# Patient Record
Sex: FEMALE | Race: WHITE | Employment: UNEMPLOYED | URBAN - METROPOLITAN AREA
[De-identification: names, ages, dates, MRNs, and addresses within clinical notes are randomized per-mention and may not be internally consistent; named-entity substitution may affect disease eponyms.]

---

## 2017-01-01 ENCOUNTER — HOSPITAL ENCOUNTER (OUTPATIENT)
Dept: LAB | Age: 77
Discharge: HOME | End: 2017-10-03

## 2017-01-01 ENCOUNTER — OFFICE VISIT (OUTPATIENT)
Dept: INTERNAL MEDICINE CLINIC | Age: 77
End: 2017-01-01

## 2017-01-01 VITALS
SYSTOLIC BLOOD PRESSURE: 184 MMHG | WEIGHT: 125 LBS | HEIGHT: 60 IN | BODY MASS INDEX: 24.54 KG/M2 | RESPIRATION RATE: 14 BRPM | TEMPERATURE: 97 F | OXYGEN SATURATION: 96 % | HEART RATE: 70 BPM | DIASTOLIC BLOOD PRESSURE: 106 MMHG

## 2017-01-01 DIAGNOSIS — N18.30 STAGE 3 CHRONIC KIDNEY DISEASE (HCC): ICD-10-CM

## 2017-01-01 DIAGNOSIS — I10 UNCONTROLLED HYPERTENSION: ICD-10-CM

## 2017-01-01 DIAGNOSIS — Z00.00 MEDICARE ANNUAL WELLNESS VISIT, SUBSEQUENT: Primary | ICD-10-CM

## 2017-01-01 LAB
ALBUMIN SERPL-MCNC: 4.3 G/DL (ref 3.5–4.8)
ALBUMIN/GLOB SERPL: 1.3 {RATIO} (ref 1.2–2.2)
ALP SERPL-CCNC: 79 IU/L (ref 39–117)
ALT SERPL-CCNC: 16 IU/L (ref 0–32)
AST SERPL-CCNC: 14 IU/L (ref 0–40)
BASOPHILS # BLD AUTO: 0 X10E3/UL (ref 0–0.2)
BASOPHILS NFR BLD AUTO: 0 %
BILIRUB SERPL-MCNC: 0.5 MG/DL (ref 0–1.2)
BUN SERPL-MCNC: 22 MG/DL (ref 8–27)
BUN/CREAT SERPL: 15 (ref 12–28)
CALCIUM SERPL-MCNC: 9.3 MG/DL (ref 8.7–10.3)
CHLORIDE SERPL-SCNC: 100 MMOL/L (ref 96–106)
CO2 SERPL-SCNC: 26 MMOL/L (ref 18–29)
CREAT SERPL-MCNC: 1.45 MG/DL (ref 0.57–1)
EOSINOPHIL # BLD AUTO: 0.2 X10E3/UL (ref 0–0.4)
EOSINOPHIL NFR BLD AUTO: 2 %
ERYTHROCYTE [DISTWIDTH] IN BLOOD BY AUTOMATED COUNT: 14.7 % (ref 12.3–15.4)
GLOBULIN SER CALC-MCNC: 3.3 G/DL (ref 1.5–4.5)
GLUCOSE SERPL-MCNC: 103 MG/DL (ref 65–99)
HCT VFR BLD AUTO: 34.1 % (ref 34–46.6)
HGB BLD-MCNC: 11.3 G/DL (ref 11.1–15.9)
IMM GRANULOCYTES # BLD: 0.1 X10E3/UL (ref 0–0.1)
IMM GRANULOCYTES NFR BLD: 1 %
LYMPHOCYTES # BLD AUTO: 1.8 X10E3/UL (ref 0.7–3.1)
LYMPHOCYTES NFR BLD AUTO: 21 %
MCH RBC QN AUTO: 29.1 PG (ref 26.6–33)
MCHC RBC AUTO-ENTMCNC: 33.1 G/DL (ref 31.5–35.7)
MCV RBC AUTO: 88 FL (ref 79–97)
MONOCYTES # BLD AUTO: 0.5 X10E3/UL (ref 0.1–0.9)
MONOCYTES NFR BLD AUTO: 6 %
NEUTROPHILS # BLD AUTO: 6.2 X10E3/UL (ref 1.4–7)
NEUTROPHILS NFR BLD AUTO: 70 %
PLATELET # BLD AUTO: 270 X10E3/UL (ref 150–379)
POTASSIUM SERPL-SCNC: 3.9 MMOL/L (ref 3.5–5.2)
PROT SERPL-MCNC: 7.6 G/DL (ref 6–8.5)
RBC # BLD AUTO: 3.88 X10E6/UL (ref 3.77–5.28)
SODIUM SERPL-SCNC: 142 MMOL/L (ref 134–144)
WBC # BLD AUTO: 8.7 X10E3/UL (ref 3.4–10.8)

## 2017-01-01 PROCEDURE — 99001 SPECIMEN HANDLING PT-LAB: CPT | Performed by: INTERNAL MEDICINE

## 2017-01-04 ENCOUNTER — HOSPITAL ENCOUNTER (EMERGENCY)
Age: 77
Discharge: LEFT BEFORE DISCHARGE | End: 2017-01-04
Attending: EMERGENCY MEDICINE
Payer: MEDICARE

## 2017-01-04 ENCOUNTER — APPOINTMENT (OUTPATIENT)
Dept: GENERAL RADIOLOGY | Age: 77
End: 2017-01-04
Attending: EMERGENCY MEDICINE
Payer: MEDICARE

## 2017-01-04 ENCOUNTER — APPOINTMENT (OUTPATIENT)
Dept: CT IMAGING | Age: 77
End: 2017-01-04
Attending: EMERGENCY MEDICINE
Payer: MEDICARE

## 2017-01-04 VITALS
OXYGEN SATURATION: 97 % | TEMPERATURE: 98.5 F | BODY MASS INDEX: 23.56 KG/M2 | HEIGHT: 60 IN | HEART RATE: 78 BPM | RESPIRATION RATE: 17 BRPM | WEIGHT: 120 LBS | DIASTOLIC BLOOD PRESSURE: 106 MMHG | SYSTOLIC BLOOD PRESSURE: 180 MMHG

## 2017-01-04 DIAGNOSIS — N39.0 ACUTE UTI: ICD-10-CM

## 2017-01-04 DIAGNOSIS — R47.81 SLURRED SPEECH: Primary | ICD-10-CM

## 2017-01-04 LAB
ALBUMIN SERPL BCP-MCNC: 3.6 G/DL (ref 3.4–5)
ALBUMIN/GLOB SERPL: 1.2 {RATIO} (ref 0.8–1.7)
ALP SERPL-CCNC: 68 U/L (ref 45–117)
ALT SERPL-CCNC: 18 U/L (ref 13–56)
ANION GAP BLD CALC-SCNC: 10 MMOL/L (ref 3–18)
APPEARANCE UR: CLEAR
APTT PPP: 25.1 SEC (ref 23–36.4)
AST SERPL W P-5'-P-CCNC: 13 U/L (ref 15–37)
BACTERIA URNS QL MICRO: ABNORMAL /HPF
BASOPHILS # BLD AUTO: 0 K/UL (ref 0–0.06)
BASOPHILS # BLD: 0 % (ref 0–2)
BILIRUB SERPL-MCNC: 0.5 MG/DL (ref 0.2–1)
BILIRUB UR QL: NEGATIVE
BUN SERPL-MCNC: 21 MG/DL (ref 7–18)
BUN/CREAT SERPL: 13 (ref 12–20)
CALCIUM SERPL-MCNC: 8.3 MG/DL (ref 8.5–10.1)
CHLORIDE SERPL-SCNC: 108 MMOL/L (ref 100–108)
CK MB CFR SERPL CALC: ABNORMAL % (ref 0–4)
CK MB SERPL-MCNC: <0.5 NG/ML (ref 0.5–3.6)
CK SERPL-CCNC: 76 U/L (ref 26–192)
CO2 SERPL-SCNC: 23 MMOL/L (ref 21–32)
COLOR UR: YELLOW
CREAT SERPL-MCNC: 1.64 MG/DL (ref 0.6–1.3)
DIFFERENTIAL METHOD BLD: ABNORMAL
EOSINOPHIL # BLD: 0.2 K/UL (ref 0–0.4)
EOSINOPHIL NFR BLD: 3 % (ref 0–5)
EPITH CASTS URNS QL MICRO: ABNORMAL /LPF (ref 0–5)
ERYTHROCYTE [DISTWIDTH] IN BLOOD BY AUTOMATED COUNT: 13.8 % (ref 11.6–14.5)
GLOBULIN SER CALC-MCNC: 2.9 G/DL (ref 2–4)
GLUCOSE BLD STRIP.AUTO-MCNC: 102 MG/DL (ref 70–110)
GLUCOSE SERPL-MCNC: 96 MG/DL (ref 74–99)
GLUCOSE UR STRIP.AUTO-MCNC: NEGATIVE MG/DL
HCT VFR BLD AUTO: 30.1 % (ref 35–45)
HGB BLD-MCNC: 10.1 G/DL (ref 12–16)
HGB UR QL STRIP: NEGATIVE
INR PPP: 1 (ref 0.8–1.2)
KETONES UR QL STRIP.AUTO: NEGATIVE MG/DL
LEUKOCYTE ESTERASE UR QL STRIP.AUTO: ABNORMAL
LYMPHOCYTES # BLD AUTO: 27 % (ref 21–52)
LYMPHOCYTES # BLD: 2 K/UL (ref 0.9–3.6)
MCH RBC QN AUTO: 30.9 PG (ref 24–34)
MCHC RBC AUTO-ENTMCNC: 33.6 G/DL (ref 31–37)
MCV RBC AUTO: 92 FL (ref 74–97)
MONOCYTES # BLD: 0.5 K/UL (ref 0.05–1.2)
MONOCYTES NFR BLD AUTO: 7 % (ref 3–10)
NEUTS SEG # BLD: 4.7 K/UL (ref 1.8–8)
NEUTS SEG NFR BLD AUTO: 63 % (ref 40–73)
NITRITE UR QL STRIP.AUTO: NEGATIVE
PH UR STRIP: 6.5 [PH] (ref 5–8)
PLATELET # BLD AUTO: 218 K/UL (ref 135–420)
PMV BLD AUTO: 9 FL (ref 9.2–11.8)
POTASSIUM SERPL-SCNC: 4.2 MMOL/L (ref 3.5–5.5)
PROT SERPL-MCNC: 6.5 G/DL (ref 6.4–8.2)
PROT UR STRIP-MCNC: NEGATIVE MG/DL
PROTHROMBIN TIME: 13.2 SEC (ref 11.5–15.2)
RBC # BLD AUTO: 3.27 M/UL (ref 4.2–5.3)
RBC #/AREA URNS HPF: 0 /HPF (ref 0–5)
SODIUM SERPL-SCNC: 141 MMOL/L (ref 136–145)
SP GR UR REFRACTOMETRY: 1 (ref 1–1.03)
TROPONIN I SERPL-MCNC: <0.02 NG/ML (ref 0–0.04)
UROBILINOGEN UR QL STRIP.AUTO: 0.2 EU/DL (ref 0.2–1)
WBC # BLD AUTO: 7.4 K/UL (ref 4.6–13.2)
WBC URNS QL MICRO: ABNORMAL /HPF (ref 0–4)

## 2017-01-04 PROCEDURE — 74011250636 HC RX REV CODE- 250/636

## 2017-01-04 PROCEDURE — 87186 SC STD MICRODIL/AGAR DIL: CPT | Performed by: EMERGENCY MEDICINE

## 2017-01-04 PROCEDURE — 71010 XR CHEST PORT: CPT

## 2017-01-04 PROCEDURE — 85025 COMPLETE CBC W/AUTO DIFF WBC: CPT | Performed by: EMERGENCY MEDICINE

## 2017-01-04 PROCEDURE — 85730 THROMBOPLASTIN TIME PARTIAL: CPT | Performed by: EMERGENCY MEDICINE

## 2017-01-04 PROCEDURE — 87077 CULTURE AEROBIC IDENTIFY: CPT | Performed by: EMERGENCY MEDICINE

## 2017-01-04 PROCEDURE — 85610 PROTHROMBIN TIME: CPT | Performed by: EMERGENCY MEDICINE

## 2017-01-04 PROCEDURE — 96374 THER/PROPH/DIAG INJ IV PUSH: CPT

## 2017-01-04 PROCEDURE — 93005 ELECTROCARDIOGRAM TRACING: CPT

## 2017-01-04 PROCEDURE — 81001 URINALYSIS AUTO W/SCOPE: CPT | Performed by: EMERGENCY MEDICINE

## 2017-01-04 PROCEDURE — 87086 URINE CULTURE/COLONY COUNT: CPT | Performed by: EMERGENCY MEDICINE

## 2017-01-04 PROCEDURE — 80053 COMPREHEN METABOLIC PANEL: CPT | Performed by: EMERGENCY MEDICINE

## 2017-01-04 PROCEDURE — 82962 GLUCOSE BLOOD TEST: CPT

## 2017-01-04 PROCEDURE — 70450 CT HEAD/BRAIN W/O DYE: CPT

## 2017-01-04 PROCEDURE — 82550 ASSAY OF CK (CPK): CPT | Performed by: EMERGENCY MEDICINE

## 2017-01-04 PROCEDURE — 99285 EMERGENCY DEPT VISIT HI MDM: CPT

## 2017-01-04 RX ORDER — NITROFURANTOIN 25; 75 MG/1; MG/1
100 CAPSULE ORAL 2 TIMES DAILY
Qty: 10 CAP | Refills: 0 | Status: SHIPPED | OUTPATIENT
Start: 2017-01-04 | End: 2017-01-09

## 2017-01-04 RX ORDER — LORAZEPAM 2 MG/ML
INJECTION INTRAMUSCULAR
Status: COMPLETED
Start: 2017-01-04 | End: 2017-01-04

## 2017-01-04 RX ORDER — LORAZEPAM 2 MG/ML
1 INJECTION INTRAMUSCULAR
Status: COMPLETED | OUTPATIENT
Start: 2017-01-04 | End: 2017-01-04

## 2017-01-04 RX ADMIN — LORAZEPAM 1 MG: 2 INJECTION INTRAMUSCULAR at 18:14

## 2017-01-04 NOTE — ED NOTES
Assumed care of pt from triage  Per daughter aprx 20 hours ago she noticed pt had slurred speech and seemed more fatigued than normal  Pt states increased fatigue from recent drive from [place]. A&O X 4. No weakness, facial droop or headache. Equal  strength and push against gravity, no pronator drift.   Pt vocal about not wanting to be \"here\"

## 2017-01-04 NOTE — ED PROVIDER NOTES
HPI Comments: 4:43 PM Alphonso Gaytan is a 68 y.o. female with a history of HTN and dementia who presents to the ED for evaluation of slurred speech that began at 6 AM this morning. Patient's daughter states that last night, the patient seemed more tired than normal last night. Last known well time is approximately 20 hours ago, per the patient's daughter. Patient does not feel as if her speech has changed or as if her limbs are weaker anywhere. Patient's daughter states that the patient takes aspirin daily, and that she has had her aspirin today. Patient denies smoking, drinking, or any drug use. She denies any CP or pain anywhere. There are no other concerns at this time. Patient is a 68 y.o. female presenting with aphasia. Aphasia    Pertinent negatives include no diarrhea, no vomiting, no congestion, no ear pain, no headaches, no shortness of breath and no cough.         Past Medical History:   Diagnosis Date    Amblyopia of left eye     Blind left eye     Cataract      bilat, right has been replaced    CRI (chronic renal insufficiency)     Dementia     Essential hypertension, malignant 2014    Hypertension     Left bundle branch block     Otitis media, acute 16     left       Past Surgical History:   Procedure Laterality Date    Hx tonsillectomy      Hx  section  1968    Hx  section  1964    Hx  section  1970    Hx cataract removal Right 2013         Family History:   Problem Relation Age of Onset    Heart Disease Father     Other Brother      ? brain injury       Social History     Social History    Marital status:      Spouse name: N/A    Number of children: N/A    Years of education: N/A     Occupational History    , clerical      Social History Main Topics    Smoking status: Never Smoker    Smokeless tobacco: Not on file    Alcohol use No    Drug use: No    Sexual activity: No     Other Topics Concern    Not on file Social History Narrative         ALLERGIES: Codeine; Levaquin [levofloxacin]; Pcn [penicillins]; and Sulfa (sulfonamide antibiotics)    Review of Systems   Constitutional: Negative for chills, fatigue and fever. HENT: Negative for congestion, ear pain, rhinorrhea and sore throat. Eyes: Negative for pain. Respiratory: Negative for cough and shortness of breath. Cardiovascular: Negative for chest pain and palpitations. Gastrointestinal: Negative for abdominal pain, diarrhea, nausea, rectal pain and vomiting. Genitourinary: Negative for dysuria, flank pain, hematuria, pelvic pain, urgency and vaginal pain. Musculoskeletal: Negative for arthralgias, back pain, myalgias and neck pain. Skin: Negative for rash and wound. Neurological: Positive for speech difficulty (slurred, per the patient's daughter). Negative for dizziness and headaches. All other systems reviewed and are negative. Vitals:    01/04/17 1722 01/04/17 1723 01/04/17 1724 01/04/17 1725   BP:       Pulse: 82 83 80 80   Resp: 17 17 18 17   Temp:       SpO2: 97% 95% 94% 94%   Weight:       Height:       98% on RA, indicating adequate oxygenation. Physical Exam   Constitutional: She is oriented to person, place, and time. She appears well-developed and well-nourished. No distress. HENT:   Head: Normocephalic and atraumatic. Mouth/Throat: Oropharynx is clear and moist.   Eyes: Conjunctivae and EOM are normal. Pupils are equal, round, and reactive to light. No scleral icterus. Neck: Normal range of motion. Neck supple. Cardiovascular: Normal rate, regular rhythm and normal heart sounds. No murmur heard. Pulmonary/Chest: Effort normal and breath sounds normal. No respiratory distress. Abdominal: Soft. Bowel sounds are normal. She exhibits no distension. There is no tenderness. Musculoskeletal: She exhibits no edema. Lymphadenopathy:     She has no cervical adenopathy.    Neurological: She is alert and oriented to person, place, and time. Coordination normal.   Strength 5/5 bilateral upper extremities. Gross sensation intact. No facial droop. Strength 5/5 bilateral lower extremities. Mild slurred speech. Skin: Skin is warm and dry. No rash noted. Psychiatric: She has a normal mood and affect. Her behavior is normal.   Nursing note and vitals reviewed.        MDM  Number of Diagnoses or Management Options  Acute UTI:   Slurred speech:   Diagnosis management comments: Recent trip from [place] pt c/o being increased tired daughter noticed slurred speech last night worse this am now slightly improved no other neuro deficit noted    Ekg; nsr rate 78 no acute st changes     Pt refused any further workup understood risks of leaving and benefits of staying discussed with daughter who also believed the patient is able to understand both risks and benefits of refusing or accepting care     Noted uti after pt left AMA called daughter and sent Rx for macrobid to her pharmacy urine cultured        Amount and/or Complexity of Data Reviewed  Clinical lab tests: ordered and reviewed  Tests in the radiology section of CPT®: ordered and reviewed  Independent visualization of images, tracings, or specimens: yes    Risk of Complications, Morbidity, and/or Mortality  Presenting problems: high  Diagnostic procedures: moderate  Management options: moderate      ED Course       Procedures    Vitals:  Patient Vitals for the past 12 hrs:   Temp Pulse Resp BP SpO2   01/04/17 1725 - 80 17 - 94 %   01/04/17 1724 - 80 18 - 94 %   01/04/17 1723 - 83 17 - 95 %   01/04/17 1722 - 82 17 - 97 %   01/04/17 1721 - 80 16 - 96 %   01/04/17 1720 - 82 14 - 95 %   01/04/17 1719 - 83 16 - 95 %   01/04/17 1718 - 83 17 - 95 %   01/04/17 1717 - 83 17 - 94 %   01/04/17 1715 - 82 18 - 95 %   01/04/17 1714 - 83 15 - 95 %   01/04/17 1713 - 87 16 - 96 %   01/04/17 1712 - 84 21 - 96 %   01/04/17 1711 - 81 18 - 95 %   01/04/17 1710 - 83 20 - 95 % 01/04/17 1709 - 84 22 - 96 %   01/04/17 1708 - 82 17 - 95 %   01/04/17 1657 - 90 18 - 95 %   01/04/17 1656 - 89 13 - 96 %   01/04/17 1655 - 88 16 - 94 %   01/04/17 1654 - 86 19 - 95 %   01/04/17 1653 - 90 22 - 96 %   01/04/17 1652 - 86 18 - 95 %   01/04/17 1651 - 87 19 - 95 %   01/04/17 1647 - 89 18 - 95 %   01/04/17 1645 - - - (!) 157/98 -   01/04/17 1633 98.5 °F (36.9 °C) 82 16 (!) 159/105 98 %   98% on RA, indicating adequate oxygenation. Medications ordered:   Medications   LORazepam (ATIVAN) 2 mg/mL injection (not administered)         Lab findings:  Recent Results (from the past 12 hour(s))   GLUCOSE, POC    Collection Time: 01/04/17  4:50 PM   Result Value Ref Range    Glucose (POC) 102 70 - 110 mg/dL   CBC WITH AUTOMATED DIFF    Collection Time: 01/04/17  5:00 PM   Result Value Ref Range    WBC 7.4 4.6 - 13.2 K/uL    RBC 3.27 (L) 4.20 - 5.30 M/uL    HGB 10.1 (L) 12.0 - 16.0 g/dL    HCT 30.1 (L) 35.0 - 45.0 %    MCV 92.0 74.0 - 97.0 FL    MCH 30.9 24.0 - 34.0 PG    MCHC 33.6 31.0 - 37.0 g/dL    RDW 13.8 11.6 - 14.5 %    PLATELET 102 598 - 741 K/uL    MPV 9.0 (L) 9.2 - 11.8 FL    NEUTROPHILS 63 40 - 73 %    LYMPHOCYTES 27 21 - 52 %    MONOCYTES 7 3 - 10 %    EOSINOPHILS 3 0 - 5 %    BASOPHILS 0 0 - 2 %    ABS. NEUTROPHILS 4.7 1.8 - 8.0 K/UL    ABS. LYMPHOCYTES 2.0 0.9 - 3.6 K/UL    ABS. MONOCYTES 0.5 0.05 - 1.2 K/UL    ABS. EOSINOPHILS 0.2 0.0 - 0.4 K/UL    ABS.  BASOPHILS 0.0 0.0 - 0.06 K/UL    DF AUTOMATED     METABOLIC PANEL, COMPREHENSIVE    Collection Time: 01/04/17  5:00 PM   Result Value Ref Range    Sodium 141 136 - 145 mmol/L    Potassium 4.2 3.5 - 5.5 mmol/L    Chloride 108 100 - 108 mmol/L    CO2 23 21 - 32 mmol/L    Anion gap 10 3.0 - 18 mmol/L    Glucose 96 74 - 99 mg/dL    BUN 21 (H) 7.0 - 18 MG/DL    Creatinine 1.64 (H) 0.6 - 1.3 MG/DL    BUN/Creatinine ratio 13 12 - 20      GFR est AA 37 (L) >60 ml/min/1.73m2    GFR est non-AA 30 (L) >60 ml/min/1.73m2    Calcium 8.3 (L) 8.5 - 10.1 MG/DL Bilirubin, total 0.5 0.2 - 1.0 MG/DL    ALT 18 13 - 56 U/L    AST 13 (L) 15 - 37 U/L    Alk. phosphatase 68 45 - 117 U/L    Protein, total 6.5 6.4 - 8.2 g/dL    Albumin 3.6 3.4 - 5.0 g/dL    Globulin 2.9 2.0 - 4.0 g/dL    A-G Ratio 1.2 0.8 - 1.7     CARDIAC PANEL,(CK, CKMB & TROPONIN)    Collection Time: 01/04/17  5:00 PM   Result Value Ref Range    CK 76 26 - 192 U/L    CK - MB <0.5 (L) 0.5 - 3.6 ng/ml    CK-MB Index CANNOT BE CALCULATED 0.0 - 4.0 %    Troponin-I, Qt. <0.02 0.0 - 0.045 NG/ML   PROTHROMBIN TIME + INR    Collection Time: 01/04/17  5:00 PM   Result Value Ref Range    Prothrombin time 13.2 11.5 - 15.2 sec    INR 1.0 0.8 - 1.2     PTT    Collection Time: 01/04/17  5:00 PM   Result Value Ref Range    aPTT 25.1 23.0 - 36.4 SEC   EKG, 12 LEAD, INITIAL    Collection Time: 01/04/17  5:11 PM   Result Value Ref Range    Ventricular Rate 78 BPM    Atrial Rate 78 BPM    P-R Interval 146 ms    QRS Duration 86 ms    Q-T Interval 406 ms    QTC Calculation (Bezet) 462 ms    Calculated P Axis 31 degrees    Calculated R Axis 9 degrees    Calculated T Axis -12 degrees    Diagnosis       Normal sinus rhythm  T wave abnormality, consider inferior ischemia  Abnormal ECG  When compared with ECG of 01-OCT-2015 02:10,  Left bundle branch block is no longer present         X-Ray, CT or other radiology findings or impressions:  CT HEAD WO CONT   Final Result   IMPRESSION:      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 There is no evidence  of hemorrhage, mass effect or acute infarction    XR CHEST PORT    (Results Pending)   6:17 PM As interpreted by myself, chest x-ray shows no acute cardiopulmonary process. Progress notes, Consult notes or additional Procedure notes:   6:17 PM Patient is repeatedly asking to go home. Will allow the patient to sign out AMA. Discussed with the patient and her daughter the need to return to the ED if any weakness, slurred speech, or other symptoms arise.  Patient and daughter verbalize agreement and understanding. Disposition:  Diagnosis: slurred speech     Disposition: Left AMA    Follow-up Information     Follow up With Details Comments Contact Info    [facility] EMERGENCY DEPT  as soon as you are willing to be seen again  [address] [place] [address]    Yana Goodman MD Schedule an appointment as soon as possible for a visit call tomorrow for ED follow up appointment [address]  [phone]              Patient's Medications   Start Taking    No medications on file   Continue Taking    ASPIRIN DELAYED-RELEASE 81 MG TABLET    Take 1 Tab by mouth daily. ATORVASTATIN (LIPITOR) 80 MG TABLET    Take 1 Tab by mouth nightly. CARVEDILOL (COREG) 25 MG TABLET    Take 1 Tab by mouth two (2) times a day. Indications: HYPERTENSION    CLONIDINE HCL (CATAPRES) 0.2 MG TABLET    Take 1 Tab by mouth two (2) times a day. FLUTICASONE (FLONASE) 50 MCG/ACTUATION NASAL SPRAY    2 Sprays by Both Nostrils route daily. HYDROCHLOROTHIAZIDE (HYDRODIURIL) 12.5 MG TABLET    Take 12.5 mg by mouth daily. LOSARTAN (COZAAR) 100 MG TABLET    Take 1 Tab by mouth daily. These Medications have changed    No medications on file   Stop Taking    No medications on file     Scribe Attestation:   Tracey Velazquez acting as a scribe for and in the presence of Dr. Kofi Marino MD January 04, 2017 at 4:48 PM       Provider Attestation:   I personally performed the services described in the documentation, reviewed the documentation, as recorded by the scribe in my presence, and it accurately and completely records my words and actions. Reviewed and signed by:  Dr. Kofi Marino MD      Signed by:  Monae Traore, January 04, 2017 at 6:21 PM

## 2017-01-04 NOTE — ED TRIAGE NOTES
Per daughter she noticed patient's speech is a little slurred this morning at 0600 am, pt states she just came back from [place] 2 days ago and daughter noticed patient has been feeling tired since then. Denies any other symptoms.

## 2017-01-05 LAB
ATRIAL RATE: 78 BPM
CALCULATED P AXIS, ECG09: 31 DEGREES
CALCULATED R AXIS, ECG10: 9 DEGREES
CALCULATED T AXIS, ECG11: -12 DEGREES
DIAGNOSIS, 93000: NORMAL
P-R INTERVAL, ECG05: 146 MS
Q-T INTERVAL, ECG07: 406 MS
QRS DURATION, ECG06: 86 MS
QTC CALCULATION (BEZET), ECG08: 462 MS
VENTRICULAR RATE, ECG03: 78 BPM

## 2017-01-06 ENCOUNTER — PATIENT OUTREACH (OUTPATIENT)
Dept: INTERNAL MEDICINE CLINIC | Age: 77
End: 2017-01-06

## 2017-01-06 LAB
BACTERIA SPEC CULT: NORMAL
SERVICE CMNT-IMP: NORMAL

## 2017-01-06 NOTE — PROGRESS NOTES
Transitional Care Nurse Navigator Note:  Emergency Department Follow Up for Artemio 1/4/17  Per EMR due to: Per daughter she noticed patient's speech is a little slurred    NN outgoing call to patient. Spoke to daughter/Margaret. She states she is 90% better. Pt is back to baseline activities. Follow up appt offered, pt will keep appt of 1/31/17 at 1030 with Dr Andrew Ford.     Red flags reviewed for altered mental status, change in speech seek medical attention PCP/ED. She verbalized understanding and thanked us for the call.

## 2017-01-31 ENCOUNTER — OFFICE VISIT (OUTPATIENT)
Dept: INTERNAL MEDICINE CLINIC | Age: 77
End: 2017-01-31

## 2017-01-31 VITALS
BODY MASS INDEX: 25.32 KG/M2 | HEART RATE: 74 BPM | RESPIRATION RATE: 16 BRPM | DIASTOLIC BLOOD PRESSURE: 84 MMHG | WEIGHT: 129 LBS | SYSTOLIC BLOOD PRESSURE: 147 MMHG | HEIGHT: 60 IN | TEMPERATURE: 95.9 F | OXYGEN SATURATION: 97 %

## 2017-01-31 DIAGNOSIS — N18.3 CKD (CHRONIC KIDNEY DISEASE), STAGE 3 (MODERATE): ICD-10-CM

## 2017-01-31 DIAGNOSIS — I10 ESSENTIAL HYPERTENSION, MALIGNANT: Primary | Chronic | ICD-10-CM

## 2017-01-31 RX ORDER — MINOXIDIL 2.5 MG/1
TABLET ORAL DAILY
COMMUNITY
End: 2018-01-01

## 2017-01-31 NOTE — PROGRESS NOTES
HISTORY OF PRESENT ILLNESS  Adore Galindo is a 68 y.o. female. Visit Vitals    /84 (BP 1 Location: Left arm, BP Patient Position: Sitting)    Pulse 74    Temp 95.9 °F (35.5 °C) (Oral)    Resp 16    Ht 5' (1.524 m)    Wt 129 lb (58.5 kg)    SpO2 97%    BMI 25.19 kg/m2       HPI Comments: She was diagnosed with UTI in early January and tx's apporopriately    Hypertension    The history is provided by the patient. This is a chronic problem. The current episode started more than 1 week ago. The problem has been gradually improving. Associated symptoms comments: Had an episode of slurred. speech--se ER note. No further sxs of that. There are no associated agents to hypertension. Risk factors include postmenopause and stress. Review of Systems   Constitutional: Negative. Weight up 5 #. Respiratory: Negative. Cardiovascular: Negative. Neurological:        See HPI         Physical Exam   Constitutional: She is oriented to person, place, and time. She appears well-developed and well-nourished. No distress. Cardiovascular: Normal rate and regular rhythm. Pulmonary/Chest: Effort normal and breath sounds normal.   Musculoskeletal: She exhibits no edema. Neurological: She is alert and oriented to person, place, and time. Speech is clear. Skin: Skin is warm and dry. She is not diaphoretic. Psychiatric: She has a normal mood and affect. Nursing note and vitals reviewed. ASSESSMENT and PLAN    ICD-10-CM ICD-9-CM    1. Essential hypertension, malignant I10 401.0    2. CKD (chronic kidney disease), stage 3 (moderate) N18.3 585. 3        Available hospital record reviewed--ER    Kidney function better that time    Overall BP trending downward with new med from cardiology.     F/u 3-4 months

## 2017-01-31 NOTE — PROGRESS NOTES
Pt presented today for f/u htn. Per pt son Dr Timmy Smith added Loniten to pts medication list.  Has pt had any falls since last 30 days? No.  Pt preferred language for health care discussion is english. Advanced Directive? no    Is someone accompanying this pt? Yes; Son Ramsey Wallace    Is the patient using any DME equipment during [address]? No    1. Have you been to the ER, urgent care clinic since your last 30 days? Hospitalized since your last 30 days? Yes; [facility] on 1/4/2017  for slurred speech and UTI    2. Have you seen or consulted any other health care providers outside of the [address] [place] [address] since your last 30 days? No      Pt  has a reminder for a \"due or due soon\" health maintenance. Pt due for zoster.

## 2017-01-31 NOTE — MR AVS SNAPSHOT
Visit Information Date & Time Provider Department Dept. Phone Encounter #  
 1/31/2017 10:30 AM Diomedes Rivera, [address] [id number] Follow-up Instructions Return in about 4 months (around 5/31/2017) for htn. Upcoming Health Maintenance Date Due DTaP/Tdap/Td series (1 - Tdap) 7/8/2017* ZOSTER VACCINE AGE 60> 1/31/2018* MEDICARE YEARLY EXAM 10/25/2017 GLAUCOMA SCREENING Q2Y 11/2/2017 *Topic was postponed. The date shown is not the original due date. Allergies as of 1/31/2017  Review Complete On: 1/31/2017 By: Diomedes Rivera MD  
  
 Severity Noted Reaction Type Reactions Codeine  05/20/2014    Unknown (comments) Unsure due to happening so long Levaquin [Levofloxacin]  09/03/2015    Nausea Only  
 intolerance Pcn [Penicillins]  04/25/2014   Systemic Hives Sulfa (Sulfonamide Antibiotics)  05/20/2014    Unknown (comments) Unsure it was so long ago Current Immunizations  Reviewed on 4/25/2014 Name Date Pneumococcal Conjugate (PCV-13) 8/10/2015 Pneumococcal Polysaccharide (PPSV-23) 5/20/2014 Td 4/25/2011 Not reviewed this visit You Were Diagnosed With   
  
 Codes Comments Essential hypertension, malignant    -  Primary ICD-10-CM: I10 
ICD-9-CM: 401.0 CKD (chronic kidney disease), stage 3 (moderate)     ICD-10-CM: N18.3 ICD-9-CM: 658. 3 Vitals BP Pulse Temp Resp Height(growth percentile) Weight(growth percentile) 147/84 (BP 1 Location: Left arm, BP Patient Position: Sitting) 74 95.9 °F (35.5 °C) (Oral) 16 5' (1.524 m) 129 lb (58.5 kg) SpO2 BMI OB Status Smoking Status 97% 25.19 kg/m2 Postmenopausal Never Smoker Vitals History BMI and BSA Data Body Mass Index Body Surface Area  
 25.19 kg/m 2 1.57 m 2 Preferred Pharmacy Pharmacy Name Phone  [address] AT [facility] [address] [facility] [phone] Your Updated Medication List  
  
   
This list is accurate as of: 1/31/17 11:24 AM.  Always use your most recent med list.  
  
  
  
  
 aspirin delayed-release 81 mg tablet Take 1 Tab by mouth daily. atorvastatin 80 mg tablet Commonly known as:  LIPITOR Take 1 Tab by mouth nightly. carvedilol 25 mg tablet Commonly known as:  Kathlean Due Take 1 Tab by mouth two (2) times a day. Indications: HYPERTENSION  
  
 cloNIDine HCl 0.2 mg tablet Commonly known as:  CATAPRES Take 1 Tab by mouth two (2) times a day. fluticasone 50 mcg/actuation nasal spray Commonly known as:  Alric Eaves 2 Sprays by Both Nostrils route daily. hydroCHLOROthiazide 12.5 mg tablet Commonly known as:  HYDRODIURIL Take 12.5 mg by mouth daily. losartan 100 mg tablet Commonly known as:  COZAAR Take 1 Tab by mouth daily. minoxidil 2.5 mg tablet Commonly known as:  Ese [place] Take  by mouth daily. Follow-up Instructions Return in about 4 months (around 5/31/2017) for htn. Introducing [facility]! Dear Mission: 
Thank you for requesting a [employer] account. Our records indicate that you already have an active InfoAssure account. You can access your account anytime at [email or web address] Did you know that you can access your hospital and ER discharge instructions at any time in Info[employer]? You can also review all of your test results from your hospital stay or ER visit. Additional Information If you have questions, please visit the Frequently Asked Questions section of the Info[employer] website at [email or web address] Remember, InfoAssure is NOT to be used for urgent needs. For medical emergencies, dial 911. Now available from your iPhone and Android! Please provide this summary of care documentation to your next provider. Your primary care clinician is listed as [facility]. If you have any questions after today's visit, please call [phone].

## 2017-04-14 RX ORDER — CLONIDINE HYDROCHLORIDE 0.2 MG/1
TABLET ORAL
Qty: 180 TAB | Refills: 5 | Status: SHIPPED | OUTPATIENT
Start: 2017-04-14 | End: 2018-01-01

## 2017-10-03 NOTE — ACP (ADVANCE CARE PLANNING)
Advance Directive:  1. Do you have an advance directive in place? Patient Reply:not on file    2. If not, would you like material regarding how to put one in place?  Patient Reply: son states has at home and will scan in and send via my chart

## 2017-10-03 NOTE — PROGRESS NOTES
HISTORY OF PRESENT ILLNESS  Adore Galindo is a 68 y.o. female. Visit Vitals    BP (!) 184/106    Pulse 70    Temp 97 °F (36.1 °C) (Oral)    Resp 14    Ht 5' (1.524 m)    Wt 125 lb (56.7 kg)    SpO2 96%    BMI 24.41 kg/m2       Hypertension    The history is provided by the patient. This is a chronic problem. The current episode started more than 1 week ago. The problem has not changed since onset. There are no associated agents to hypertension. Risk factors include stress and postmenopause. Review of Systems   Constitutional: Negative. Respiratory: Negative. Cardiovascular: Negative. Physical Exam   Constitutional: She is oriented to person, place, and time. She appears well-developed and well-nourished. No distress. Cardiovascular: Normal rate and regular rhythm. Pulmonary/Chest: Effort normal and breath sounds normal.   Neurological: She is alert and oriented to person, place, and time. Skin: Skin is warm and dry. She is not diaphoretic. Psychiatric:   Vary anxiety prone  Dementia     Nursing note and vitals reviewed. ASSESSMENT and PLAN    ICD-10-CM ICD-9-CM            Uncontrolled hypertension D46 308.6 METABOLIC PANEL, COMPREHENSIVE      CBC WITH AUTOMATED DIFF    Stage 3 chronic kidney disease L25.4 080.7 METABOLIC PANEL, COMPREHENSIVE      CBC WITH AUTOMATED DIFF       BP very labile--was excellent with Dr.Goldberg in May    Update lab--pt does not like needles of any kind    Pt's underlying psychiatric illness makes management difficult. Have tried to minimize lab and tests as much as possible, but lab is 8 months old and her BP is uncontrolled today.  ? Status of kidneys    F/u 6 months for BP

## 2017-10-03 NOTE — PATIENT INSTRUCTIONS

## 2017-10-03 NOTE — PROGRESS NOTES
This is a Subsequent Medicare Annual Wellness Exam (AWV) (Performed 12 months after IPPE or effective date of Medicare Part B enrollment, Once in a lifetime)    I have reviewed the patient's medical history in detail and updated the computerized patient record. History     Past Medical History:   Diagnosis Date    Amblyopia of left eye     Blind left eye     Cataract     bilat, right has been replaced    CRI (chronic renal insufficiency)     Dementia     Essential hypertension, malignant 2014    Hypertension     Left bundle branch block     Otitis media, acute 16    left      Past Surgical History:   Procedure Laterality Date    HX CATARACT REMOVAL Right     HX  SECTION  1968    HX  SECTION  1964    HX  SECTION      HX TONSILLECTOMY       Current Outpatient Prescriptions   Medication Sig Dispense Refill    cloNIDine HCl (CATAPRES) 0.2 mg tablet TAKE 1 TABLET BY MOUTH TWICE DAILY 180 Tab 5    minoxidil (LONITEN) 2.5 mg tablet Take  by mouth daily.  fluticasone (FLONASE) 50 mcg/actuation nasal spray 2 Sprays by Both Nostrils route daily. 1 Bottle 5    losartan (COZAAR) 100 mg tablet Take 1 Tab by mouth daily. 30 Tab 5    Hydrochlorothiazide (HYDRODIURIL) 12.5 mg tablet Take 12.5 mg by mouth daily.  aspirin delayed-release 81 mg tablet Take 1 Tab by mouth daily. 1 Tab 0    carvedilol (COREG) 25 mg tablet Take 1 Tab by mouth two (2) times a day.  Indications: HYPERTENSION 60 Tab 5    atorvastatin (LIPITOR) 80 mg tablet TAKE 1 TABLET BY MOUTH EVERY NIGHT 30 Tab 5     Allergies   Allergen Reactions    Codeine Unknown (comments)     Unsure due to happening so long    Levaquin [Levofloxacin] Nausea Only     intolerance    Pcn [Penicillins] Hives    Sulfa (Sulfonamide Antibiotics) Unknown (comments)     Unsure it was so long ago     Family History   Problem Relation Age of Onset    Heart Disease Father     Other Brother      ? brain injury Social History   Substance Use Topics    Smoking status: Never Smoker    Smokeless tobacco: Never Used    Alcohol use No     Patient Active Problem List   Diagnosis Code    Essential hypertension, malignant I10    NESS (acute kidney injury) ([facility] [address] [facility] N17.9    Dehydration E86.0    Abnormality of gait and mobility R26.9    Stroke [facility] I63.9    CKD (chronic kidney disease) N18.9    Uncontrolled hypertension I10       Depression Risk Factor Screening:     PHQ over the last two weeks 10/3/2017   Little interest or pleasure in doing things Not at all   Feeling down, depressed or hopeless Not at all   Total Score PHQ 2 0     Alcohol Risk Factor Screening: You do not drink alcohol or very rarely. Functional Ability and Level of Safety:   Hearing Loss  Hearing is good. Activities of Daily Living  The home contains: grab bars. Has checked for rugs and cords  Patient needs help with:  transportation and managing money    Fall Risk  Fall Risk Assessment, last 12 mths 10/3/2017   Able to walk? Yes   Fall in past 12 months? No       Abuse Screen  Patient is not abused    Cognitive Screening   Evaluation of Cognitive Function:  Has your family/caregiver stated any concerns about your memory: yes  Abnormal--know mild cognitive deficits. No significant change    Patient Care Team   Patient Care Team:  Bobo Up MD as PCP - General (Internal Medicine)  Babak Holloway MD (Obstetrics & Gynecology)  Leopoldo Kaplan MD (Cardiology)  Argelia Johnston MD as Consulting Provider (Ophthalmology)  Whitley Ndiaye MD as Consulting Provider (Otolaryngology)  Vanessa Rivera RN as Ambulatory Care Navigator    Assessment/Plan   Education and counseling provided:  Are appropriate based on today's review and evaluation    Diagnoses and all orders for this visit:    1.  Medicare annual wellness visit, subsequent      Pt declines all vaccines, Mammogram and BMD    Health Maintenance Due   Topic Date Due    INFLUENZA AGE 9 TO ADULT  08/01/2017    GLAUCOMA SCREENING Q2Y  11/02/2017

## 2017-10-03 NOTE — PROGRESS NOTES
Chief Complaint   Patient presents with    Annual Wellness Visit       Pt preferred language for health care discussion is english. Is someone accompanying this pt? Yes, son    Is the patient using any DME equipment during OV? no    Depression Screening:  PHQ over the last two weeks 10/3/2017 1/31/2017 7/8/2016 7/6/2015 4/25/2014   Little interest or pleasure in doing things Not at all Not at all Not at all Not at all Not at all   Feeling down, depressed or hopeless Not at all Not at all Not at all Not at all Not at all   Total Score PHQ 2 0 0 0 0 0       Learning Assessment:  Learning Assessment 4/25/2014   PRIMARY LEARNER Patient   HIGHEST LEVEL OF EDUCATION - PRIMARY LEARNER  [facility] PRIMARY LEARN[facility] NONE   CO-LEARNER CAREGIVER No   PRIMARY LANGUAGE ENGLISH   LEARNER PREFERENCE PRIMARY READING   ANSWERED BY patient   RELATIONSHIP SELF       Abuse Screening:  Abuse Screening Questionnaire 4/25/2014   Do you ever feel afraid of your partner? N   Are you in a relationship with someone who physically or mentally threatens you? N   Is it safe for you to go home? Y       Fall Risk  Fall Risk Assessment, last 12 mths 10/3/2017 1/31/2017 7/8/2016 7/6/2015 4/25/2014   Able to walk? Yes Yes Yes Yes Yes   Fall in past 12 months? No No No No No         Health Maintenance reviewed and discussed per provider. Yes    Pt is due for influenza (declined), glaucoma screening (getting release). Please order/place referral if appropriate. Pt currently taking Antiplatelet therapy? Yes aspirin    Advance Directive:  1. Do you have an advance directive in place? Patient Reply:not on file    2. If not, would you like material regarding how to put one in place? Patient Reply: son states has at home and will scan in and send via my chart    Coordination of Care:  1. Have you been to the ER, urgent care clinic since your last visit? Hospitalized since your last visit? no    2.  Have you seen or consulted any other health care providers outside of the [address] [place] [address] since your last visit? Include any pap smears or colon screening. no    Please see Red banners under Allergies, Med rec, Immunizations to remove outside inquires. All correct information has been verified with patient and added to chart. Medication's patient's would liked removed:  zocor.

## 2017-10-03 NOTE — MR AVS SNAPSHOT
Visit Information Date & Time Provider Department Dept. Phone Encounter #  
 10/3/2017 10:00 AM Jose Sanchez, [address] [id number] Follow-up Instructions Return in about 6 months (around 4/3/2018) for htn. Upcoming Health Maintenance Date Due INFLUENZA AGE 9 TO ADULT 8/1/2017 GLAUCOMA SCREENING Q2Y 11/2/2017 ZOSTER VACCINE AGE 60> 1/31/2018* DTaP/Tdap/Td series (1 - Tdap) 10/3/2018* MEDICARE YEARLY EXAM 10/25/2017 *Topic was postponed. The date shown is not the original due date. Allergies as of 10/3/2017  Review Complete On: 10/3/2017 By: Jose Sanchez MD  
  
 Severity Noted Reaction Type Reactions Codeine  05/20/2014    Unknown (comments) Unsure due to happening so long Levaquin [Levofloxacin]  09/03/2015    Nausea Only  
 intolerance Pcn [Penicillins]  04/25/2014   Systemic Hives Sulfa (Sulfonamide Antibiotics)  05/20/2014    Unknown (comments) Unsure it was so long ago Current Immunizations  Reviewed on 4/25/2014 Name Date Pneumococcal Conjugate (PCV-13) 8/10/2015 Pneumococcal Polysaccharide (PPSV-23) 5/20/2014 Td 4/25/2011 Not reviewed this visit You Were Diagnosed With   
  
 Codes Comments Medicare annual wellness visit, subsequent    -  Primary ICD-10-CM: Z00.00 ICD-9-CM: V70.0 Uncontrolled hypertension     ICD-10-CM: I10 
ICD-9-CM: 401.9 Stage 3 chronic kidney disease     ICD-10-CM: N18.3 ICD-9-CM: 794. 3 Vitals BP Pulse Temp Resp Height(growth percentile) Weight(growth percentile) (!) 184/106 70 97 °F (36.1 °C) (Oral) 14 5' (1.524 m) 125 lb (56.7 kg) SpO2 BMI OB Status Smoking Status 96% 24.41 kg/m2 Postmenopausal Never Smoker Vitals History BMI and BSA Data Body Mass Index Body Surface Area  
 24.41 kg/m 2 1.55 m 2 Preferred Pharmacy Pharmacy Name Phone [facility] [place], [address] [phone] Your Updated Medication List  
  
   
This list is accurate as of: 10/3/17 10:39 AM.  Always use your most recent med list.  
  
  
  
  
 aspirin delayed-release 81 mg tablet Take 1 Tab by mouth daily. atorvastatin 80 mg tablet Commonly known as:  LIPITOR  
TAKE 1 TABLET BY MOUTH EVERY NIGHT  
  
 carvedilol 25 mg tablet Commonly known as:  Payal Dome Take 1 Tab by mouth two (2) times a day. Indications: HYPERTENSION  
  
 cloNIDine HCl 0.2 mg tablet Commonly known as:  CATAPRES  
TAKE 1 TABLET BY MOUTH TWICE DAILY  
  
 fluticasone 50 mcg/actuation nasal spray Commonly known as:  Marty Dixie 2 Sprays by Both Nostrils route daily. hydroCHLOROthiazide 12.5 mg tablet Commonly known as:  HYDRODIURIL Take 12.5 mg by mouth daily. losartan 100 mg tablet Commonly known as:  COZAAR Take 1 Tab by mouth daily. minoxidil 2.5 mg tablet Commonly known as:  Lavada Liu Take  by mouth daily. Follow-up Instructions Return in about 6 months (around 4/3/2018) for htn. To-Do List   
 10/03/2017 Lab:  CBC WITH AUTOMATED DIFF   
  
 10/03/2017 Lab:  METABOLIC PANEL, COMPREHENSIVE Patient Instructions Medicare Wellness Visit, Female The best way to live healthy is to have a healthy lifestyle by eating a well-balanced diet, exercising regularly, limiting alcohol and stopping smoking. Regular physical exams and screening tests are another way to keep healthy. Preventive exams provided by your health care provider can find health problems before they become diseases or illnesses. Preventive services including immunizations, screening tests, monitoring and exams can help you take care of your own health.  
 
All people over age 72 should have a pneumovax  and and a prevnar shot to prevent pneumonia. These are once in a lifetime unless you and your provider decide differently. All people over 65 should have a yearly flu shot and a tetanus vaccine every 10 years. A bone mass density to screen for osteoporosis or thinning of the bones should be done every 2 years after 65. Screening for diabetes mellitus with a blood sugar test should be done every year. Glaucoma is a disease of the eye due to increased ocular pressure that can lead to blindness and it should be done every year by an eye professional. 
 
Cardiovascular screening tests that check for elevated lipids (fatty part of blood) which can lead to heart disease and strokes should be done every 5 years. Colorectal screening that evaluates for blood or polyps in your colon should be done yearly as a stool test or every five years as a flexible sigmoidoscope or every 10 years as a colonoscopy up to age 76. Breast cancer screening with a mammogram is recommended biennially  for women age 54-69. Screening for cervical cancer with a pap smear and pelvic exam is recommended for women after age 72 years every 2 years up to age 79 or when the provider and patient decide to stop. If there is a history of cervical abnormalities or other increased risk for cancer then the test is recommended yearly. Hepatitis C screening is also recommended for anyone born between [birth year] through [address]. A shingles vaccine is also recommended once in a lifetime after age 61. Your Medicare Wellness Exam is recommended annually. Here is a list of your current Health Maintenance items with a due date: 
Health Maintenance Due Topic Date Due  
 Flu Vaccine  08/01/2017  Glaucoma Screening   11/02/2017 Introducing [facility]! Dear Era Piedra: 
Thank you for requesting a [email or web address] account. Our records indicate that you already have an active [email or web address] account.   You can access your account anytime at [email or web address]. [email or web address] Did you know that you can access your hospital and ER discharge instructions at any time in [employer]? You can also review all of your test results from your hospital stay or ER visit. Additional Information If you have questions, please visit the Frequently Asked Questions section of the [employer] website at [email or web address] Remember, [employer] is NOT to be used for urgent needs. For medical emergencies, dial 911. Now available from your iPhone and Android! Please provide this summary of care documentation to your next provider. Your primary care clinician is listed as [facility]. If you have any questions after today's visit, please call [phone].

## 2018-01-01 ENCOUNTER — TELEPHONE (OUTPATIENT)
Dept: INTERNAL MEDICINE CLINIC | Age: 78
End: 2018-01-01

## 2018-01-01 ENCOUNTER — HOSPITAL ENCOUNTER (EMERGENCY)
Age: 78
Discharge: HOME | End: 2018-02-17
Attending: EMERGENCY MEDICINE
Payer: MEDICARE

## 2018-01-01 ENCOUNTER — APPOINTMENT (OUTPATIENT)
Dept: CT IMAGING | Age: 78
DRG: 391 | End: 2018-01-01
Attending: EMERGENCY MEDICINE
Payer: MEDICARE

## 2018-01-01 ENCOUNTER — HOME CARE VISIT (OUTPATIENT)
Dept: SCHEDULING | Facility: HOME HEALTH | Age: 78
End: 2018-01-01
Payer: MEDICARE

## 2018-01-01 ENCOUNTER — ANESTHESIA (OUTPATIENT)
Dept: ENDOSCOPY | Age: 78
DRG: 682 | End: 2018-01-01
Payer: MEDICARE

## 2018-01-01 ENCOUNTER — HOME CARE VISIT (OUTPATIENT)
Dept: HOSPICE | Facility: HOSPICE | Age: 78
End: 2018-01-01
Payer: MEDICARE

## 2018-01-01 ENCOUNTER — OFFICE VISIT (OUTPATIENT)
Dept: INTERNAL MEDICINE CLINIC | Age: 78
End: 2018-01-01

## 2018-01-01 ENCOUNTER — APPOINTMENT (OUTPATIENT)
Dept: GENERAL RADIOLOGY | Age: 78
DRG: 682 | End: 2018-01-01
Attending: NURSE PRACTITIONER
Payer: MEDICARE

## 2018-01-01 ENCOUNTER — APPOINTMENT (OUTPATIENT)
Dept: NUCLEAR MEDICINE | Age: 78
DRG: 391 | End: 2018-01-01
Attending: INTERNAL MEDICINE
Payer: MEDICARE

## 2018-01-01 ENCOUNTER — APPOINTMENT (OUTPATIENT)
Dept: GENERAL RADIOLOGY | Age: 78
End: 2018-01-01
Attending: EMERGENCY MEDICINE
Payer: MEDICARE

## 2018-01-01 ENCOUNTER — APPOINTMENT (OUTPATIENT)
Dept: CT IMAGING | Age: 78
DRG: 682 | End: 2018-01-01
Attending: INTERNAL MEDICINE
Payer: MEDICARE

## 2018-01-01 ENCOUNTER — PATIENT OUTREACH (OUTPATIENT)
Dept: INTERNAL MEDICINE CLINIC | Age: 78
End: 2018-01-01

## 2018-01-01 ENCOUNTER — HOSPITAL ENCOUNTER (INPATIENT)
Age: 78
LOS: 8 days | Discharge: NURSING, REHAB OR LONG-TERM CARE | DRG: 391 | End: 2018-03-22
Attending: EMERGENCY MEDICINE | Admitting: HOSPITALIST
Payer: MEDICARE

## 2018-01-01 ENCOUNTER — APPOINTMENT (OUTPATIENT)
Dept: GENERAL RADIOLOGY | Age: 78
DRG: 682 | End: 2018-01-01
Attending: HOSPITALIST
Payer: MEDICARE

## 2018-01-01 ENCOUNTER — APPOINTMENT (OUTPATIENT)
Dept: GENERAL RADIOLOGY | Age: 78
DRG: 391 | End: 2018-01-01
Attending: HOSPITALIST
Payer: MEDICARE

## 2018-01-01 ENCOUNTER — ANESTHESIA EVENT (OUTPATIENT)
Dept: ENDOSCOPY | Age: 78
DRG: 391 | End: 2018-01-01
Payer: MEDICARE

## 2018-01-01 ENCOUNTER — APPOINTMENT (OUTPATIENT)
Dept: CT IMAGING | Age: 78
DRG: 682 | End: 2018-01-01
Attending: EMERGENCY MEDICINE
Payer: MEDICARE

## 2018-01-01 ENCOUNTER — APPOINTMENT (OUTPATIENT)
Dept: CT IMAGING | Age: 78
End: 2018-01-01
Attending: EMERGENCY MEDICINE
Payer: MEDICARE

## 2018-01-01 ENCOUNTER — HOSPITAL ENCOUNTER (EMERGENCY)
Age: 78
Discharge: HOME | End: 2018-03-03
Attending: EMERGENCY MEDICINE
Payer: MEDICARE

## 2018-01-01 ENCOUNTER — APPOINTMENT (OUTPATIENT)
Dept: GENERAL RADIOLOGY | Age: 78
DRG: 391 | End: 2018-01-01
Attending: EMERGENCY MEDICINE
Payer: MEDICARE

## 2018-01-01 ENCOUNTER — APPOINTMENT (OUTPATIENT)
Dept: CT IMAGING | Age: 78
DRG: 391 | End: 2018-01-01
Attending: HOSPITALIST
Payer: MEDICARE

## 2018-01-01 ENCOUNTER — HOSPICE ADMISSION (OUTPATIENT)
Dept: HOSPICE | Facility: HOSPICE | Age: 78
End: 2018-01-01
Payer: MEDICARE

## 2018-01-01 ENCOUNTER — ANESTHESIA (OUTPATIENT)
Dept: ENDOSCOPY | Age: 78
DRG: 391 | End: 2018-01-01
Payer: MEDICARE

## 2018-01-01 ENCOUNTER — HOSPITAL ENCOUNTER (INPATIENT)
Age: 78
LOS: 17 days | Discharge: HOME | DRG: 682 | End: 2018-04-23
Attending: EMERGENCY MEDICINE | Admitting: HOSPITALIST
Payer: MEDICARE

## 2018-01-01 ENCOUNTER — APPOINTMENT (OUTPATIENT)
Dept: GENERAL RADIOLOGY | Age: 78
End: 2018-01-01
Attending: PHYSICIAN ASSISTANT
Payer: MEDICARE

## 2018-01-01 ENCOUNTER — HOSPITAL ENCOUNTER (EMERGENCY)
Age: 78
Discharge: HOME | End: 2018-02-12
Attending: EMERGENCY MEDICINE | Admitting: EMERGENCY MEDICINE
Payer: MEDICARE

## 2018-01-01 ENCOUNTER — ANESTHESIA EVENT (OUTPATIENT)
Dept: ENDOSCOPY | Age: 78
DRG: 682 | End: 2018-01-01
Payer: MEDICARE

## 2018-01-01 VITALS — RESPIRATION RATE: 16 BRPM | HEART RATE: 88 BPM | TEMPERATURE: 98.2 F | OXYGEN SATURATION: 92 %

## 2018-01-01 VITALS
RESPIRATION RATE: 20 BRPM | SYSTOLIC BLOOD PRESSURE: 166 MMHG | WEIGHT: 113.98 LBS | HEIGHT: 60 IN | TEMPERATURE: 97.5 F | DIASTOLIC BLOOD PRESSURE: 95 MMHG | HEART RATE: 110 BPM | OXYGEN SATURATION: 100 % | BODY MASS INDEX: 22.38 KG/M2

## 2018-01-01 VITALS
HEIGHT: 60 IN | OXYGEN SATURATION: 98 % | DIASTOLIC BLOOD PRESSURE: 58 MMHG | RESPIRATION RATE: 16 BRPM | WEIGHT: 113 LBS | SYSTOLIC BLOOD PRESSURE: 118 MMHG | HEART RATE: 106 BPM | BODY MASS INDEX: 22.19 KG/M2 | TEMPERATURE: 97.2 F

## 2018-01-01 VITALS
OXYGEN SATURATION: 90 % | HEART RATE: 120 BPM | TEMPERATURE: 98.2 F | DIASTOLIC BLOOD PRESSURE: 62 MMHG | SYSTOLIC BLOOD PRESSURE: 110 MMHG | RESPIRATION RATE: 14 BRPM

## 2018-01-01 VITALS
DIASTOLIC BLOOD PRESSURE: 104 MMHG | BODY MASS INDEX: 23.56 KG/M2 | SYSTOLIC BLOOD PRESSURE: 161 MMHG | RESPIRATION RATE: 23 BRPM | OXYGEN SATURATION: 95 % | TEMPERATURE: 97.5 F | WEIGHT: 120 LBS | HEIGHT: 60 IN | HEART RATE: 93 BPM

## 2018-01-01 VITALS
RESPIRATION RATE: 16 BRPM | OXYGEN SATURATION: 98 % | HEART RATE: 89 BPM | WEIGHT: 95 LBS | DIASTOLIC BLOOD PRESSURE: 76 MMHG | SYSTOLIC BLOOD PRESSURE: 117 MMHG | TEMPERATURE: 97.9 F | BODY MASS INDEX: 18.65 KG/M2 | HEIGHT: 60 IN

## 2018-01-01 VITALS
DIASTOLIC BLOOD PRESSURE: 103 MMHG | RESPIRATION RATE: 17 BRPM | SYSTOLIC BLOOD PRESSURE: 153 MMHG | HEIGHT: 60 IN | WEIGHT: 107.9 LBS | BODY MASS INDEX: 21.18 KG/M2 | HEART RATE: 77 BPM | OXYGEN SATURATION: 94 % | TEMPERATURE: 97.3 F

## 2018-01-01 VITALS
TEMPERATURE: 97.6 F | HEIGHT: 60 IN | WEIGHT: 109 LBS | HEART RATE: 78 BPM | SYSTOLIC BLOOD PRESSURE: 120 MMHG | BODY MASS INDEX: 21.4 KG/M2 | OXYGEN SATURATION: 97 % | DIASTOLIC BLOOD PRESSURE: 80 MMHG | RESPIRATION RATE: 15 BRPM

## 2018-01-01 VITALS
HEART RATE: 100 BPM | OXYGEN SATURATION: 99 % | RESPIRATION RATE: 16 BRPM | DIASTOLIC BLOOD PRESSURE: 72 MMHG | SYSTOLIC BLOOD PRESSURE: 120 MMHG | TEMPERATURE: 98.2 F

## 2018-01-01 VITALS
TEMPERATURE: 98.2 F | RESPIRATION RATE: 18 BRPM | HEART RATE: 100 BPM | DIASTOLIC BLOOD PRESSURE: 52 MMHG | SYSTOLIC BLOOD PRESSURE: 90 MMHG | OXYGEN SATURATION: 90 %

## 2018-01-01 VITALS
RESPIRATION RATE: 20 BRPM | OXYGEN SATURATION: 98 % | HEART RATE: 75 BPM | TEMPERATURE: 97.3 F | DIASTOLIC BLOOD PRESSURE: 90 MMHG | SYSTOLIC BLOOD PRESSURE: 147 MMHG

## 2018-01-01 VITALS
HEART RATE: 101 BPM | OXYGEN SATURATION: 97 % | WEIGHT: 95 LBS | TEMPERATURE: 97.5 F | RESPIRATION RATE: 21 BRPM | BODY MASS INDEX: 18.65 KG/M2 | SYSTOLIC BLOOD PRESSURE: 139 MMHG | HEIGHT: 60 IN | DIASTOLIC BLOOD PRESSURE: 74 MMHG

## 2018-01-01 VITALS
SYSTOLIC BLOOD PRESSURE: 150 MMHG | OXYGEN SATURATION: 97 % | RESPIRATION RATE: 18 BRPM | DIASTOLIC BLOOD PRESSURE: 90 MMHG | TEMPERATURE: 98.2 F | HEART RATE: 72 BPM

## 2018-01-01 DIAGNOSIS — Z71.89 ACP (ADVANCE CARE PLANNING): ICD-10-CM

## 2018-01-01 DIAGNOSIS — R41.82 ALTERED MENTAL STATUS, UNSPECIFIED ALTERED MENTAL STATUS TYPE: ICD-10-CM

## 2018-01-01 DIAGNOSIS — N28.9 ACUTE RENAL INSUFFICIENCY: Primary | ICD-10-CM

## 2018-01-01 DIAGNOSIS — N39.0 URINARY TRACT INFECTION WITHOUT HEMATURIA, SITE UNSPECIFIED: ICD-10-CM

## 2018-01-01 DIAGNOSIS — E43 SEVERE PROTEIN-CALORIE MALNUTRITION (GOMEZ: LESS THAN 60% OF STANDARD WEIGHT) (HCC): ICD-10-CM

## 2018-01-01 DIAGNOSIS — J18.9 COMMUNITY ACQUIRED PNEUMONIA OF RIGHT LOWER LOBE OF LUNG: Primary | ICD-10-CM

## 2018-01-01 DIAGNOSIS — R11.2 INTRACTABLE VOMITING WITH NAUSEA, UNSPECIFIED VOMITING TYPE: Primary | ICD-10-CM

## 2018-01-01 DIAGNOSIS — B37.31 YEAST VAGINITIS: ICD-10-CM

## 2018-01-01 DIAGNOSIS — R11.2 NAUSEA AND VOMITING, INTRACTABILITY OF VOMITING NOT SPECIFIED, UNSPECIFIED VOMITING TYPE: ICD-10-CM

## 2018-01-01 DIAGNOSIS — Z71.89 ADVANCED CARE PLANNING/COUNSELING DISCUSSION: ICD-10-CM

## 2018-01-01 DIAGNOSIS — N30.00 ACUTE CYSTITIS WITHOUT HEMATURIA: Primary | ICD-10-CM

## 2018-01-01 DIAGNOSIS — R63.4 WEIGHT LOSS: ICD-10-CM

## 2018-01-01 DIAGNOSIS — E86.0 DEHYDRATION: ICD-10-CM

## 2018-01-01 DIAGNOSIS — R11.0 NAUSEA: ICD-10-CM

## 2018-01-01 DIAGNOSIS — Z09 FOLLOW UP: Primary | ICD-10-CM

## 2018-01-01 DIAGNOSIS — K80.20 CALCULUS OF GALLBLADDER WITHOUT CHOLECYSTITIS WITHOUT OBSTRUCTION: ICD-10-CM

## 2018-01-01 DIAGNOSIS — R62.7 ADULT FAILURE TO THRIVE: ICD-10-CM

## 2018-01-01 DIAGNOSIS — R11.2 NON-INTRACTABLE VOMITING WITH NAUSEA, UNSPECIFIED VOMITING TYPE: Primary | ICD-10-CM

## 2018-01-01 DIAGNOSIS — R63.0 DECREASED APPETITE: ICD-10-CM

## 2018-01-01 DIAGNOSIS — R77.8 ELEVATED TROPONIN: ICD-10-CM

## 2018-01-01 DIAGNOSIS — R53.81 DEBILITY: ICD-10-CM

## 2018-01-01 DIAGNOSIS — F03.90 DEMENTIA WITHOUT BEHAVIORAL DISTURBANCE, UNSPECIFIED DEMENTIA TYPE: ICD-10-CM

## 2018-01-01 DIAGNOSIS — J18.9 PNEUMONIA OF RIGHT MIDDLE LOBE DUE TO INFECTIOUS ORGANISM: Primary | ICD-10-CM

## 2018-01-01 LAB
ABO + RH BLD: NORMAL
ALBUMIN SERPL-MCNC: 1.6 G/DL (ref 3.4–5)
ALBUMIN SERPL-MCNC: 2.5 G/DL (ref 3.4–5)
ALBUMIN SERPL-MCNC: 2.7 G/DL (ref 3.4–5)
ALBUMIN SERPL-MCNC: 2.7 G/DL (ref 3.4–5)
ALBUMIN SERPL-MCNC: 2.8 G/DL (ref 3.4–5)
ALBUMIN SERPL-MCNC: 3.1 G/DL (ref 3.4–5)
ALBUMIN SERPL-MCNC: 3.2 G/DL (ref 3.4–5)
ALBUMIN SERPL-MCNC: 3.5 G/DL (ref 3.4–5)
ALBUMIN SERPL-MCNC: 3.7 G/DL (ref 3.4–5)
ALBUMIN/GLOB SERPL: 0.6 {RATIO} (ref 0.8–1.7)
ALBUMIN/GLOB SERPL: 0.8 {RATIO} (ref 0.8–1.7)
ALBUMIN/GLOB SERPL: 0.8 {RATIO} (ref 0.8–1.7)
ALBUMIN/GLOB SERPL: 0.9 {RATIO} (ref 0.8–1.7)
ALBUMIN/GLOB SERPL: 1 {RATIO} (ref 0.8–1.7)
ALBUMIN/GLOB SERPL: 1.1 {RATIO} (ref 0.8–1.7)
ALP SERPL-CCNC: 57 U/L (ref 45–117)
ALP SERPL-CCNC: 58 U/L (ref 45–117)
ALP SERPL-CCNC: 59 U/L (ref 45–117)
ALP SERPL-CCNC: 66 U/L (ref 45–117)
ALP SERPL-CCNC: 66 U/L (ref 45–117)
ALP SERPL-CCNC: 68 U/L (ref 45–117)
ALP SERPL-CCNC: 76 U/L (ref 45–117)
ALP SERPL-CCNC: 76 U/L (ref 45–117)
ALP SERPL-CCNC: 78 U/L (ref 45–117)
ALT SERPL-CCNC: 11 U/L (ref 13–56)
ALT SERPL-CCNC: 12 U/L (ref 13–56)
ALT SERPL-CCNC: 15 U/L (ref 13–56)
ALT SERPL-CCNC: 15 U/L (ref 13–56)
ALT SERPL-CCNC: 16 U/L (ref 13–56)
ALT SERPL-CCNC: 17 U/L (ref 13–56)
ALT SERPL-CCNC: 23 U/L (ref 13–56)
ALT SERPL-CCNC: 7 U/L (ref 13–56)
ALT SERPL-CCNC: 9 U/L (ref 13–56)
AMMONIA PLAS-SCNC: <10 UMOL/L (ref 11–32)
AMORPH CRY URNS QL MICRO: ABNORMAL
AMPHET UR QL SCN: NEGATIVE
ANION GAP SERPL CALC-SCNC: 10 MMOL/L (ref 3–18)
ANION GAP SERPL CALC-SCNC: 11 MMOL/L (ref 3–18)
ANION GAP SERPL CALC-SCNC: 12 MMOL/L (ref 3–18)
ANION GAP SERPL CALC-SCNC: 13 MMOL/L (ref 3–18)
ANION GAP SERPL CALC-SCNC: 13 MMOL/L (ref 3–18)
ANION GAP SERPL CALC-SCNC: 14 MMOL/L (ref 3–18)
ANION GAP SERPL CALC-SCNC: 15 MMOL/L (ref 3–18)
ANION GAP SERPL CALC-SCNC: 16 MMOL/L (ref 3–18)
ANION GAP SERPL CALC-SCNC: 19 MMOL/L (ref 3–18)
ANION GAP SERPL CALC-SCNC: 9 MMOL/L (ref 3–18)
ANION GAP SERPL CALC-SCNC: 9 MMOL/L (ref 3–18)
APPEARANCE UR: ABNORMAL
APPEARANCE UR: CLEAR
APPEARANCE UR: CLEAR
AST SERPL-CCNC: 11 U/L (ref 15–37)
AST SERPL-CCNC: 14 U/L (ref 15–37)
AST SERPL-CCNC: 17 U/L (ref 15–37)
AST SERPL-CCNC: 18 U/L (ref 15–37)
AST SERPL-CCNC: 21 U/L (ref 15–37)
AST SERPL-CCNC: 22 U/L (ref 15–37)
AST SERPL-CCNC: 26 U/L (ref 15–37)
AST SERPL-CCNC: 28 U/L (ref 15–37)
AST SERPL-CCNC: 32 U/L (ref 15–37)
ATRIAL RATE: 102 BPM
ATRIAL RATE: 115 BPM
ATRIAL RATE: 119 BPM
ATRIAL RATE: 69 BPM
ATRIAL RATE: 76 BPM
ATRIAL RATE: 83 BPM
ATRIAL RATE: 83 BPM
ATRIAL RATE: 98 BPM
BACTERIA SPEC CULT: ABNORMAL
BACTERIA SPEC CULT: NORMAL
BACTERIA URNS QL MICRO: ABNORMAL /HPF
BACTERIA URNS QL MICRO: NEGATIVE /HPF
BARBITURATES UR QL SCN: NEGATIVE
BASOPHILS # BLD: 0 K/UL (ref 0–0.06)
BASOPHILS # BLD: 0 K/UL (ref 0–0.1)
BASOPHILS NFR BLD: 0 % (ref 0–2)
BASOPHILS NFR BLD: 0 % (ref 0–3)
BASOPHILS NFR BLD: 1 % (ref 0–2)
BENZODIAZ UR QL: NEGATIVE
BILIRUB DIRECT SERPL-MCNC: 0.2 MG/DL (ref 0–0.2)
BILIRUB DIRECT SERPL-MCNC: 0.3 MG/DL (ref 0–0.2)
BILIRUB INDIRECT SERPL-MCNC: 0.4 MG/DL
BILIRUB SERPL-MCNC: 0.4 MG/DL (ref 0.2–1)
BILIRUB SERPL-MCNC: 0.6 MG/DL (ref 0.2–1)
BILIRUB SERPL-MCNC: 0.8 MG/DL (ref 0.2–1)
BILIRUB SERPL-MCNC: 0.8 MG/DL (ref 0.2–1)
BILIRUB SERPL-MCNC: 0.9 MG/DL (ref 0.2–1)
BILIRUB SERPL-MCNC: 1 MG/DL (ref 0.2–1)
BILIRUB SERPL-MCNC: 1.1 MG/DL (ref 0.2–1)
BILIRUB SERPL-MCNC: 1.1 MG/DL (ref 0.2–1)
BILIRUB UR QL: ABNORMAL
BILIRUB UR QL: ABNORMAL
BILIRUB UR QL: NEGATIVE
BLD PROD TYP BPU: NORMAL
BLD PROD TYP BPU: NORMAL
BLOOD GROUP ANTIBODIES SERPL: NORMAL
BNP SERPL-MCNC: 5635 PG/ML (ref 0–1800)
BPU ID: NORMAL
BPU ID: NORMAL
BUN SERPL-MCNC: 10 MG/DL (ref 7–18)
BUN SERPL-MCNC: 11 MG/DL (ref 7–18)
BUN SERPL-MCNC: 12 MG/DL (ref 7–18)
BUN SERPL-MCNC: 12 MG/DL (ref 7–18)
BUN SERPL-MCNC: 13 MG/DL (ref 7–18)
BUN SERPL-MCNC: 15 MG/DL (ref 7–18)
BUN SERPL-MCNC: 15 MG/DL (ref 7–18)
BUN SERPL-MCNC: 16 MG/DL (ref 7–18)
BUN SERPL-MCNC: 17 MG/DL (ref 7–18)
BUN SERPL-MCNC: 17 MG/DL (ref 7–18)
BUN SERPL-MCNC: 19 MG/DL (ref 7–18)
BUN SERPL-MCNC: 21 MG/DL (ref 7–18)
BUN SERPL-MCNC: 22 MG/DL (ref 7–18)
BUN SERPL-MCNC: 22 MG/DL (ref 7–18)
BUN SERPL-MCNC: 23 MG/DL (ref 7–18)
BUN SERPL-MCNC: 28 MG/DL (ref 7–18)
BUN SERPL-MCNC: 33 MG/DL (ref 7–18)
BUN SERPL-MCNC: 36 MG/DL (ref 7–18)
BUN SERPL-MCNC: 38 MG/DL (ref 7–18)
BUN SERPL-MCNC: 4 MG/DL (ref 7–18)
BUN SERPL-MCNC: 4 MG/DL (ref 7–18)
BUN SERPL-MCNC: 40 MG/DL (ref 7–18)
BUN SERPL-MCNC: 48 MG/DL (ref 7–18)
BUN SERPL-MCNC: 5 MG/DL (ref 7–18)
BUN SERPL-MCNC: 6 MG/DL (ref 7–18)
BUN SERPL-MCNC: 8 MG/DL (ref 7–18)
BUN SERPL-MCNC: 8 MG/DL (ref 7–18)
BUN SERPL-MCNC: 9 MG/DL (ref 7–18)
BUN SERPL-MCNC: 9 MG/DL (ref 7–18)
BUN/CREAT SERPL: 10 (ref 12–20)
BUN/CREAT SERPL: 13 (ref 12–20)
BUN/CREAT SERPL: 14 (ref 12–20)
BUN/CREAT SERPL: 15 (ref 12–20)
BUN/CREAT SERPL: 16 (ref 12–20)
BUN/CREAT SERPL: 16 (ref 12–20)
BUN/CREAT SERPL: 17 (ref 12–20)
BUN/CREAT SERPL: 19 (ref 12–20)
BUN/CREAT SERPL: 19 (ref 12–20)
BUN/CREAT SERPL: 21 (ref 12–20)
BUN/CREAT SERPL: 22 (ref 12–20)
BUN/CREAT SERPL: 22 (ref 12–20)
BUN/CREAT SERPL: 23 (ref 12–20)
BUN/CREAT SERPL: 23 (ref 12–20)
BUN/CREAT SERPL: 24 (ref 12–20)
BUN/CREAT SERPL: 29 (ref 12–20)
BUN/CREAT SERPL: 29 (ref 12–20)
BUN/CREAT SERPL: 4 (ref 12–20)
BUN/CREAT SERPL: 5 (ref 12–20)
BUN/CREAT SERPL: 6 (ref 12–20)
BUN/CREAT SERPL: 7 (ref 12–20)
BUN/CREAT SERPL: 8 (ref 12–20)
BUN/CREAT SERPL: 9 (ref 12–20)
CALCIUM SERPL-MCNC: 6.2 MG/DL (ref 8.5–10.1)
CALCIUM SERPL-MCNC: 7.1 MG/DL (ref 8.5–10.1)
CALCIUM SERPL-MCNC: 7.1 MG/DL (ref 8.5–10.1)
CALCIUM SERPL-MCNC: 7.3 MG/DL (ref 8.5–10.1)
CALCIUM SERPL-MCNC: 7.4 MG/DL (ref 8.5–10.1)
CALCIUM SERPL-MCNC: 7.5 MG/DL (ref 8.5–10.1)
CALCIUM SERPL-MCNC: 7.5 MG/DL (ref 8.5–10.1)
CALCIUM SERPL-MCNC: 7.7 MG/DL (ref 8.5–10.1)
CALCIUM SERPL-MCNC: 7.8 MG/DL (ref 8.5–10.1)
CALCIUM SERPL-MCNC: 7.9 MG/DL (ref 8.5–10.1)
CALCIUM SERPL-MCNC: 8 MG/DL (ref 8.5–10.1)
CALCIUM SERPL-MCNC: 8 MG/DL (ref 8.5–10.1)
CALCIUM SERPL-MCNC: 8.1 MG/DL (ref 8.5–10.1)
CALCIUM SERPL-MCNC: 8.2 MG/DL (ref 8.5–10.1)
CALCIUM SERPL-MCNC: 8.2 MG/DL (ref 8.5–10.1)
CALCIUM SERPL-MCNC: 8.3 MG/DL (ref 8.5–10.1)
CALCIUM SERPL-MCNC: 8.5 MG/DL (ref 8.5–10.1)
CALCIUM SERPL-MCNC: 8.7 MG/DL (ref 8.5–10.1)
CALCIUM SERPL-MCNC: 8.9 MG/DL (ref 8.5–10.1)
CALCIUM SERPL-MCNC: 9 MG/DL (ref 8.5–10.1)
CALCIUM SERPL-MCNC: 9.4 MG/DL (ref 8.5–10.1)
CALCULATED P AXIS, ECG09: 30 DEGREES
CALCULATED P AXIS, ECG09: 31 DEGREES
CALCULATED P AXIS, ECG09: 34 DEGREES
CALCULATED P AXIS, ECG09: 36 DEGREES
CALCULATED P AXIS, ECG09: 38 DEGREES
CALCULATED P AXIS, ECG09: 46 DEGREES
CALCULATED P AXIS, ECG09: 53 DEGREES
CALCULATED P AXIS, ECG09: 64 DEGREES
CALCULATED R AXIS, ECG10: -15 DEGREES
CALCULATED R AXIS, ECG10: -26 DEGREES
CALCULATED R AXIS, ECG10: -27 DEGREES
CALCULATED R AXIS, ECG10: -40 DEGREES
CALCULATED R AXIS, ECG10: -43 DEGREES
CALCULATED R AXIS, ECG10: 10 DEGREES
CALCULATED R AXIS, ECG10: 24 DEGREES
CALCULATED R AXIS, ECG10: 30 DEGREES
CALCULATED T AXIS, ECG11: -124 DEGREES
CALCULATED T AXIS, ECG11: -27 DEGREES
CALCULATED T AXIS, ECG11: -32 DEGREES
CALCULATED T AXIS, ECG11: 107 DEGREES
CALCULATED T AXIS, ECG11: 118 DEGREES
CALCULATED T AXIS, ECG11: 120 DEGREES
CALCULATED T AXIS, ECG11: 120 DEGREES
CALCULATED T AXIS, ECG11: 122 DEGREES
CALLED TO:,BCALL1: NORMAL
CANNABINOIDS UR QL SCN: NEGATIVE
CHLORIDE SERPL-SCNC: 100 MMOL/L (ref 100–108)
CHLORIDE SERPL-SCNC: 101 MMOL/L (ref 100–108)
CHLORIDE SERPL-SCNC: 102 MMOL/L (ref 100–108)
CHLORIDE SERPL-SCNC: 103 MMOL/L (ref 100–108)
CHLORIDE SERPL-SCNC: 104 MMOL/L (ref 100–108)
CHLORIDE SERPL-SCNC: 105 MMOL/L (ref 100–108)
CHLORIDE SERPL-SCNC: 106 MMOL/L (ref 100–108)
CHLORIDE SERPL-SCNC: 107 MMOL/L (ref 100–108)
CHLORIDE SERPL-SCNC: 107 MMOL/L (ref 100–108)
CHLORIDE SERPL-SCNC: 108 MMOL/L (ref 100–108)
CHLORIDE SERPL-SCNC: 110 MMOL/L (ref 100–108)
CHLORIDE SERPL-SCNC: 110 MMOL/L (ref 100–108)
CHLORIDE SERPL-SCNC: 111 MMOL/L (ref 100–108)
CHLORIDE SERPL-SCNC: 111 MMOL/L (ref 100–108)
CHLORIDE SERPL-SCNC: 112 MMOL/L (ref 100–108)
CHLORIDE SERPL-SCNC: 117 MMOL/L (ref 100–108)
CHLORIDE SERPL-SCNC: 99 MMOL/L (ref 100–108)
CK MB CFR SERPL CALC: 2.8 % (ref 0–4)
CK MB CFR SERPL CALC: 5.4 % (ref 0–4)
CK MB CFR SERPL CALC: NORMAL % (ref 0–4)
CK MB SERPL-MCNC: 2 NG/ML (ref 5–25)
CK MB SERPL-MCNC: 2.3 NG/ML (ref 5–25)
CK MB SERPL-MCNC: <1 NG/ML (ref 5–25)
CK SERPL-CCNC: 26 U/L (ref 26–192)
CK SERPL-CCNC: 37 U/L (ref 26–192)
CK SERPL-CCNC: 82 U/L (ref 26–192)
CO2 SERPL-SCNC: 14 MMOL/L (ref 21–32)
CO2 SERPL-SCNC: 15 MMOL/L (ref 21–32)
CO2 SERPL-SCNC: 18 MMOL/L (ref 21–32)
CO2 SERPL-SCNC: 19 MMOL/L (ref 21–32)
CO2 SERPL-SCNC: 20 MMOL/L (ref 21–32)
CO2 SERPL-SCNC: 20 MMOL/L (ref 21–32)
CO2 SERPL-SCNC: 21 MMOL/L (ref 21–32)
CO2 SERPL-SCNC: 21 MMOL/L (ref 21–32)
CO2 SERPL-SCNC: 22 MMOL/L (ref 21–32)
CO2 SERPL-SCNC: 23 MMOL/L (ref 21–32)
CO2 SERPL-SCNC: 24 MMOL/L (ref 21–32)
CO2 SERPL-SCNC: 24 MMOL/L (ref 21–32)
CO2 SERPL-SCNC: 25 MMOL/L (ref 21–32)
CO2 SERPL-SCNC: 27 MMOL/L (ref 21–32)
CO2 SERPL-SCNC: 28 MMOL/L (ref 21–32)
CO2 SERPL-SCNC: 28 MMOL/L (ref 21–32)
CO2 SERPL-SCNC: 29 MMOL/L (ref 21–32)
COCAINE UR QL SCN: NEGATIVE
COLOR UR: ABNORMAL
COLOR UR: YELLOW
COLOR UR: YELLOW
CORTIS SERPL-MCNC: 44.7 UG/DL (ref 3.09–22.4)
CREAT SERPL-MCNC: 0.77 MG/DL (ref 0.6–1.3)
CREAT SERPL-MCNC: 0.81 MG/DL (ref 0.6–1.3)
CREAT SERPL-MCNC: 0.81 MG/DL (ref 0.6–1.3)
CREAT SERPL-MCNC: 0.83 MG/DL (ref 0.6–1.3)
CREAT SERPL-MCNC: 0.83 MG/DL (ref 0.6–1.3)
CREAT SERPL-MCNC: 0.88 MG/DL (ref 0.6–1.3)
CREAT SERPL-MCNC: 0.9 MG/DL (ref 0.6–1.3)
CREAT SERPL-MCNC: 0.9 MG/DL (ref 0.6–1.3)
CREAT SERPL-MCNC: 0.92 MG/DL (ref 0.6–1.3)
CREAT SERPL-MCNC: 0.95 MG/DL (ref 0.6–1.3)
CREAT SERPL-MCNC: 0.95 MG/DL (ref 0.6–1.3)
CREAT SERPL-MCNC: 0.97 MG/DL (ref 0.6–1.3)
CREAT SERPL-MCNC: 0.97 MG/DL (ref 0.6–1.3)
CREAT SERPL-MCNC: 0.98 MG/DL (ref 0.6–1.3)
CREAT SERPL-MCNC: 0.99 MG/DL (ref 0.6–1.3)
CREAT SERPL-MCNC: 1.03 MG/DL (ref 0.6–1.3)
CREAT SERPL-MCNC: 1.07 MG/DL (ref 0.6–1.3)
CREAT SERPL-MCNC: 1.12 MG/DL (ref 0.6–1.3)
CREAT SERPL-MCNC: 1.16 MG/DL (ref 0.6–1.3)
CREAT SERPL-MCNC: 1.19 MG/DL (ref 0.6–1.3)
CREAT SERPL-MCNC: 1.19 MG/DL (ref 0.6–1.3)
CREAT SERPL-MCNC: 1.21 MG/DL (ref 0.6–1.3)
CREAT SERPL-MCNC: 1.23 MG/DL (ref 0.6–1.3)
CREAT SERPL-MCNC: 1.26 MG/DL (ref 0.6–1.3)
CREAT SERPL-MCNC: 1.32 MG/DL (ref 0.6–1.3)
CREAT SERPL-MCNC: 1.42 MG/DL (ref 0.6–1.3)
CREAT SERPL-MCNC: 2.22 MG/DL (ref 0.6–1.3)
CREAT SERPL-MCNC: 2.3 MG/DL (ref 0.6–1.3)
CREAT SERPL-MCNC: 2.56 MG/DL (ref 0.6–1.3)
CROSSMATCH RESULT,%XM: NORMAL
CROSSMATCH RESULT,%XM: NORMAL
DIAGNOSIS, 93000: NORMAL
DIFFERENTIAL METHOD BLD: ABNORMAL
EOSINOPHIL # BLD: 0 K/UL (ref 0–0.4)
EOSINOPHIL # BLD: 0.1 K/UL (ref 0–0.4)
EOSINOPHIL # BLD: 0.2 K/UL (ref 0–0.4)
EOSINOPHIL # BLD: 0.3 K/UL (ref 0–0.4)
EOSINOPHIL # BLD: 0.3 K/UL (ref 0–0.4)
EOSINOPHIL # BLD: 0.4 K/UL (ref 0–0.4)
EOSINOPHIL # BLD: 0.5 K/UL (ref 0–0.4)
EOSINOPHIL NFR BLD: 0 % (ref 0–5)
EOSINOPHIL NFR BLD: 1 % (ref 0–5)
EOSINOPHIL NFR BLD: 2 % (ref 0–5)
EOSINOPHIL NFR BLD: 3 % (ref 0–5)
EOSINOPHIL NFR BLD: 4 % (ref 0–5)
EOSINOPHIL NFR BLD: 5 % (ref 0–5)
EOSINOPHIL NFR BLD: 7 % (ref 0–5)
EPITH CASTS URNS QL MICRO: ABNORMAL /LPF (ref 0–5)
ERYTHROCYTE [DISTWIDTH] IN BLOOD BY AUTOMATED COUNT: 13.5 % (ref 11.6–14.5)
ERYTHROCYTE [DISTWIDTH] IN BLOOD BY AUTOMATED COUNT: 13.8 % (ref 11.6–14.5)
ERYTHROCYTE [DISTWIDTH] IN BLOOD BY AUTOMATED COUNT: 14.6 % (ref 11.6–14.5)
ERYTHROCYTE [DISTWIDTH] IN BLOOD BY AUTOMATED COUNT: 14.9 % (ref 11.6–14.5)
ERYTHROCYTE [DISTWIDTH] IN BLOOD BY AUTOMATED COUNT: 14.9 % (ref 11.6–14.5)
ERYTHROCYTE [DISTWIDTH] IN BLOOD BY AUTOMATED COUNT: 15.9 % (ref 11.6–14.5)
ERYTHROCYTE [DISTWIDTH] IN BLOOD BY AUTOMATED COUNT: 16.5 % (ref 11.6–14.5)
ERYTHROCYTE [DISTWIDTH] IN BLOOD BY AUTOMATED COUNT: 16.8 % (ref 11.6–14.5)
ERYTHROCYTE [DISTWIDTH] IN BLOOD BY AUTOMATED COUNT: 17.2 % (ref 11.6–14.5)
ERYTHROCYTE [DISTWIDTH] IN BLOOD BY AUTOMATED COUNT: 17.2 % (ref 11.6–14.5)
ERYTHROCYTE [DISTWIDTH] IN BLOOD BY AUTOMATED COUNT: 17.3 % (ref 11.6–14.5)
ERYTHROCYTE [DISTWIDTH] IN BLOOD BY AUTOMATED COUNT: 17.3 % (ref 11.6–14.5)
ERYTHROCYTE [DISTWIDTH] IN BLOOD BY AUTOMATED COUNT: 17.5 % (ref 11.6–14.5)
ERYTHROCYTE [DISTWIDTH] IN BLOOD BY AUTOMATED COUNT: 17.8 % (ref 11.6–14.5)
ERYTHROCYTE [DISTWIDTH] IN BLOOD BY AUTOMATED COUNT: 17.8 % (ref 11.6–14.5)
ERYTHROCYTE [DISTWIDTH] IN BLOOD BY AUTOMATED COUNT: 18.2 % (ref 11.6–14.5)
ERYTHROCYTE [DISTWIDTH] IN BLOOD BY AUTOMATED COUNT: 18.5 % (ref 11.6–14.5)
ERYTHROCYTE [SEDIMENTATION RATE] IN BLOOD: 35 MM/HR (ref 0–30)
ETHANOL SERPL-MCNC: <3 MG/DL (ref 0–3)
FERRITIN SERPL-MCNC: 319 NG/ML (ref 8–388)
GLOBULIN SER CALC-MCNC: 2.5 G/DL (ref 2–4)
GLOBULIN SER CALC-MCNC: 2.6 G/DL (ref 2–4)
GLOBULIN SER CALC-MCNC: 2.7 G/DL (ref 2–4)
GLOBULIN SER CALC-MCNC: 2.7 G/DL (ref 2–4)
GLOBULIN SER CALC-MCNC: 3.2 G/DL (ref 2–4)
GLOBULIN SER CALC-MCNC: 3.4 G/DL (ref 2–4)
GLOBULIN SER CALC-MCNC: 3.5 G/DL (ref 2–4)
GLOBULIN SER CALC-MCNC: 3.5 G/DL (ref 2–4)
GLOBULIN SER CALC-MCNC: 3.7 G/DL (ref 2–4)
GLUCOSE BLD STRIP.AUTO-MCNC: 100 MG/DL (ref 70–110)
GLUCOSE BLD STRIP.AUTO-MCNC: 103 MG/DL (ref 70–110)
GLUCOSE BLD STRIP.AUTO-MCNC: 103 MG/DL (ref 70–110)
GLUCOSE BLD STRIP.AUTO-MCNC: 106 MG/DL (ref 70–110)
GLUCOSE BLD STRIP.AUTO-MCNC: 108 MG/DL (ref 70–110)
GLUCOSE BLD STRIP.AUTO-MCNC: 115 MG/DL (ref 70–110)
GLUCOSE BLD STRIP.AUTO-MCNC: 117 MG/DL (ref 70–110)
GLUCOSE BLD STRIP.AUTO-MCNC: 118 MG/DL (ref 70–110)
GLUCOSE BLD STRIP.AUTO-MCNC: 120 MG/DL (ref 70–110)
GLUCOSE BLD STRIP.AUTO-MCNC: 121 MG/DL (ref 70–110)
GLUCOSE BLD STRIP.AUTO-MCNC: 131 MG/DL (ref 70–110)
GLUCOSE BLD STRIP.AUTO-MCNC: 133 MG/DL (ref 70–110)
GLUCOSE BLD STRIP.AUTO-MCNC: 136 MG/DL (ref 70–110)
GLUCOSE BLD STRIP.AUTO-MCNC: 140 MG/DL (ref 70–110)
GLUCOSE BLD STRIP.AUTO-MCNC: 150 MG/DL (ref 70–110)
GLUCOSE BLD STRIP.AUTO-MCNC: 152 MG/DL (ref 70–110)
GLUCOSE BLD STRIP.AUTO-MCNC: 153 MG/DL (ref 70–110)
GLUCOSE BLD STRIP.AUTO-MCNC: 161 MG/DL (ref 70–110)
GLUCOSE BLD STRIP.AUTO-MCNC: 162 MG/DL (ref 70–110)
GLUCOSE BLD STRIP.AUTO-MCNC: 186 MG/DL (ref 70–110)
GLUCOSE BLD STRIP.AUTO-MCNC: 207 MG/DL (ref 70–110)
GLUCOSE BLD STRIP.AUTO-MCNC: 251 MG/DL (ref 70–110)
GLUCOSE BLD STRIP.AUTO-MCNC: 66 MG/DL (ref 70–110)
GLUCOSE BLD STRIP.AUTO-MCNC: 70 MG/DL (ref 70–110)
GLUCOSE BLD STRIP.AUTO-MCNC: 71 MG/DL (ref 70–110)
GLUCOSE BLD STRIP.AUTO-MCNC: 73 MG/DL (ref 70–110)
GLUCOSE BLD STRIP.AUTO-MCNC: 74 MG/DL (ref 70–110)
GLUCOSE BLD STRIP.AUTO-MCNC: 76 MG/DL (ref 70–110)
GLUCOSE BLD STRIP.AUTO-MCNC: 77 MG/DL (ref 70–110)
GLUCOSE BLD STRIP.AUTO-MCNC: 90 MG/DL (ref 70–110)
GLUCOSE BLD STRIP.AUTO-MCNC: 92 MG/DL (ref 70–110)
GLUCOSE BLD STRIP.AUTO-MCNC: 92 MG/DL (ref 70–110)
GLUCOSE BLD STRIP.AUTO-MCNC: 94 MG/DL (ref 70–110)
GLUCOSE SERPL-MCNC: 101 MG/DL (ref 74–99)
GLUCOSE SERPL-MCNC: 104 MG/DL (ref 74–99)
GLUCOSE SERPL-MCNC: 110 MG/DL (ref 74–99)
GLUCOSE SERPL-MCNC: 114 MG/DL (ref 74–99)
GLUCOSE SERPL-MCNC: 127 MG/DL (ref 74–99)
GLUCOSE SERPL-MCNC: 134 MG/DL (ref 74–99)
GLUCOSE SERPL-MCNC: 137 MG/DL (ref 74–99)
GLUCOSE SERPL-MCNC: 156 MG/DL (ref 74–99)
GLUCOSE SERPL-MCNC: 205 MG/DL (ref 74–99)
GLUCOSE SERPL-MCNC: 45 MG/DL (ref 74–99)
GLUCOSE SERPL-MCNC: 54 MG/DL (ref 74–99)
GLUCOSE SERPL-MCNC: 62 MG/DL (ref 74–99)
GLUCOSE SERPL-MCNC: 63 MG/DL (ref 74–99)
GLUCOSE SERPL-MCNC: 66 MG/DL (ref 74–99)
GLUCOSE SERPL-MCNC: 71 MG/DL (ref 74–99)
GLUCOSE SERPL-MCNC: 71 MG/DL (ref 74–99)
GLUCOSE SERPL-MCNC: 79 MG/DL (ref 74–99)
GLUCOSE SERPL-MCNC: 85 MG/DL (ref 74–99)
GLUCOSE SERPL-MCNC: 86 MG/DL (ref 74–99)
GLUCOSE SERPL-MCNC: 88 MG/DL (ref 74–99)
GLUCOSE SERPL-MCNC: 89 MG/DL (ref 74–99)
GLUCOSE SERPL-MCNC: 91 MG/DL (ref 74–99)
GLUCOSE SERPL-MCNC: 91 MG/DL (ref 74–99)
GLUCOSE SERPL-MCNC: 92 MG/DL (ref 74–99)
GLUCOSE SERPL-MCNC: 94 MG/DL (ref 74–99)
GLUCOSE SERPL-MCNC: 98 MG/DL (ref 74–99)
GLUCOSE SERPL-MCNC: 98 MG/DL (ref 74–99)
GLUCOSE UR STRIP.AUTO-MCNC: NEGATIVE MG/DL
HCT VFR BLD AUTO: 20 % (ref 35–45)
HCT VFR BLD AUTO: 23.2 % (ref 35–45)
HCT VFR BLD AUTO: 25 % (ref 35–45)
HCT VFR BLD AUTO: 25.5 % (ref 35–45)
HCT VFR BLD AUTO: 26.3 % (ref 35–45)
HCT VFR BLD AUTO: 27.4 % (ref 35–45)
HCT VFR BLD AUTO: 30.7 % (ref 35–45)
HCT VFR BLD AUTO: 31.1 % (ref 35–45)
HCT VFR BLD AUTO: 31.7 % (ref 35–45)
HCT VFR BLD AUTO: 33.3 % (ref 35–45)
HCT VFR BLD AUTO: 33.4 % (ref 35–45)
HCT VFR BLD AUTO: 33.4 % (ref 35–45)
HCT VFR BLD AUTO: 33.6 % (ref 35–45)
HCT VFR BLD AUTO: 35 % (ref 35–45)
HCT VFR BLD AUTO: 35.5 % (ref 35–45)
HCT VFR BLD AUTO: 35.6 % (ref 35–45)
HCT VFR BLD AUTO: 36.7 % (ref 35–45)
HDSCOM,HDSCOM: NORMAL
HGB BLD-MCNC: 10.1 G/DL (ref 12–16)
HGB BLD-MCNC: 10.4 G/DL (ref 12–16)
HGB BLD-MCNC: 10.5 G/DL (ref 12–16)
HGB BLD-MCNC: 10.9 G/DL (ref 12–16)
HGB BLD-MCNC: 11 G/DL (ref 12–16)
HGB BLD-MCNC: 11.1 G/DL (ref 12–16)
HGB BLD-MCNC: 11.7 G/DL (ref 12–16)
HGB BLD-MCNC: 12 G/DL (ref 12–16)
HGB BLD-MCNC: 12.3 G/DL (ref 12–16)
HGB BLD-MCNC: 6.3 G/DL (ref 12–16)
HGB BLD-MCNC: 7.2 G/DL (ref 12–16)
HGB BLD-MCNC: 8.1 G/DL (ref 12–16)
HGB BLD-MCNC: 8.3 G/DL (ref 12–16)
HGB BLD-MCNC: 8.4 G/DL (ref 12–16)
HGB BLD-MCNC: 8.8 G/DL (ref 12–16)
HGB UR QL STRIP: ABNORMAL
HGB UR QL STRIP: NEGATIVE
HGB UR QL STRIP: NEGATIVE
HYALINE CASTS URNS QL MICRO: ABNORMAL /LPF (ref 0–2)
INR PPP: 1.2 (ref 0.8–1.2)
IRON SATN MFR SERPL: 55 %
IRON SERPL-MCNC: 94 UG/DL (ref 50–175)
KETONES UR QL STRIP.AUTO: 40 MG/DL
KETONES UR QL STRIP.AUTO: ABNORMAL MG/DL
KETONES UR QL STRIP.AUTO: ABNORMAL MG/DL
KETONES UR QL STRIP.AUTO: NEGATIVE MG/DL
KETONES UR QL STRIP.AUTO: NEGATIVE MG/DL
LACTATE BLD-SCNC: 1.3 MMOL/L (ref 0.4–2)
LACTATE BLD-SCNC: 1.9 MMOL/L (ref 0.4–2)
LACTATE SERPL-SCNC: 0.8 MMOL/L (ref 0.4–2)
LACTATE SERPL-SCNC: 1.6 MMOL/L (ref 0.4–2)
LACTATE SERPL-SCNC: 2.1 MMOL/L (ref 0.4–2)
LEUKOCYTE ESTERASE UR QL STRIP.AUTO: ABNORMAL
LEUKOCYTE ESTERASE UR QL STRIP.AUTO: NEGATIVE
LIPASE SERPL-CCNC: 125 U/L (ref 73–393)
LIPASE SERPL-CCNC: 160 U/L (ref 73–393)
LYMPHOCYTES # BLD: 0.4 K/UL (ref 0.8–3.5)
LYMPHOCYTES # BLD: 0.6 K/UL (ref 0.9–3.6)
LYMPHOCYTES # BLD: 1.2 K/UL (ref 0.9–3.6)
LYMPHOCYTES # BLD: 1.3 K/UL (ref 0.8–3.5)
LYMPHOCYTES # BLD: 1.3 K/UL (ref 0.9–3.6)
LYMPHOCYTES # BLD: 1.5 K/UL (ref 0.9–3.6)
LYMPHOCYTES # BLD: 1.5 K/UL (ref 0.9–3.6)
LYMPHOCYTES # BLD: 1.7 K/UL (ref 0.9–3.6)
LYMPHOCYTES # BLD: 2.1 K/UL (ref 0.9–3.6)
LYMPHOCYTES # BLD: 2.1 K/UL (ref 0.9–3.6)
LYMPHOCYTES # BLD: 2.2 K/UL (ref 0.9–3.6)
LYMPHOCYTES # BLD: 2.2 K/UL (ref 0.9–3.6)
LYMPHOCYTES # BLD: 2.9 K/UL (ref 0.8–3.5)
LYMPHOCYTES # BLD: 3.2 K/UL (ref 0.8–3.5)
LYMPHOCYTES # BLD: 3.2 K/UL (ref 0.8–3.5)
LYMPHOCYTES NFR BLD: 12 % (ref 20–51)
LYMPHOCYTES NFR BLD: 13 % (ref 21–52)
LYMPHOCYTES NFR BLD: 13 % (ref 21–52)
LYMPHOCYTES NFR BLD: 14 % (ref 21–52)
LYMPHOCYTES NFR BLD: 15 % (ref 20–51)
LYMPHOCYTES NFR BLD: 16 % (ref 21–52)
LYMPHOCYTES NFR BLD: 18 % (ref 21–52)
LYMPHOCYTES NFR BLD: 20 % (ref 21–52)
LYMPHOCYTES NFR BLD: 3 % (ref 21–52)
LYMPHOCYTES NFR BLD: 30 % (ref 21–52)
LYMPHOCYTES NFR BLD: 31 % (ref 20–51)
LYMPHOCYTES NFR BLD: 31 % (ref 21–52)
LYMPHOCYTES NFR BLD: 34 % (ref 20–51)
LYMPHOCYTES NFR BLD: 5 % (ref 20–51)
LYMPHOCYTES NFR BLD: 9 % (ref 21–52)
MAGNESIUM SERPL-MCNC: 1.3 MG/DL (ref 1.6–2.6)
MAGNESIUM SERPL-MCNC: 1.5 MG/DL (ref 1.6–2.6)
MAGNESIUM SERPL-MCNC: 1.6 MG/DL (ref 1.6–2.6)
MAGNESIUM SERPL-MCNC: 1.7 MG/DL (ref 1.6–2.6)
MAGNESIUM SERPL-MCNC: 1.8 MG/DL (ref 1.6–2.6)
MAGNESIUM SERPL-MCNC: 1.9 MG/DL (ref 1.6–2.6)
MAGNESIUM SERPL-MCNC: 1.9 MG/DL (ref 1.6–2.6)
MAGNESIUM SERPL-MCNC: 2 MG/DL (ref 1.6–2.6)
MAGNESIUM SERPL-MCNC: 2.1 MG/DL (ref 1.6–2.6)
MAGNESIUM SERPL-MCNC: 2.3 MG/DL (ref 1.6–2.6)
MAGNESIUM SERPL-MCNC: 2.9 MG/DL (ref 1.6–2.6)
MCH RBC QN AUTO: 28.3 PG (ref 24–34)
MCH RBC QN AUTO: 28.9 PG (ref 24–34)
MCH RBC QN AUTO: 29.3 PG (ref 24–34)
MCH RBC QN AUTO: 29.4 PG (ref 24–34)
MCH RBC QN AUTO: 29.4 PG (ref 24–34)
MCH RBC QN AUTO: 29.6 PG (ref 24–34)
MCH RBC QN AUTO: 29.7 PG (ref 24–34)
MCH RBC QN AUTO: 29.7 PG (ref 24–34)
MCH RBC QN AUTO: 29.8 PG (ref 24–34)
MCH RBC QN AUTO: 29.9 PG (ref 24–34)
MCH RBC QN AUTO: 30.1 PG (ref 24–34)
MCH RBC QN AUTO: 30.5 PG (ref 24–34)
MCHC RBC AUTO-ENTMCNC: 31 G/DL (ref 31–37)
MCHC RBC AUTO-ENTMCNC: 31.5 G/DL (ref 31–37)
MCHC RBC AUTO-ENTMCNC: 31.6 G/DL (ref 31–37)
MCHC RBC AUTO-ENTMCNC: 32.1 G/DL (ref 31–37)
MCHC RBC AUTO-ENTMCNC: 32.4 G/DL (ref 31–37)
MCHC RBC AUTO-ENTMCNC: 32.5 G/DL (ref 31–37)
MCHC RBC AUTO-ENTMCNC: 32.7 G/DL (ref 31–37)
MCHC RBC AUTO-ENTMCNC: 32.8 G/DL (ref 31–37)
MCHC RBC AUTO-ENTMCNC: 32.9 G/DL (ref 31–37)
MCHC RBC AUTO-ENTMCNC: 32.9 G/DL (ref 31–37)
MCHC RBC AUTO-ENTMCNC: 33 G/DL (ref 31–37)
MCHC RBC AUTO-ENTMCNC: 33.2 G/DL (ref 31–37)
MCHC RBC AUTO-ENTMCNC: 33.4 G/DL (ref 31–37)
MCHC RBC AUTO-ENTMCNC: 33.5 G/DL (ref 31–37)
MCHC RBC AUTO-ENTMCNC: 33.7 G/DL (ref 31–37)
MCHC RBC AUTO-ENTMCNC: 34.2 G/DL (ref 31–37)
MCHC RBC AUTO-ENTMCNC: 34.8 G/DL (ref 31–37)
MCV RBC AUTO: 85.1 FL (ref 74–97)
MCV RBC AUTO: 88.1 FL (ref 74–97)
MCV RBC AUTO: 88.3 FL (ref 74–97)
MCV RBC AUTO: 88.4 FL (ref 74–97)
MCV RBC AUTO: 88.8 FL (ref 74–97)
MCV RBC AUTO: 88.8 FL (ref 74–97)
MCV RBC AUTO: 89.1 FL (ref 74–97)
MCV RBC AUTO: 89.2 FL (ref 74–97)
MCV RBC AUTO: 89.3 FL (ref 74–97)
MCV RBC AUTO: 89.7 FL (ref 74–97)
MCV RBC AUTO: 89.8 FL (ref 74–97)
MCV RBC AUTO: 91.1 FL (ref 74–97)
MCV RBC AUTO: 91.6 FL (ref 74–97)
MCV RBC AUTO: 92 FL (ref 74–97)
MCV RBC AUTO: 92.3 FL (ref 74–97)
MCV RBC AUTO: 93.3 FL (ref 74–97)
MCV RBC AUTO: 94.3 FL (ref 74–97)
METAMYELOCYTES NFR BLD MANUAL: 1 %
METAMYELOCYTES NFR BLD MANUAL: 2 %
METAMYELOCYTES NFR BLD MANUAL: 3 %
METAMYELOCYTES NFR BLD MANUAL: 4 %
METHADONE UR QL: NEGATIVE
MONOCYTES # BLD: 0.4 K/UL (ref 0.05–1.2)
MONOCYTES # BLD: 0.4 K/UL (ref 0–1)
MONOCYTES # BLD: 0.4 K/UL (ref 0–1)
MONOCYTES # BLD: 0.6 K/UL (ref 0.05–1.2)
MONOCYTES # BLD: 0.6 K/UL (ref 0.05–1.2)
MONOCYTES # BLD: 0.6 K/UL (ref 0–1)
MONOCYTES # BLD: 0.7 K/UL (ref 0.05–1.2)
MONOCYTES # BLD: 0.7 K/UL (ref 0.05–1.2)
MONOCYTES # BLD: 0.7 K/UL (ref 0–1)
MONOCYTES # BLD: 0.8 K/UL (ref 0.05–1.2)
MONOCYTES # BLD: 0.8 K/UL (ref 0–1)
MONOCYTES # BLD: 0.9 K/UL (ref 0.05–1.2)
MONOCYTES # BLD: 1 K/UL (ref 0.05–1.2)
MONOCYTES # BLD: 1.1 K/UL (ref 0.05–1.2)
MONOCYTES # BLD: 1.1 K/UL (ref 0.05–1.2)
MONOCYTES NFR BLD: 3 % (ref 2–9)
MONOCYTES NFR BLD: 4 % (ref 3–10)
MONOCYTES NFR BLD: 5 % (ref 2–9)
MONOCYTES NFR BLD: 5 % (ref 3–10)
MONOCYTES NFR BLD: 6 % (ref 2–9)
MONOCYTES NFR BLD: 7 % (ref 2–9)
MONOCYTES NFR BLD: 7 % (ref 2–9)
MONOCYTES NFR BLD: 7 % (ref 3–10)
MONOCYTES NFR BLD: 8 % (ref 3–10)
MONOCYTES NFR BLD: 9 % (ref 3–10)
MUCOUS THREADS URNS QL MICRO: ABNORMAL /LPF
MYELOCYTES NFR BLD MANUAL: 2 %
MYELOCYTES NFR BLD MANUAL: 3 %
MYELOCYTES NFR BLD MANUAL: 4 %
NEUTS BAND NFR BLD MANUAL: 2 % (ref 0–5)
NEUTS BAND NFR BLD MANUAL: 2 % (ref 0–5)
NEUTS BAND NFR BLD MANUAL: 5 %
NEUTS BAND NFR BLD MANUAL: 8 % (ref 0–5)
NEUTS SEG # BLD: 12.9 K/UL (ref 1.8–8)
NEUTS SEG # BLD: 13.3 K/UL (ref 1.8–8)
NEUTS SEG # BLD: 17.5 K/UL (ref 1.8–8)
NEUTS SEG # BLD: 19.5 K/UL (ref 1.8–8)
NEUTS SEG # BLD: 3.9 K/UL (ref 1.8–8)
NEUTS SEG # BLD: 4.4 K/UL (ref 1.8–8)
NEUTS SEG # BLD: 4.5 K/UL (ref 1.8–8)
NEUTS SEG # BLD: 5.5 K/UL (ref 1.8–8)
NEUTS SEG # BLD: 5.8 K/UL (ref 1.8–8)
NEUTS SEG # BLD: 6.9 K/UL (ref 1.8–8)
NEUTS SEG # BLD: 7.1 K/UL (ref 1.8–8)
NEUTS SEG # BLD: 7.1 K/UL (ref 1.8–8)
NEUTS SEG # BLD: 7.2 K/UL (ref 1.8–8)
NEUTS SEG NFR BLD: 54 % (ref 42–75)
NEUTS SEG NFR BLD: 55 % (ref 40–73)
NEUTS SEG NFR BLD: 57 % (ref 42–75)
NEUTS SEG NFR BLD: 61 % (ref 40–73)
NEUTS SEG NFR BLD: 69 % (ref 40–73)
NEUTS SEG NFR BLD: 73 % (ref 40–73)
NEUTS SEG NFR BLD: 73 % (ref 42–75)
NEUTS SEG NFR BLD: 74 % (ref 40–73)
NEUTS SEG NFR BLD: 76 % (ref 40–73)
NEUTS SEG NFR BLD: 76 % (ref 42–75)
NEUTS SEG NFR BLD: 77 % (ref 40–73)
NEUTS SEG NFR BLD: 79 % (ref 40–73)
NEUTS SEG NFR BLD: 79 % (ref 42–75)
NEUTS SEG NFR BLD: 82 % (ref 40–73)
NEUTS SEG NFR BLD: 84 % (ref 40–73)
NITRITE UR QL STRIP.AUTO: NEGATIVE
NITRITE UR QL STRIP.AUTO: POSITIVE
NITRITE UR QL STRIP.AUTO: POSITIVE
OPIATES UR QL: NEGATIVE
P-R INTERVAL, ECG05: 134 MS
P-R INTERVAL, ECG05: 144 MS
P-R INTERVAL, ECG05: 144 MS
P-R INTERVAL, ECG05: 146 MS
P-R INTERVAL, ECG05: 146 MS
P-R INTERVAL, ECG05: 154 MS
P-R INTERVAL, ECG05: 162 MS
P-R INTERVAL, ECG05: 162 MS
PATH REV BLD -IMP: NORMAL
PCP UR QL: NEGATIVE
PH UR STRIP: 5 [PH] (ref 5–8)
PH UR STRIP: 5.5 [PH] (ref 5–8)
PH UR STRIP: 5.5 [PH] (ref 5–8)
PLATELET # BLD AUTO: 109 K/UL (ref 135–420)
PLATELET # BLD AUTO: 169 K/UL (ref 135–420)
PLATELET # BLD AUTO: 174 K/UL (ref 135–420)
PLATELET # BLD AUTO: 181 K/UL (ref 135–420)
PLATELET # BLD AUTO: 184 K/UL (ref 135–420)
PLATELET # BLD AUTO: 203 K/UL (ref 135–420)
PLATELET # BLD AUTO: 204 K/UL (ref 135–420)
PLATELET # BLD AUTO: 211 K/UL (ref 135–420)
PLATELET # BLD AUTO: 213 K/UL (ref 135–420)
PLATELET # BLD AUTO: 225 K/UL (ref 135–420)
PLATELET # BLD AUTO: 249 K/UL (ref 135–420)
PLATELET # BLD AUTO: 251 K/UL (ref 135–420)
PLATELET # BLD AUTO: 270 K/UL (ref 135–420)
PLATELET # BLD AUTO: 276 K/UL (ref 135–420)
PLATELET # BLD AUTO: 280 K/UL (ref 135–420)
PLATELET # BLD AUTO: 334 K/UL (ref 135–420)
PLATELET # BLD AUTO: 341 K/UL (ref 135–420)
PLATELET COMMENTS,PCOM: ABNORMAL
PMV BLD AUTO: 10 FL (ref 9.2–11.8)
PMV BLD AUTO: 10.2 FL (ref 9.2–11.8)
PMV BLD AUTO: 10.3 FL (ref 9.2–11.8)
PMV BLD AUTO: 10.4 FL (ref 9.2–11.8)
PMV BLD AUTO: 8.7 FL (ref 9.2–11.8)
PMV BLD AUTO: 9 FL (ref 9.2–11.8)
PMV BLD AUTO: 9.2 FL (ref 9.2–11.8)
PMV BLD AUTO: 9.3 FL (ref 9.2–11.8)
PMV BLD AUTO: 9.6 FL (ref 9.2–11.8)
PMV BLD AUTO: 9.7 FL (ref 9.2–11.8)
PMV BLD AUTO: 9.8 FL (ref 9.2–11.8)
PMV BLD AUTO: 9.8 FL (ref 9.2–11.8)
PMV BLD AUTO: 9.9 FL (ref 9.2–11.8)
POTASSIUM SERPL-SCNC: 2.7 MMOL/L (ref 3.5–5.5)
POTASSIUM SERPL-SCNC: 2.8 MMOL/L (ref 3.5–5.5)
POTASSIUM SERPL-SCNC: 2.9 MMOL/L (ref 3.5–5.5)
POTASSIUM SERPL-SCNC: 3 MMOL/L (ref 3.5–5.5)
POTASSIUM SERPL-SCNC: 3.1 MMOL/L (ref 3.5–5.5)
POTASSIUM SERPL-SCNC: 3.3 MMOL/L (ref 3.5–5.5)
POTASSIUM SERPL-SCNC: 3.4 MMOL/L (ref 3.5–5.5)
POTASSIUM SERPL-SCNC: 3.5 MMOL/L (ref 3.5–5.5)
POTASSIUM SERPL-SCNC: 3.5 MMOL/L (ref 3.5–5.5)
POTASSIUM SERPL-SCNC: 3.6 MMOL/L (ref 3.5–5.5)
POTASSIUM SERPL-SCNC: 3.7 MMOL/L (ref 3.5–5.5)
POTASSIUM SERPL-SCNC: 3.7 MMOL/L (ref 3.5–5.5)
POTASSIUM SERPL-SCNC: 3.8 MMOL/L (ref 3.5–5.5)
POTASSIUM SERPL-SCNC: 3.9 MMOL/L (ref 3.5–5.5)
POTASSIUM SERPL-SCNC: 4 MMOL/L (ref 3.5–5.5)
POTASSIUM SERPL-SCNC: 4 MMOL/L (ref 3.5–5.5)
POTASSIUM SERPL-SCNC: 4.1 MMOL/L (ref 3.5–5.5)
POTASSIUM SERPL-SCNC: 4.3 MMOL/L (ref 3.5–5.5)
POTASSIUM SERPL-SCNC: 4.5 MMOL/L (ref 3.5–5.5)
PROCALCITONIN SERPL-MCNC: 0.21 NG/ML (ref 0–0.08)
PROMYELOCYTES NFR BLD MANUAL: 1 %
PROMYELOCYTES NFR BLD MANUAL: 1 %
PROT SERPL-MCNC: 4.1 G/DL (ref 6.4–8.2)
PROT SERPL-MCNC: 5.1 G/DL (ref 6.4–8.2)
PROT SERPL-MCNC: 5.4 G/DL (ref 6.4–8.2)
PROT SERPL-MCNC: 5.5 G/DL (ref 6.4–8.2)
PROT SERPL-MCNC: 5.9 G/DL (ref 6.4–8.2)
PROT SERPL-MCNC: 6.6 G/DL (ref 6.4–8.2)
PROT SERPL-MCNC: 6.8 G/DL (ref 6.4–8.2)
PROT SERPL-MCNC: 7 G/DL (ref 6.4–8.2)
PROT SERPL-MCNC: 7.2 G/DL (ref 6.4–8.2)
PROT UR STRIP-MCNC: 100 MG/DL
PROT UR STRIP-MCNC: 30 MG/DL
PROT UR STRIP-MCNC: 30 MG/DL
PROT UR STRIP-MCNC: NEGATIVE MG/DL
PROT UR STRIP-MCNC: NEGATIVE MG/DL
PROTHROMBIN TIME: 14.2 SEC (ref 11.5–15.2)
Q-T INTERVAL, ECG07: 366 MS
Q-T INTERVAL, ECG07: 372 MS
Q-T INTERVAL, ECG07: 384 MS
Q-T INTERVAL, ECG07: 388 MS
Q-T INTERVAL, ECG07: 420 MS
Q-T INTERVAL, ECG07: 430 MS
Q-T INTERVAL, ECG07: 434 MS
Q-T INTERVAL, ECG07: 456 MS
QRS DURATION, ECG06: 130 MS
QRS DURATION, ECG06: 130 MS
QRS DURATION, ECG06: 132 MS
QRS DURATION, ECG06: 134 MS
QRS DURATION, ECG06: 134 MS
QRS DURATION, ECG06: 70 MS
QRS DURATION, ECG06: 72 MS
QRS DURATION, ECG06: 80 MS
QTC CALCULATION (BEZET), ECG08: 455 MS
QTC CALCULATION (BEZET), ECG08: 460 MS
QTC CALCULATION (BEZET), ECG08: 490 MS
QTC CALCULATION (BEZET), ECG08: 509 MS
QTC CALCULATION (BEZET), ECG08: 513 MS
QTC CALCULATION (BEZET), ECG08: 513 MS
QTC CALCULATION (BEZET), ECG08: 514 MS
QTC CALCULATION (BEZET), ECG08: 547 MS
RBC # BLD AUTO: 2.23 M/UL (ref 4.2–5.3)
RBC # BLD AUTO: 2.46 M/UL (ref 4.2–5.3)
RBC # BLD AUTO: 2.73 M/UL (ref 4.2–5.3)
RBC # BLD AUTO: 2.82 M/UL (ref 4.2–5.3)
RBC # BLD AUTO: 2.84 M/UL (ref 4.2–5.3)
RBC # BLD AUTO: 2.97 M/UL (ref 4.2–5.3)
RBC # BLD AUTO: 3.44 M/UL (ref 4.2–5.3)
RBC # BLD AUTO: 3.48 M/UL (ref 4.2–5.3)
RBC # BLD AUTO: 3.49 M/UL (ref 4.2–5.3)
RBC # BLD AUTO: 3.62 M/UL (ref 4.2–5.3)
RBC # BLD AUTO: 3.74 M/UL (ref 4.2–5.3)
RBC # BLD AUTO: 3.79 M/UL (ref 4.2–5.3)
RBC # BLD AUTO: 3.94 M/UL (ref 4.2–5.3)
RBC # BLD AUTO: 3.95 M/UL (ref 4.2–5.3)
RBC # BLD AUTO: 4 M/UL (ref 4.2–5.3)
RBC # BLD AUTO: 4.03 M/UL (ref 4.2–5.3)
RBC # BLD AUTO: 4.15 M/UL (ref 4.2–5.3)
RBC #/AREA URNS HPF: 0 /HPF (ref 0–5)
RBC #/AREA URNS HPF: ABNORMAL /HPF (ref 0–5)
RBC MORPH BLD: ABNORMAL
SERVICE CMNT-IMP: ABNORMAL
SERVICE CMNT-IMP: NORMAL
SODIUM SERPL-SCNC: 135 MMOL/L (ref 136–145)
SODIUM SERPL-SCNC: 136 MMOL/L (ref 136–145)
SODIUM SERPL-SCNC: 137 MMOL/L (ref 136–145)
SODIUM SERPL-SCNC: 138 MMOL/L (ref 136–145)
SODIUM SERPL-SCNC: 139 MMOL/L (ref 136–145)
SODIUM SERPL-SCNC: 140 MMOL/L (ref 136–145)
SODIUM SERPL-SCNC: 141 MMOL/L (ref 136–145)
SODIUM SERPL-SCNC: 142 MMOL/L (ref 136–145)
SODIUM SERPL-SCNC: 143 MMOL/L (ref 136–145)
SODIUM SERPL-SCNC: 143 MMOL/L (ref 136–145)
SODIUM SERPL-SCNC: 146 MMOL/L (ref 136–145)
SP GR UR REFRACTOMETRY: 1.02 (ref 1–1.03)
SP GR UR REFRACTOMETRY: 1.03 (ref 1–1.03)
SPECIMEN EXP DATE BLD: NORMAL
STATUS OF UNIT,%ST: NORMAL
STATUS OF UNIT,%ST: NORMAL
T4 SERPL-MCNC: 12.2 UG/DL (ref 4.5–10.9)
TIBC SERPL-MCNC: 171 UG/DL (ref 250–450)
TROPONIN I SERPL-MCNC: 0.02 NG/ML (ref 0–0.04)
TROPONIN I SERPL-MCNC: 0.18 NG/ML (ref 0–0.04)
TROPONIN I SERPL-MCNC: 0.26 NG/ML (ref 0–0.04)
TROPONIN I SERPL-MCNC: 0.33 NG/ML (ref 0–0.04)
TROPONIN I SERPL-MCNC: <0.02 NG/ML (ref 0–0.04)
TSH SERPL DL<=0.05 MIU/L-ACNC: 1.85 UIU/ML (ref 0.36–3.74)
TSH SERPL DL<=0.05 MIU/L-ACNC: 2.31 UIU/ML (ref 0.36–3.74)
UNIT DIVISION, %UDIV: 0
UNIT DIVISION, %UDIV: 0
UROBILINOGEN UR QL STRIP.AUTO: 0.2 EU/DL (ref 0.2–1)
UROBILINOGEN UR QL STRIP.AUTO: 1 EU/DL (ref 0.2–1)
UROBILINOGEN UR QL STRIP.AUTO: 1 EU/DL (ref 0.2–1)
VANCOMYCIN SERPL-MCNC: 9.6 UG/ML (ref 5–40)
VENTRICULAR RATE, ECG03: 102 BPM
VENTRICULAR RATE, ECG03: 115 BPM
VENTRICULAR RATE, ECG03: 118 BPM
VENTRICULAR RATE, ECG03: 69 BPM
VENTRICULAR RATE, ECG03: 76 BPM
VENTRICULAR RATE, ECG03: 83 BPM
VENTRICULAR RATE, ECG03: 83 BPM
VENTRICULAR RATE, ECG03: 98 BPM
WBC # BLD AUTO: 10.2 K/UL (ref 4.6–13.2)
WBC # BLD AUTO: 10.4 K/UL (ref 4.6–13.2)
WBC # BLD AUTO: 13.3 K/UL (ref 4.6–13.2)
WBC # BLD AUTO: 15.6 K/UL (ref 4.6–13.2)
WBC # BLD AUTO: 16.2 K/UL (ref 4.6–13.2)
WBC # BLD AUTO: 16.8 K/UL (ref 4.6–13.2)
WBC # BLD AUTO: 21.4 K/UL (ref 4.6–13.2)
WBC # BLD AUTO: 26.7 K/UL (ref 4.6–13.2)
WBC # BLD AUTO: 7.2 K/UL (ref 4.6–13.2)
WBC # BLD AUTO: 7.2 K/UL (ref 4.6–13.2)
WBC # BLD AUTO: 7.3 K/UL (ref 4.6–13.2)
WBC # BLD AUTO: 8.4 K/UL (ref 4.6–13.2)
WBC # BLD AUTO: 8.7 K/UL (ref 4.6–13.2)
WBC # BLD AUTO: 9.3 K/UL (ref 4.6–13.2)
WBC # BLD AUTO: 9.4 K/UL (ref 4.6–13.2)
WBC # BLD AUTO: 9.7 K/UL (ref 4.6–13.2)
WBC # BLD AUTO: 9.8 K/UL (ref 4.6–13.2)
WBC MORPH BLD: ABNORMAL
WBC URNS QL MICRO: ABNORMAL /HPF (ref 0–4)
YEAST URNS QL MICRO: ABNORMAL

## 2018-01-01 PROCEDURE — 74011000250 HC RX REV CODE- 250: Performed by: HOSPITALIST

## 2018-01-01 PROCEDURE — 65660000000 HC RM CCU STEPDOWN

## 2018-01-01 PROCEDURE — 85025 COMPLETE CBC W/AUTO DIFF WBC: CPT | Performed by: NURSE PRACTITIONER

## 2018-01-01 PROCEDURE — 36415 COLL VENOUS BLD VENIPUNCTURE: CPT | Performed by: HOSPITALIST

## 2018-01-01 PROCEDURE — 74011250637 HC RX REV CODE- 250/637: Performed by: NURSE PRACTITIONER

## 2018-01-01 PROCEDURE — 80048 BASIC METABOLIC PNL TOTAL CA: CPT | Performed by: HOSPITALIST

## 2018-01-01 PROCEDURE — 82550 ASSAY OF CK (CPK): CPT | Performed by: HOSPITALIST

## 2018-01-01 PROCEDURE — 74011250637 HC RX REV CODE- 250/637: Performed by: HOSPITALIST

## 2018-01-01 PROCEDURE — 74011250636 HC RX REV CODE- 250/636: Performed by: EMERGENCY MEDICINE

## 2018-01-01 PROCEDURE — 74011250636 HC RX REV CODE- 250/636: Performed by: NURSE PRACTITIONER

## 2018-01-01 PROCEDURE — 74011250637 HC RX REV CODE- 250/637: Performed by: INTERNAL MEDICINE

## 2018-01-01 PROCEDURE — 74011250636 HC RX REV CODE- 250/636: Performed by: INTERNAL MEDICINE

## 2018-01-01 PROCEDURE — 74011000258 HC RX REV CODE- 258: Performed by: INTERNAL MEDICINE

## 2018-01-01 PROCEDURE — A6250 SKIN SEAL PROTECT MOISTURIZR: HCPCS

## 2018-01-01 PROCEDURE — 77030020257 HC SOL INJ SOD CL 0.45% 1000ML BG

## 2018-01-01 PROCEDURE — G0156 HHCP-SVS OF AIDE,EA 15 MIN: HCPCS

## 2018-01-01 PROCEDURE — 97530 THERAPEUTIC ACTIVITIES: CPT

## 2018-01-01 PROCEDURE — 83735 ASSAY OF MAGNESIUM: CPT | Performed by: INTERNAL MEDICINE

## 2018-01-01 PROCEDURE — G0299 HHS/HOSPICE OF RN EA 15 MIN: HCPCS

## 2018-01-01 PROCEDURE — 74177 CT ABD & PELVIS W/CONTRAST: CPT

## 2018-01-01 PROCEDURE — 83880 ASSAY OF NATRIURETIC PEPTIDE: CPT | Performed by: INTERNAL MEDICINE

## 2018-01-01 PROCEDURE — 74011250636 HC RX REV CODE- 250/636: Performed by: FAMILY MEDICINE

## 2018-01-01 PROCEDURE — HOSPICE MEDICATION HC HH HOSPICE MEDICATION

## 2018-01-01 PROCEDURE — 82550 ASSAY OF CK (CPK): CPT | Performed by: PHYSICIAN ASSISTANT

## 2018-01-01 PROCEDURE — 36415 COLL VENOUS BLD VENIPUNCTURE: CPT | Performed by: INTERNAL MEDICINE

## 2018-01-01 PROCEDURE — 74011000250 HC RX REV CODE- 250: Performed by: INTERNAL MEDICINE

## 2018-01-01 PROCEDURE — 87077 CULTURE AEROBIC IDENTIFY: CPT | Performed by: EMERGENCY MEDICINE

## 2018-01-01 PROCEDURE — 65270000029 HC RM PRIVATE

## 2018-01-01 PROCEDURE — 74011000250 HC RX REV CODE- 250: Performed by: EMERGENCY MEDICINE

## 2018-01-01 PROCEDURE — T4541 LARGE DISPOSABLE UNDERPAD: HCPCS

## 2018-01-01 PROCEDURE — 77030033269 HC SLV COMPR SCD KNE2 CARD -B

## 2018-01-01 PROCEDURE — 74011250636 HC RX REV CODE- 250/636: Performed by: HOSPITALIST

## 2018-01-01 PROCEDURE — 84484 ASSAY OF TROPONIN QUANT: CPT | Performed by: EMERGENCY MEDICINE

## 2018-01-01 PROCEDURE — 77030020256 HC SOL INJ NACL 0.9%  500ML

## 2018-01-01 PROCEDURE — 77030020245 HC SOL INJ 5% D/0.9%NACL

## 2018-01-01 PROCEDURE — G8997 SWALLOW GOAL STATUS: HCPCS

## 2018-01-01 PROCEDURE — 85025 COMPLETE CBC W/AUTO DIFF WBC: CPT | Performed by: HOSPITALIST

## 2018-01-01 PROCEDURE — 36415 COLL VENOUS BLD VENIPUNCTURE: CPT | Performed by: NURSE PRACTITIONER

## 2018-01-01 PROCEDURE — 83605 ASSAY OF LACTIC ACID: CPT | Performed by: HOSPITALIST

## 2018-01-01 PROCEDURE — 77030037878 HC DRSG MEPILEX >48IN BORD MOLN -B

## 2018-01-01 PROCEDURE — 77030033263 HC DRSG MEPILEX 16-48IN BORD MOLN -B

## 2018-01-01 PROCEDURE — 97164 PT RE-EVAL EST PLAN CARE: CPT

## 2018-01-01 PROCEDURE — 77030012058: Performed by: INTERNAL MEDICINE

## 2018-01-01 PROCEDURE — 80053 COMPREHEN METABOLIC PANEL: CPT | Performed by: PHYSICIAN ASSISTANT

## 2018-01-01 PROCEDURE — 80307 DRUG TEST PRSMV CHEM ANLYZR: CPT | Performed by: EMERGENCY MEDICINE

## 2018-01-01 PROCEDURE — 96374 THER/PROPH/DIAG INJ IV PUSH: CPT

## 2018-01-01 PROCEDURE — 0651 HSPC ROUTINE HOME CARE

## 2018-01-01 PROCEDURE — 96361 HYDRATE IV INFUSION ADD-ON: CPT

## 2018-01-01 PROCEDURE — 85652 RBC SED RATE AUTOMATED: CPT | Performed by: INTERNAL MEDICINE

## 2018-01-01 PROCEDURE — 77030020263 HC SOL INJ SOD CL0.9% LFCR 1000ML

## 2018-01-01 PROCEDURE — 99218 HC RM OBSERVATION: CPT

## 2018-01-01 PROCEDURE — G8996 SWALLOW CURRENT STATUS: HCPCS

## 2018-01-01 PROCEDURE — 80048 BASIC METABOLIC PNL TOTAL CA: CPT | Performed by: NURSE PRACTITIONER

## 2018-01-01 PROCEDURE — 82248 BILIRUBIN DIRECT: CPT | Performed by: INTERNAL MEDICINE

## 2018-01-01 PROCEDURE — 77030020186 HC BOOT HL PROTCT SAGE -B

## 2018-01-01 PROCEDURE — 85027 COMPLETE CBC AUTOMATED: CPT | Performed by: NURSE PRACTITIONER

## 2018-01-01 PROCEDURE — 77010033678 HC OXYGEN DAILY

## 2018-01-01 PROCEDURE — 76060000032 HC ANESTHESIA 0.5 TO 1 HR: Performed by: INTERNAL MEDICINE

## 2018-01-01 PROCEDURE — 77030011256 HC DRSG MEPILEX <16IN NO BORD MOLN -A

## 2018-01-01 PROCEDURE — 83735 ASSAY OF MAGNESIUM: CPT | Performed by: HOSPITALIST

## 2018-01-01 PROCEDURE — 82962 GLUCOSE BLOOD TEST: CPT

## 2018-01-01 PROCEDURE — 83735 ASSAY OF MAGNESIUM: CPT | Performed by: EMERGENCY MEDICINE

## 2018-01-01 PROCEDURE — 93005 ELECTROCARDIOGRAM TRACING: CPT

## 2018-01-01 PROCEDURE — 74011250636 HC RX REV CODE- 250/636

## 2018-01-01 PROCEDURE — 74011000250 HC RX REV CODE- 250

## 2018-01-01 PROCEDURE — 85025 COMPLETE CBC W/AUTO DIFF WBC: CPT | Performed by: INTERNAL MEDICINE

## 2018-01-01 PROCEDURE — 77030020253 HC SOL INJ D545NS .05 DEX .45 SAL

## 2018-01-01 PROCEDURE — A9270 NON-COVERED ITEM OR SERVICE: HCPCS

## 2018-01-01 PROCEDURE — P9016 RBC LEUKOCYTES REDUCED: HCPCS | Performed by: HOSPITALIST

## 2018-01-01 PROCEDURE — HHS10554 SHAMPOO/BODY WASH 8 OZ ALOE VESTA

## 2018-01-01 PROCEDURE — 74011000258 HC RX REV CODE- 258: Performed by: HOSPITALIST

## 2018-01-01 PROCEDURE — 85025 COMPLETE CBC W/AUTO DIFF WBC: CPT | Performed by: EMERGENCY MEDICINE

## 2018-01-01 PROCEDURE — 71046 X-RAY EXAM CHEST 2 VIEWS: CPT

## 2018-01-01 PROCEDURE — A4927 NON-STERILE GLOVES: HCPCS

## 2018-01-01 PROCEDURE — 76040000019: Performed by: INTERNAL MEDICINE

## 2018-01-01 PROCEDURE — 87040 BLOOD CULTURE FOR BACTERIA: CPT | Performed by: HOSPITALIST

## 2018-01-01 PROCEDURE — 74011000258 HC RX REV CODE- 258

## 2018-01-01 PROCEDURE — 71045 X-RAY EXAM CHEST 1 VIEW: CPT

## 2018-01-01 PROCEDURE — 81001 URINALYSIS AUTO W/SCOPE: CPT | Performed by: EMERGENCY MEDICINE

## 2018-01-01 PROCEDURE — 77030018798 HC PMP KT ENTRL FED COVD -A

## 2018-01-01 PROCEDURE — 92526 ORAL FUNCTION THERAPY: CPT

## 2018-01-01 PROCEDURE — 76040000007: Performed by: INTERNAL MEDICINE

## 2018-01-01 PROCEDURE — 80048 BASIC METABOLIC PNL TOTAL CA: CPT | Performed by: EMERGENCY MEDICINE

## 2018-01-01 PROCEDURE — 3336500001 HSPC ELECTION

## 2018-01-01 PROCEDURE — 99285 EMERGENCY DEPT VISIT HI MDM: CPT

## 2018-01-01 PROCEDURE — 80053 COMPREHEN METABOLIC PANEL: CPT | Performed by: EMERGENCY MEDICINE

## 2018-01-01 PROCEDURE — 77030020291 HC FLEXSEAL FMS BMS -C

## 2018-01-01 PROCEDURE — G8979 MOBILITY GOAL STATUS: HCPCS

## 2018-01-01 PROCEDURE — 77030034850

## 2018-01-01 PROCEDURE — 83605 ASSAY OF LACTIC ACID: CPT | Performed by: NURSE PRACTITIONER

## 2018-01-01 PROCEDURE — 84145 PROCALCITONIN (PCT): CPT | Performed by: NURSE PRACTITIONER

## 2018-01-01 PROCEDURE — 84484 ASSAY OF TROPONIN QUANT: CPT | Performed by: INTERNAL MEDICINE

## 2018-01-01 PROCEDURE — 81001 URINALYSIS AUTO W/SCOPE: CPT | Performed by: HOSPITALIST

## 2018-01-01 PROCEDURE — 74230 X-RAY XM SWLNG FUNCJ C+: CPT

## 2018-01-01 PROCEDURE — 87493 C DIFF AMPLIFIED PROBE: CPT | Performed by: HOSPITALIST

## 2018-01-01 PROCEDURE — 80048 BASIC METABOLIC PNL TOTAL CA: CPT | Performed by: INTERNAL MEDICINE

## 2018-01-01 PROCEDURE — 36430 TRANSFUSION BLD/BLD COMPNT: CPT

## 2018-01-01 PROCEDURE — 77030032490 HC SLV COMPR SCD KNE COVD -B

## 2018-01-01 PROCEDURE — G0155 HHCP-SVS OF CSW,EA 15 MIN: HCPCS

## 2018-01-01 PROCEDURE — 96376 TX/PRO/DX INJ SAME DRUG ADON: CPT

## 2018-01-01 PROCEDURE — 83540 ASSAY OF IRON: CPT | Performed by: INTERNAL MEDICINE

## 2018-01-01 PROCEDURE — 86920 COMPATIBILITY TEST SPIN: CPT | Performed by: HOSPITALIST

## 2018-01-01 PROCEDURE — 81001 URINALYSIS AUTO W/SCOPE: CPT | Performed by: INTERNAL MEDICINE

## 2018-01-01 PROCEDURE — A6402 STERILE GAUZE <= 16 SQ IN: HCPCS

## 2018-01-01 PROCEDURE — 86901 BLOOD TYPING SEROLOGIC RH(D): CPT | Performed by: HOSPITALIST

## 2018-01-01 PROCEDURE — 92611 MOTION FLUOROSCOPY/SWALLOW: CPT

## 2018-01-01 PROCEDURE — 80053 COMPREHEN METABOLIC PANEL: CPT | Performed by: INTERNAL MEDICINE

## 2018-01-01 PROCEDURE — 96365 THER/PROPH/DIAG IV INF INIT: CPT

## 2018-01-01 PROCEDURE — 77030020262 HC SOL INJ SOD CL 0.9% 100ML

## 2018-01-01 PROCEDURE — 74011636320 HC RX REV CODE- 636/320: Performed by: EMERGENCY MEDICINE

## 2018-01-01 PROCEDURE — 96375 TX/PRO/DX INJ NEW DRUG ADDON: CPT

## 2018-01-01 PROCEDURE — 77030027138 HC INCENT SPIROMETER -A

## 2018-01-01 PROCEDURE — 87040 BLOOD CULTURE FOR BACTERIA: CPT | Performed by: EMERGENCY MEDICINE

## 2018-01-01 PROCEDURE — A4322 IRRIGATION SYRINGE: HCPCS

## 2018-01-01 PROCEDURE — 82533 TOTAL CORTISOL: CPT | Performed by: INTERNAL MEDICINE

## 2018-01-01 PROCEDURE — 74011000255 HC RX REV CODE- 255: Performed by: HOSPITALIST

## 2018-01-01 PROCEDURE — 80076 HEPATIC FUNCTION PANEL: CPT | Performed by: EMERGENCY MEDICINE

## 2018-01-01 PROCEDURE — 77030005122 HC CATH GASTMY PEG BSC -B: Performed by: INTERNAL MEDICINE

## 2018-01-01 PROCEDURE — 70450 CT HEAD/BRAIN W/O DYE: CPT

## 2018-01-01 PROCEDURE — 83735 ASSAY OF MAGNESIUM: CPT | Performed by: NURSE PRACTITIONER

## 2018-01-01 PROCEDURE — 85610 PROTHROMBIN TIME: CPT | Performed by: INTERNAL MEDICINE

## 2018-01-01 PROCEDURE — 83605 ASSAY OF LACTIC ACID: CPT

## 2018-01-01 PROCEDURE — 0DH63UZ INSERTION OF FEEDING DEVICE INTO STOMACH, PERCUTANEOUS APPROACH: ICD-10-PCS | Performed by: INTERNAL MEDICINE

## 2018-01-01 PROCEDURE — 80053 COMPREHEN METABOLIC PANEL: CPT | Performed by: NURSE PRACTITIONER

## 2018-01-01 PROCEDURE — 97162 PT EVAL MOD COMPLEX 30 MIN: CPT

## 2018-01-01 PROCEDURE — 77030018846 HC SOL IRR STRL H20 ICUM -A

## 2018-01-01 PROCEDURE — 74220 X-RAY XM ESOPHAGUS 1CNTRST: CPT

## 2018-01-01 PROCEDURE — 97166 OT EVAL MOD COMPLEX 45 MIN: CPT

## 2018-01-01 PROCEDURE — 83690 ASSAY OF LIPASE: CPT | Performed by: EMERGENCY MEDICINE

## 2018-01-01 PROCEDURE — 84436 ASSAY OF TOTAL THYROXINE: CPT | Performed by: INTERNAL MEDICINE

## 2018-01-01 PROCEDURE — 74011250637 HC RX REV CODE- 250/637: Performed by: EMERGENCY MEDICINE

## 2018-01-01 PROCEDURE — 3E0G76Z INTRODUCTION OF NUTRITIONAL SUBSTANCE INTO UPPER GI, VIA NATURAL OR ARTIFICIAL OPENING: ICD-10-PCS | Performed by: HOSPITALIST

## 2018-01-01 PROCEDURE — 87086 URINE CULTURE/COLONY COUNT: CPT | Performed by: EMERGENCY MEDICINE

## 2018-01-01 PROCEDURE — T4523 ADULT SIZE BRIEF/DIAPER LG: HCPCS

## 2018-01-01 PROCEDURE — 76450000000

## 2018-01-01 PROCEDURE — 77030018836 HC SOL IRR NACL ICUM -A

## 2018-01-01 PROCEDURE — T4522 ADULT SIZE BRIEF/DIAPER MED: HCPCS

## 2018-01-01 PROCEDURE — 84443 ASSAY THYROID STIM HORMONE: CPT | Performed by: EMERGENCY MEDICINE

## 2018-01-01 PROCEDURE — 65660000001 HC RM ICU INTERMED STEPDOWN

## 2018-01-01 PROCEDURE — 96360 HYDRATION IV INFUSION INIT: CPT

## 2018-01-01 PROCEDURE — 82140 ASSAY OF AMMONIA: CPT | Performed by: EMERGENCY MEDICINE

## 2018-01-01 PROCEDURE — 74176 CT ABD & PELVIS W/O CONTRAST: CPT

## 2018-01-01 PROCEDURE — 99284 EMERGENCY DEPT VISIT MOD MDM: CPT

## 2018-01-01 PROCEDURE — 0D758ZZ DILATION OF ESOPHAGUS, VIA NATURAL OR ARTIFICIAL OPENING ENDOSCOPIC: ICD-10-PCS | Performed by: INTERNAL MEDICINE

## 2018-01-01 PROCEDURE — 80202 ASSAY OF VANCOMYCIN: CPT | Performed by: HOSPITALIST

## 2018-01-01 PROCEDURE — 85025 COMPLETE CBC W/AUTO DIFF WBC: CPT | Performed by: PHYSICIAN ASSISTANT

## 2018-01-01 PROCEDURE — 82550 ASSAY OF CK (CPK): CPT | Performed by: EMERGENCY MEDICINE

## 2018-01-01 PROCEDURE — G8998 SWALLOW D/C STATUS: HCPCS

## 2018-01-01 PROCEDURE — 82728 ASSAY OF FERRITIN: CPT | Performed by: INTERNAL MEDICINE

## 2018-01-01 PROCEDURE — 71250 CT THORAX DX C-: CPT

## 2018-01-01 PROCEDURE — G8978 MOBILITY CURRENT STATUS: HCPCS

## 2018-01-01 PROCEDURE — 78227 HEPATOBIL SYST IMAGE W/DRUG: CPT

## 2018-01-01 PROCEDURE — 97110 THERAPEUTIC EXERCISES: CPT

## 2018-01-01 PROCEDURE — 76060000031 HC ANESTHESIA FIRST 0.5 HR: Performed by: INTERNAL MEDICINE

## 2018-01-01 PROCEDURE — 74011636320 HC RX REV CODE- 636/320: Performed by: HOSPITALIST

## 2018-01-01 PROCEDURE — 97535 SELF CARE MNGMENT TRAINING: CPT

## 2018-01-01 PROCEDURE — 83605 ASSAY OF LACTIC ACID: CPT | Performed by: INTERNAL MEDICINE

## 2018-01-01 RX ORDER — HYOSCYAMINE SULFATE 0.12 MG/1
0.12 TABLET, ORALLY DISINTEGRATING ORAL
Status: DISCONTINUED | OUTPATIENT
Start: 2018-01-01 | End: 2018-01-01 | Stop reason: HOSPADM

## 2018-01-01 RX ORDER — CARVEDILOL 25 MG/1
25 TABLET ORAL 2 TIMES DAILY
Status: DISCONTINUED | OUTPATIENT
Start: 2018-01-01 | End: 2018-01-01

## 2018-01-01 RX ORDER — ALBUTEROL SULFATE 90 UG/1
AEROSOL, METERED RESPIRATORY (INHALATION) AS NEEDED
COMMUNITY
Start: 2018-01-01 | End: 2018-01-01

## 2018-01-01 RX ORDER — METOCLOPRAMIDE 5 MG/1
5 TABLET ORAL
Qty: 30 TAB | Refills: 0 | Status: SHIPPED | OUTPATIENT
Start: 2018-01-01 | End: 2018-01-01

## 2018-01-01 RX ORDER — SODIUM CHLORIDE 9 MG/ML
250 INJECTION, SOLUTION INTRAVENOUS CONTINUOUS
Status: DISPENSED | OUTPATIENT
Start: 2018-01-01 | End: 2018-01-01

## 2018-01-01 RX ORDER — LORAZEPAM 2 MG/ML
1 CONCENTRATE ORAL
Status: DISCONTINUED | OUTPATIENT
Start: 2018-01-01 | End: 2018-01-01 | Stop reason: HOSPADM

## 2018-01-01 RX ORDER — DEXTROSE MONOHYDRATE AND SODIUM CHLORIDE 5; .45 G/100ML; G/100ML
75 INJECTION, SOLUTION INTRAVENOUS CONTINUOUS
Status: DISCONTINUED | OUTPATIENT
Start: 2018-01-01 | End: 2018-01-01

## 2018-01-01 RX ORDER — HALOPERIDOL 2 MG/ML
1 SOLUTION ORAL
Qty: 30 ML | Refills: 0 | Status: SHIPPED | OUTPATIENT
Start: 2018-01-01

## 2018-01-01 RX ORDER — METOPROLOL TARTRATE 5 MG/5ML
5 INJECTION INTRAVENOUS
Status: DISCONTINUED | OUTPATIENT
Start: 2018-01-01 | End: 2018-01-01 | Stop reason: HOSPADM

## 2018-01-01 RX ORDER — SODIUM CHLORIDE 0.9 % (FLUSH) 0.9 %
5-10 SYRINGE (ML) INJECTION EVERY 8 HOURS
Status: CANCELLED | OUTPATIENT
Start: 2018-01-01 | End: 2018-01-01

## 2018-01-01 RX ORDER — AMLODIPINE BESYLATE 5 MG/1
5 TABLET ORAL DAILY
Qty: 30 TAB | Refills: 0 | Status: SHIPPED | OUTPATIENT
Start: 2018-01-01 | End: 2018-01-01

## 2018-01-01 RX ORDER — SODIUM CHLORIDE 0.9 % (FLUSH) 0.9 %
5-10 SYRINGE (ML) INJECTION AS NEEDED
Status: CANCELLED | OUTPATIENT
Start: 2018-01-01 | End: 2018-01-01

## 2018-01-01 RX ORDER — POTASSIUM CHLORIDE 7.45 MG/ML
10 INJECTION INTRAVENOUS
Status: DISPENSED | OUTPATIENT
Start: 2018-01-01 | End: 2018-01-01

## 2018-01-01 RX ORDER — FAMOTIDINE 20 MG/1
20 TABLET, FILM COATED ORAL DAILY
Qty: 30 TAB | Refills: 0 | Status: SHIPPED | OUTPATIENT
Start: 2018-01-01

## 2018-01-01 RX ORDER — MAGNESIUM SULFATE HEPTAHYDRATE 40 MG/ML
2 INJECTION, SOLUTION INTRAVENOUS ONCE
Status: COMPLETED | OUTPATIENT
Start: 2018-01-01 | End: 2018-01-01

## 2018-01-01 RX ORDER — MAGNESIUM SULFATE HEPTAHYDRATE 40 MG/ML
2 INJECTION, SOLUTION INTRAVENOUS
Status: COMPLETED | OUTPATIENT
Start: 2018-01-01 | End: 2018-01-01

## 2018-01-01 RX ORDER — FAMOTIDINE 20 MG/1
20 TABLET, FILM COATED ORAL DAILY
Status: DISCONTINUED | OUTPATIENT
Start: 2018-01-01 | End: 2018-01-01 | Stop reason: HOSPADM

## 2018-01-01 RX ORDER — LABETALOL HCL 20 MG/4 ML
10-20 SYRINGE (ML) INTRAVENOUS AS NEEDED
Status: DISCONTINUED | OUTPATIENT
Start: 2018-01-01 | End: 2018-01-01

## 2018-01-01 RX ORDER — ONDANSETRON 4 MG/1
4 TABLET, ORALLY DISINTEGRATING ORAL
COMMUNITY
End: 2018-01-01 | Stop reason: SDUPTHER

## 2018-01-01 RX ORDER — FLUCONAZOLE 2 MG/ML
200 INJECTION, SOLUTION INTRAVENOUS
Status: COMPLETED | OUTPATIENT
Start: 2018-01-01 | End: 2018-01-01

## 2018-01-01 RX ORDER — DEXTROMETHORPHAN/PSEUDOEPHED 2.5-7.5/.8
1.2 DROPS ORAL
Status: CANCELLED | OUTPATIENT
Start: 2018-01-01

## 2018-01-01 RX ORDER — SODIUM CHLORIDE 0.9 % (FLUSH) 0.9 %
5-10 SYRINGE (ML) INJECTION AS NEEDED
Status: DISPENSED | OUTPATIENT
Start: 2018-01-01 | End: 2018-01-01

## 2018-01-01 RX ORDER — SODIUM CHLORIDE 9 MG/ML
1000 INJECTION, SOLUTION INTRAVENOUS ONCE
Status: COMPLETED | OUTPATIENT
Start: 2018-01-01 | End: 2018-01-01

## 2018-01-01 RX ORDER — POTASSIUM CHLORIDE 7.45 MG/ML
10 INJECTION INTRAVENOUS ONCE
Status: COMPLETED | OUTPATIENT
Start: 2018-01-01 | End: 2018-01-01

## 2018-01-01 RX ORDER — HEPARIN SODIUM 5000 [USP'U]/ML
5000 INJECTION, SOLUTION INTRAVENOUS; SUBCUTANEOUS EVERY 8 HOURS
Status: DISCONTINUED | OUTPATIENT
Start: 2018-01-01 | End: 2018-01-01

## 2018-01-01 RX ORDER — SODIUM CHLORIDE 9 MG/ML
500 INJECTION, SOLUTION INTRAVENOUS ONCE
Status: COMPLETED | OUTPATIENT
Start: 2018-01-01 | End: 2018-01-01

## 2018-01-01 RX ORDER — SODIUM CHLORIDE 450 MG/100ML
75 INJECTION, SOLUTION INTRAVENOUS CONTINUOUS
Status: DISCONTINUED | OUTPATIENT
Start: 2018-01-01 | End: 2018-01-01

## 2018-01-01 RX ORDER — ACETAMINOPHEN 325 MG/1
650 TABLET ORAL
Status: DISCONTINUED | OUTPATIENT
Start: 2018-01-01 | End: 2018-01-01

## 2018-01-01 RX ORDER — LEVOFLOXACIN 5 MG/ML
500 INJECTION, SOLUTION INTRAVENOUS
Status: DISCONTINUED | OUTPATIENT
Start: 2018-01-01 | End: 2018-01-01

## 2018-01-01 RX ORDER — SODIUM CHLORIDE 9 MG/ML
INJECTION, SOLUTION INTRAVENOUS
Status: DISCONTINUED | OUTPATIENT
Start: 2018-01-01 | End: 2018-01-01 | Stop reason: HOSPADM

## 2018-01-01 RX ORDER — ONDANSETRON 2 MG/ML
4 INJECTION INTRAMUSCULAR; INTRAVENOUS
Status: COMPLETED | OUTPATIENT
Start: 2018-01-01 | End: 2018-01-01

## 2018-01-01 RX ORDER — ASPIRIN 81 MG/1
81 TABLET ORAL DAILY
Status: DISCONTINUED | OUTPATIENT
Start: 2018-01-01 | End: 2018-01-01 | Stop reason: HOSPADM

## 2018-01-01 RX ORDER — DOXYCYCLINE 100 MG/1
100 CAPSULE ORAL
Status: COMPLETED | OUTPATIENT
Start: 2018-01-01 | End: 2018-01-01

## 2018-01-01 RX ORDER — LISINOPRIL 20 MG/1
40 TABLET ORAL DAILY
Status: DISCONTINUED | OUTPATIENT
Start: 2018-01-01 | End: 2018-01-01 | Stop reason: HOSPADM

## 2018-01-01 RX ORDER — POTASSIUM CHLORIDE 7.45 MG/ML
10 INJECTION INTRAVENOUS
Status: COMPLETED | OUTPATIENT
Start: 2018-01-01 | End: 2018-01-01

## 2018-01-01 RX ORDER — ACETAMINOPHEN 650 MG/1
650 SUPPOSITORY RECTAL
Status: DISCONTINUED | OUTPATIENT
Start: 2018-01-01 | End: 2018-01-01 | Stop reason: HOSPADM

## 2018-01-01 RX ORDER — ONDANSETRON 4 MG/1
4 TABLET, ORALLY DISINTEGRATING ORAL
Qty: 30 TAB | Refills: 1 | Status: SHIPPED | OUTPATIENT
Start: 2018-01-01 | End: 2018-01-01

## 2018-01-01 RX ORDER — HYDRALAZINE HYDROCHLORIDE 20 MG/ML
20 INJECTION INTRAMUSCULAR; INTRAVENOUS
Status: DISCONTINUED | OUTPATIENT
Start: 2018-01-01 | End: 2018-01-01 | Stop reason: HOSPADM

## 2018-01-01 RX ORDER — METOCLOPRAMIDE HYDROCHLORIDE 5 MG/ML
10 INJECTION INTRAMUSCULAR; INTRAVENOUS ONCE
Status: COMPLETED | OUTPATIENT
Start: 2018-01-01 | End: 2018-01-01

## 2018-01-01 RX ORDER — LORATADINE 10 MG/1
10 TABLET ORAL
Status: DISCONTINUED | OUTPATIENT
Start: 2018-01-01 | End: 2018-01-01 | Stop reason: HOSPADM

## 2018-01-01 RX ORDER — PROCHLORPERAZINE MALEATE 5 MG
5 TABLET ORAL
Qty: 30 TAB | Refills: 0 | Status: SHIPPED | OUTPATIENT
Start: 2018-01-01 | End: 2018-01-01

## 2018-01-01 RX ORDER — POTASSIUM CHLORIDE 7.45 MG/ML
10 INJECTION INTRAVENOUS
Status: DISCONTINUED | OUTPATIENT
Start: 2018-01-01 | End: 2018-01-01

## 2018-01-01 RX ORDER — PROPOFOL 10 MG/ML
INJECTION, EMULSION INTRAVENOUS AS NEEDED
Status: DISCONTINUED | OUTPATIENT
Start: 2018-01-01 | End: 2018-01-01 | Stop reason: HOSPADM

## 2018-01-01 RX ORDER — POTASSIUM CHLORIDE 750 MG/1
40 TABLET, FILM COATED, EXTENDED RELEASE ORAL
Status: COMPLETED | OUTPATIENT
Start: 2018-01-01 | End: 2018-01-01

## 2018-01-01 RX ORDER — SODIUM CHLORIDE 0.9 % (FLUSH) 0.9 %
5-10 SYRINGE (ML) INJECTION EVERY 8 HOURS
Status: COMPLETED | OUTPATIENT
Start: 2018-01-01 | End: 2018-01-01

## 2018-01-01 RX ORDER — DEXTROSE MONOHYDRATE 25 G/50ML
25 INJECTION, SOLUTION INTRAVENOUS
Status: COMPLETED | OUTPATIENT
Start: 2018-01-01 | End: 2018-01-01

## 2018-01-01 RX ORDER — LIDOCAINE HYDROCHLORIDE 20 MG/ML
INJECTION, SOLUTION EPIDURAL; INFILTRATION; INTRACAUDAL; PERINEURAL AS NEEDED
Status: DISCONTINUED | OUTPATIENT
Start: 2018-01-01 | End: 2018-01-01 | Stop reason: HOSPADM

## 2018-01-01 RX ORDER — METOPROLOL TARTRATE 5 MG/5ML
5 INJECTION INTRAVENOUS
Status: DISCONTINUED | OUTPATIENT
Start: 2018-01-01 | End: 2018-01-01

## 2018-01-01 RX ORDER — METOPROLOL TARTRATE 5 MG/5ML
2.5 INJECTION INTRAVENOUS EVERY 6 HOURS
Status: DISCONTINUED | OUTPATIENT
Start: 2018-01-01 | End: 2018-01-01

## 2018-01-01 RX ORDER — SODIUM CHLORIDE 9 MG/ML
250 INJECTION, SOLUTION INTRAVENOUS AS NEEDED
Status: DISCONTINUED | OUTPATIENT
Start: 2018-01-01 | End: 2018-01-01

## 2018-01-01 RX ORDER — ATORVASTATIN CALCIUM 80 MG/1
80 TABLET, FILM COATED ORAL
COMMUNITY
End: 2018-01-01

## 2018-01-01 RX ORDER — FLUCONAZOLE 2 MG/ML
200 INJECTION, SOLUTION INTRAVENOUS EVERY 24 HOURS
Status: COMPLETED | OUTPATIENT
Start: 2018-01-01 | End: 2018-01-01

## 2018-01-01 RX ORDER — HYDRALAZINE HYDROCHLORIDE 20 MG/ML
10 INJECTION INTRAMUSCULAR; INTRAVENOUS
Status: DISCONTINUED | OUTPATIENT
Start: 2018-01-01 | End: 2018-01-01

## 2018-01-01 RX ORDER — AMLODIPINE BESYLATE 5 MG/1
5 TABLET ORAL DAILY
Status: DISCONTINUED | OUTPATIENT
Start: 2018-01-01 | End: 2018-01-01 | Stop reason: HOSPADM

## 2018-01-01 RX ORDER — METOCLOPRAMIDE HYDROCHLORIDE 5 MG/ML
5 INJECTION INTRAMUSCULAR; INTRAVENOUS EVERY 6 HOURS
Status: DISCONTINUED | OUTPATIENT
Start: 2018-01-01 | End: 2018-01-01

## 2018-01-01 RX ORDER — HEPARIN SODIUM 5000 [USP'U]/ML
5000 INJECTION, SOLUTION INTRAVENOUS; SUBCUTANEOUS EVERY 12 HOURS
Status: DISCONTINUED | OUTPATIENT
Start: 2018-01-01 | End: 2018-01-01 | Stop reason: HOSPADM

## 2018-01-01 RX ORDER — WATER FOR INJECTION,STERILE
VIAL (ML) INJECTION
Status: COMPLETED | OUTPATIENT
Start: 2018-01-01 | End: 2018-01-01

## 2018-01-01 RX ORDER — ONDANSETRON 2 MG/ML
INJECTION INTRAMUSCULAR; INTRAVENOUS
Status: COMPLETED
Start: 2018-01-01 | End: 2018-01-01

## 2018-01-01 RX ORDER — LORATADINE 10 MG/1
10 TABLET ORAL
Status: DISCONTINUED | OUTPATIENT
Start: 2018-01-01 | End: 2018-01-01

## 2018-01-01 RX ORDER — SODIUM CHLORIDE 9 MG/ML
75 INJECTION, SOLUTION INTRAVENOUS CONTINUOUS
Status: DISCONTINUED | OUTPATIENT
Start: 2018-01-01 | End: 2018-01-01

## 2018-01-01 RX ORDER — DEXTROSE MONOHYDRATE 25 G/50ML
INJECTION, SOLUTION INTRAVENOUS
Status: DISPENSED
Start: 2018-01-01 | End: 2018-01-01

## 2018-01-01 RX ORDER — SODIUM CHLORIDE 9 MG/ML
250 INJECTION, SOLUTION INTRAVENOUS ONCE
Status: DISPENSED | OUTPATIENT
Start: 2018-01-01 | End: 2018-01-01

## 2018-01-01 RX ORDER — POTASSIUM CHLORIDE 7.45 MG/ML
INJECTION INTRAVENOUS
Status: DISCONTINUED
Start: 2018-01-01 | End: 2018-01-01

## 2018-01-01 RX ORDER — MORPHINE SULFATE 20 MG/ML
5 SOLUTION ORAL
Qty: 1 BOTTLE | Refills: 0 | Status: SHIPPED | OUTPATIENT
Start: 2018-01-01

## 2018-01-01 RX ORDER — PROPOFOL 10 MG/ML
INJECTION, EMULSION INTRAVENOUS
Status: DISCONTINUED | OUTPATIENT
Start: 2018-01-01 | End: 2018-01-01 | Stop reason: HOSPADM

## 2018-01-01 RX ORDER — FAMOTIDINE 20 MG/1
20 TABLET, FILM COATED ORAL DAILY
Status: DISCONTINUED | OUTPATIENT
Start: 2018-01-01 | End: 2018-01-01

## 2018-01-01 RX ORDER — FACIAL-BODY WIPES
10 EACH TOPICAL DAILY PRN
Status: DISCONTINUED | OUTPATIENT
Start: 2018-01-01 | End: 2018-01-01

## 2018-01-01 RX ORDER — CLONIDINE HYDROCHLORIDE 0.1 MG/1
0.2 TABLET ORAL 2 TIMES DAILY
Status: DISCONTINUED | OUTPATIENT
Start: 2018-01-01 | End: 2018-01-01

## 2018-01-01 RX ORDER — DOCUSATE SODIUM 100 MG/1
100 CAPSULE, LIQUID FILLED ORAL DAILY
Status: DISCONTINUED | OUTPATIENT
Start: 2018-01-01 | End: 2018-01-01

## 2018-01-01 RX ORDER — METOCLOPRAMIDE 5 MG/1
5 TABLET ORAL
Status: DISCONTINUED | OUTPATIENT
Start: 2018-01-01 | End: 2018-01-01

## 2018-01-01 RX ORDER — MINOXIDIL 2.5 MG/1
TABLET ORAL
COMMUNITY
Start: 2018-01-01 | End: 2018-01-01

## 2018-01-01 RX ORDER — ATORVASTATIN CALCIUM 40 MG/1
80 TABLET, FILM COATED ORAL
Status: DISCONTINUED | OUTPATIENT
Start: 2018-01-01 | End: 2018-01-01

## 2018-01-01 RX ORDER — CIPROFLOXACIN 250 MG/1
250 TABLET, FILM COATED ORAL 2 TIMES DAILY
Qty: 6 TAB | Refills: 0 | Status: SHIPPED | OUTPATIENT
Start: 2018-01-01 | End: 2018-01-01

## 2018-01-01 RX ORDER — CARVEDILOL 25 MG/1
25 TABLET ORAL 2 TIMES DAILY
Status: DISCONTINUED | OUTPATIENT
Start: 2018-01-01 | End: 2018-01-01 | Stop reason: HOSPADM

## 2018-01-01 RX ORDER — LISINOPRIL 40 MG/1
40 TABLET ORAL DAILY
Qty: 30 TAB | Refills: 0 | Status: SHIPPED | OUTPATIENT
Start: 2018-01-01 | End: 2018-01-01

## 2018-01-01 RX ORDER — MORPHINE SULFATE 20 MG/ML
5 SOLUTION ORAL
Status: DISCONTINUED | OUTPATIENT
Start: 2018-01-01 | End: 2018-01-01

## 2018-01-01 RX ORDER — MAGNESIUM SULFATE HEPTAHYDRATE 500 MG/ML
2 INJECTION, SOLUTION INTRAMUSCULAR; INTRAVENOUS
Status: DISCONTINUED | OUTPATIENT
Start: 2018-01-01 | End: 2018-01-01

## 2018-01-01 RX ORDER — SODIUM CHLORIDE 9 MG/ML
25 INJECTION, SOLUTION INTRAVENOUS CONTINUOUS
Status: DISPENSED | OUTPATIENT
Start: 2018-01-01 | End: 2018-01-01

## 2018-01-01 RX ORDER — SENNOSIDES 8.8 MG/5ML
5 LIQUID ORAL
Status: DISCONTINUED | OUTPATIENT
Start: 2018-01-01 | End: 2018-01-01

## 2018-01-01 RX ORDER — HALOPERIDOL 2 MG/ML
1 SOLUTION ORAL
Status: DISCONTINUED | OUTPATIENT
Start: 2018-01-01 | End: 2018-01-01 | Stop reason: HOSPADM

## 2018-01-01 RX ORDER — AMLODIPINE BESYLATE 5 MG/1
5 TABLET ORAL DAILY
Status: DISCONTINUED | OUTPATIENT
Start: 2018-01-01 | End: 2018-01-01

## 2018-01-01 RX ORDER — MIRTAZAPINE 15 MG/1
15 TABLET, FILM COATED ORAL
Status: DISCONTINUED | OUTPATIENT
Start: 2018-01-01 | End: 2018-01-01 | Stop reason: HOSPADM

## 2018-01-01 RX ORDER — FLUMAZENIL 0.1 MG/ML
0.2 INJECTION INTRAVENOUS ONCE
Status: DISPENSED | OUTPATIENT
Start: 2018-01-01 | End: 2018-01-01

## 2018-01-01 RX ORDER — SCOLOPAMINE TRANSDERMAL SYSTEM 1 MG/1
1 PATCH, EXTENDED RELEASE TRANSDERMAL
Status: DISCONTINUED | OUTPATIENT
Start: 2018-01-01 | End: 2018-01-01 | Stop reason: HOSPADM

## 2018-01-01 RX ORDER — DOXYCYCLINE 100 MG/1
100 CAPSULE ORAL 2 TIMES DAILY
Qty: 20 CAP | Refills: 0 | Status: SHIPPED | OUTPATIENT
Start: 2018-01-01 | End: 2018-01-01

## 2018-01-01 RX ORDER — BENZONATATE 100 MG/1
CAPSULE ORAL AS NEEDED
COMMUNITY
Start: 2018-01-01 | End: 2018-01-01

## 2018-01-01 RX ORDER — POTASSIUM CHLORIDE 1.5 G/1.77G
40 POWDER, FOR SOLUTION ORAL EVERY 4 HOURS
Status: COMPLETED | OUTPATIENT
Start: 2018-01-01 | End: 2018-01-01

## 2018-01-01 RX ORDER — ASPIRIN 81 MG/1
81 TABLET ORAL DAILY
Status: DISCONTINUED | OUTPATIENT
Start: 2018-01-01 | End: 2018-01-01

## 2018-01-01 RX ORDER — PROCHLORPERAZINE MALEATE 5 MG
5 TABLET ORAL
Status: DISCONTINUED | OUTPATIENT
Start: 2018-01-01 | End: 2018-01-01 | Stop reason: HOSPADM

## 2018-01-01 RX ORDER — CLONIDINE 0.2 MG/24H
1 PATCH, EXTENDED RELEASE TRANSDERMAL
Status: DISCONTINUED | OUTPATIENT
Start: 2018-01-01 | End: 2018-01-01

## 2018-01-01 RX ORDER — ONDANSETRON 4 MG/1
4 TABLET, ORALLY DISINTEGRATING ORAL
Status: DISCONTINUED | OUTPATIENT
Start: 2018-01-01 | End: 2018-01-01

## 2018-01-01 RX ORDER — MIRTAZAPINE 15 MG/1
15 TABLET, FILM COATED ORAL
Qty: 30 TAB | Refills: 0 | Status: SHIPPED | OUTPATIENT
Start: 2018-01-01 | End: 2018-01-01

## 2018-01-01 RX ORDER — DEXTROSE MONOHYDRATE AND SODIUM CHLORIDE 5; .9 G/100ML; G/100ML
75 INJECTION, SOLUTION INTRAVENOUS CONTINUOUS
Status: DISCONTINUED | OUTPATIENT
Start: 2018-01-01 | End: 2018-01-01

## 2018-01-01 RX ORDER — HYOSCYAMINE SULFATE 0.12 MG/1
0.12 TABLET, ORALLY DISINTEGRATING ORAL
Qty: 30 TAB | Refills: 0 | Status: SHIPPED | OUTPATIENT
Start: 2018-01-01

## 2018-01-01 RX ORDER — METRONIDAZOLE 500 MG/100ML
500 INJECTION, SOLUTION INTRAVENOUS EVERY 8 HOURS
Status: DISCONTINUED | OUTPATIENT
Start: 2018-01-01 | End: 2018-01-01

## 2018-01-01 RX ORDER — ATORVASTATIN CALCIUM 40 MG/1
80 TABLET, FILM COATED ORAL
Status: DISCONTINUED | OUTPATIENT
Start: 2018-01-01 | End: 2018-01-01 | Stop reason: HOSPADM

## 2018-01-01 RX ORDER — LABETALOL HCL 20 MG/4 ML
20 SYRINGE (ML) INTRAVENOUS AS NEEDED
Status: DISCONTINUED | OUTPATIENT
Start: 2018-01-01 | End: 2018-01-01

## 2018-01-01 RX ORDER — ONDANSETRON 4 MG/1
TABLET, ORALLY DISINTEGRATING ORAL
Qty: 10 TAB | Refills: 0 | Status: SHIPPED | OUTPATIENT
Start: 2018-01-01 | End: 2018-01-01

## 2018-01-01 RX ORDER — SCOLOPAMINE TRANSDERMAL SYSTEM 1 MG/1
1 PATCH, EXTENDED RELEASE TRANSDERMAL
Qty: 10 PATCH | Refills: 0 | Status: SHIPPED | OUTPATIENT
Start: 2018-01-01

## 2018-01-01 RX ORDER — ONDANSETRON 2 MG/ML
4 INJECTION INTRAMUSCULAR; INTRAVENOUS
Status: DISCONTINUED | OUTPATIENT
Start: 2018-01-01 | End: 2018-01-01 | Stop reason: HOSPADM

## 2018-01-01 RX ORDER — FLUCONAZOLE 100 MG/1
150 TABLET ORAL
Status: ACTIVE | OUTPATIENT
Start: 2018-01-01 | End: 2018-01-01

## 2018-01-01 RX ORDER — FUROSEMIDE 10 MG/ML
20 INJECTION INTRAMUSCULAR; INTRAVENOUS
Status: COMPLETED | OUTPATIENT
Start: 2018-01-01 | End: 2018-01-01

## 2018-01-01 RX ORDER — LISINOPRIL 20 MG/1
20 TABLET ORAL DAILY
Status: DISCONTINUED | OUTPATIENT
Start: 2018-01-01 | End: 2018-01-01

## 2018-01-01 RX ORDER — SODIUM CHLORIDE 9 MG/ML
INJECTION, SOLUTION INTRAVENOUS
Status: DISPENSED
Start: 2018-01-01 | End: 2018-01-01

## 2018-01-01 RX ORDER — MORPHINE SULFATE 20 MG/ML
5 SOLUTION ORAL
Status: DISCONTINUED | OUTPATIENT
Start: 2018-01-01 | End: 2018-01-01 | Stop reason: HOSPADM

## 2018-01-01 RX ORDER — MIRTAZAPINE 15 MG/1
15 TABLET, FILM COATED ORAL
Status: DISCONTINUED | OUTPATIENT
Start: 2018-01-01 | End: 2018-01-01

## 2018-01-01 RX ORDER — CEFAZOLIN SODIUM 2 G/50ML
2 SOLUTION INTRAVENOUS ONCE
Status: COMPLETED | OUTPATIENT
Start: 2018-01-01 | End: 2018-01-01

## 2018-01-01 RX ORDER — LORAZEPAM 2 MG/ML
1 CONCENTRATE ORAL
Qty: 1 BOTTLE | Refills: 0 | Status: SHIPPED | OUTPATIENT
Start: 2018-01-01

## 2018-01-01 RX ORDER — LORATADINE 10 MG/1
10 TABLET ORAL
Qty: 30 TAB | Refills: 0 | Status: SHIPPED | OUTPATIENT
Start: 2018-01-01 | End: 2018-01-01

## 2018-01-01 RX ORDER — ASPIRIN 300 MG/1
300 SUPPOSITORY RECTAL DAILY
Status: DISCONTINUED | OUTPATIENT
Start: 2018-01-01 | End: 2018-01-01 | Stop reason: HOSPADM

## 2018-01-01 RX ORDER — CLONIDINE 0.2 MG/24H
1 PATCH, EXTENDED RELEASE TRANSDERMAL
Status: DISCONTINUED | OUTPATIENT
Start: 2018-01-01 | End: 2018-01-01 | Stop reason: HOSPADM

## 2018-01-01 RX ORDER — CIPROFLOXACIN 250 MG/1
TABLET, FILM COATED ORAL
Refills: 0 | COMMUNITY
Start: 2018-01-01 | End: 2018-01-01

## 2018-01-01 RX ORDER — DOCUSATE SODIUM 50 MG/5ML
100 LIQUID ORAL DAILY
Status: DISCONTINUED | OUTPATIENT
Start: 2018-01-01 | End: 2018-01-01

## 2018-01-01 RX ORDER — ONDANSETRON 2 MG/ML
4 INJECTION INTRAMUSCULAR; INTRAVENOUS
Status: DISCONTINUED | OUTPATIENT
Start: 2018-01-01 | End: 2018-01-01

## 2018-01-01 RX ORDER — MIRTAZAPINE 15 MG/1
15 TABLET, FILM COATED ORAL
Qty: 30 TAB | Refills: 0 | Status: SHIPPED | OUTPATIENT
Start: 2018-01-01

## 2018-01-01 RX ADMIN — FLUCONAZOLE 200 MG: 2 INJECTION INTRAVENOUS at 15:11

## 2018-01-01 RX ADMIN — SODIUM CHLORIDE 75 ML/HR: 900 INJECTION, SOLUTION INTRAVENOUS at 03:12

## 2018-01-01 RX ADMIN — LABETALOL 20 MG/4 ML (5 MG/ML) INTRAVENOUS SYRINGE 20 MG: at 00:09

## 2018-01-01 RX ADMIN — DOCUSATE SODIUM 100 MG: 50 LIQUID ORAL at 09:37

## 2018-01-01 RX ADMIN — SODIUM CHLORIDE 1000 ML: 900 INJECTION, SOLUTION INTRAVENOUS at 20:04

## 2018-01-01 RX ADMIN — WATER: 1 INJECTION INTRAMUSCULAR; INTRAVENOUS; SUBCUTANEOUS at 10:15

## 2018-01-01 RX ADMIN — HYDRALAZINE HYDROCHLORIDE 10 MG: 20 INJECTION INTRAMUSCULAR; INTRAVENOUS at 21:51

## 2018-01-01 RX ADMIN — POTASSIUM CHLORIDE 10 MEQ: 7.46 INJECTION, SOLUTION INTRAVENOUS at 19:49

## 2018-01-01 RX ADMIN — METOCLOPRAMIDE HYDROCHLORIDE 5 MG: 5 TABLET ORAL at 12:15

## 2018-01-01 RX ADMIN — SINCALIDE 0.99 MCG: 5 INJECTION, POWDER, LYOPHILIZED, FOR SOLUTION INTRAVENOUS at 10:15

## 2018-01-01 RX ADMIN — METOPROLOL TARTRATE 2.5 MG: 5 INJECTION, SOLUTION INTRAVENOUS at 06:05

## 2018-01-01 RX ADMIN — METRONIDAZOLE 500 MG: 500 INJECTION, SOLUTION INTRAVENOUS at 23:31

## 2018-01-01 RX ADMIN — HYDRALAZINE HYDROCHLORIDE 10 MG: 20 INJECTION INTRAMUSCULAR; INTRAVENOUS at 06:54

## 2018-01-01 RX ADMIN — METOPROLOL TARTRATE 2.5 MG: 5 INJECTION, SOLUTION INTRAVENOUS at 18:23

## 2018-01-01 RX ADMIN — LIDOCAINE HYDROCHLORIDE 40 MG: 20 INJECTION, SOLUTION EPIDURAL; INFILTRATION; INTRACAUDAL; PERINEURAL at 14:10

## 2018-01-01 RX ADMIN — METRONIDAZOLE 500 MG: 500 INJECTION, SOLUTION INTRAVENOUS at 00:11

## 2018-01-01 RX ADMIN — METRONIDAZOLE 500 MG: 500 INJECTION, SOLUTION INTRAVENOUS at 10:12

## 2018-01-01 RX ADMIN — ASPIRIN 300 MG: 300 SUPPOSITORY RECTAL at 10:33

## 2018-01-01 RX ADMIN — METOCLOPRAMIDE 10 MG: 5 INJECTION, SOLUTION INTRAMUSCULAR; INTRAVENOUS at 11:11

## 2018-01-01 RX ADMIN — METOCLOPRAMIDE HYDROCHLORIDE 5 MG: 5 TABLET ORAL at 10:06

## 2018-01-01 RX ADMIN — HEPARIN SODIUM 5000 UNITS: 5000 INJECTION, SOLUTION INTRAVENOUS; SUBCUTANEOUS at 16:38

## 2018-01-01 RX ADMIN — POTASSIUM CHLORIDE 10 MEQ: 10 INJECTION, SOLUTION INTRAVENOUS at 18:11

## 2018-01-01 RX ADMIN — WATER 2 G: 1 INJECTION INTRAMUSCULAR; INTRAVENOUS; SUBCUTANEOUS at 20:26

## 2018-01-01 RX ADMIN — METOCLOPRAMIDE 5 MG: 5 INJECTION, SOLUTION INTRAMUSCULAR; INTRAVENOUS at 18:27

## 2018-01-01 RX ADMIN — POTASSIUM CHLORIDE 10 MEQ: 10 INJECTION, SOLUTION INTRAVENOUS at 11:44

## 2018-01-01 RX ADMIN — FLUCONAZOLE 200 MG: 2 INJECTION, SOLUTION INTRAVENOUS at 17:53

## 2018-01-01 RX ADMIN — ATORVASTATIN CALCIUM 80 MG: 40 TABLET, FILM COATED ORAL at 00:10

## 2018-01-01 RX ADMIN — METOCLOPRAMIDE 5 MG: 5 INJECTION, SOLUTION INTRAMUSCULAR; INTRAVENOUS at 12:09

## 2018-01-01 RX ADMIN — CARVEDILOL 25 MG: 25 TABLET, FILM COATED ORAL at 17:48

## 2018-01-01 RX ADMIN — AMLODIPINE BESYLATE 5 MG: 5 TABLET ORAL at 11:24

## 2018-01-01 RX ADMIN — METOPROLOL TARTRATE 5 MG: 5 INJECTION INTRAVENOUS at 05:07

## 2018-01-01 RX ADMIN — METOCLOPRAMIDE HYDROCHLORIDE 5 MG: 5 TABLET ORAL at 12:36

## 2018-01-01 RX ADMIN — HEPARIN SODIUM 5000 UNITS: 5000 INJECTION, SOLUTION INTRAVENOUS; SUBCUTANEOUS at 00:38

## 2018-01-01 RX ADMIN — MAGNESIUM SULFATE HEPTAHYDRATE 3 G: 500 INJECTION, SOLUTION INTRAMUSCULAR; INTRAVENOUS at 18:06

## 2018-01-01 RX ADMIN — POTASSIUM CHLORIDE 10 MEQ: 7.46 INJECTION, SOLUTION INTRAVENOUS at 18:31

## 2018-01-01 RX ADMIN — VANCOMYCIN HYDROCHLORIDE 250 MG: 1 INJECTION, POWDER, LYOPHILIZED, FOR SOLUTION INTRAVENOUS at 00:13

## 2018-01-01 RX ADMIN — METRONIDAZOLE 500 MG: 500 INJECTION, SOLUTION INTRAVENOUS at 08:39

## 2018-01-01 RX ADMIN — LISINOPRIL 40 MG: 20 TABLET ORAL at 08:19

## 2018-01-01 RX ADMIN — HEPARIN SODIUM 5000 UNITS: 5000 INJECTION, SOLUTION INTRAVENOUS; SUBCUTANEOUS at 22:26

## 2018-01-01 RX ADMIN — HYDRALAZINE HYDROCHLORIDE 20 MG: 20 INJECTION INTRAMUSCULAR; INTRAVENOUS at 01:22

## 2018-01-01 RX ADMIN — BARIUM SULFATE 15 ML: 400 PASTE ORAL at 12:21

## 2018-01-01 RX ADMIN — HEPARIN SODIUM 5000 UNITS: 5000 INJECTION, SOLUTION INTRAVENOUS; SUBCUTANEOUS at 17:49

## 2018-01-01 RX ADMIN — HEPARIN SODIUM 5000 UNITS: 5000 INJECTION, SOLUTION INTRAVENOUS; SUBCUTANEOUS at 07:58

## 2018-01-01 RX ADMIN — ONDANSETRON 4 MG: 2 INJECTION INTRAMUSCULAR; INTRAVENOUS at 17:14

## 2018-01-01 RX ADMIN — ASPIRIN 300 MG: 300 SUPPOSITORY RECTAL at 08:19

## 2018-01-01 RX ADMIN — CEFTRIAXONE 1 G: 1 INJECTION, POWDER, FOR SOLUTION INTRAMUSCULAR; INTRAVENOUS at 15:09

## 2018-01-01 RX ADMIN — HEPARIN SODIUM 5000 UNITS: 5000 INJECTION, SOLUTION INTRAVENOUS; SUBCUTANEOUS at 16:33

## 2018-01-01 RX ADMIN — MIRTAZAPINE 15 MG: 15 TABLET, FILM COATED ORAL at 22:05

## 2018-01-01 RX ADMIN — BARIUM SULFATE 176 G: 960 POWDER, FOR SUSPENSION ORAL at 08:36

## 2018-01-01 RX ADMIN — SODIUM CHLORIDE 1000 ML: 900 INJECTION, SOLUTION INTRAVENOUS at 22:36

## 2018-01-01 RX ADMIN — PROPOFOL 100 MCG/KG/MIN: 10 INJECTION, EMULSION INTRAVENOUS at 14:10

## 2018-01-01 RX ADMIN — METRONIDAZOLE 500 MG: 500 INJECTION, SOLUTION INTRAVENOUS at 23:58

## 2018-01-01 RX ADMIN — METOCLOPRAMIDE 5 MG: 5 INJECTION, SOLUTION INTRAMUSCULAR; INTRAVENOUS at 00:52

## 2018-01-01 RX ADMIN — HEPARIN SODIUM 5000 UNITS: 5000 INJECTION, SOLUTION INTRAVENOUS; SUBCUTANEOUS at 22:30

## 2018-01-01 RX ADMIN — CLONIDINE HYDROCHLORIDE 0.2 MG: 0.1 TABLET ORAL at 17:00

## 2018-01-01 RX ADMIN — HEPARIN SODIUM 5000 UNITS: 5000 INJECTION, SOLUTION INTRAVENOUS; SUBCUTANEOUS at 17:33

## 2018-01-01 RX ADMIN — POTASSIUM CHLORIDE 10 MEQ: 7.46 INJECTION, SOLUTION INTRAVENOUS at 18:08

## 2018-01-01 RX ADMIN — ASPIRIN 81 MG: 81 TABLET, COATED ORAL at 11:11

## 2018-01-01 RX ADMIN — HEPARIN SODIUM 5000 UNITS: 5000 INJECTION, SOLUTION INTRAVENOUS; SUBCUTANEOUS at 17:03

## 2018-01-01 RX ADMIN — ONDANSETRON 4 MG: 2 INJECTION INTRAMUSCULAR; INTRAVENOUS at 16:56

## 2018-01-01 RX ADMIN — ONDANSETRON 4 MG: 2 INJECTION INTRAMUSCULAR; INTRAVENOUS at 00:19

## 2018-01-01 RX ADMIN — METRONIDAZOLE 500 MG: 500 INJECTION, SOLUTION INTRAVENOUS at 01:37

## 2018-01-01 RX ADMIN — FLUCONAZOLE 200 MG: 2 INJECTION, SOLUTION INTRAVENOUS at 17:03

## 2018-01-01 RX ADMIN — POTASSIUM CHLORIDE 10 MEQ: 10 INJECTION, SOLUTION INTRAVENOUS at 23:19

## 2018-01-01 RX ADMIN — CEFEPIME HYDROCHLORIDE 1 G: 1 INJECTION, POWDER, FOR SOLUTION INTRAMUSCULAR; INTRAVENOUS at 00:11

## 2018-01-01 RX ADMIN — SODIUM CHLORIDE 75 ML/HR: 450 INJECTION, SOLUTION INTRAVENOUS at 16:49

## 2018-01-01 RX ADMIN — ATORVASTATIN CALCIUM 80 MG: 40 TABLET, FILM COATED ORAL at 22:10

## 2018-01-01 RX ADMIN — METOPROLOL TARTRATE 5 MG: 5 INJECTION INTRAVENOUS at 07:47

## 2018-01-01 RX ADMIN — DEXTROSE MONOHYDRATE AND SODIUM CHLORIDE 75 ML/HR: 5; .45 INJECTION, SOLUTION INTRAVENOUS at 17:11

## 2018-01-01 RX ADMIN — WATER 2 G: 1 INJECTION INTRAMUSCULAR; INTRAVENOUS; SUBCUTANEOUS at 18:50

## 2018-01-01 RX ADMIN — HEPARIN SODIUM 5000 UNITS: 5000 INJECTION, SOLUTION INTRAVENOUS; SUBCUTANEOUS at 17:53

## 2018-01-01 RX ADMIN — VANCOMYCIN HYDROCHLORIDE 250 MG: 1 INJECTION, POWDER, LYOPHILIZED, FOR SOLUTION INTRAVENOUS at 14:19

## 2018-01-01 RX ADMIN — CLONIDINE HYDROCHLORIDE 0.2 MG: 0.1 TABLET ORAL at 18:38

## 2018-01-01 RX ADMIN — HEPARIN SODIUM 5000 UNITS: 5000 INJECTION, SOLUTION INTRAVENOUS; SUBCUTANEOUS at 07:44

## 2018-01-01 RX ADMIN — AMLODIPINE BESYLATE 5 MG: 5 TABLET ORAL at 09:49

## 2018-01-01 RX ADMIN — CLONIDINE HYDROCHLORIDE 0.2 MG: 0.1 TABLET ORAL at 18:35

## 2018-01-01 RX ADMIN — HEPARIN SODIUM 5000 UNITS: 5000 INJECTION, SOLUTION INTRAVENOUS; SUBCUTANEOUS at 06:41

## 2018-01-01 RX ADMIN — FLUCONAZOLE 200 MG: 2 INJECTION, SOLUTION INTRAVENOUS at 17:33

## 2018-01-01 RX ADMIN — HEPARIN SODIUM 5000 UNITS: 5000 INJECTION, SOLUTION INTRAVENOUS; SUBCUTANEOUS at 05:41

## 2018-01-01 RX ADMIN — BISACODYL 10 MG: 10 SUPPOSITORY RECTAL at 16:30

## 2018-01-01 RX ADMIN — DEXTROSE MONOHYDRATE AND SODIUM CHLORIDE 75 ML/HR: 5; .9 INJECTION, SOLUTION INTRAVENOUS at 07:42

## 2018-01-01 RX ADMIN — CEFTRIAXONE 1 G: 1 INJECTION, POWDER, FOR SOLUTION INTRAMUSCULAR; INTRAVENOUS at 15:37

## 2018-01-01 RX ADMIN — PROPOFOL 10 MG: 10 INJECTION, EMULSION INTRAVENOUS at 12:59

## 2018-01-01 RX ADMIN — METOPROLOL TARTRATE 2.5 MG: 5 INJECTION, SOLUTION INTRAVENOUS at 18:07

## 2018-01-01 RX ADMIN — HYDRALAZINE HYDROCHLORIDE 10 MG: 20 INJECTION INTRAMUSCULAR; INTRAVENOUS at 18:35

## 2018-01-01 RX ADMIN — ASPIRIN 81 MG: 81 TABLET, COATED ORAL at 09:05

## 2018-01-01 RX ADMIN — POTASSIUM CHLORIDE 10 MEQ: 10 INJECTION, SOLUTION INTRAVENOUS at 00:18

## 2018-01-01 RX ADMIN — POTASSIUM CHLORIDE 10 MEQ: 10 INJECTION, SOLUTION INTRAVENOUS at 16:19

## 2018-01-01 RX ADMIN — POTASSIUM CHLORIDE 10 MEQ: 7.46 INJECTION, SOLUTION INTRAVENOUS at 17:37

## 2018-01-01 RX ADMIN — CEFTRIAXONE 1 G: 1 INJECTION, POWDER, FOR SOLUTION INTRAMUSCULAR; INTRAVENOUS at 14:55

## 2018-01-01 RX ADMIN — HEPARIN SODIUM 5000 UNITS: 5000 INJECTION, SOLUTION INTRAVENOUS; SUBCUTANEOUS at 14:44

## 2018-01-01 RX ADMIN — POTASSIUM CHLORIDE 10 MEQ: 10 INJECTION, SOLUTION INTRAVENOUS at 16:12

## 2018-01-01 RX ADMIN — CARVEDILOL 25 MG: 25 TABLET, FILM COATED ORAL at 18:38

## 2018-01-01 RX ADMIN — DEXTROSE MONOHYDRATE AND SODIUM CHLORIDE 75 ML/HR: 5; .45 INJECTION, SOLUTION INTRAVENOUS at 06:35

## 2018-01-01 RX ADMIN — HEPARIN SODIUM 5000 UNITS: 5000 INJECTION, SOLUTION INTRAVENOUS; SUBCUTANEOUS at 17:20

## 2018-01-01 RX ADMIN — CARVEDILOL 25 MG: 25 TABLET, FILM COATED ORAL at 08:19

## 2018-01-01 RX ADMIN — SODIUM CHLORIDE: 9 INJECTION, SOLUTION INTRAVENOUS at 12:49

## 2018-01-01 RX ADMIN — HEPARIN SODIUM 5000 UNITS: 5000 INJECTION, SOLUTION INTRAVENOUS; SUBCUTANEOUS at 10:11

## 2018-01-01 RX ADMIN — ASPIRIN 300 MG: 300 SUPPOSITORY RECTAL at 09:50

## 2018-01-01 RX ADMIN — POTASSIUM CHLORIDE 10 MEQ: 10 INJECTION, SOLUTION INTRAVENOUS at 21:20

## 2018-01-01 RX ADMIN — POTASSIUM CHLORIDE 40 MEQ: 1.5 POWDER, FOR SOLUTION ORAL at 11:42

## 2018-01-01 RX ADMIN — METOPROLOL TARTRATE 5 MG: 5 INJECTION INTRAVENOUS at 18:27

## 2018-01-01 RX ADMIN — METRONIDAZOLE 500 MG: 500 INJECTION, SOLUTION INTRAVENOUS at 17:58

## 2018-01-01 RX ADMIN — HYDRALAZINE HYDROCHLORIDE 10 MG: 20 INJECTION INTRAMUSCULAR; INTRAVENOUS at 03:30

## 2018-01-01 RX ADMIN — HEPARIN SODIUM 5000 UNITS: 5000 INJECTION, SOLUTION INTRAVENOUS; SUBCUTANEOUS at 04:55

## 2018-01-01 RX ADMIN — ONDANSETRON 4 MG: 2 INJECTION INTRAMUSCULAR; INTRAVENOUS at 06:28

## 2018-01-01 RX ADMIN — HEPARIN SODIUM 5000 UNITS: 5000 INJECTION, SOLUTION INTRAVENOUS; SUBCUTANEOUS at 14:01

## 2018-01-01 RX ADMIN — METOCLOPRAMIDE 5 MG: 5 INJECTION, SOLUTION INTRAMUSCULAR; INTRAVENOUS at 06:41

## 2018-01-01 RX ADMIN — ONDANSETRON 4 MG: 2 INJECTION INTRAMUSCULAR; INTRAVENOUS at 22:09

## 2018-01-01 RX ADMIN — POTASSIUM CHLORIDE 10 MEQ: 10 INJECTION, SOLUTION INTRAVENOUS at 14:04

## 2018-01-01 RX ADMIN — LEVOFLOXACIN 500 MG: 5 INJECTION, SOLUTION INTRAVENOUS at 16:56

## 2018-01-01 RX ADMIN — AMLODIPINE BESYLATE 5 MG: 5 TABLET ORAL at 12:07

## 2018-01-01 RX ADMIN — CARVEDILOL 25 MG: 25 TABLET, FILM COATED ORAL at 18:45

## 2018-01-01 RX ADMIN — HYDRALAZINE HYDROCHLORIDE 10 MG: 20 INJECTION INTRAMUSCULAR; INTRAVENOUS at 22:10

## 2018-01-01 RX ADMIN — METOPROLOL TARTRATE 2.5 MG: 5 INJECTION, SOLUTION INTRAVENOUS at 14:03

## 2018-01-01 RX ADMIN — SODIUM CHLORIDE 1000 ML/HR: 900 INJECTION, SOLUTION INTRAVENOUS at 00:46

## 2018-01-01 RX ADMIN — CARVEDILOL 25 MG: 25 TABLET, FILM COATED ORAL at 08:18

## 2018-01-01 RX ADMIN — CEFAZOLIN SODIUM 2 G: 2 SOLUTION INTRAVENOUS at 18:12

## 2018-01-01 RX ADMIN — LISINOPRIL 20 MG: 20 TABLET ORAL at 09:49

## 2018-01-01 RX ADMIN — ASPIRIN 81 MG: 81 TABLET, COATED ORAL at 10:11

## 2018-01-01 RX ADMIN — METOPROLOL TARTRATE 2.5 MG: 5 INJECTION, SOLUTION INTRAVENOUS at 23:15

## 2018-01-01 RX ADMIN — POTASSIUM CHLORIDE 10 MEQ: 7.46 INJECTION, SOLUTION INTRAVENOUS at 13:45

## 2018-01-01 RX ADMIN — POTASSIUM CHLORIDE 10 MEQ: 7.46 INJECTION, SOLUTION INTRAVENOUS at 15:57

## 2018-01-01 RX ADMIN — HEPARIN SODIUM 5000 UNITS: 5000 INJECTION, SOLUTION INTRAVENOUS; SUBCUTANEOUS at 22:43

## 2018-01-01 RX ADMIN — LORAZEPAM 1 MG: 2 CONCENTRATE ORAL at 11:15

## 2018-01-01 RX ADMIN — ONDANSETRON 4 MG: 2 INJECTION INTRAMUSCULAR; INTRAVENOUS at 07:47

## 2018-01-01 RX ADMIN — MAGNESIUM SULFATE HEPTAHYDRATE 2 G: 40 INJECTION, SOLUTION INTRAVENOUS at 18:25

## 2018-01-01 RX ADMIN — ONDANSETRON 4 MG: 2 INJECTION INTRAMUSCULAR; INTRAVENOUS at 12:06

## 2018-01-01 RX ADMIN — POTASSIUM CHLORIDE 10 MEQ: 10 INJECTION, SOLUTION INTRAVENOUS at 22:38

## 2018-01-01 RX ADMIN — ATORVASTATIN CALCIUM 80 MG: 40 TABLET, FILM COATED ORAL at 21:43

## 2018-01-01 RX ADMIN — CLONIDINE HYDROCHLORIDE 0.2 MG: 0.1 TABLET ORAL at 09:50

## 2018-01-01 RX ADMIN — ATORVASTATIN CALCIUM 80 MG: 40 TABLET, FILM COATED ORAL at 21:35

## 2018-01-01 RX ADMIN — DEXTROSE MONOHYDRATE AND SODIUM CHLORIDE 75 ML/HR: 5; .9 INJECTION, SOLUTION INTRAVENOUS at 19:17

## 2018-01-01 RX ADMIN — ONDANSETRON 4 MG: 2 INJECTION INTRAMUSCULAR; INTRAVENOUS at 10:50

## 2018-01-01 RX ADMIN — HEPARIN SODIUM 5000 UNITS: 5000 INJECTION, SOLUTION INTRAVENOUS; SUBCUTANEOUS at 17:01

## 2018-01-01 RX ADMIN — CARVEDILOL 25 MG: 25 TABLET, FILM COATED ORAL at 17:42

## 2018-01-01 RX ADMIN — ONDANSETRON 4 MG: 2 INJECTION INTRAMUSCULAR; INTRAVENOUS at 12:48

## 2018-01-01 RX ADMIN — SODIUM CHLORIDE 1000 ML: 900 INJECTION, SOLUTION INTRAVENOUS at 13:44

## 2018-01-01 RX ADMIN — LISINOPRIL 40 MG: 20 TABLET ORAL at 10:06

## 2018-01-01 RX ADMIN — HEPARIN SODIUM 5000 UNITS: 5000 INJECTION, SOLUTION INTRAVENOUS; SUBCUTANEOUS at 08:20

## 2018-01-01 RX ADMIN — METOPROLOL TARTRATE 2.5 MG: 5 INJECTION, SOLUTION INTRAVENOUS at 00:54

## 2018-01-01 RX ADMIN — CEFTRIAXONE 1 G: 1 INJECTION, POWDER, FOR SOLUTION INTRAMUSCULAR; INTRAVENOUS at 14:19

## 2018-01-01 RX ADMIN — METOPROLOL TARTRATE 2.5 MG: 5 INJECTION, SOLUTION INTRAVENOUS at 00:32

## 2018-01-01 RX ADMIN — ASPIRIN 81 MG: 81 TABLET, COATED ORAL at 10:19

## 2018-01-01 RX ADMIN — METRONIDAZOLE 500 MG: 500 INJECTION, SOLUTION INTRAVENOUS at 17:03

## 2018-01-01 RX ADMIN — ASPIRIN 81 MG: 81 TABLET, COATED ORAL at 08:08

## 2018-01-01 RX ADMIN — HYDRALAZINE HYDROCHLORIDE 10 MG: 20 INJECTION INTRAMUSCULAR; INTRAVENOUS at 06:55

## 2018-01-01 RX ADMIN — ATORVASTATIN CALCIUM 80 MG: 40 TABLET, FILM COATED ORAL at 21:47

## 2018-01-01 RX ADMIN — PROPOFOL 50 MG: 10 INJECTION, EMULSION INTRAVENOUS at 14:10

## 2018-01-01 RX ADMIN — ONDANSETRON 4 MG: 2 INJECTION INTRAMUSCULAR; INTRAVENOUS at 22:56

## 2018-01-01 RX ADMIN — HEPARIN SODIUM 5000 UNITS: 5000 INJECTION, SOLUTION INTRAVENOUS; SUBCUTANEOUS at 07:00

## 2018-01-01 RX ADMIN — HYDRALAZINE HYDROCHLORIDE 10 MG: 20 INJECTION INTRAMUSCULAR; INTRAVENOUS at 09:12

## 2018-01-01 RX ADMIN — ASPIRIN 81 MG: 81 TABLET, COATED ORAL at 08:44

## 2018-01-01 RX ADMIN — SODIUM CHLORIDE 75 ML/HR: 450 INJECTION, SOLUTION INTRAVENOUS at 21:47

## 2018-01-01 RX ADMIN — ASPIRIN 81 MG: 81 TABLET, COATED ORAL at 09:37

## 2018-01-01 RX ADMIN — ASPIRIN 300 MG: 300 SUPPOSITORY RECTAL at 08:18

## 2018-01-01 RX ADMIN — HEPARIN SODIUM 5000 UNITS: 5000 INJECTION, SOLUTION INTRAVENOUS; SUBCUTANEOUS at 06:28

## 2018-01-01 RX ADMIN — HEPARIN SODIUM 5000 UNITS: 5000 INJECTION, SOLUTION INTRAVENOUS; SUBCUTANEOUS at 18:26

## 2018-01-01 RX ADMIN — METOCLOPRAMIDE HYDROCHLORIDE 5 MG: 5 TABLET ORAL at 09:30

## 2018-01-01 RX ADMIN — HEPARIN SODIUM 5000 UNITS: 5000 INJECTION, SOLUTION INTRAVENOUS; SUBCUTANEOUS at 02:26

## 2018-01-01 RX ADMIN — SODIUM CHLORIDE 1000 MG: 900 INJECTION, SOLUTION INTRAVENOUS at 16:56

## 2018-01-01 RX ADMIN — DEXTROSE MONOHYDRATE AND SODIUM CHLORIDE 75 ML/HR: 5; .45 INJECTION, SOLUTION INTRAVENOUS at 08:10

## 2018-01-01 RX ADMIN — Medication 10 ML: at 16:12

## 2018-01-01 RX ADMIN — HYDRALAZINE HYDROCHLORIDE 10 MG: 20 INJECTION INTRAMUSCULAR; INTRAVENOUS at 04:46

## 2018-01-01 RX ADMIN — METRONIDAZOLE 500 MG: 500 INJECTION, SOLUTION INTRAVENOUS at 16:06

## 2018-01-01 RX ADMIN — DEXTROSE MONOHYDRATE AND SODIUM CHLORIDE 75 ML/HR: 5; .9 INJECTION, SOLUTION INTRAVENOUS at 05:59

## 2018-01-01 RX ADMIN — FAMOTIDINE 20 MG: 20 TABLET ORAL at 08:44

## 2018-01-01 RX ADMIN — WATER 1 G: 1 INJECTION INTRAMUSCULAR; INTRAVENOUS; SUBCUTANEOUS at 21:04

## 2018-01-01 RX ADMIN — HEPARIN SODIUM 5000 UNITS: 5000 INJECTION, SOLUTION INTRAVENOUS; SUBCUTANEOUS at 22:54

## 2018-01-01 RX ADMIN — HYDRALAZINE HYDROCHLORIDE 10 MG: 20 INJECTION INTRAMUSCULAR; INTRAVENOUS at 04:53

## 2018-01-01 RX ADMIN — HEPARIN SODIUM 5000 UNITS: 5000 INJECTION, SOLUTION INTRAVENOUS; SUBCUTANEOUS at 22:22

## 2018-01-01 RX ADMIN — HEPARIN SODIUM 5000 UNITS: 5000 INJECTION, SOLUTION INTRAVENOUS; SUBCUTANEOUS at 00:04

## 2018-01-01 RX ADMIN — HEPARIN SODIUM 5000 UNITS: 5000 INJECTION, SOLUTION INTRAVENOUS; SUBCUTANEOUS at 23:31

## 2018-01-01 RX ADMIN — ONDANSETRON 4 MG: 2 INJECTION INTRAMUSCULAR; INTRAVENOUS at 22:53

## 2018-01-01 RX ADMIN — WATER 2 G: 1 INJECTION INTRAMUSCULAR; INTRAVENOUS; SUBCUTANEOUS at 21:43

## 2018-01-01 RX ADMIN — HEPARIN SODIUM 5000 UNITS: 5000 INJECTION, SOLUTION INTRAVENOUS; SUBCUTANEOUS at 16:14

## 2018-01-01 RX ADMIN — AMLODIPINE BESYLATE 5 MG: 5 TABLET ORAL at 09:37

## 2018-01-01 RX ADMIN — SODIUM CHLORIDE 75 ML/HR: 450 INJECTION, SOLUTION INTRAVENOUS at 11:42

## 2018-01-01 RX ADMIN — DOXYCYCLINE 100 MG: 100 CAPSULE ORAL at 22:28

## 2018-01-01 RX ADMIN — ATORVASTATIN CALCIUM 80 MG: 40 TABLET, FILM COATED ORAL at 00:54

## 2018-01-01 RX ADMIN — METOPROLOL TARTRATE 5 MG: 5 INJECTION INTRAVENOUS at 20:10

## 2018-01-01 RX ADMIN — ATORVASTATIN CALCIUM 80 MG: 40 TABLET, FILM COATED ORAL at 22:35

## 2018-01-01 RX ADMIN — ASPIRIN 81 MG: 81 TABLET, COATED ORAL at 11:24

## 2018-01-01 RX ADMIN — POTASSIUM CHLORIDE 10 MEQ: 7.46 INJECTION, SOLUTION INTRAVENOUS at 16:56

## 2018-01-01 RX ADMIN — POTASSIUM CHLORIDE 40 MEQ: 1.5 POWDER, FOR SOLUTION ORAL at 09:19

## 2018-01-01 RX ADMIN — ATORVASTATIN CALCIUM 80 MG: 40 TABLET, FILM COATED ORAL at 22:05

## 2018-01-01 RX ADMIN — HEPARIN SODIUM 5000 UNITS: 5000 INJECTION, SOLUTION INTRAVENOUS; SUBCUTANEOUS at 17:10

## 2018-01-01 RX ADMIN — DEXTROSE MONOHYDRATE: 5 INJECTION, SOLUTION INTRAVENOUS at 17:57

## 2018-01-01 RX ADMIN — FAMOTIDINE 20 MG: 20 TABLET, FILM COATED ORAL at 09:00

## 2018-01-01 RX ADMIN — FLUCONAZOLE 200 MG: 2 INJECTION, SOLUTION INTRAVENOUS at 17:42

## 2018-01-01 RX ADMIN — METOCLOPRAMIDE HYDROCHLORIDE 5 MG: 5 TABLET ORAL at 11:41

## 2018-01-01 RX ADMIN — SINCALIDE 0.99 MCG: 5 INJECTION, POWDER, LYOPHILIZED, FOR SOLUTION INTRAVENOUS at 11:57

## 2018-01-01 RX ADMIN — PROPOFOL 50 MG: 10 INJECTION, EMULSION INTRAVENOUS at 12:54

## 2018-01-01 RX ADMIN — SODIUM CHLORIDE 75 ML/HR: 450 INJECTION, SOLUTION INTRAVENOUS at 05:13

## 2018-01-01 RX ADMIN — POTASSIUM CHLORIDE 10 MEQ: 10 INJECTION, SOLUTION INTRAVENOUS at 10:30

## 2018-01-01 RX ADMIN — HEPARIN SODIUM 5000 UNITS: 5000 INJECTION, SOLUTION INTRAVENOUS; SUBCUTANEOUS at 05:36

## 2018-01-01 RX ADMIN — ATORVASTATIN CALCIUM 80 MG: 40 TABLET, FILM COATED ORAL at 00:52

## 2018-01-01 RX ADMIN — HYDRALAZINE HYDROCHLORIDE 10 MG: 20 INJECTION INTRAMUSCULAR; INTRAVENOUS at 02:50

## 2018-01-01 RX ADMIN — ASPIRIN 81 MG: 81 TABLET, COATED ORAL at 11:18

## 2018-01-01 RX ADMIN — HEPARIN SODIUM 5000 UNITS: 5000 INJECTION, SOLUTION INTRAVENOUS; SUBCUTANEOUS at 06:54

## 2018-01-01 RX ADMIN — POTASSIUM CHLORIDE 10 MEQ: 7.46 INJECTION, SOLUTION INTRAVENOUS at 16:46

## 2018-01-01 RX ADMIN — BARIUM SULFATE 30 ML: 0.81 POWDER, FOR SUSPENSION ORAL at 12:19

## 2018-01-01 RX ADMIN — POTASSIUM CHLORIDE 10 MEQ: 7.46 INJECTION, SOLUTION INTRAVENOUS at 21:46

## 2018-01-01 RX ADMIN — POTASSIUM CHLORIDE 10 MEQ: 7.46 INJECTION, SOLUTION INTRAVENOUS at 12:07

## 2018-01-01 RX ADMIN — LEVOFLOXACIN 500 MG: 5 INJECTION, SOLUTION INTRAVENOUS at 16:06

## 2018-01-01 RX ADMIN — CEFEPIME HYDROCHLORIDE 1 G: 1 INJECTION, POWDER, FOR SOLUTION INTRAMUSCULAR; INTRAVENOUS at 00:49

## 2018-01-01 RX ADMIN — HEPARIN SODIUM 5000 UNITS: 5000 INJECTION, SOLUTION INTRAVENOUS; SUBCUTANEOUS at 21:46

## 2018-01-01 RX ADMIN — POTASSIUM CHLORIDE 10 MEQ: 7.46 INJECTION, SOLUTION INTRAVENOUS at 18:35

## 2018-01-01 RX ADMIN — ASPIRIN 300 MG: 300 SUPPOSITORY RECTAL at 08:58

## 2018-01-01 RX ADMIN — CARVEDILOL 25 MG: 25 TABLET, FILM COATED ORAL at 18:24

## 2018-01-01 RX ADMIN — WATER 2 G: 1 INJECTION INTRAMUSCULAR; INTRAVENOUS; SUBCUTANEOUS at 18:30

## 2018-01-01 RX ADMIN — POTASSIUM CHLORIDE 10 MEQ: 7.46 INJECTION, SOLUTION INTRAVENOUS at 01:47

## 2018-01-01 RX ADMIN — METOPROLOL TARTRATE 5 MG: 5 INJECTION INTRAVENOUS at 22:10

## 2018-01-01 RX ADMIN — MORPHINE SULFATE 5 MG: 20 SOLUTION ORAL at 11:15

## 2018-01-01 RX ADMIN — POTASSIUM CHLORIDE 10 MEQ: 7.46 INJECTION, SOLUTION INTRAVENOUS at 15:48

## 2018-01-01 RX ADMIN — ASPIRIN 81 MG: 81 TABLET, COATED ORAL at 09:19

## 2018-01-01 RX ADMIN — POTASSIUM CHLORIDE 10 MEQ: 7.46 INJECTION, SOLUTION INTRAVENOUS at 12:22

## 2018-01-01 RX ADMIN — FLUCONAZOLE 200 MG: 2 INJECTION, SOLUTION INTRAVENOUS at 16:42

## 2018-01-01 RX ADMIN — POTASSIUM CHLORIDE 10 MEQ: 7.46 INJECTION, SOLUTION INTRAVENOUS at 21:42

## 2018-01-01 RX ADMIN — METOCLOPRAMIDE HYDROCHLORIDE 5 MG: 5 TABLET ORAL at 17:52

## 2018-01-01 RX ADMIN — MIRTAZAPINE 15 MG: 15 TABLET, FILM COATED ORAL at 22:19

## 2018-01-01 RX ADMIN — METOPROLOL TARTRATE 5 MG: 5 INJECTION INTRAVENOUS at 00:12

## 2018-01-01 RX ADMIN — DEXTROSE MONOHYDRATE AND SODIUM CHLORIDE 75 ML/HR: 5; .45 INJECTION, SOLUTION INTRAVENOUS at 18:23

## 2018-01-01 RX ADMIN — CLONIDINE HYDROCHLORIDE 0.2 MG: 0.1 TABLET ORAL at 11:24

## 2018-01-01 RX ADMIN — LIDOCAINE HYDROCHLORIDE 20 MG: 20 INJECTION, SOLUTION EPIDURAL; INFILTRATION; INTRACAUDAL; PERINEURAL at 12:53

## 2018-01-01 RX ADMIN — FAMOTIDINE 20 MG: 20 TABLET ORAL at 09:05

## 2018-01-01 RX ADMIN — ONDANSETRON 4 MG: 2 INJECTION INTRAMUSCULAR; INTRAVENOUS at 11:36

## 2018-01-01 RX ADMIN — DOCUSATE SODIUM 100 MG: 50 LIQUID ORAL at 16:25

## 2018-01-01 RX ADMIN — WATER 2 G: 1 INJECTION INTRAMUSCULAR; INTRAVENOUS; SUBCUTANEOUS at 21:29

## 2018-01-01 RX ADMIN — CARVEDILOL 25 MG: 25 TABLET, FILM COATED ORAL at 09:37

## 2018-01-01 RX ADMIN — CARVEDILOL 25 MG: 25 TABLET, FILM COATED ORAL at 17:01

## 2018-01-01 RX ADMIN — POTASSIUM CHLORIDE 40 MEQ: 750 TABLET, EXTENDED RELEASE ORAL at 17:47

## 2018-01-01 RX ADMIN — PROPOFOL 10 MG: 10 INJECTION, EMULSION INTRAVENOUS at 12:55

## 2018-01-01 RX ADMIN — VANCOMYCIN HYDROCHLORIDE 250 MG: 1 INJECTION, POWDER, LYOPHILIZED, FOR SOLUTION INTRAVENOUS at 23:00

## 2018-01-01 RX ADMIN — HYDRALAZINE HYDROCHLORIDE 10 MG: 20 INJECTION INTRAMUSCULAR; INTRAVENOUS at 21:55

## 2018-01-01 RX ADMIN — POTASSIUM CHLORIDE 10 MEQ: 10 INJECTION, SOLUTION INTRAVENOUS at 16:22

## 2018-01-01 RX ADMIN — HEPARIN SODIUM 5000 UNITS: 5000 INJECTION, SOLUTION INTRAVENOUS; SUBCUTANEOUS at 16:19

## 2018-01-01 RX ADMIN — CARVEDILOL 25 MG: 25 TABLET, FILM COATED ORAL at 17:52

## 2018-01-01 RX ADMIN — POTASSIUM CHLORIDE 10 MEQ: 7.46 INJECTION, SOLUTION INTRAVENOUS at 20:03

## 2018-01-01 RX ADMIN — SODIUM CHLORIDE 500 ML: 900 INJECTION, SOLUTION INTRAVENOUS at 12:48

## 2018-01-01 RX ADMIN — POTASSIUM CHLORIDE 10 MEQ: 7.46 INJECTION, SOLUTION INTRAVENOUS at 02:00

## 2018-01-01 RX ADMIN — VANCOMYCIN HYDROCHLORIDE 250 MG: 1 INJECTION, POWDER, LYOPHILIZED, FOR SOLUTION INTRAVENOUS at 05:20

## 2018-01-01 RX ADMIN — CEFTRIAXONE 1 G: 1 INJECTION, POWDER, FOR SOLUTION INTRAMUSCULAR; INTRAVENOUS at 14:43

## 2018-01-01 RX ADMIN — METOPROLOL TARTRATE 2.5 MG: 5 INJECTION, SOLUTION INTRAVENOUS at 23:19

## 2018-01-01 RX ADMIN — DOCUSATE SODIUM 100 MG: 50 LIQUID ORAL at 08:08

## 2018-01-01 RX ADMIN — POTASSIUM CHLORIDE 10 MEQ: 7.46 INJECTION, SOLUTION INTRAVENOUS at 14:47

## 2018-01-01 RX ADMIN — HEPARIN SODIUM 5000 UNITS: 5000 INJECTION, SOLUTION INTRAVENOUS; SUBCUTANEOUS at 10:20

## 2018-01-01 RX ADMIN — ASPIRIN 81 MG: 81 TABLET, COATED ORAL at 08:20

## 2018-01-01 RX ADMIN — ONDANSETRON 4 MG: 2 INJECTION INTRAMUSCULAR; INTRAVENOUS at 17:11

## 2018-01-01 RX ADMIN — HYDRALAZINE HYDROCHLORIDE 20 MG: 20 INJECTION INTRAMUSCULAR; INTRAVENOUS at 10:06

## 2018-01-01 RX ADMIN — WATER 2 G: 1 INJECTION INTRAMUSCULAR; INTRAVENOUS; SUBCUTANEOUS at 20:36

## 2018-01-01 RX ADMIN — ATORVASTATIN CALCIUM 80 MG: 40 TABLET, FILM COATED ORAL at 23:41

## 2018-01-01 RX ADMIN — HEPARIN SODIUM 5000 UNITS: 5000 INJECTION, SOLUTION INTRAVENOUS; SUBCUTANEOUS at 06:31

## 2018-01-01 RX ADMIN — SODIUM CHLORIDE 1000 ML: 900 INJECTION, SOLUTION INTRAVENOUS at 19:37

## 2018-01-01 RX ADMIN — SODIUM CHLORIDE 1000 ML: 900 INJECTION, SOLUTION INTRAVENOUS at 19:41

## 2018-01-01 RX ADMIN — DEXTROSE MONOHYDRATE AND SODIUM CHLORIDE 75 ML/HR: 5; .45 INJECTION, SOLUTION INTRAVENOUS at 01:46

## 2018-01-01 RX ADMIN — LISINOPRIL 20 MG: 20 TABLET ORAL at 12:07

## 2018-01-01 RX ADMIN — HYDRALAZINE HYDROCHLORIDE 20 MG: 20 INJECTION INTRAMUSCULAR; INTRAVENOUS at 10:31

## 2018-01-01 RX ADMIN — METOPROLOL TARTRATE 5 MG: 5 INJECTION INTRAVENOUS at 06:05

## 2018-01-01 RX ADMIN — CARVEDILOL 25 MG: 25 TABLET, FILM COATED ORAL at 17:05

## 2018-01-01 RX ADMIN — CARVEDILOL 25 MG: 25 TABLET, FILM COATED ORAL at 18:35

## 2018-01-01 RX ADMIN — POTASSIUM CHLORIDE 40 MEQ: 1.5 POWDER, FOR SOLUTION ORAL at 16:06

## 2018-01-01 RX ADMIN — ASPIRIN 300 MG: 300 SUPPOSITORY RECTAL at 10:06

## 2018-01-01 RX ADMIN — SODIUM CHLORIDE 75 ML/HR: 900 INJECTION, SOLUTION INTRAVENOUS at 15:25

## 2018-01-01 RX ADMIN — MIRTAZAPINE 15 MG: 15 TABLET, FILM COATED ORAL at 21:35

## 2018-01-01 RX ADMIN — POTASSIUM CHLORIDE 10 MEQ: 7.46 INJECTION, SOLUTION INTRAVENOUS at 15:04

## 2018-01-01 RX ADMIN — VANCOMYCIN HYDROCHLORIDE 250 MG: 1 INJECTION, POWDER, LYOPHILIZED, FOR SOLUTION INTRAVENOUS at 17:55

## 2018-01-01 RX ADMIN — POTASSIUM CHLORIDE 10 MEQ: 7.46 INJECTION, SOLUTION INTRAVENOUS at 14:50

## 2018-01-01 RX ADMIN — METOPROLOL TARTRATE 5 MG: 5 INJECTION INTRAVENOUS at 23:58

## 2018-01-01 RX ADMIN — FAMOTIDINE 20 MG: 10 INJECTION INTRAVENOUS at 00:06

## 2018-01-01 RX ADMIN — LISINOPRIL 40 MG: 20 TABLET ORAL at 10:32

## 2018-01-01 RX ADMIN — HYDRALAZINE HYDROCHLORIDE 10 MG: 20 INJECTION INTRAMUSCULAR; INTRAVENOUS at 01:37

## 2018-01-01 RX ADMIN — SODIUM CHLORIDE: 9 INJECTION, SOLUTION INTRAVENOUS at 13:58

## 2018-01-01 RX ADMIN — VANCOMYCIN HYDROCHLORIDE 250 MG: 1 INJECTION, POWDER, LYOPHILIZED, FOR SOLUTION INTRAVENOUS at 06:08

## 2018-01-01 RX ADMIN — CARVEDILOL 25 MG: 25 TABLET, FILM COATED ORAL at 09:30

## 2018-01-01 RX ADMIN — HEPARIN SODIUM 5000 UNITS: 5000 INJECTION, SOLUTION INTRAVENOUS; SUBCUTANEOUS at 06:39

## 2018-01-01 RX ADMIN — HEPARIN SODIUM 5000 UNITS: 5000 INJECTION, SOLUTION INTRAVENOUS; SUBCUTANEOUS at 08:09

## 2018-01-01 RX ADMIN — CARVEDILOL 25 MG: 25 TABLET, FILM COATED ORAL at 11:24

## 2018-01-01 RX ADMIN — MAGNESIUM SULFATE HEPTAHYDRATE 2 G: 40 INJECTION, SOLUTION INTRAVENOUS at 09:33

## 2018-01-01 RX ADMIN — WATER 2 G: 1 INJECTION INTRAMUSCULAR; INTRAVENOUS; SUBCUTANEOUS at 20:59

## 2018-01-01 RX ADMIN — HEPARIN SODIUM 5000 UNITS: 5000 INJECTION, SOLUTION INTRAVENOUS; SUBCUTANEOUS at 15:10

## 2018-01-01 RX ADMIN — DEXTROSE MONOHYDRATE AND SODIUM CHLORIDE 75 ML/HR: 5; .45 INJECTION, SOLUTION INTRAVENOUS at 06:22

## 2018-01-01 RX ADMIN — METRONIDAZOLE 500 MG: 500 INJECTION, SOLUTION INTRAVENOUS at 07:44

## 2018-01-01 RX ADMIN — MAGNESIUM SULFATE HEPTAHYDRATE 2 G: 40 INJECTION, SOLUTION INTRAVENOUS at 16:27

## 2018-01-01 RX ADMIN — CEFTRIAXONE 1 G: 1 INJECTION, POWDER, FOR SOLUTION INTRAMUSCULAR; INTRAVENOUS at 14:44

## 2018-01-01 RX ADMIN — VANCOMYCIN HYDROCHLORIDE 250 MG: 1 INJECTION, POWDER, LYOPHILIZED, FOR SOLUTION INTRAVENOUS at 18:09

## 2018-01-01 RX ADMIN — CARVEDILOL 25 MG: 25 TABLET, FILM COATED ORAL at 10:31

## 2018-01-01 RX ADMIN — MIRTAZAPINE 15 MG: 15 TABLET, FILM COATED ORAL at 22:10

## 2018-01-01 RX ADMIN — HEPARIN SODIUM 5000 UNITS: 5000 INJECTION, SOLUTION INTRAVENOUS; SUBCUTANEOUS at 16:34

## 2018-01-01 RX ADMIN — HEPARIN SODIUM 5000 UNITS: 5000 INJECTION, SOLUTION INTRAVENOUS; SUBCUTANEOUS at 16:25

## 2018-01-01 RX ADMIN — HEPARIN SODIUM 5000 UNITS: 5000 INJECTION, SOLUTION INTRAVENOUS; SUBCUTANEOUS at 14:47

## 2018-01-01 RX ADMIN — WATER 2 G: 1 INJECTION INTRAMUSCULAR; INTRAVENOUS; SUBCUTANEOUS at 20:52

## 2018-01-01 RX ADMIN — HYDRALAZINE HYDROCHLORIDE 20 MG: 20 INJECTION INTRAMUSCULAR; INTRAVENOUS at 16:59

## 2018-01-01 RX ADMIN — CARVEDILOL 25 MG: 25 TABLET, FILM COATED ORAL at 17:33

## 2018-01-01 RX ADMIN — CLONIDINE HYDROCHLORIDE 0.2 MG: 0.1 TABLET ORAL at 18:44

## 2018-01-01 RX ADMIN — PROMETHAZINE HYDROCHLORIDE 12.5 MG: 25 INJECTION INTRAMUSCULAR; INTRAVENOUS at 12:14

## 2018-01-01 RX ADMIN — CLONIDINE HYDROCHLORIDE 0.2 MG: 0.1 TABLET ORAL at 09:37

## 2018-01-01 RX ADMIN — HEPARIN SODIUM 5000 UNITS: 5000 INJECTION, SOLUTION INTRAVENOUS; SUBCUTANEOUS at 08:39

## 2018-01-01 RX ADMIN — ASPIRIN 300 MG: 300 SUPPOSITORY RECTAL at 17:34

## 2018-01-01 RX ADMIN — VANCOMYCIN HYDROCHLORIDE 250 MG: 1 INJECTION, POWDER, LYOPHILIZED, FOR SOLUTION INTRAVENOUS at 12:38

## 2018-01-01 RX ADMIN — LISINOPRIL 40 MG: 20 TABLET ORAL at 09:30

## 2018-01-01 RX ADMIN — ASPIRIN 300 MG: 300 SUPPOSITORY RECTAL at 09:30

## 2018-01-01 RX ADMIN — HEPARIN SODIUM 5000 UNITS: 5000 INJECTION, SOLUTION INTRAVENOUS; SUBCUTANEOUS at 22:07

## 2018-01-01 RX ADMIN — MIRTAZAPINE 15 MG: 15 TABLET, FILM COATED ORAL at 00:54

## 2018-01-01 RX ADMIN — ONDANSETRON 4 MG: 2 INJECTION INTRAMUSCULAR; INTRAVENOUS at 18:08

## 2018-01-01 RX ADMIN — ATORVASTATIN CALCIUM 80 MG: 40 TABLET, FILM COATED ORAL at 22:02

## 2018-01-01 RX ADMIN — MIRTAZAPINE 15 MG: 15 TABLET, FILM COATED ORAL at 22:44

## 2018-01-01 RX ADMIN — HEPARIN SODIUM 5000 UNITS: 5000 INJECTION, SOLUTION INTRAVENOUS; SUBCUTANEOUS at 17:42

## 2018-01-01 RX ADMIN — ATORVASTATIN CALCIUM 80 MG: 40 TABLET, FILM COATED ORAL at 22:13

## 2018-01-01 RX ADMIN — AMLODIPINE BESYLATE 5 MG: 5 TABLET ORAL at 08:19

## 2018-01-01 RX ADMIN — METOCLOPRAMIDE 5 MG: 5 INJECTION, SOLUTION INTRAMUSCULAR; INTRAVENOUS at 17:33

## 2018-01-01 RX ADMIN — POTASSIUM CHLORIDE 10 MEQ: 7.46 INJECTION, SOLUTION INTRAVENOUS at 23:48

## 2018-01-01 RX ADMIN — SODIUM CHLORIDE 1000 ML: 900 INJECTION, SOLUTION INTRAVENOUS at 11:45

## 2018-01-01 RX ADMIN — IOHEXOL 50 ML: 240 INJECTION, SOLUTION INTRATHECAL; INTRAVASCULAR; INTRAVENOUS; ORAL at 13:09

## 2018-01-01 RX ADMIN — HEPARIN SODIUM 5000 UNITS: 5000 INJECTION, SOLUTION INTRAVENOUS; SUBCUTANEOUS at 13:43

## 2018-01-01 RX ADMIN — POTASSIUM CHLORIDE 10 MEQ: 10 INJECTION, SOLUTION INTRAVENOUS at 01:06

## 2018-01-01 RX ADMIN — FUROSEMIDE 20 MG: 10 INJECTION, SOLUTION INTRAMUSCULAR; INTRAVENOUS at 19:51

## 2018-01-01 RX ADMIN — MIRTAZAPINE 15 MG: 15 TABLET, FILM COATED ORAL at 22:02

## 2018-01-01 RX ADMIN — SODIUM CHLORIDE 500 ML: 900 INJECTION, SOLUTION INTRAVENOUS at 00:03

## 2018-01-01 RX ADMIN — FAMOTIDINE 20 MG: 20 TABLET, FILM COATED ORAL at 09:19

## 2018-01-01 RX ADMIN — METOPROLOL TARTRATE 2.5 MG: 5 INJECTION, SOLUTION INTRAVENOUS at 05:57

## 2018-01-01 RX ADMIN — IOPAMIDOL 80 ML: 612 INJECTION, SOLUTION INTRAVENOUS at 12:32

## 2018-01-01 RX ADMIN — FAMOTIDINE 20 MG: 20 TABLET, FILM COATED ORAL at 10:11

## 2018-01-01 RX ADMIN — MIRTAZAPINE 15 MG: 15 TABLET, FILM COATED ORAL at 21:47

## 2018-01-01 RX ADMIN — ASPIRIN 81 MG: 81 TABLET, COATED ORAL at 08:39

## 2018-01-01 RX ADMIN — HEPARIN SODIUM 5000 UNITS: 5000 INJECTION, SOLUTION INTRAVENOUS; SUBCUTANEOUS at 23:58

## 2018-01-01 RX ADMIN — METOPROLOL TARTRATE 2.5 MG: 5 INJECTION, SOLUTION INTRAVENOUS at 06:04

## 2018-01-01 RX ADMIN — METOPROLOL TARTRATE 2.5 MG: 5 INJECTION, SOLUTION INTRAVENOUS at 06:31

## 2018-01-01 RX ADMIN — POTASSIUM CHLORIDE 10 MEQ: 10 INJECTION, SOLUTION INTRAVENOUS at 17:26

## 2018-01-01 RX ADMIN — HEPARIN SODIUM 5000 UNITS: 5000 INJECTION, SOLUTION INTRAVENOUS; SUBCUTANEOUS at 06:40

## 2018-01-01 RX ADMIN — METOCLOPRAMIDE HYDROCHLORIDE 5 MG: 5 TABLET ORAL at 17:42

## 2018-01-01 RX ADMIN — DEXTROSE MONOHYDRATE AND SODIUM CHLORIDE 75 ML/HR: 5; .9 INJECTION, SOLUTION INTRAVENOUS at 18:54

## 2018-01-01 RX ADMIN — ATORVASTATIN CALCIUM 80 MG: 40 TABLET, FILM COATED ORAL at 21:45

## 2018-01-01 RX ADMIN — WATER 2 G: 1 INJECTION INTRAMUSCULAR; INTRAVENOUS; SUBCUTANEOUS at 20:18

## 2018-01-01 RX ADMIN — POTASSIUM CHLORIDE 10 MEQ: 10 INJECTION, SOLUTION INTRAVENOUS at 19:52

## 2018-01-01 RX ADMIN — POTASSIUM CHLORIDE 10 MEQ: 7.46 INJECTION, SOLUTION INTRAVENOUS at 12:44

## 2018-01-01 RX ADMIN — MIRTAZAPINE 15 MG: 15 TABLET, FILM COATED ORAL at 21:27

## 2018-01-01 RX ADMIN — VANCOMYCIN HYDROCHLORIDE 250 MG: 1 INJECTION, POWDER, LYOPHILIZED, FOR SOLUTION INTRAVENOUS at 12:55

## 2018-01-01 RX ADMIN — METOCLOPRAMIDE 5 MG: 5 INJECTION, SOLUTION INTRAMUSCULAR; INTRAVENOUS at 23:22

## 2018-01-01 RX ADMIN — HEPARIN SODIUM 5000 UNITS: 5000 INJECTION, SOLUTION INTRAVENOUS; SUBCUTANEOUS at 16:49

## 2018-01-01 RX ADMIN — SODIUM CHLORIDE 75 ML/HR: 900 INJECTION, SOLUTION INTRAVENOUS at 16:32

## 2018-01-01 RX ADMIN — METOCLOPRAMIDE 5 MG: 5 INJECTION, SOLUTION INTRAMUSCULAR; INTRAVENOUS at 06:54

## 2018-01-01 RX ADMIN — METOPROLOL TARTRATE 2.5 MG: 5 INJECTION, SOLUTION INTRAVENOUS at 13:54

## 2018-01-01 RX ADMIN — DEXTROSE MONOHYDRATE AND SODIUM CHLORIDE 75 ML/HR: 5; .45 INJECTION, SOLUTION INTRAVENOUS at 07:54

## 2018-01-01 RX ADMIN — HYDRALAZINE HYDROCHLORIDE 10 MG: 20 INJECTION INTRAMUSCULAR; INTRAVENOUS at 15:44

## 2018-01-01 RX ADMIN — HEPARIN SODIUM 5000 UNITS: 5000 INJECTION, SOLUTION INTRAVENOUS; SUBCUTANEOUS at 09:23

## 2018-01-01 RX ADMIN — AMLODIPINE BESYLATE 5 MG: 5 TABLET ORAL at 09:30

## 2018-01-01 RX ADMIN — LABETALOL 20 MG/4 ML (5 MG/ML) INTRAVENOUS SYRINGE 20 MG: at 05:31

## 2018-01-01 RX ADMIN — POTASSIUM CHLORIDE 10 MEQ: 7.46 INJECTION, SOLUTION INTRAVENOUS at 11:41

## 2018-01-01 RX ADMIN — METOPROLOL TARTRATE 2.5 MG: 5 INJECTION, SOLUTION INTRAVENOUS at 00:38

## 2018-01-01 RX ADMIN — MIRTAZAPINE 15 MG: 15 TABLET, FILM COATED ORAL at 22:38

## 2018-01-01 RX ADMIN — PROPOFOL 10 MG: 10 INJECTION, EMULSION INTRAVENOUS at 12:56

## 2018-01-01 RX ADMIN — DEXTROSE MONOHYDRATE AND SODIUM CHLORIDE 75 ML/HR: 5; .45 INJECTION, SOLUTION INTRAVENOUS at 18:09

## 2018-01-01 RX ADMIN — SODIUM CHLORIDE 250 ML/HR: 900 INJECTION, SOLUTION INTRAVENOUS at 20:45

## 2018-01-01 RX ADMIN — AMLODIPINE BESYLATE 5 MG: 5 TABLET ORAL at 10:32

## 2018-01-01 RX ADMIN — DEXTROSE MONOHYDRATE 25 G: 25 INJECTION, SOLUTION INTRAVENOUS at 08:07

## 2018-01-01 RX ADMIN — DOCUSATE SODIUM 100 MG: 50 LIQUID ORAL at 08:20

## 2018-01-01 RX ADMIN — AMLODIPINE BESYLATE 5 MG: 5 TABLET ORAL at 10:06

## 2018-01-01 RX ADMIN — POTASSIUM CHLORIDE 10 MEQ: 7.46 INJECTION, SOLUTION INTRAVENOUS at 13:33

## 2018-01-01 RX ADMIN — METRONIDAZOLE 500 MG: 500 INJECTION, SOLUTION INTRAVENOUS at 11:20

## 2018-01-01 RX ADMIN — CARVEDILOL 25 MG: 25 TABLET, FILM COATED ORAL at 09:50

## 2018-01-01 RX ADMIN — VANCOMYCIN HYDROCHLORIDE 250 MG: 1 INJECTION, POWDER, LYOPHILIZED, FOR SOLUTION INTRAVENOUS at 23:57

## 2018-01-01 RX ADMIN — CARVEDILOL 25 MG: 25 TABLET, FILM COATED ORAL at 10:06

## 2018-01-01 RX ADMIN — ATORVASTATIN CALCIUM 80 MG: 40 TABLET, FILM COATED ORAL at 22:19

## 2018-01-01 RX ADMIN — MAGNESIUM SULFATE HEPTAHYDRATE 2 G: 40 INJECTION, SOLUTION INTRAVENOUS at 11:42

## 2018-01-01 RX ADMIN — ATORVASTATIN CALCIUM 80 MG: 40 TABLET, FILM COATED ORAL at 22:38

## 2018-02-13 NOTE — ED PROVIDER NOTES
HPI Comments: Lizett Polk is a 68 y.o. Female who was treated for pneumonia in  with abx, improved now with increased fatigue for last week, diff walking around without getting too tired. Dec appetite, po intake. Pt denies any pain, fcs, nvd, abd pain, increased sob, blood in stool, melena, new swelling. sx worse with exertion, better with rest. No syncope. No new meds. No new cough. Has not f/u with her pcp since being treated or for these sx      The history is provided by the patient (daughter). Past Medical History:   Diagnosis Date    Amblyopia of left eye     Blind left eye     Cataract     bilat, right has been replaced    CRI (chronic renal insufficiency)     Dementia     Essential hypertension, malignant 2014    Hypertension     Left bundle branch block     Otitis media, acute 16    left       Past Surgical History:   Procedure Laterality Date    HX CATARACT REMOVAL Right 2013    HX  SECTION      HX  SECTION      HX  SECTION      HX TONSILLECTOMY           Family History:   Problem Relation Age of Onset    Heart Disease Father     Other Brother      ? brain injury       Social History     Social History    Marital status:      Spouse name: N/A    Number of children: N/A    Years of education: N/A     Occupational History    , clerical      Social History Main Topics    Smoking status: Never Smoker    Smokeless tobacco: Never Used    Alcohol use No    Drug use: No    Sexual activity: No     Other Topics Concern    Not on file     Social History Narrative         ALLERGIES: Codeine; Levaquin [levofloxacin]; Pcn [penicillins]; and Sulfa (sulfonamide antibiotics)    Review of Systems   Constitutional: Positive for appetite change and fatigue. HENT: Negative for sore throat and trouble swallowing. Eyes: Negative for visual disturbance. Respiratory: Negative for cough and shortness of breath. Cardiovascular: Negative for chest pain and leg swelling. Gastrointestinal: Negative for abdominal pain. Endocrine: Negative for polyuria. Genitourinary: Negative for decreased urine volume and difficulty urinating. Musculoskeletal: Negative for joint swelling. Allergic/Immunologic: Negative for immunocompromised state. Neurological: Negative for syncope. Psychiatric/Behavioral: Positive for sleep disturbance. Vitals:    02/12/18 2030 02/12/18 2100 02/12/18 2133 02/12/18 2200   BP: 140/88 149/90  147/90   Pulse: 76 78 76 75   Resp: 23 16 19 20   Temp:       SpO2: 95% 96% 94% 98%            Physical Exam   Constitutional: She is oriented to person, place, and time. She appears well-developed and well-nourished. No distress. HENT:   Head: Normocephalic and atraumatic. Right Ear: External ear normal.   Left Ear: External ear normal.   Nose: Nose normal.   Mouth/Throat: Uvula is midline and oropharynx is clear and moist. Mucous membranes are dry. No oropharyngeal exudate. Eyes: Conjunctivae are normal. No scleral icterus. Neck: Neck supple. Cardiovascular: Normal rate, regular rhythm, normal heart sounds and intact distal pulses. Pulmonary/Chest: Effort normal and breath sounds normal.   Abdominal: Soft. There is no tenderness. Musculoskeletal: She exhibits no edema. Neurological: She is alert and oriented to person, place, and time. No cranial nerve deficit (3-12). Gait normal.   Skin: Skin is warm and dry. She is not diaphoretic. Psychiatric: Her behavior is normal.   Nursing note and vitals reviewed.        [facility]      ED Course       Procedures      Vitals:  Patient Vitals for the past 12 hrs:   Temp Pulse Resp BP SpO2   02/12/18 2200 - 75 20 147/90 98 %   02/12/18 2133 - 76 19 - 94 %   02/12/18 2100 - 78 16 149/90 96 %   02/12/18 2030 - 76 23 140/88 95 %   02/12/18 2000 - 74 25 146/86 95 %   02/12/18 1930 - 76 22 (!) 151/91 95 %   02/12/18 1920 - 84 - - 96 %   02/12/18 1919 - - - - 95 %   02/12/18 1918 - - - - 95 %   02/12/18 1916 - - - 155/90 -   02/12/18 1814 97.3 °F (36.3 °C) 77 16 (!) 134/94 99 %         Medications ordered:   Medications   sodium chloride 0.9 % bolus infusion 1,000 mL (0 mL IntraVENous IV Completed 2/12/18 2037)   doxycycline (MONODOX) capsule 100 mg (100 mg Oral Given 2/12/18 2228)         Lab findings:  Recent Results (from the past 12 hour(s))   EKG, 12 LEAD, INITIAL    Collection Time: 02/12/18  6:36 PM   Result Value Ref Range    Ventricular Rate 69 BPM    Atrial Rate 69 BPM    P-R Interval 144 ms    QRS Duration 80 ms    Q-T Interval 430 ms    QTC Calculation (Bezet) 460 ms    Calculated P Axis 46 degrees    Calculated R Axis 24 degrees    Calculated T Axis -32 degrees    Diagnosis       Normal sinus rhythm  Possible Left atrial enlargement  ST & T wave abnormality, consider inferior ischemia  ST & T wave abnormality, consider anterolateral ischemia  Abnormal ECG  When compared with ECG of 04-JAN-2017 17:11,  ST now depressed in Anterior leads  T wave inversion now evident in Anterior leads     CBC WITH AUTOMATED DIFF    Collection Time: 02/12/18  6:45 PM   Result Value Ref Range    WBC 10.4 4.6 - 13.2 K/uL    RBC 3.74 (L) 4.20 - 5.30 M/uL    HGB 11.0 (L) 12.0 - 16.0 g/dL    HCT 33.4 (L) 35.0 - 45.0 %    MCV 89.3 74.0 - 97.0 FL    MCH 29.4 24.0 - 34.0 PG    MCHC 32.9 31.0 - 37.0 g/dL    RDW 13.8 11.6 - 14.5 %    PLATELET 073 136 - 742 K/uL    MPV 8.7 (L) 9.2 - 11.8 FL    NEUTROPHILS 69 40 - 73 %    LYMPHOCYTES 20 (L) 21 - 52 %    MONOCYTES 9 3 - 10 %    EOSINOPHILS 2 0 - 5 %    BASOPHILS 0 0 - 2 %    ABS. NEUTROPHILS 7.1 1.8 - 8.0 K/UL    ABS. LYMPHOCYTES 2.1 0.9 - 3.6 K/UL    ABS. MONOCYTES 1.0 0.05 - 1.2 K/UL    ABS. EOSINOPHILS 0.2 0.0 - 0.4 K/UL    ABS.  BASOPHILS 0.0 0.0 - 0.06 K/UL    DF AUTOMATED     METABOLIC PANEL, COMPREHENSIVE    Collection Time: 02/12/18  6:45 PM   Result Value Ref Range    Sodium 138 136 - 145 mmol/L    Potassium 3.6 3.5 - 5.5 mmol/L Chloride 103 100 - 108 mmol/L    CO2 25 21 - 32 mmol/L    Anion gap 10 3.0 - 18 mmol/L    Glucose 110 (H) 74 - 99 mg/dL    BUN 12 7.0 - 18 MG/DL    Creatinine 1.19 0.6 - 1.3 MG/DL    BUN/Creatinine ratio 10 (L) 12 - 20      GFR est AA 53 (L) >60 ml/min/1.73m2    GFR est non-AA 44 (L) >60 ml/min/1.73m2    Calcium 8.7 8.5 - 10.1 MG/DL    Bilirubin, total 0.9 0.2 - 1.0 MG/DL    ALT (SGPT) 7 (L) 13 - 56 U/L    AST (SGOT) 11 (L) 15 - 37 U/L    Alk. phosphatase 76 45 - 117 U/L    Protein, total 6.8 6.4 - 8.2 g/dL    Albumin 3.1 (L) 3.4 - 5.0 g/dL    Globulin 3.7 2.0 - 4.0 g/dL    A-G Ratio 0.8 0.8 - 1.7     CARDIAC PANEL,(CK, CKMB & TROPONIN)    Collection Time: 02/12/18  6:45 PM   Result Value Ref Range    CK 26 26 - 192 U/L    CK - MB <1.0 <3.6 ng/ml    CK-MB Index  0.0 - 4.0 %     CALCULATION NOT PERFORMED WHEN RESULT IS BELOW LINEAR LIMIT    Troponin-I, Qt. <0.02 0.0 - 0.045 NG/ML   TSH 3RD GENERATION    Collection Time: 02/12/18  6:45 PM   Result Value Ref Range    TSH 1.85 0.36 - 3.74 uIU/mL       EKG interpretation by ED Physician:  nsr with ns st tw changes  Rate 69, pr 144, qtc 460  St changes slightly diff from previous but not indicative of acute ischemia    X-Ray, CT or other radiology findings or impressions:  XR CHEST PA LAT    (Results Pending)   CT CHEST WO CONT    (Results Pending)   ct chest with consolidation areas in rll. Progress notes, Consult notes or additional Procedure notes: Will treat for pna again. No hypoxia, tachycardia, arf, sig mental status change  Doubt need for admission. D/w pt and daughter need for close f/u with pcp  I have discussed with patient and/or family/sig other the results, interpretation of any imaging if performed, suspected diagnosis and treatment plan to include instructions regarding the diagnoses listed to which understanding was expressed with all questions answered      Reevaluation of patient:   Stable for dc    Disposition:  Diagnosis:   1.  Community acquired pneumonia of right lower lobe of lung ([facility] [address]    2. Decreased appetite        Disposition: home      Follow-up Information     Follow up With Details Comments 2008 Nine MD Michael Schedule an appointment as soon as possible for a visit this week for follow up from your ER visit. call office in the morning Son Harvey  [address]  [phone]              Discharge Medication List as of 2/12/2018 10:24 PM      START taking these medications    Details   doxycycline (MONODOX) 100 mg capsule Take 1 Cap by mouth two (2) times a day for 10 days. , Print, Disp-20 Cap, R-0         CONTINUE these medications which have NOT CHANGED    Details   atorvastatin (LIPITOR) 80 mg tablet Take 80 mg by mouth nightly., Historical Med      cloNIDine HCl (CATAPRES) 0.2 mg tablet TAKE 1 TABLET BY MOUTH TWICE DAILY, Normal**Patient requests 90 days supply**Disp-180 Tab, R-5      Hydrochlorothiazide (HYDRODIURIL) 12.5 mg tablet Take 12.5 mg by mouth daily. , Historical Med      carvedilol (COREG) 25 mg tablet Take 1 Tab by mouth two (2) times a day. Indications: HYPERTENSION, Normal, Disp-60 Tab, R-5      minoxidil (LONITEN) 2.5 mg tablet Take  by mouth daily. , Historical Med      fluticasone (FLONASE) 50 mcg/actuation nasal spray 2 Sprays by Both Nostrils route daily. , Normal, Disp-1 Bottle, R-5      losartan (COZAAR) 100 mg tablet Take 1 Tab by mouth daily. , NormalW/o HCTZDisp-30 Tab, R-5      aspirin delayed-release 81 mg tablet Take 1 Tab by mouth daily. , No Print, Disp-1 Tab, R-0

## 2018-02-13 NOTE — ED NOTES
Purposeful rounding completed:    Side rails up x 2:  YES  Bed in low position and wheels locked: YES  Call bell within reach: YES  Comfort addressed: YES    Toileting needs addressed: YES  Plan of care reviewed/updated with patient and or family members: YES  IV site assessed: YES  Pain assessed and addressed: YES  Family remains at the bedside

## 2018-02-13 NOTE — PROGRESS NOTES
Hospital Discharge Follow-Up      Date/Time:  2/13/2018 11:08 AM    Patient listed on discharge report on February 13th, 2018. Patient was in the ED at [facility] on 2/12/18. Diagnosis:   Pneumonia   RRAT score: 16 - Moderate    Medical History:     Past Medical History:   Diagnosis Date    Amblyopia of left eye     Blind left eye     Cataract     bilat, right has been replaced    CRI (chronic renal insufficiency)     Dementia     Essential hypertension, malignant 4/25/2014    Hypertension     Left bundle branch block     Otitis media, acute 7/2/16    left     Imaging Reports Reviewed:  Chest xray done in the ED revealed:  Nonspecific airspace disease in the right midlung, possibly atelectasis and/or pneumonia. I contacted the patient by telephone to perform post ED discharge assessment. Spoke with son, Mary Allen after checking Privacy form. The patient lives with her daughter , who is not available at present and the patient is sleeping at present. Son states patient has fallen twice in the last month and states that patient has been dizzy, lightheaded more than usual.  Discussed possible causes of dizziness with him. Patient has not been eating well, recommended protein shakes, soft food, encouraging fluids. Medications:   Performed medication reconciliation with son, and he is able to verbalize understanding of administration of home medications. There were no barriers to obtaining medications identified at this time. Red Flags:    Red flags:   Fever, Chills, Productive cough, Bluish color of nails or lips due to diminished oxygen in blood     Diet:   Son reports: Regular Diet, however states that patient has had a poor appetite for last month or so. Activity:    Son reports: minimal walking around the house - using walls to hold on to. Patient has a walker but refuses to use it.       Support system:  patient, son and daughter    Discharge Instructions :  Reviewed discharge instructions with son who understands instructions and follow-up care. Advance Care Planning:   Patient has an Advanced Directive in chart    PCP/Specialist Follow Up:   Patient scheduled to follow up with Dr. Lalit Ochoa on Monday, February 19th. Offered earlier appt with another provider but son requests Dr. Lalit Ochoa.       Reviewed red flags with son and he verbalizes understanding. Given opportunity to ask questions. No other clinical/social/functional needs noted. PLAN    Goals      Reduce ED Utilization      Reduce incidence of patient falling             Patient has fallen twice in the last month            Reviewed plan of care. Son aware to contact the PCP office for questions related to their healthcare.       Catrachito Ngo RN

## 2018-02-13 NOTE — ED NOTES
I have reviewed discharge instructions with the patient and caregiver. The patient and parent verbalized understanding. Patient armband removed and shredded. Patient discharged to lobby with family.

## 2018-02-13 NOTE — ED NOTES
Purposeful rounding completed:    Side rails up x 2:  YES  Bed in low position and wheels locked: YES  Call bell within reach: YES  Comfort addressed: YES    Toileting needs addressed: YES  Plan of care reviewed/updated with patient and or family members: YES  IV site assessed: YES  Pain assessed and addressed: YES  Family at the bedside.

## 2018-02-17 NOTE — ED TRIAGE NOTES
Per family patient was here last Tuesday, she was diagnosed with pneuonia. She has not been able to keep anything down due to nausea.

## 2018-02-17 NOTE — DISCHARGE INSTRUCTIONS
Nausea and Vomiting: Care Instructions  Your Care Instructions    When you are nauseated, you may feel weak and sweaty and notice a lot of saliva in your mouth. Nausea often leads to vomiting. Most of the time you do not need to worry about nausea and vomiting, but they can be signs of other illnesses. Two common causes of nausea and vomiting are stomach flu and food poisoning. Nausea and vomiting from viral stomach flu will usually start to improve within 24 hours. Nausea and vomiting from food poisoning may last from 12 to 48 hours. The doctor has checked you carefully, but problems can develop later. If you notice any problems or new symptoms, get medical treatment right away. Follow-up care is a key part of your treatment and safety. Be sure to make and go to all appointments, and call your doctor if you are having problems. It's also a good idea to know your test results and keep a list of the medicines you take. How can you care for yourself at home? · To prevent dehydration, drink plenty of fluids, enough so that your urine is light yellow or clear like water. Choose water and other caffeine-free clear liquids until you feel better. If you have kidney, heart, or liver disease and have to limit fluids, talk with your doctor before you increase the amount of fluids you drink. · Rest in bed until you feel better. · When you are able to eat, try clear soups, mild foods, and liquids until all symptoms are gone for 12 to 48 hours. Other good choices include dry toast, crackers, cooked cereal, and gelatin dessert, such as Jell-O. When should you call for help? Call 911 anytime you think you may need emergency care. For example, call if:  ? · You passed out (lost consciousness). ?Call your doctor now or seek immediate medical care if:  ? · You have symptoms of dehydration, such as:  ¨ Dry eyes and a dry mouth. ¨ Passing only a little dark urine.   ¨ Feeling thirstier than usual.   ? · You have new or worsening belly pain. ? · You have a new or higher fever. ? · You vomit blood or what looks like coffee grounds. ? Watch closely for changes in your health, and be sure to contact your doctor if:  ? · You have ongoing nausea and vomiting. ? · Your vomiting is getting worse. ? · Your vomiting lasts longer than 2 days. ? · You are not getting better as expected. Where can you learn more? Go to http://jadyn-ivette.info/. Enter 25 [id number] in the search box to learn more about \"Nausea and Vomiting: Care Instructions. \"  Current as of: March 20, 2017  Content Version: 11.4  © 5656-8582 [employer]. Care instructions adapted under license by [employer] (which disclaims liability or warranty for this information). If you have questions about a medical condition or this instruction, always ask your healthcare professional. [address] any warranty or liability for your use of this information.

## 2018-02-17 NOTE — ED PROVIDER NOTES
EMERGENCY DEPARTMENT HISTORY AND PHYSICAL EXAM    12:34 PM      Date: 2/17/2018  Patient Name: [facility]    History of Presenting Illness     Chief Complaint   Patient presents with    Fatigue    Nausea         History Provided By: Patient and Patient's family    Chief Complaint: Nausea  Duration: 4 days  Timing: Persisting  Location: N/A  Quality: N/A  Severity: N/A  Modifying Factors: The patient does not report taking any medications for her symptoms  Associated Symptoms: Vomiting. Denies abdominal pain and diarrhea      Additional History (Context): [facility] is a 68 y.o. female with a history of hypertension presents with nausea that has been persisting for 4 days. The patient has an associated symptom of vomiting for 4 days. She denies abdominal pain and diarrhea. The family reports she has not been able to keep down any of her prescribed medications. She does not report taking any medications for her symptoms. The patient was seen at [facility] on 2/13/18 and was diagnosed with pneumonia. She was prescribed doxycycline. Her family reports that her SOB from her pneumonia has been improving, but is worse in the morning. PCP: Jenn Mcgee MD    Current Outpatient Prescriptions   Medication Sig Dispense Refill    ondansetron (ZOFRAN ODT) 4 mg disintegrating tablet Take 1-2 tablets every 6-8 hours as needed for nausea and vomiting. 10 Tab 0    atorvastatin (LIPITOR) 80 mg tablet Take 80 mg by mouth nightly.  doxycycline (MONODOX) 100 mg capsule Take 1 Cap by mouth two (2) times a day for 10 days. 20 Cap 0    cloNIDine HCl (CATAPRES) 0.2 mg tablet TAKE 1 TABLET BY MOUTH TWICE DAILY 180 Tab 5    minoxidil (LONITEN) 2.5 mg tablet Take  by mouth daily.  Hydrochlorothiazide (HYDRODIURIL) 12.5 mg tablet Take 12.5 mg by mouth daily.  aspirin delayed-release 81 mg tablet Take 1 Tab by mouth daily. 1 Tab 0    carvedilol (COREG) 25 mg tablet Take 1 Tab by mouth two (2) times a day. Indications: HYPERTENSION 60 Tab 5       Past History     Past Medical History:  Past Medical History:   Diagnosis Date    Amblyopia of left eye     Blind left eye     Cataract     bilat, right has been replaced    CRI (chronic renal insufficiency)     Dementia     Essential hypertension, malignant 2014    Hypertension     Left bundle branch block     Otitis media, acute 16    left       Past Surgical History:  Past Surgical History:   Procedure Laterality Date    HX CATARACT REMOVAL Right     HX  SECTION  1968    HX  SECTION  1964    HX  SECTION  1970    HX TONSILLECTOMY         Family History:  Family History   Problem Relation Age of Onset    Heart Disease Father     Other Brother      ? brain injury       Social History:  Social History   Substance Use Topics    Smoking status: Never Smoker    Smokeless tobacco: Never Used    Alcohol use No       Allergies: Allergies   Allergen Reactions    Codeine Unknown (comments)     Unsure due to happening so long    Levaquin [Levofloxacin] Nausea Only     intolerance    Pcn [Penicillins] Hives    Sulfa (Sulfonamide Antibiotics) Unknown (comments)     Unsure it was so long ago         Review of Systems     Review of Systems   Constitutional: Negative for diaphoresis and fever. HENT: Negative for congestion and sore throat. Eyes: Negative for pain and itching. Respiratory: Negative for cough and shortness of breath. Cardiovascular: Negative for chest pain and palpitations. Gastrointestinal: Positive for nausea and vomiting. Negative for abdominal pain and diarrhea. Endocrine: Negative for polydipsia and polyuria. Genitourinary: Negative for dysuria and hematuria. Musculoskeletal: Negative for arthralgias and myalgias. Skin: Negative for rash and wound. Neurological: Negative for seizures and syncope. Hematological: Does not bruise/bleed easily.    Psychiatric/Behavioral: Negative for agitation and hallucinations. Physical Exam     Visit Vitals    BP (!) 161/104    Pulse 93    Temp 97.5 °F (36.4 °C)    Resp 23    Ht 5' (1.524 m)    Wt 54.4 kg (120 lb)    SpO2 95%    BMI 23.44 kg/m2       Physical Exam   Constitutional: She appears well-developed and well-nourished. No distress. HENT:   Head: Normocephalic and atraumatic. Eyes: Conjunctivae are normal. No scleral icterus. Neck: Normal range of motion. Neck supple. No JVD present. Cardiovascular: Normal rate, regular rhythm and normal heart sounds. 4 intact extremity pulses   Pulmonary/Chest: Effort normal and breath sounds normal.   Abdominal: Soft. She exhibits no mass. There is no tenderness. Musculoskeletal: Normal range of motion. Lymphadenopathy:     She has no cervical adenopathy. Neurological: She is alert. Skin: Skin is warm and dry. Nursing note and vitals reviewed. Diagnostic Study Results     Labs -  Recent Results (from the past 12 hour(s))   CBC WITH AUTOMATED DIFF    Collection Time: 02/17/18 12:41 PM   Result Value Ref Range    WBC 9.8 4.6 - 13.2 K/uL    RBC 3.94 (L) 4.20 - 5.30 M/uL    HGB 11.7 (L) 12.0 - 16.0 g/dL    HCT 35.0 35.0 - 45.0 %    MCV 88.8 74.0 - 97.0 FL    MCH 29.7 24.0 - 34.0 PG    MCHC 33.4 31.0 - 37.0 g/dL    RDW 13.5 11.6 - 14.5 %    PLATELET 969 827 - 951 K/uL    MPV 9.0 (L) 9.2 - 11.8 FL    NEUTROPHILS 74 (H) 40 - 73 %    LYMPHOCYTES 16 (L) 21 - 52 %    MONOCYTES 8 3 - 10 %    EOSINOPHILS 2 0 - 5 %    BASOPHILS 0 0 - 2 %    ABS. NEUTROPHILS 7.2 1.8 - 8.0 K/UL    ABS. LYMPHOCYTES 1.5 0.9 - 3.6 K/UL    ABS. MONOCYTES 0.8 0.05 - 1.2 K/UL    ABS. EOSINOPHILS 0.2 0.0 - 0.4 K/UL    ABS.  BASOPHILS 0.0 0.0 - 0.06 K/UL    DF AUTOMATED     METABOLIC PANEL, BASIC    Collection Time: 02/17/18 12:41 PM   Result Value Ref Range    Sodium 141 136 - 145 mmol/L    Potassium 3.5 3.5 - 5.5 mmol/L    Chloride 103 100 - 108 mmol/L    CO2 25 21 - 32 mmol/L    Anion gap 13 3.0 - 18 mmol/L Glucose 92 74 - 99 mg/dL    BUN 23 (H) 7.0 - 18 MG/DL    Creatinine 1.21 0.6 - 1.3 MG/DL    BUN/Creatinine ratio 19 12 - 20      GFR est AA 52 (L) >60 ml/min/1.73m2    GFR est non-AA 43 (L) >60 ml/min/1.73m2    Calcium 9.0 8.5 - 10.1 MG/DL       Radiologic Studies -   XR CHEST PORT   Final Result        Xr Chest Port    Result Date: 2/17/2018  EXAM: Chest, portable erect, one view INDICATION: Pneumonia. Nausea and vomiting. COMPARISON: 02/12/2018 _______________ FINDINGS: Aorta is tortuous, cardiac silhouette is prominent with left ventricular predominance. Focal consolidation lateral aspect of the right middle lobe is again noted without change. Left lung base is obscured by the heart. No pneumothorax. _______________     IMPRESSION: Right middle lobe pneumonia without change. Prominent left heart obscuring visualization of the left lung base, no definite developing consolidation. Medical Decision Making   Initial Medical Decision Making and DDx:  Will obtain chest x-ray to check progress of pneumonia. Will obtain labs do evaluate for dehydration. Possibly kidney failure and white count. Do not suspect bowel obstruction or bowel perforation. Patient does not appear septic or toxic. Hypertension noted. Patient is unable to keep down medications. Not consistent with pulmonary edema or hypertensive emergency. ED Course: Progress Notes, Reevaluation, and Consults:  1:50 PM Patient reevaluated. Discussed with patient and family, will have her ambulate and take PO then discharge with Zofran. PNA uncahged. No severe dehydration. No acute kidney injury or lab abnormality. Questions answered and patient happy with plan. I am the first provider for this patient. I reviewed the vital signs, available nursing notes, past medical history, past surgical history, family history and social history. Vital Signs-Reviewed the patient's vital signs. Records Reviewed:  Old Medical Records (Time of Review: 12:34 PM)    Procedures:     Critical Care Time:         Diagnosis     Clinical Impression:   1. Non-intractable vomiting with nausea, unspecified vomiting type        Disposition: Dischaged    Follow-up Information     Follow up With Details Comments 2008 Nine MD Michael In 2 days  Son [address]  [address] [place] [address]  [phone]             Discharge Medication List as of 2/17/2018  1:49 PM      START taking these medications    Details   ondansetron (ZOFRAN ODT) 4 mg disintegrating tablet Take 1-2 tablets every 6-8 hours as needed for nausea and vomiting., Print, Disp-10 Tab, R-0         CONTINUE these medications which have NOT CHANGED    Details   atorvastatin (LIPITOR) 80 mg tablet Take 80 mg by mouth nightly., Historical Med      doxycycline (MONODOX) 100 mg capsule Take 1 Cap by mouth two (2) times a day for 10 days. , Print, Disp-20 Cap, R-0      cloNIDine HCl (CATAPRES) 0.2 mg tablet TAKE 1 TABLET BY MOUTH TWICE DAILY, Normal**Patient requests 90 days supply**Disp-180 Tab, R-5      minoxidil (LONITEN) 2.5 mg tablet Take  by mouth daily. , Historical Med      Hydrochlorothiazide (HYDRODIURIL) 12.5 mg tablet Take 12.5 mg by mouth daily. , Historical Med      aspirin delayed-release 81 mg tablet Take 1 Tab by mouth daily. , No Print, Disp-1 Tab, R-0      carvedilol (COREG) 25 mg tablet Take 1 Tab by mouth two (2) times a day. Indications: HYPERTENSION, Normal, Disp-60 Tab, R-5           _______________________________    Attestations:  Scribe Attestation     Chana Daly acting as a scribe for and in the presence of Connie Botello MD      February 17, 2018 at 4:47 PM       Provider Attestation:      I personally performed the services described in the documentation, reviewed the documentation, as recorded by the scribe in my presence, and it accurately and completely records my words and actions.  February 17, 2018 at 4:47 PM - Connie Botello MD _______________________________

## 2018-02-19 NOTE — MR AVS SNAPSHOT
[address] [place] [address] 
 
 
 [address] [facility] [address] [id number] 
[phone] Patient: [facility] MRN: [id number] CHS:[birth year] Visit Information Date & Time Provider Department Dept. Phone Encounter #  
 2/19/2018 11:30 AM Lilia Lopez MD [address] [phone] [id number] Follow-up Instructions Return in about 6 weeks (around 4/2/2018) for pneumonia, htn. Upcoming Health Maintenance Date Due ZOSTER VACCINE AGE 60> 8/7/2000 Influenza Age 5 to Adult 8/1/2017 GLAUCOMA SCREENING Q2Y 11/2/2017 DTaP/Tdap/Td series (1 - Tdap) 10/3/2018* MEDICARE YEARLY EXAM 10/4/2018 *Topic was postponed. The date shown is not the original due date. Allergies as of 2/19/2018  Review Complete On: 2/19/2018 By: Lilia Lopez MD  
  
 Severity Noted Reaction Type Reactions Codeine  05/20/2014    Unknown (comments) Unsure due to happening so long Levaquin [Levofloxacin]  09/03/2015    Nausea Only  
 intolerance Pcn [Penicillins]  04/25/2014   Systemic Hives Sulfa (Sulfonamide Antibiotics)  05/20/2014    Unknown (comments) Unsure it was so long ago Current Immunizations  Reviewed on 4/25/2014 Name Date Pneumococcal Conjugate (PCV-13) 8/10/2015 Pneumococcal Polysaccharide (PPSV-23) 5/20/2014 Td 4/25/2011 Not reviewed this visit You Were Diagnosed With   
  
 Codes Comments Pneumonia of right middle lobe due to infectious organism [facility])    -  Primary ICD-10-CM: J18.1 ICD-9-CM: 498 Calculus of gallbladder without cholecystitis without obstruction     ICD-10-CM: K80.20 ICD-9-CM: 574.20 Nausea     ICD-10-CM: R11.0 ICD-9-CM: 787.02 Weight loss     ICD-10-CM: R63.4 ICD-9-CM: 783.21 Vitals BP Pulse Temp Resp Height(growth percentile) Weight(growth percentile) (!) 153/103 77 97.3 °F (36.3 °C) (Oral) 17 5' (1.524 m) 107 lb 14.4 oz (48.9 kg) SpO2 BMI OB Status Smoking Status 94% 21.07 kg/m2 Postmenopausal Never Smoker Vitals History BMI and BSA Data Body Mass Index Body Surface Area 21.07 kg/m 2 1.44 m 2 Preferred Pharmacy [facility] [address] [place] [address] [phone] Your Updated Medication List  
  
   
This list is accurate as of: 2/19/18 12:21 PM.  Always use your most recent med list.  
  
  
  
  
 aspirin delayed-release 81 mg tablet Take 1 Tab by mouth daily. atorvastatin 80 mg tablet Commonly known as:  LIPITOR Take 80 mg by mouth nightly. carvedilol 25 mg tablet Commonly known as:  Lorel Docker Take 1 Tab by mouth two (2) times a day. Indications: HYPERTENSION  
  
 cloNIDine HCl 0.2 mg tablet Commonly known as:  CATAPRES  
TAKE 1 TABLET BY MOUTH TWICE DAILY  
  
 doxycycline 100 mg capsule Commonly known as:  Sinclair Constantino Take 1 Cap by mouth two (2) times a day for 10 days. hydroCHLOROthiazide 12.5 mg tablet Commonly known as:  HYDRODIURIL Take 12.5 mg by mouth daily. minoxidil 2.5 mg tablet Commonly known as:  Ayush Damian Take  by mouth daily. Follow-up Instructions Return in about 6 weeks (around 4/2/2018) for pneumonia, htn. To-Do List   
 02/19/2018 Imaging:  [facility] HEPATOBILIARY DUCT SCAN Referral Information Referral ID Referred By Referred To  
  
 [id number] [facility] Not Available Visits Status Start Date End Date 1 New Request 2/19/18 2/19/19 If your referral has a status of pending review or denied, additional information will be sent to support the outcome of this decision. Introducing [facility]! Dear Shavon Bullock: 
Thank you for requesting a [employer] account. Our records indicate that you already have an active [employer] account.   You can access your account anytime at [email or web address] Did you know that you can access your hospital and ER discharge instructions at any time in [employer]? You can also review all of your test results from your hospital stay or ER visit. Additional Information If you have questions, please visit the Frequently Asked Questions section of the [employer] website at [email or web address] Remember, [employer] is NOT to be used for urgent needs. For medical emergencies, dial 911. Now available from your iPhone and Android! Please provide this summary of care documentation to your next provider. Your primary care clinician is listed as [facility]. If you have any questions after today's visit, please call [phone].

## 2018-02-19 NOTE — PROGRESS NOTES
Identified pt with two pt identifiers(name and ). Reviewed record in preparation for visit and have obtained necessary documentation. Chief Complaint   Patient presents with   [facility] Follow Up     (Rm #4) pneumonia f/u        Health Maintenance Due   Topic    ZOSTER VACCINE AGE 60>     Influenza Age 5 to Adult     GLAUCOMA SCREENING Q2Y      Coordination of Care Questionnaire:  :   1) Have you been to an emergency room, urgent care clinic since your last visit? yes   Hospitalized since your last visit? no           , 18- ED for current complaint    2. Have seen or consulted any other health care provider since your last visit? NO  If yes, where when, and reason for visit? 3) Do you have an Advanced Directive/ Living Will in place? YES  If yes, do we have a copy on file YES  If no, would you like information NO    Patient is accompanied by son and daughter I have received verbal consent from [facility] to discuss any/all medical information while they are present in the room.

## 2018-02-19 NOTE — PROGRESS NOTES
HISTORY OF PRESENT ILLNESS  Nemesio Eduardo is a 68 y.o. female. Visit Vitals    BP (!) 168/100 (BP 1 Location: Left arm, BP Patient Position: Sitting)    Pulse 77    Temp 97.3 °F (36.3 °C) (Oral)    Resp 17    Ht 5' (1.524 m)    Wt 107 lb 14.4 oz (48.9 kg)    SpO2 94%    BMI 21.07 kg/m2       HPI Comments: Pt has some dementia. She was seen at the hospital with probable pneumonia. She was given doxycycline. She also went to the ER since then with stomach issues. Some nausea. The could and cold sxs are better. Her stomach is better. But she is not eating well. She has lost 19 pounds since [place]. She has had a poor appetite since then. She has no desire to eat. The denies dysphagia. Daughter says she has had some mild RUQ fullness at times. CT of chest only showed some gallbladder sludge. Hospital Follow Up   The history is provided by the patient. This is a new problem. Pertinent negatives include no abdominal pain. Review of Systems   Constitutional: Negative. Negative for chills and fever. Respiratory: Negative. Cardiovascular: Negative. Gastrointestinal: Negative for abdominal pain, blood in stool, constipation, diarrhea, nausea and vomiting. Genitourinary: Negative for dysuria, frequency and urgency. Physical Exam   Constitutional: She is oriented to person, place, and time. She appears well-developed and well-nourished. No distress. Cardiovascular: Normal rate and regular rhythm. Pulmonary/Chest: Effort normal and breath sounds normal. She has no wheezes. She has no rales. Abdominal: Soft. She exhibits no distension. There is no tenderness. There is no rebound. Musculoskeletal: She exhibits no edema. Neurological: She is alert and oriented to person, place, and time. Skin: Skin is warm and dry. She is not diaphoretic. Psychiatric: She has a normal mood and affect. Nursing note and vitals reviewed. ASSESSMENT and PLAN    ICD-10-CM ICD-9-CM    1. Pneumonia of right middle lobe due to infectious organism ([address] J18.1 486    2. Calculus of gallbladder without cholecystitis without obstruction K80.20 574.20 NM HEPATOBILIARY DUCT SCAN   3. Nausea R11.0 787.02    4. Weight loss R63.4 783.21      Available hospital/ER record reviewed    [employer] does not cover megace or marinol in this patient  Do not think she needs remeron    Encourage her to eat  finish the doxycycline    Order HILTON to see if this may be causing some problems    BP is up. Coming down with rest    F/u 6 weeks for recheck on xrays.

## 2018-03-02 NOTE — ED TRIAGE NOTES
Pt arrived by EMT. Per pt, pt coud not eat for 2 weeks. Per pt children, pt lost over 19 lbs for 2 weeks.

## 2018-03-03 NOTE — ED PROVIDER NOTES
EMERGENCY DEPARTMENT HISTORY AND PHYSICAL EXAM    7:12 PM      Date: 3/2/2018  Patient Name: [facility]    History of Presenting Illness     Chief Complaint   Patient presents with    Other         History Provided By: Son & daughter    Chief Complaint: emesis  Duration:  several weeks, worse this week, 3 times today  Timing:  frequently  Severity: Severe  Modifying Factors: better   Associated Symptoms: loss of appetite, dehydration       Additional History (Context): [facility], a 68 y.o. Female, with the noted social hx, with PMHx of gallstones, HTN, TIAs, and recent PNA for which she was put on abx, is presented to the emergency department by her daughter and son for numerous episodes of emesis that despite having been evaluated in this emergency department on the 12th and 19th of January 2018,  have become more frequent over the past week. Pt's son states that today, over 1 hour, pt vomited 3 times without having eaten anything. She has been dehydrated, has been unwell, and has had a very poor appetite but has not had any fever, melena, pain, dysuria, sob, dysuria, abd pain, frequency, blood in stool, diarrhea, pain in legs, swelling in legs, or hemoptysis. Pt recently has had several falls secondary to weakness but has not hit her head or lost consciousness and pt is not on any anticoagulants. PCP: Melissa Trujillo MD    Current Outpatient Prescriptions   Medication Sig Dispense Refill    ciprofloxacin HCl (CIPRO) 250 mg tablet Take 1 Tab by mouth two (2) times a day for 3 days. 6 Tab 0    ondansetron (ZOFRAN ODT) 4 mg disintegrating tablet Take 4 mg by mouth every eight (8) hours as needed for Nausea.  atorvastatin (LIPITOR) 80 mg tablet Take 80 mg by mouth nightly.  cloNIDine HCl (CATAPRES) 0.2 mg tablet TAKE 1 TABLET BY MOUTH TWICE DAILY 180 Tab 5    minoxidil (LONITEN) 2.5 mg tablet Take  by mouth daily.       Hydrochlorothiazide (HYDRODIURIL) 12.5 mg tablet Take 12.5 mg by mouth daily.  aspirin delayed-release 81 mg tablet Take 1 Tab by mouth daily. 1 Tab 0    carvedilol (COREG) 25 mg tablet Take 1 Tab by mouth two (2) times a day. Indications: HYPERTENSION 60 Tab 5       Past History     Past Medical History:  Past Medical History:   Diagnosis Date    Amblyopia of left eye     Blind left eye     Cataract     bilat, right has been replaced    CRI (chronic renal insufficiency)     Dementia     Essential hypertension, malignant 2014    Hypertension     Left bundle branch block     Otitis media, acute 16    left       Past Surgical History:  Past Surgical History:   Procedure Laterality Date    HX CATARACT REMOVAL Right 2013    HX  SECTION  1968    HX  SECTION  1964    HX  SECTION      HX TONSILLECTOMY         Family History:  Family History   Problem Relation Age of Onset    Heart Disease Father     Other Brother      ? brain injury       Social History:  Social History   Substance Use Topics    Smoking status: Never Smoker    Smokeless tobacco: Never Used    Alcohol use No       Allergies: Allergies   Allergen Reactions    Codeine Unknown (comments)     Unsure due to happening so long    Levaquin [Levofloxacin] Nausea Only     intolerance    Pcn [Penicillins] Hives    Sulfa (Sulfonamide Antibiotics) Unknown (comments)     Unsure it was so long ago         Review of Systems       Review of Systems   Constitutional: Negative for fever. Loss of appetite   Dehydration    Respiratory: Positive for cough (not new). Negative for shortness of breath. Cardiovascular: Negative for chest pain, palpitations and leg swelling. Gastrointestinal: Positive for vomiting. Negative for abdominal pain, blood in stool and nausea (not at the time of evaluation). No melena   No hemoptysis    Genitourinary: Negative for dysuria, frequency and hematuria.    Musculoskeletal:        No LE pain     All other systems reviewed and are negative. Physical Exam     Visit Vitals    /74    Pulse (!) 101    Temp 97.5 °F (36.4 °C)    Resp 21    Ht 5' (1.524 m)    Wt 43.1 kg (95 lb)    SpO2 97%    BMI 18.55 kg/m2         Physical Exam   Constitutional:   General:  Well-developed, well-nourished, no apparent distress. Head:  Normocephalic atraumatic. Eyes:  Pupils midrange extraocular movements intact. No pallor or conjunctival injection. Nose:  No rhinorrhea, inspection grossly normal.    Ears:  Grossly normal to inspection, no discharge. Mouth:  Mucous membranes dry , no appreciable intraoral lesion. Neck/Back:  Trachea midline, no asymmetry. Chest:  Grossly normal inspection, symmetric chest rise. Pulmonary:  Clear to auscultation bilaterally no wheezes rhonchi or rales. Cardiovascular:  S1-S2 no murmurs rubs or gallops. Abdomen: Soft, nontender, nondistended no guarding rebound or peritoneal signs. There is absolutely no RIGHT upper quadrant tenderness no CVA tenderness. Extremities:  Grossly normal to inspection, peripheral pulses intact    Neurologic:  Alert and oriented ×2, she is unsure about the date  no appreciable focal neurologic deficit. Skin:  Warm and dry  Psychiatric:  Grossly normal mood and affect. Nursing note reviewed, vital signs reviewed.            Diagnostic Study Results     Labs -  Recent Results (from the past 12 hour(s))   EKG, 12 LEAD, INITIAL    Collection Time: 03/02/18  6:20 PM   Result Value Ref Range    Ventricular Rate 83 BPM    Atrial Rate 83 BPM    P-R Interval 162 ms    QRS Duration 134 ms    Q-T Interval 434 ms    QTC Calculation (Bezet) 509 ms    Calculated P Axis 34 degrees    Calculated R Axis -40 degrees    Calculated T Axis 107 degrees    Diagnosis       Normal sinus rhythm  Left axis deviation  Left bundle branch block  Abnormal ECG  When compared with ECG of 12-FEB-2018 18:36,  Left bundle branch block is now present     METABOLIC PANEL, BASIC    Collection Time: 03/02/18  6:24 PM   Result Value Ref Range    Sodium 139 136 - 145 mmol/L    Potassium 3.6 3.5 - 5.5 mmol/L    Chloride 101 100 - 108 mmol/L    CO2 29 21 - 32 mmol/L    Anion gap 9 3.0 - 18 mmol/L    Glucose 114 (H) 74 - 99 mg/dL    BUN 17 7.0 - 18 MG/DL    Creatinine 1.26 0.6 - 1.3 MG/DL    BUN/Creatinine ratio 13 12 - 20      GFR est AA 50 (L) >60 ml/min/1.73m2    GFR est non-AA 41 (L) >60 ml/min/1.73m2    Calcium 9.4 8.5 - 10.1 MG/DL   CBC WITH AUTOMATED DIFF    Collection Time: 03/02/18  6:24 PM   Result Value Ref Range    WBC 9.7 4.6 - 13.2 K/uL    RBC 4.15 (L) 4.20 - 5.30 M/uL    HGB 12.3 12.0 - 16.0 g/dL    HCT 36.7 35.0 - 45.0 %    MCV 88.4 74.0 - 97.0 FL    MCH 29.6 24.0 - 34.0 PG    MCHC 33.5 31.0 - 37.0 g/dL    RDW 14.6 (H) 11.6 - 14.5 %    PLATELET 548 745 - 250 K/uL    MPV 9.6 9.2 - 11.8 FL    NEUTROPHILS 73 40 - 73 %    LYMPHOCYTES 18 (L) 21 - 52 %    MONOCYTES 7 3 - 10 %    EOSINOPHILS 2 0 - 5 %    BASOPHILS 0 0 - 2 %    ABS. NEUTROPHILS 7.1 1.8 - 8.0 K/UL    ABS. LYMPHOCYTES 1.7 0.9 - 3.6 K/UL    ABS. MONOCYTES 0.7 0.05 - 1.2 K/UL    ABS. EOSINOPHILS 0.2 0.0 - 0.4 K/UL    ABS.  BASOPHILS 0.0 0.0 - 0.06 K/UL    DF AUTOMATED     URINALYSIS W/ RFLX MICROSCOPIC    Collection Time: 03/02/18  6:24 PM   Result Value Ref Range    Color DARK YELLOW      Appearance CLOUDY      Specific gravity 1.018 1.005 - 1.030      pH (UA) 5.0 5.0 - 8.0      Protein NEGATIVE  NEG mg/dL    Glucose NEGATIVE  NEG mg/dL    Ketone NEGATIVE  NEG mg/dL    Bilirubin NEGATIVE  NEG      Blood MODERATE (A) NEG      Urobilinogen 0.2 0.2 - 1.0 EU/dL    Nitrites POSITIVE (A) NEG      Leukocyte Esterase MODERATE (A) NEG     URINE MICROSCOPIC ONLY    Collection Time: 03/02/18  6:24 PM   Result Value Ref Range    WBC 36 to 50 0 - 4 /hpf    RBC 0 to 3 0 - 5 /hpf    Epithelial cells FEW 0 - 5 /lpf    Bacteria 3+ (A) NEG /hpf   HEPATIC FUNCTION PANEL    Collection Time: 03/02/18  6:24 PM   Result Value Ref Range Protein, total 7.2 6.4 - 8.2 g/dL    Albumin 3.7 3.4 - 5.0 g/dL    Globulin 3.5 2.0 - 4.0 g/dL    A-G Ratio 1.1 0.8 - 1.7      Bilirubin, total 1.0 0.2 - 1.0 MG/DL    Bilirubin, direct 0.3 (H) 0.0 - 0.2 MG/DL    Alk. phosphatase 76 45 - 117 U/L    AST (SGOT) 21 15 - 37 U/L    ALT (SGPT) 17 13 - 56 U/L   LIPASE    Collection Time: 03/02/18  6:24 PM   Result Value Ref Range    Lipase 125 73 - 393 U/L   TROPONIN I    Collection Time: 03/02/18  6:24 PM   Result Value Ref Range    Troponin-I, Qt. <0.02 0.0 - 0.045 NG/ML   TROPONIN I    Collection Time: 03/02/18 10:45 PM   Result Value Ref Range    Troponin-I, Qt. 0.02 0.0 - 0.045 NG/ML   EKG, 12 LEAD, SUBSEQUENT    Collection Time: 03/02/18 10:49 PM   Result Value Ref Range    Ventricular Rate 102 BPM    Atrial Rate 102 BPM    P-R Interval 162 ms    QRS Duration 134 ms    Q-T Interval 420 ms    QTC Calculation (Bezet) 547 ms    Calculated P Axis 31 degrees    Calculated R Axis -27 degrees    Calculated T Axis 122 degrees    Diagnosis       Sinus tachycardia  Left bundle branch block  Abnormal ECG  When compared with ECG of 02-MAR-2018 18:20,  No significant change was found     POC LACTIC ACID    Collection Time: 03/02/18 10:56 PM   Result Value Ref Range    Lactic Acid (POC) 1.3 0.4 - 2.0 mmol/L       Radiologic Studies -   XR CHEST PORT    (Results Pending)     XR CHEST PORT  ----------------------------------------  Impression by Dr. Juarez at 8:39 PM   Wedge shaped opacity in right mid lung field, similar to prior; otherwise no pneumothorax     Medical Decision Making   I am the first provider for this patient. I reviewed the vital signs, available nursing notes, past medical history, past surgical history, family history and social history. Vital Signs-Reviewed the patient's vital signs.     Pulse Oximetry Analysis -  96% on room air (Interpretation) Non-hypoxic     Cardiac Monitor:  Rate: 92  Rhythm:  Normal Sinus Rhythm     Records Reviewed: Nursing Notes and Old Medical Records (Time of Review: 7:12 PM)    ED Course: Progress Notes, Reevaluation, and Consults:  ED course:  Patient presents with repeated episodes of vomiting, has no current complaints no chest pain no shortness breath no abdominal pain no current nausea. His primary concern is obtaining a HIDA scan but she has no RIGHT upper quadrant tenderness there is no clinical signs of cholecystitis at this time. She does not have any food. By report, we'll consider mesenteric ischemia she is unable tolerate diet, no clinical signs of bowel obstruction. She does not any chest pain or shortness breath we'll check for atypical ACS    EKG done at 1820 hrs.: Normal sinus rhythm heart rate 83 intervals are significant for prolonged QRS duration of 134 there is a QS wave in V1 and V2 which may be LEFT bundle, there are ST depressions that are discordant in the high lateral leads, there are discordant ST changes in inferior leads there are [place] and ST changes in V6. There is new widened QRS complex and no ST changes compared to prior EKG in the EMR    Labs unremarkable troponin negative, urinalysis very strongly suspicious for urinary tract infection with pyuria and bacteria, this is a clean sample. We'll treat for urinary tract infection*ceftriaxone here prior culture was reviewed, resistant to Bactrim we'll discharge with Cipro    EKG repeated at 2249 hrs.: LEFT bundle-branch block morphology unchanged, similar pattern of discordant ST-T changes. Repeat troponin negative    She was hydrated here, remains tachycardic, sent lactic acid which was negative. She will be discharged push fluids slowly advance diet and return with any concerns    Patient's presentation, history, physical exam and laboratory evaluations were reviewed.   At this time patient was felt to be stable for outpatient management and follow with primary care/specialist.  Patient was instructed to return to the emergency department with any concerns. Disposition:    Discharged home      Portions of this chart were created with Dragon medical speech to text program.   Unrecognized errors may be present. Diagnosis     Clinical Impression:   1. Acute cystitis without hematuria    2. Dehydration        Disposition: Discharged    Follow-up Information     Follow up With Details Comments 2008 Nine MD Michael Call in 2 days  [address] [address]  [phone]      [facility] EMERGENCY DEPT  As needed, If symptoms worsen [address]  [phone]           Discharge Medication List as of 3/3/2018 12:05 AM      START taking these medications    Details   ciprofloxacin HCl (CIPRO) 250 mg tablet Take 1 Tab by mouth two (2) times a day for 3 days. , Print, Disp-6 Tab, R-0         CONTINUE these medications which have NOT CHANGED    Details   ondansetron (ZOFRAN ODT) 4 mg disintegrating tablet Take 4 mg by mouth every eight (8) hours as needed for Nausea., Historical Med      atorvastatin (LIPITOR) 80 mg tablet Take 80 mg by mouth nightly., Historical Med      cloNIDine HCl (CATAPRES) 0.2 mg tablet TAKE 1 TABLET BY MOUTH TWICE DAILY, Normal**Patient requests 90 days supply**Disp-180 Tab, R-5      minoxidil (LONITEN) 2.5 mg tablet Take  by mouth daily. , Historical Med      Hydrochlorothiazide (HYDRODIURIL) 12.5 mg tablet Take 12.5 mg by mouth daily. , Historical Med      aspirin delayed-release 81 mg tablet Take 1 Tab by mouth daily. , No Print, Disp-1 Tab, R-0      carvedilol (COREG) 25 mg tablet Take 1 Tab by mouth two (2) times a day.  Indications: HYPERTENSION, Normal, Disp-60 Tab, R-5           _______________________________    Attestations:  Scribe Attestation     Zita Knutson acting as a scribe for and in the presence of Mark Anthony Ward MD      March 02, 2018 at 7:12 PM       Provider Attestation:      I personally performed the services described in the documentation, reviewed the documentation, as recorded by the scribe in my presence, and it accurately and completely records my words and actions.  March 02, 2018 at 7:12 PM - Elisa Patel MD    _______________________________

## 2018-03-03 NOTE — DISCHARGE INSTRUCTIONS
Urinary Tract Infection in Women: Care Instructions  Your Care Instructions    A urinary tract infection, or UTI, is a general term for an infection anywhere between the kidneys and the urethra (where urine comes out). Most UTIs are bladder infections. They often cause pain or burning when you urinate. UTIs are caused by bacteria and can be cured with antibiotics. Be sure to complete your treatment so that the infection goes away. Follow-up care is a key part of your treatment and safety. Be sure to make and go to all appointments, and call your doctor if you are having problems. It's also a good idea to know your test results and keep a list of the medicines you take. How can you care for yourself at home? · Take your antibiotics as directed. Do not stop taking them just because you feel better. You need to take the full course of antibiotics. · Drink extra water and other fluids for the next day or two. This may help wash out the bacteria that are causing the infection. (If you have kidney, heart, or liver disease and have to limit fluids, talk with your doctor before you increase your fluid intake.)  · Avoid drinks that are carbonated or have caffeine. They can irritate the bladder. · Urinate often. Try to empty your bladder each time. · To relieve pain, take a hot bath or lay a heating pad set on low over your lower belly or genital area. Never go to sleep with a heating pad in place. To prevent UTIs  · Drink plenty of water each day. This helps you urinate often, which clears bacteria from your system. (If you have kidney, heart, or liver disease and have to limit fluids, talk with your doctor before you increase your fluid intake.)  · Urinate when you need to. · Urinate right after you have sex. · Change sanitary pads often. · Avoid douches, bubble baths, feminine hygiene sprays, and other feminine hygiene products that have deodorants.   · After going to the bathroom, wipe from front to back.  When should you call for help? Call your doctor now or seek immediate medical care if:  ? · Symptoms such as fever, chills, nausea, or vomiting get worse or appear for the first time. ? · You have new pain in your back just below your rib cage. This is called flank pain. ? · There is new blood or pus in your urine. ? · You have any problems with your antibiotic medicine. ? Watch closely for changes in your health, and be sure to contact your doctor if:  ? · You are not getting better after taking an antibiotic for 2 days. ? · Your symptoms go away but then come back. Where can you learn more? Go to http://jadyn-ivette.info/. Enter W664 in the search box to learn more about \"Urinary Tract Infection in Women: Care Instructions. \"  Current as of: May 12, 2017  Content Version: 11.4  © 6007-9142 [employer]. Care instructions adapted under license by [employer] (which disclaims liability or warranty for this information). If you have questions about a medical condition or this instruction, always ask your healthcare professional. [address] any warranty or liability for your use of this information. Dehydration: Care Instructions  Your Care Instructions  Dehydration happens when your body loses too much fluid. This might happen when you do not drink enough water or you lose large amounts of fluids from your body because of diarrhea, vomiting, or sweating. Severe dehydration can be life-threatening. Water and minerals called electrolytes help put your body fluids back in balance. Learn the early signs of fluid loss, and drink more fluids to prevent dehydration. Follow-up care is a key part of your treatment and safety. Be sure to make and go to all appointments, and call your doctor if you are having problems. It's also a good idea to know your test results and keep a list of the medicines you take.   How can you care for yourself at home? · To prevent dehydration, drink plenty of fluids, enough so that your urine is light yellow or clear like water. Choose water and other caffeine-free clear liquids until you feel better. If you have kidney, heart, or liver disease and have to limit fluids, talk with your doctor before you increase the amount of fluids you drink. · If you do not feel like eating or drinking, try taking small sips of water, sports drinks, or other rehydration drinks. · Get plenty of rest.  To prevent dehydration  · Add more fluids to your diet and daily routine, unless your doctor has told you not to. · During hot weather, drink more fluids. Drink even more fluids if you exercise a lot. Stay away from drinks with alcohol or caffeine. · Watch for the symptoms of dehydration. These include:  ¨ A dry, sticky mouth. ¨ Dark yellow urine, and not much of it. ¨ Dry and sunken eyes. ¨ Feeling very tired. · Learn what problems can lead to dehydration. These include:  ¨ Diarrhea, fever, and vomiting. ¨ Any illness with a fever, such as pneumonia or the flu. ¨ Activities that cause heavy sweating, such as endurance races and heavy outdoor work in hot or humid weather. ¨ Alcohol or drug abuse or withdrawal.  ¨ Certain medicines, such as cold and allergy pills (antihistamines), diet pills (diuretics), and laxatives. ¨ Certain diseases, such as diabetes, cancer, and heart or kidney disease. When should you call for help? Call 911 anytime you think you may need emergency care. For example, call if:  ? · You passed out (lost consciousness). ?Call your doctor now or seek immediate medical care if:  ? · You are confused and cannot think clearly. ? · You are dizzy or lightheaded, or you feel like you may faint. ? · You have signs of needing more fluids. You have sunken eyes and a dry mouth, and you pass only a little dark urine. ? · You cannot keep fluids down. ? Watch closely for changes in your health, and be sure to contact your doctor if:  ? · You are not making tears. ? · Your skin is very dry and sags slowly back into place after you pinch it. ? · Your mouth and eyes are very dry. Where can you learn more? Go to http://jadyn-ivette.info/. Enter A697 in the search box to learn more about \"Dehydration: Care Instructions. \"  Current as of: March 20, 2017  Content Version: 11.4  © 8948-7537 [employer]. Care instructions adapted under license by [employer] (which disclaims liability or warranty for this information). If you have questions about a medical condition or this instruction, always ask your healthcare professional. [address] any warranty or liability for your use of this information.

## 2018-03-05 NOTE — TELEPHONE ENCOUNTER
Patient's son calling in to request a refill. States his mother has been to the ER 3x in the last three weeks. Is still having nausea and ER told them to get the refill from their PCP. Has a F/U appt on 3/8/18 with STERLING Platt. Requested Prescriptions     Pending Prescriptions Disp Refills    ondansetron (ZOFRAN ODT) 4 mg disintegrating tablet       Sig: Take 1 Tab by mouth every eight (8) hours as needed for Nausea.

## 2018-03-06 NOTE — PROGRESS NOTES
I spoke with patient. She is home alone today. States family will be there this evening. States that she is normally home alone during the day. I asked about her nausea/vomiting. No vomiting today per patient but patient states she just has no desire to eat. I asked her about getting up - she states that she does get up out of bed when she has to go to bathroom but otherwise lies down most of the day. Patient states she has lost at least 20 lbs. since all this started. Per Dr. Sierra Calvillo note, pt has dementia so unknown how accurate pt's statements are. Patient states that she takes her medications like she is supposed to and is fine by herself at home, however has had several recent falls due to being so weak and sick from pneumonia and not eating. Patient is scheduled to see Laurie on Thursday - will discuss with her prior to appt.     Aide Leon RN

## 2018-03-08 PROBLEM — Z09 FOLLOW UP: Status: ACTIVE | Noted: 2018-01-01

## 2018-03-08 NOTE — MR AVS SNAPSHOT
[address] [facility] 
 
 
 [address] [address] 
[phone] Patient: [facility] MRN: [id number] Visit Information Date & Time Provider Department Dept. Phone Encounter #  
 3/8/2018  4:15 PM Saul Walters NP [facility] ) [phone] [id number] Your Appointments 4/2/2018 10:30 AM  
Office Visit with Albert De La Torre MD  
[facility] ) [facility]: 6 week f/u pneumonia, htn; r/s from 4/2  
 [phone] [facility] [address]  
  
   
 [address] [facility] Upcoming Health Maintenance Date Due ZOSTER VACCINE AGE 60> 8/7/2000 Influenza Age 5 to Adult 8/1/2017 GLAUCOMA SCREENING Q2Y 11/2/2017 DTaP/Tdap/Td series (1 - Tdap) 10/3/2018* MEDICARE YEARLY EXAM 10/4/2018 *Topic was postponed. The date shown is not the original due date. Allergies as of 3/8/2018  Review Complete On: 3/8/2018 By: Tanya Richmond Severity Noted Reaction Type Reactions Codeine  05/20/2014    Unknown (comments) Unsure due to happening so long Levaquin [Levofloxacin]  09/03/2015    Nausea Only  
 intolerance Pcn [Penicillins]  04/25/2014   Systemic Hives Sulfa (Sulfonamide Antibiotics)  05/20/2014    Unknown (comments) Unsure it was so long ago Current Immunizations  Reviewed on 4/25/2014 Name Date Pneumococcal Conjugate (PCV-13) 8/10/2015 Pneumococcal Polysaccharide (PPSV-23) 5/20/2014 Td 4/25/2011 Not reviewed this visit You Were Diagnosed With   
  
 Codes Comments Follow up    -  Primary ICD-10-CM: Z09 ICD-9-CM: V67.9 Vitals BP Pulse Temp Resp Height(growth percentile) Weight(growth percentile) 117/76 (BP 1 Location: Right arm, BP Patient Position: Sitting) 89 97.9 °F (36.6 °C) (Oral) 16 5' (1.524 m) 95 lb (43.1 kg) SpO2 BMI OB Status Smoking Status 98% 18.55 kg/m2 Postmenopausal Never Smoker BMI and BSA Data Body Mass Index Body Surface Area 18.55 kg/m 2 1.35 m 2 Preferred Pharmacy Pharmacy Name Phone [facility], [address] [place] [address] [phone] Your Updated Medication List  
  
   
This list is accurate as of 3/8/18  4:49 PM.  Always use your most recent med list.  
  
  
  
  
 aspirin delayed-release 81 mg tablet Take 1 Tab by mouth daily. atorvastatin 80 mg tablet Commonly known as:  LIPITOR Take 80 mg by mouth nightly. carvedilol 25 mg tablet Commonly known as:  Cristina Hard Take 1 Tab by mouth two (2) times a day. Indications: HYPERTENSION  
  
 ciprofloxacin HCl 250 mg tablet Commonly known as:  CIPRO TK 1 T PO TWO TIMES A DAY FOR 3 DAYS  
  
 cloNIDine HCl 0.2 mg tablet Commonly known as:  CATAPRES  
TAKE 1 TABLET BY MOUTH TWICE DAILY  
  
 hydroCHLOROthiazide 12.5 mg tablet Commonly known as:  HYDRODIURIL Take 12.5 mg by mouth daily. * minoxidil 2.5 mg tablet Commonly known as:  Manuel Juan Jose Take  by mouth daily. * minoxidil 2.5 mg tablet Commonly known as:  LONITEN  
TAKE 1 TABLET BY MOUTH ONCE A DAY  
  
 ondansetron 4 mg disintegrating tablet Commonly known as:  ZOFRAN ODT Take 1 Tab by mouth every eight (8) hours as needed for Nausea. * Notice: This list has 2 medication(s) that are the same as other medications prescribed for you. Read the directions carefully, and ask your doctor or other care provider to review them with you. Introducing [facility]! Dear Jose Robles: 
Thank you for requesting a [employer] account. Our records indicate that you already have an active [employer] account. You can access your account anytime at [email or web address] Did you know that you can access your hospital and ER discharge instructions at any time in [employer]? You can also review all of your test results from your hospital stay or ER visit. Additional Information If you have questions, please visit the Frequently Asked Questions section of the [employer] website at [email or web address] Remember, [employer] is NOT to be used for urgent needs. For medical emergencies, dial 911. Now available from your iPhone and Android! Please provide this summary of care documentation to your next provider. Your primary care clinician is listed as [facility]. If you have any questions after today's visit, please call [phone].

## 2018-03-08 NOTE — PROGRESS NOTES
ROOM # [id number] presents today for   Chief Complaint   Patient presents with   [facility] Follow Up     Post [facility] ER for UTI on last [address] preferred language for health care discussion is english/other. Is someone accompanying this pt? Yes/ daughter and friend     Is the patient using any DME equipment during [address]? no    Depression Screening:  PHQ over the last two weeks 10/3/2017 1/31/2017 7/8/2016 7/6/2015 4/25/2014   Little interest or pleasure in doing things Not at all Not at all Not at all Not at all Not at all   Feeling down, depressed or hopeless Not at all Not at all Not at all Not at all Not at all   Total Score PHQ 2 0 0 0 0 0       Learning Assessment:  Learning Assessment 4/25/2014   PRIMARY LEARNER Patient   HIGHEST LEVEL OF EDUCATION - PRIMARY LEARNER  2 YEARS [facility] PRIMARY LEARNER NONE   CO-LEARNER CAREGIVER No   PRIMARY LANGUAGE ENGLISH   LEARNER PREFERENCE PRIMARY READING   ANSWERED BY patient   RELATIONSHIP SELF       Abuse Screening:  Abuse Screening Questionnaire 4/25/2014   Do you ever feel afraid of your partner? N   Are you in a relationship with someone who physically or mentally threatens you? N   Is it safe for you to go home? Y       Fall Risk  Fall Risk Assessment, last 12 mths 2/13/2018 10/3/2017 1/31/2017 7/8/2016 7/6/2015 4/25/2014   Able to walk? Yes Yes Yes Yes Yes Yes   Fall in past 12 months? Yes No No No No No   Fall with injury? No - - - - -   Number of falls in past 12 months 2 - - - - -   Fall Risk Score 2 - - - - -       Health Maintenance reviewed and discussed per provider. Yes    [facility] is due for nothing at this time. Please order/place referral if appropriate. Advance Directive:  1. Do you have an advance directive in place? Patient Reply: yes    2. If not, would you like material regarding how to put one in place? Patient Reply: no    Coordination of Care:  1.  Have you been to the ER, urgent care clinic since your last visit? Yes [address] on Friday for UTI  Hospitalized since your last visit? no    2. Have you seen or consulted any other health care providers outside of the [address] [place] [address] since your last visit? Include any pap smears or colon screening.  no

## 2018-03-12 PROBLEM — R11.2 NAUSEA & VOMITING: Status: ACTIVE | Noted: 2018-01-01

## 2018-03-12 NOTE — ED TRIAGE NOTES
Pt came in via EMS A&Ox3  w/ son. Son called EMS bc pt has been lethargic w/ nausea/vomiting for 3-4 weeks. Unable to get pt out of bed when she is usually ambulatory w/ 1 assist at home. Denies pain, HA, dizziness. Pt has been to ED recently w/ pneumonia, dehydration, UTI.  Dr. Body Groves would like her to be admitted due to failed outpt tx w/abx (Cipro)

## 2018-03-12 NOTE — TELEPHONE ENCOUNTER
Patient's daughter Vanita Severin) is calling back to speak with Jersey Rodrigues. States she is in the ER with her mother at [facility] and they are trying to send her home.   Would like to speak with Jersey Rodrigues ASAP, can be reached at [phone]

## 2018-03-12 NOTE — ED NOTES
TRANSFER - OUT REPORT:    Verbal report given to [facility] RN(name) on [facility]  being transferred to [facility](unit) for routine progression of care       Report consisted of patients Situation, Background, Assessment and   Recommendations(SBAR). Information from the following report(s) ED Summary, MAR and Recent Results was reviewed with the receiving nurse. Lines:   Peripheral IV 03/12/18 Left Antecubital (Active)   Site Assessment Clean, dry, & intact 3/12/2018 10:15 AM   Phlebitis Assessment 0 3/12/2018 10:15 AM   Infiltration Assessment 0 3/12/2018 10:15 AM   Dressing Status Clean, dry, & intact 3/12/2018 10:15 AM   Hub Color/Line Status Infusing 3/12/2018 10:15 AM        Opportunity for questions and clarification was provided.       Patient transported with:   "[employer]

## 2018-03-12 NOTE — H&P
[address] [facility]  MR#: [id number]  : 1940  ACCOUNT #: [de-identified]   ADMIT DATE: 2018    REASON FOR ADMISSION:  Nausea, vomiting x1 month, a weight loss of 20 pounds. HISTORY OF PRESENT ILLNESS:  The patient is a pleasant 26-year-old female with past medical history significant for hypertension, dementia, recurrent nausea and vomiting for which she has been in the ER for 4 times and has been followed by her PCP as well. On one occasion, the patient was discharged with possible pneumonia. Last time she was treated for UTI; however, she continues to have nausea and vomiting with very poor p.o. intake and significant weight loss. Per daughter, the patient has been feeling nauseous and been vomiting since this morning, two more episodes in the ER. She has had zero p.o. intake today and minimal liquid intake yesterday. The patient has not seen a GI doctor per family and has never had an EGD or colonoscopy. PAST MEDICAL HISTORY:  Hypertension, mild dementia, renal insufficiency. PAST SURGICAL HISTORY:  , cataract surgery. SOCIAL HISTORY:  Nonsmoker, nondrinker. FAMILY HISTORY:  Positive for coronary artery disease. ALLERGIES:  CODEINE, LEVAQUIN, PENICILLIN AND SULFA. MEDICATIONS:  At home, she is on minoxidil 2.5 mg p.o. daily, Zofran p.r.n., Lipitor 80 mg p.o. at bedtime, clonidine 0.2 mg p.o. b.i.d., HCTZ 12.5 mg daily, aspirin 81 mg daily, Coreg 25 mg p.o. b.i.d. REVIEW OF SYSTEMS:  No fever or chills. Positive for weight loss. No neck pain. No chest pain or palpitation. No shortness of breath or cough. Positive for abdominal discomfort. Positive for nausea and vomiting. No constipation. No dysuria or polyuria. PHYSICAL EXAMINATION:  GENERAL:  This is a cachectic female in no apparent distress. VITAL SIGNS:  Temperature is 98.4, heart rate is 100, blood pressure is 174/105, satting 99%.   HEENT: Normocephalic, atraumatic. Sclerae anicteric. Oropharynx clear. Mucous membranes dry. NECK:  No JVD or thyromegaly. HEART:  S1, S2.  Regular rate and rhythm. LUNGS:  Clear to auscultation bilaterally. No wheezing. ABDOMEN:  Nontender, nondistended, normoactive bowel sounds. EXTREMITIES:  No edema or clubbing. NEUROLOGIC:  Motor strength is equal throughout. ANCILLARY DATA:  WBC is normal at 8.7, hemoglobin 12.0, platelets 866. UA: 11-20 WBC, 3+ bacteria, 2+ yeast.  BUN of 17, creatinine 1.23, albumin is 3.2. ALT and AST and alkaline phosphatase are normal.  TSH within normal limits. Urine culture is pending. CT of abdomen and pelvis shows no acute or focal abnormality to explain nausea and vomiting, minimal layering density in the gallbladder, but no secondary CT finding of acute cholecystitis. ASSESSMENT AND PLAN:  1.  Nausea and vomiting, persistent, of questionable etiology. We will schedule the patient for a HIDA scan to rule out gallbladder disease, in addition to a barium swallowing to rule out esophageal stricture. Other etiology could be a large hiatal hernia versus achalasia. The patient may need a GI evaluation in the a.m. for a possible EGD. She will be made n.p.o.  Start D5 normal saline, IV Zofran. 2.  Hypertension. Change clonidine to a clonidine patch. Use labetalol p.r.n. to keep systolic blood pressure less than 170.  3.  Deep venous thrombosis prophylaxis. 4.  Possible recurrent urinary tract infection. The patient will be started on Rocephin. THE ALLERGY TO PENICILLIN SEEMS TO BE A RASH ONLY. We will confirm with the family. Code status remains FULL. Overall prognosis is guarded.       MD GERSON Ayala/DOROTA  D: 03/12/2018 18:34     T: 03/12/2018 19:26  JOB #: [id number]

## 2018-03-12 NOTE — PROGRESS NOTES
TRANSFER - IN REPORT:    Verbal report received from Lilia pineda RN(name) on [facility]  being received from ED(unit) for routine progression of care      Report consisted of patients Situation, Background, Assessment and   Recommendations(SBAR). Information from the following report(s) SBAR, Kardex, Intake/Output and MAR was reviewed with the receiving nurse. Opportunity for questions and clarification was provided. Assessment completed upon patients arrival to unit and care assumed. 2020- Pt  given a bed bath lotion applied made comfortable in bed. Daughter at bedside required documentation completed. As per the daughter pt has already access to [facility] Chart. Dual skin assessment done pt has a rash to arabella area protective ointment applied. Mepilex applied to sacral area for protection. 2334- Pt has 100 ml of emesis Zofran 4 mg iv given with relieve. 0430- Incontinent care done pt given a warm bed bath made comfortable in bed. Daughter at bedside no concerns voiced. 0630- Pt' BP continues to be high  183/105 Dr Ponce Chain notified order for hydralazine received. Pt has nausea and vomiting NPO for now.

## 2018-03-12 NOTE — TELEPHONE ENCOUNTER
Incoming call from pt daughter 2 pt identifiers confirmed. Pt daughter that pt was seen at the ER 3/2/18 for UTI was placed on ABX that pt started to feel better and was able to eat while on the ABX however once the prescription ran out pt started declining again and is now currently unable to sit up in the bed on her own and is being fed by her daughter. Of note pt daughter stated that on 2/19/18 when pt was seen by PCP pt walked into appt. Stated understanding and informed pt daughter that she needs to take pt into the ER that pt is definitely not getting enough fluids as listed below and that she should not be having such a rapid decline that there is definitely something going on with her mom that needs to be assessed immediately.   Pt daughter stated understanding no other questions or concerns noted at this time    Intake 3/11/18  12oz fluid   3 tbsp applesauce  6 tbsp chocolate ice cream  2 elbow macaroni  2 stewed tomatoes

## 2018-03-12 NOTE — ED PROVIDER NOTES
EMERGENCY DEPARTMENT HISTORY AND PHYSICAL EXAM    10:21 AM      Date: 3/12/2018  Patient Name: [facility]    History of Presenting Illness     Chief Complaint   Patient presents with    Lethargy         History Provided By: Patient and Patient's Son    Chief Complaint: Nausea and vomiting  Duration:  1 month  Timing:  Worsening  Location: N/a  Quality: N/a  Severity: 0/10  Modifying Factors: None  Associated Symptoms: loss of appetite      Additional History (Context): [facility] is a 68 y.o. female with HTN, cataract, dementia, LBBB, and otitis media who presents with worsening nausea for the past month. Pt reports that she is feeling nauseous and is not eating. She denies all other pain, SOB, and HA. Per pt's son, she has been declining in health since Anders, especially in the past month, and she has been constantly nauseous and vomiting. The pt is unable to keep her medicine down. On 02/12/2018 the pt had a CT scan which revealed gallstones. No other concerns, modifying factors, or symptoms were expressed by the pt at this time.       PCP: Melissa Trujillo MD    Current Facility-Administered Medications   Medication Dose Route Frequency Provider Last Rate Last Dose    fluconazole (DIFLUCAN) tablet 150 mg  150 mg Oral NOW Douglas Lorenzo MD   Stopped at 03/12/18 1449    dextrose 5% and 0.9% NaCl infusion  75 mL/hr IntraVENous CONTINUOUS Jared Robertson MD 75 mL/hr at 03/12/18 1854 75 mL/hr at 03/12/18 1854    heparin (porcine) injection 5,000 Units  5,000 Units SubCUTAneous Q12H Jared Robertson MD        cefTRIAXone (ROCEPHIN) 2 g in sterile water (preservative free) 20 mL IV syringe  2 g IntraVENous Q24H Jared Robertson MD   2 g at 03/12/18 1850    ondansetron (ZOFRAN) injection 4 mg  4 mg IntraVENous Q6H PRN Jared Robertson MD         Current Outpatient Prescriptions   Medication Sig Dispense Refill    ciprofloxacin HCl (CIPRO) 250 mg tablet TK 1 T PO TWO TIMES A DAY FOR 3 DAYS  0    minoxidil (LONITEN) 2.5 mg tablet TAKE 1 TABLET BY MOUTH ONCE A DAY      ondansetron (ZOFRAN ODT) 4 mg disintegrating tablet Take 1 Tab by mouth every eight (8) hours as needed for Nausea. 30 Tab 1    atorvastatin (LIPITOR) 80 mg tablet Take 80 mg by mouth nightly.  cloNIDine HCl (CATAPRES) 0.2 mg tablet TAKE 1 TABLET BY MOUTH TWICE DAILY 180 Tab 5    minoxidil (LONITEN) 2.5 mg tablet Take  by mouth daily.  Hydrochlorothiazide (HYDRODIURIL) 12.5 mg tablet Take 12.5 mg by mouth daily.  aspirin delayed-release 81 mg tablet Take 1 Tab by mouth daily. 1 Tab 0    carvedilol (COREG) 25 mg tablet Take 1 Tab by mouth two (2) times a day. Indications: HYPERTENSION 60 Tab 5       Past History     Past Medical History:  Past Medical History:   Diagnosis Date    Amblyopia of left eye     Blind left eye     Cataract     bilat, right has been replaced    CRI (chronic renal insufficiency)     Dementia     Essential hypertension, malignant 2014    Hypertension     Left bundle branch block     Otitis media, acute 16    left       Past Surgical History:  Past Surgical History:   Procedure Laterality Date    HX CATARACT REMOVAL Right     HX  SECTION  1968    HX  SECTION  1964    HX  SECTION      HX TONSILLECTOMY         Family History:  Family History   Problem Relation Age of Onset    Heart Disease Father     Other Brother      ? brain injury       Social History:  Social History   Substance Use Topics    Smoking status: Never Smoker    Smokeless tobacco: Never Used    Alcohol use No       Allergies:   Allergies   Allergen Reactions    Codeine Unknown (comments)     Unsure due to happening so long    Levaquin [Levofloxacin] Nausea Only     intolerance    Pcn [Penicillins] Hives    Sulfa (Sulfonamide Antibiotics) Unknown (comments)     Unsure it was so long ago         Review of Systems       Review of Systems   Constitutional: Positive for appetite change (loss of appetite). HENT: Negative for ear pain and sinus pain. Eyes: Negative for pain. Cardiovascular: Negative for chest pain. Gastrointestinal: Positive for nausea and vomiting. Negative for abdominal pain and rectal pain. Genitourinary: Negative for flank pain, pelvic pain and vaginal pain. Musculoskeletal: Negative for back pain and neck pain. All other systems reviewed and are negative. Physical Exam     Visit Vitals    BP (!) 168/102    Pulse 96    Temp 98.4 °F (36.9 °C)    Resp 22    Wt 49.4 kg (109 lb)    SpO2 97%    BMI 21.29 kg/m2         Physical Exam   Constitutional:   General:  Elderly female weak appearing, not warm to touch nontoxic nondiaphoretic. Head:  Normocephalic atraumatic. Eyes:  Pupils midrange extraocular movements intact. No pallor or conjunctival injection. Nose:  No rhinorrhea, inspection grossly normal.    Ears:  Grossly normal to inspection, no discharge. Mouth:  Mucous membranes moist, no appreciable intraoral lesion. Neck/Back:  Trachea midline, no asymmetry. Chest:  Grossly normal inspection, symmetric chest rise. Pulmonary:  Clear to auscultation bilaterally no wheezes rhonchi or rales. Cardiovascular:  S1-S2 no murmurs rubs or gallops. Abdomen: Soft, nontender, nondistended no guarding rebound or peritoneal signs. No RIGHT upper quadrant tenderness no CVA tenderness  Extremities:  Grossly normal to inspection, peripheral pulses intact    Neurologic: Awake able tell me her name no appreciable focal neurologic deficit. Skin:  Warm and dry  Psychiatric:  Grossly normal mood and affect. Nursing note reviewed, vital signs reviewed.            Diagnostic Study Results     Labs -  Recent Results (from the past 12 hour(s))   EKG, 12 LEAD, INITIAL    Collection Time: 03/12/18 11:10 AM   Result Value Ref Range    Ventricular Rate 83 BPM    Atrial Rate 83 BPM    P-R Interval 154 ms    QRS Duration 70 ms Q-T Interval 388 ms    QTC Calculation (Bezet) 455 ms    Calculated P Axis 53 degrees    Calculated R Axis 10 degrees    Calculated T Axis -27 degrees    Diagnosis       Normal sinus rhythm  Possible Left atrial enlargement  ST & T wave abnormality, consider anterolateral ischemia  Abnormal ECG  When compared with ECG of 02-MAR-2018 22:49,  Left bundle branch block is no longer present     CBC WITH AUTOMATED DIFF    Collection Time: 03/12/18 11:21 AM   Result Value Ref Range    WBC 8.7 4.6 - 13.2 K/uL    RBC 4.03 (L) 4.20 - 5.30 M/uL    HGB 12.0 12.0 - 16.0 g/dL    HCT 35.6 35.0 - 45.0 %    MCV 88.3 74.0 - 97.0 FL    MCH 29.8 24.0 - 34.0 PG    MCHC 33.7 31.0 - 37.0 g/dL    RDW 14.9 (H) 11.6 - 14.5 %    PLATELET 649 064 - 250 K/uL    MPV 10.2 9.2 - 11.8 FL    NEUTROPHILS 79 (H) 42 - 75 %    LYMPHOCYTES 15 (L) 20 - 51 %    MONOCYTES 5 2 - 9 %    EOSINOPHILS 1 0 - 5 %    BASOPHILS 0 0 - 3 %    ABS. NEUTROPHILS 6.9 1.8 - 8.0 K/UL    ABS. LYMPHOCYTES 1.3 0.8 - 3.5 K/UL    ABS. MONOCYTES 0.4 0 - 1.0 K/UL    ABS. EOSINOPHILS 0.1 0.0 - 0.4 K/UL    ABS. BASOPHILS 0.0 0.0 - 0.1 K/UL    RBC COMMENTS ANISOCYTOSIS  1+        RBC COMMENTS MICROCYTOSIS  1+        RBC COMMENTS HYPOCHROMIA  1+        DF MANUAL     METABOLIC PANEL, COMPREHENSIVE    Collection Time: 03/12/18 11:21 AM   Result Value Ref Range    Sodium 139 136 - 145 mmol/L    Potassium 4.1 3.5 - 5.5 mmol/L    Chloride 101 100 - 108 mmol/L    CO2 28 21 - 32 mmol/L    Anion gap 10 3.0 - 18 mmol/L    Glucose 92 74 - 99 mg/dL    BUN 17 7.0 - 18 MG/DL    Creatinine 1.23 0.6 - 1.3 MG/DL    BUN/Creatinine ratio 14 12 - 20      GFR est AA 51 (L) >60 ml/min/1.73m2    GFR est non-AA 42 (L) >60 ml/min/1.73m2    Calcium 8.5 8.5 - 10.1 MG/DL    Bilirubin, total 1.1 (H) 0.2 - 1.0 MG/DL    ALT (SGPT) 15 13 - 56 U/L    AST (SGOT) 28 15 - 37 U/L    Alk.  phosphatase 68 45 - 117 U/L    Protein, total 6.6 6.4 - 8.2 g/dL    Albumin 3.2 (L) 3.4 - 5.0 g/dL    Globulin 3.4 2.0 - 4.0 g/dL A-G Ratio 0.9 0.8 - 1.7     LIPASE    Collection Time: 03/12/18 11:21 AM   Result Value Ref Range    Lipase 160 73 - 393 U/L   MAGNESIUM    Collection Time: 03/12/18 11:21 AM   Result Value Ref Range    Magnesium 1.9 1.6 - 2.6 mg/dL   TROPONIN I    Collection Time: 03/12/18 11:21 AM   Result Value Ref Range    Troponin-I, Qt. <0.02 0.0 - 0.045 NG/ML   TSH 3RD GENERATION    Collection Time: 03/12/18 11:21 AM   Result Value Ref Range    TSH 2.31 0.36 - 3.74 uIU/mL   URINALYSIS W/ RFLX MICROSCOPIC    Collection Time: 03/12/18  1:40 PM   Result Value Ref Range    Color YELLOW      Appearance CLEAR      Specific gravity 1.026 1.005 - 1.030      pH (UA) 5.5 5.0 - 8.0      Protein NEGATIVE  NEG mg/dL    Glucose NEGATIVE  NEG mg/dL    Ketone TRACE (A) NEG mg/dL    Bilirubin NEGATIVE  NEG      Blood MODERATE (A) NEG      Urobilinogen 0.2 0.2 - 1.0 EU/dL    Nitrites NEGATIVE  NEG      Leukocyte Esterase SMALL (A) NEG     URINE MICROSCOPIC ONLY    Collection Time: 03/12/18  1:40 PM   Result Value Ref Range    WBC 11 to 20 0 - 4 /hpf    RBC 4 to 10 0 - 5 /hpf    Epithelial cells 3+ 0 - 5 /lpf    Bacteria 3+ (A) NEG /hpf    Mucus 3+ (A) NEG /lpf    Yeast w/hyphae 2+ (A) NEG       Radiologic Studies -   CT ABD PELV W CONT   Final Result      XR CHEST PORT   Final Result      NM HEPATOBILIARY DUCT SCAN    (Results Pending)         Medical Decision Making   I am the first provider for this patient. I reviewed the vital signs, available nursing notes, past medical history, past surgical history, family history and social history. Vital Signs-Reviewed the patient's vital signs. Pulse Oximetry Analysis -  Non-hypoxic    ED course:  Patient presents with continued nausea not food associated by her report no abdominal pain no chest pain or shortness of breath. No fever no headache.   She has poor by mouth intake was seen recently diagnosed with urinary tract infection finish her Cipro but still has persistent symptoms was seen by primary care physician and wanted her admitted for failure to thrive/failure of outpatient treatment. She is afebrile and not tachycardic saturation normal on room air    Monitor: Normal sinus rhythm    Labs unremarkable    Urinalysis questionable urinary tract infection    Chest x-ray per my interpretation rotated but no clear infiltrate    CT abdomen and pelvis per radiology no acute findings    Patient was hydrated feel slightly better but still. We'll consult the hospitalist    Patient had recurrence of her vomiting      Patient's presentation, history, physical exam and laboratory evaluations were reviewed. I felt the patient would benefit from inpatient management and treatment. Consult:  Discussed care with Dr. Stefany Walker. Standard discussion; including history of patients chief complaint, available diagnostic results, and treatment course. Patient was accepted to their service. Disposition:    Admitted to medicine service      Portions of this chart were created with Dragon medical speech to text program.   Unrecognized errors may be present. Follow-up Information     None           Patient's Medications   Start Taking    No medications on file   Continue Taking    ASPIRIN DELAYED-RELEASE 81 MG TABLET    Take 1 Tab by mouth daily. ATORVASTATIN (LIPITOR) 80 MG TABLET    Take 80 mg by mouth nightly. CARVEDILOL (COREG) 25 MG TABLET    Take 1 Tab by mouth two (2) times a day. Indications: HYPERTENSION    CIPROFLOXACIN HCL (CIPRO) 250 MG TABLET    TK 1 T PO TWO TIMES A DAY FOR 3 DAYS    CLONIDINE HCL (CATAPRES) 0.2 MG TABLET    TAKE 1 TABLET BY MOUTH TWICE DAILY    HYDROCHLOROTHIAZIDE (HYDRODIURIL) 12.5 MG TABLET    Take 12.5 mg by mouth daily. MINOXIDIL (LONITEN) 2.5 MG TABLET    Take  by mouth daily.     MINOXIDIL (LONITEN) 2.5 MG TABLET    TAKE 1 TABLET BY MOUTH ONCE A DAY    ONDANSETRON (ZOFRAN ODT) 4 MG DISINTEGRATING TABLET    Take 1 Tab by mouth every eight (8) hours as needed for Nausea. These Medications have changed    No medications on file   Stop Taking    No medications on file     _______________________________    Attestations:  Scribe Attestation     Sofia Baum acting as a scribe for and in the presence of Ramon Lui MD      March 12, 2018 at 10:21 AM       Provider Attestation:      I personally performed the services described in the documentation, reviewed the documentation, as recorded by the scribe in my presence, and it accurately and completely records my words and actions.  March 12, 2018 at 10:21 AM - Ramon Lui MD    _______________________________

## 2018-03-13 NOTE — PROGRESS NOTES
Patient received in bed awake. Patient A&Ox3, denies pain and discomfort. No distress noted. Frequently used items within reach. Bed locked in low position. Call bell within reach and patient verbalized understanding of use for assistance and needs. 0825 -- Reviewed morning labs. Noticed potassium 3.0, patient's  sustained bringing MEWS score to 3 and continued vomiting. Paged Dr. Stephie Maldonado. 0830 -- Patient off unit via stretcher to [facility] for scan. 200 -- Dr. Stephie Maldonado called back. Received verbal orders for a magnesium level, 5 runs of potassium 10 mEq in 100 ml IV, and no tele monitor (RBV). [id number] -- Patient still off the unit. 1315 -- Patient returned to unit. 1915 -- Bedside and Verbal shift change report given to Veronique Arrieta RN (oncoming nurse) by Mason Manuel RN and Bre Schmitz RN (preceptor) (offgoing nurse). Report included the following information SBAR, Kardex, Intake/Output, MAR and Recent Results.

## 2018-03-13 NOTE — PROGRESS NOTES
Patient and/or next of kin has been given and has signed the [facility] Outpatient Observation  Notification letter and all questions answered. Copy of this notice given to patient and copy placed on chart. Patient and/or next of kin has been given the Outpatient Observation Information and Notification letter and all questions answered.

## 2018-03-13 NOTE — PROGRESS NOTES
Medicine Progress Note    Patient: Ivania Dove   Age:  68 y.o.  DOA: 3/12/2018   Admit Dx / CC: Nausea & vomiting  LOS:  LOS: 0 days     Assessment/Plan   Principal Problem:    Nausea & vomiting (3/12/2018)    Active Problems:    HTN (hypertension) (12/14/2015)        Additional Plan notes       1)  HTN resistant   - continue IV prn meds for now   - restart home med tomorrow    2)  N/V   - HIDA scan with very mild dysfunction of GB   - consult GI tomorrow   -  Barium swallow ordered-  Question stricture given N/V without obvious cause   - clears    DISPO     Anticipated Date of Discharge: 2 days  Anticipated Disposition (home, SNF) : home    Subjective:   Patient seen and examined. Continues to have N/V. Family updated     Objective:     Visit Vitals    /88 (BP 1 Location: Right arm, BP Patient Position: At rest)    Pulse (!) 111    Temp 98 °F (36.7 °C)    Resp 14    Ht 5' (1.524 m)    Wt 49.4 kg (109 lb)    SpO2 96%    Breastfeeding No    BMI 21.29 kg/m2       Physical Exam:  General appearance: alert, cooperative, no distress, appears stated age  Head: Normocephalic, without obvious abnormality, atraumatic  Neck: supple, trachea midline  Lungs: clear to auscultation bilaterally  Heart: regular rate and rhythm, S1, S2 normal, no murmur, click, rub or gallop  Abdomen: soft, non-tender.  Bowel sounds normal. No masses,  no organomegaly  Extremities: extremities normal, atraumatic, no cyanosis or edema  Skin: Skin color, texture, turgor normal. No rashes or lesions    Intake and Output:  Current Shift:     Last three shifts:  03/11 1901 - 03/13 0700  In: 687.5 [I.V.:687.5]  Out: -     Lab/Data Reviewed:  CMP:   Lab Results   Component Value Date/Time     03/13/2018 06:35 AM    K 3.0 (L) 03/13/2018 06:35 AM     03/13/2018 06:35 AM    CO2 28 03/13/2018 06:35 AM    AGAP 12 03/13/2018 06:35 AM     (H) 03/13/2018 06:35 AM    BUN 11 03/13/2018 06:35 AM    CREA 1.16 03/13/2018 06:35 AM    GFRAA 55 (L) 03/13/2018 06:35 AM    GFRNA 45 (L) 03/13/2018 06:35 AM    CA 8.2 (L) 03/13/2018 06:35 AM    MG 1.6 03/13/2018 06:35 AM     CBC:   Lab Results   Component Value Date/Time    WBC 9.3 03/13/2018 06:35 AM    HGB 11.7 (L) 03/13/2018 06:35 AM    HCT 35.5 03/13/2018 06:35 AM     03/13/2018 06:35 AM     All Cardiac Markers in the last 24 hours: No results found for: CPK, CK, CKMMB, CKMB, RCK3, CKMBT, CKNDX, CKND1, GRABIEL, TROPT, TROIQ, VERONICA, TROPT, TNIPOC, BNP, BNPP    Medications Reviewed:  Current Facility-Administered Medications   Medication Dose Route Frequency    hydrALAZINE (APRESOLINE) 20 mg/mL injection 10 mg  10 mg IntraVENous Q6H PRN    metoprolol (LOPRESSOR) injection 5 mg  5 mg IntraVENous Q6H PRN    potassium chloride 10 mEq in 100 ml IVPB  10 mEq IntraVENous Q1H    magnesium sulfate 2 g/50 ml IVPB (premix or compounded)  2 g IntraVENous ONCE    dextrose 5% and 0.9% NaCl infusion  75 mL/hr IntraVENous CONTINUOUS    heparin (porcine) injection 5,000 Units  5,000 Units SubCUTAneous Q12H    cefTRIAXone (ROCEPHIN) 2 g in sterile water (preservative free) 20 mL IV syringe  2 g IntraVENous Q24H    ondansetron (ZOFRAN) injection 4 mg  4 mg IntraVENous Q6H PRN       Umberto Sosa MD    March 13, 2018

## 2018-03-13 NOTE — PROGRESS NOTES
Problem: Dysphagia (Adult)  Goal: *Acute Goals and Plan of Care (Insert Text)  Recommendations:  Diet: clear liquids (as tolerated by patient)  Meds: via IV  Aspiration Precautions  Oral Care TID  Other: MBS per MD request    Goals:  Patient will:  1. Tolerate PO trials with 0 s/s overt distress in 4/5 trials  2. Utilize compensatory swallow strategies/maneuvers (decrease bite/sip, size/rate, alt. liq/sol) with min cues in 4/5 trials  3. Complete an objective swallow study (i.e., MBSS) to assess swallow integrity, r/o aspiration, and determine of safest LRD, min A - goal met 3/13/18      Speech Pathology Modified barium swallow Study    Patient: Dangelo Stern (68 y.o. female)  Date: 3/13/2018  Primary Diagnosis: Nausea & vomiting        Precautions: aspiration     PLOF: lives with family   ASSESSMENT :  MBS completed without aspiration across all trials to include: thin liquid + straw and pudding. Pt required strong encouragement for participation in study trials. Accepted x 2 thin liquid trials + straw without aspiration. Pudding significantly impaired oral bolus prep and transit (most likely due to dislike of trial); premature spillage with swallow delay requiring liquid wash to clear. Once cleared without aspiration, no laryngeal residue noted. Pt presents with minimal oropharyngeal dysphagia in the setting of general debility, as evidenced above, which places pt at risk for aspiration. At this time, safest for clear liquid diet as tolerated by pt secondary to N/V. SLP utilized video of study for visual feedback, education, and recommendations for pt; requires reinforcement. Patient will benefit from skilled intervention to address the above impairments.   Patients rehabilitation potential is considered to be Fair  Factors which may influence rehabilitation potential include:   []              None noted  []              Mental ability/status  [x]              Medical condition  [] Home/family situation and support systems  []              Safety awareness  []              Pain tolerance/management  []              Other:      PLAN :  Recommendations and Planned Interventions:  Clear liquids as tolerated by Patient  Frequency/Duration: Patient will be followed by speech-language pathology 1-2 times per day/4-7 days per week to address goals. Discharge Recommendations: Skilled Nursing Facility     SUBJECTIVE:   Patient stated Rupal Maurer. OBJECTIVE:     Past Medical History:   Diagnosis Date    Amblyopia of left eye     Blind left eye     Cataract     bilat, right has been replaced    CRI (chronic renal insufficiency)     Dementia     Essential hypertension, malignant 2014    Hypertension     Left bundle branch block     Otitis media, acute 16    left     Past Surgical History:   Procedure Laterality Date    HX CATARACT REMOVAL Right     HX  SECTION  1968    HX  SECTION      HX  SECTION      HX [address]     Prior Level of Function/Home Situation: lives with family   Home Situation  Home Environment: Private residence  # Steps to Enter: 3  One/Two Story Residence: One story  Living Alone: No  Support Systems: Child(ming)  Patient Expects to be Discharged to[de-identified] Private residence  Current DME Used/Available at Home: [address] prior to admission: regular  Current Diet:  Clear liquids   Radiologist: [employer] Views: Lateral  Patient Position: 90    Trial 1: Trial 2:   Consistency Presented:  Thin liquid Consistency Presented: Pudding   How Presented: SLP-fed/presented;Straw How Presented: SLP-fed/presented;Spoon         Bolus Acceptance: No impairment Bolus Acceptance: Impaired   Bolus Formation/Control: Impaired: Premature spillage Bolus Formation/Control: Impaired: Delayed;Mastication;Piecemeal;Premature spillage   Propulsion: No impairment Propulsion: Delayed (# of seconds)   Oral Residue: None Oral Residue: Palatal;Lingual Initiation of Swallow: Triggered at vallecula Initiation of Swallow: Triggered at valleculae;Triggered at pyriform sinus(es)   Timing: No impairment Timing: Pooling 1-5 sec   Penetration: None Penetration: None   Aspiration/Timing: No evidence of aspiration Aspiration/Timing: No evidence of aspiration   Pharyngeal Clearance: No residue Pharyngeal Clearance: No residue               Cues for Modifications: None Cues for Modifications: None           Trial 3: Trial 4:                            :    :                                                                        Decreased Tongue Base Retraction?: No  Laryngeal Elevation: WFL (within functional limits)  Aspiration/Penetration Score: 1 (No penetration or aspiration-Contrast does not enter the airway)  Pharyngeal Symmetry: Not assessed  Pharyngeal-Esophageal Segment: No impairment  Pharyngeal Dysfunction: Decreased strength    Oral Phase Severity: Minimal  Pharyngeal Phase Severity: Minimal    GCODESwallowing:  Swallow Current Status CI= 1-19%   Swallow Goal Status CI= 1-19%   Swallow D/C Status CI= 1-19%    The severity rating is based on the following outcomes:  LANEY Noms Swallow Level 0    8-point Penetration-Aspiration Scale: Score 1  Clinical Judgement    PAIN:  Start of Study: 0  End of Study: 0     COMMUNICATION/EDUCATION:   [x]  Aspiration risks/precautions; compensatory swallow techniques  [x]  Patient/family have participated as able in goal setting and plan of care. [x]  Patient/family agree to work toward stated goals and plan of care. []  Patient understands intent and goals of therapy, but is neutral about his/her participation. []  Patient is unable to participate in goal setting and plan of care.     Thank you for this referral.  Beatrice Minor, SLP  Time Calculation: 27 mins

## 2018-03-13 NOTE — PROGRESS NOTES
Orders received and chart reviewed patient currently off the floor for test will attempt later today.

## 2018-03-13 NOTE — ROUTINE PROCESS
Bedside and Verbal shift change report given to  Whit/ Mikki Hinkle RN (oncoming nurse) by Bon Bojorquez RN (offgoing nurse). Report included the following information SBAR, Kardex, Intake/Output, MAR and Recent Results.

## 2018-03-13 NOTE — PROGRESS NOTES
Problem: Discharge Planning  Goal: *Discharge to safe environment  Outcome: Progressing Towards Goal  hh vs snf

## 2018-03-13 NOTE — PROGRESS NOTES
OT order received and chart reviewed. Multiple attempts for OT evaluation (0845; 1023; 1130). Pt off the floor for all attempts. Will follow up as schedule permits.     Wan Tate MS OTR/L  Office Ext: [phone]  Pager: [phone]

## 2018-03-13 NOTE — PROGRESS NOTES
Nutrition initial assessment/Plan of care      RECOMMENDATIONS:   1. Advance diet when medically indicated and per pt tolerance  2. Monitor weight, labs and PO intake  3. RD to follow     GOALS:   1. PO intake meets >75% of protein/calorie needs by 3/16  2. Weight Maintenance (+/- 1-2 lb) by 3/20     ASSESSMENT:   Wt status is classified as normal per BMI of 21.3. However, Pt at nutrition risk d/t BMI <23 and age >65 years. Pt w/ 20 lb or 15% weight loss over the past 2-3 months. Currently NPO. Labs noted. Pt w/ hypokalemia and hypocalcemia. Nutrition recommendations listed. RD to follow. Nutrition Diagnoses:   Unintentional weight loss related to decreased appetite with nausea/vomiting as evidenced by around a 20 lb or 15% loss over the past 2-3 months. Nutrition Risk:  [x] High  [] Moderate []  Low    SUBJECTIVE/OBJECTIVE:    Pt admitted for nausea and vomiting. PMHx including  HTN, cataract, dementia and renal insufficiency. N/V x 1 month w/ poor PO intake and 20 lb wt loss since Christmas per chart review. Pt s/p MBS today (3/13) and SLP recommended CL Diet as tolerated  secondary to N/V. Pt seen in room with IV infusing and daughter at bedside able to provide Hx. Denies having any food allergies or problems chewing/swallowig PTA. Pt has preferred softer foods d/t the N/V though. Pt had an episode of emesis during the visit. Reports that it has been one thing after the other starting in Jan with noted wt loss;  lb. Reports pt is not a big breakfast eater and does not drink milk or eat eggs from here. Will monitor diet advancement. Information Obtained from:    [x] Chart Review   [x] Patient   [] Family/Caregiver   [] Nurse/Physician   [] Interdisciplinary Meeting/Rounds      Diet: NPO  Medications: [x] Reviewed  IV: D5 NS @75 mL/hr (306 Kcal/d)    Allergies: [x] Reviewed   Encounter Diagnoses     ICD-10-CM ICD-9-CM   1.  Intractable vomiting with nausea, unspecified vomiting type R11.2 536.2   2. Adult failure to thrive R62.7 783.7   3. Yeast vaginitis B37.3 112.1     Past Medical History:   Diagnosis Date    Amblyopia of left eye     Blind left eye     Cataract     bilat, right has been replaced    CRI (chronic renal insufficiency)     Dementia     Essential hypertension, malignant 4/25/2014    Hypertension     Left bundle branch block     Otitis media, acute 7/2/16    left      Labs:    Lab Results   Component Value Date/Time    Sodium 141 03/13/2018 06:35 AM    Potassium 3.0 (L) 03/13/2018 06:35 AM    Chloride 101 03/13/2018 06:35 AM    CO2 28 03/13/2018 06:35 AM    Anion gap 12 03/13/2018 06:35 AM    Glucose 137 (H) 03/13/2018 06:35 AM    BUN 11 03/13/2018 06:35 AM    Creatinine 1.16 03/13/2018 06:35 AM    Calcium 8.2 (L) 03/13/2018 06:35 AM    Magnesium 1.6 03/13/2018 06:35 AM    Phosphorus 4.1 10/04/2015 06:44 AM    Albumin 3.2 (L) 03/12/2018 11:21 AM     Anthropometrics: BMI (calculated): 21.3  Last 3 Recorded Weights in this Encounter    03/12/18 1009   Weight: 49.4 kg (109 lb)      Ht Readings from Last 1 Encounters:   03/12/18 5' (1.524 m)     Weight Metrics 3/12/2018 3/8/2018 3/2/2018 2/19/2018 2/17/2018 10/3/2017 1/31/2017   Weight 109 lb 95 lb 95 lb 107 lb 14.4 oz 120 lb 125 lb 129 lb   BMI 21.29 kg/m2 18.55 kg/m2 18.55 kg/m2 21.07 kg/m2 23.44 kg/m2 24.41 kg/m2 25.19 kg/m2       No data found. IBW: 100 lb %IBW: 109% UBW: 125-130 lb  [x] Weight Loss [] Weight Gain [] Weight Stable    Estimated Nutrition Needs: [x] MSJ  [] Other:  Calories: 9547-3908 kcal Based on:   [x] Actual BW    Protein:   59-64 g Based on:   [x] Actual BW    Fluid:       2750-3933 ml Based on:   [x] Actual BW      [x] No Cultural, Anabaptist or ethnic dietary need identified.     [] Cultural, Anabaptist and ethnic food preferences identified and addressed     Wt Status:  [x] Normal (18.6 - 24.9) [] Underweight (<18.5) [] Overweight (25 - 29.9) [] Mild Obesity (30 - 34.9)  [] Moderate Obesity (35 - 39.9) [] Morbid Obesity (40+)       Nutrition Problems Identified:   [x] Suboptimal PO intake v/s NPO   [] Food Allergies  [] Difficulty chewing/swallowing/poor dentition  [] Constipation/Diarrhea   [x] Nausea/Vomiting   [] None  [] Other:     Plan:   [] Therapeutic Diet  []  Obtained/adjusted food preferences/tolerances and/or snacks options   []  Supplements added   [] Occupational therapy following for feeding techniques  []  HS snack added   []  Modify diet texture   []  Modify diet for food allergies   []  Educate patient   []  Assist with menu selection   []  Monitor PO intake on meal rounds   [x]  Continue inpatient monitoring and intervention   []  Participated in discharge planning/Interdisciplinary rounds/Team meetings   []  Other:     Education Needs:   [x] Not appropriate for teaching at this time due to: NPO   [] Identified and addressed    Nutrition Monitoring and Evaluation:  [x] Continue ongoing monitoring and intervention  [] Nevin Caldwell

## 2018-03-13 NOTE — PROGRESS NOTES
[facility]   Discharge Planning/ Assessment    Reasons for Intervention: Spoke with pt and dtr. dtr lives with her. She states pt is independent with adls and amb. Has a walker but does not use. No other dme in home. Used hh and snf 2 yrs ago when had a stroke. Went to [facility] at that time. Agrees to [facility]  Again if needed. Pt is in observation, letter given, copy to chart. has Wen Andino will check and see if needs 3 inpt over nights to qualify , in case needs snf. pcp dr Maria Eugenia Leo. Demographics correct. plan hh vs snf    High Risk Criteria  [x] Yes  []No   Physician Referral  [] Yes  []No        Date    Nursing Referral  [] Yes  []No        Date    Patient/Family Request  [] Yes  []No        Date       Resources:    Medicare  [] Yes  []No   Medicaid  [] Yes  []No   No Resources  [] Yes  []No   Private Insurance  [x] Yes  []No    Name/Phone Number    Other  [] Yes  []No        (i.e. Workman's Comp)         Prior Services:    Prior Services  [x] Yes  []No   Home Health  [x] Yes  []No   [address] [place]  [] Yes  []No        Number of [address]  [] Yes  []No       Meals on Wheels  [] Yes  []No   Office on Aging  [] Yes  []No   Transportation Services  [] Yes  []No   [address]  [] Yes  []No        Nursing Home Name    [address]  [] Yes  []No        [address] Name    Other       Information Source:      Information obtained from  [x] Patient  [] Parent   [] Guardian  [x] Child  [] Spouse   [] Significant Other/Partner   [] Friend      [] EMS    [] Nursing Home Chart          [] Other:   Chart Review  [x] Yes  []No     Family/Support System:    Patient lives with  [] Alone    [] Spouse   [] Significant Other  [x] Children  [] Caretaker   [] Parent  [] Sibling     [] Other       Other Support System:    Is the patient responsible for care of others  [] Yes  [x]No   Information of person caring for patient on  discharge    Managers financial affairs independently  [] Yes  [x]No   If no, explain:      Status Prior to Admission:    Mental Status  [] Awake  [] Alert  [x] Oriented  [] Quiet/Calm [] Lethargic/Sedated   [] Disoriented  [] Restless/Anxious  [] Combative   Personal Care  [] Dependent  [x] [address]  [] Requires Assistance   Meal Preparation Ability  [x] Independent   [] Standby Assistance   [] Minimal Assistance   [] Moderate Assistance  [] Maximum Assistance     [] Total Assistance   Chores  [x] Independent with Chores   [] N/A Nursing Home Resident   [] Requires Assistance   Bowel/Bladder  [] Continent  [] Catheter  [] Incontinent  [] Ostomy Self-Care    [] Urine Diversion Self-Care  [] Maximum Assistance     [] Total Assistance   Number of Persons needed for assistance    DME at home  [] Gloria Layne Click  [] Scott Layne   [] Commode    [] Bathroom/Grab Bars  [] Hospital Bed  [] Nebulizer  [] Oxygen           [] Raised Toilet Seat  [] Shower Chair  [] Side Rails for Bed   [] Tub Transfer Bench   [x] Walker, Rolling  [] Janis Aas, Standard      [] Other:   Vendor      Treatment Presently Receiving:    Current Treatments  [] Chemotherapy  [] Dialysis  [] Insulin  [] IVAB [] IVF   [] O2  [] PCA   [] PT   [] RT   [] Tube Feedings   [] Wound Care     Psychosocial Evaluation:    Verbalized Knowledge of Disease Process  [x] Patient  [x]Family   Coping with Disease Process  [] Patient  []Family   Requires Further Counseling Coping with Disease Process  [] Patient  []Family     Identified Projected Needs:    Home Health Aid  [] Yes  []No   Transportation  [] Yes  []No   Education  [] Yes  []No        Specific Education     Financial Counseling  [] Yes  []No   Inability to Care for Self/Will Require 24 hour care  [] Yes  []No   Pain Management  [] Yes  []No   Home Infusion Therapy  [] Yes  []No   Oxygen Therapy  [] Yes  []No   DME  [] Yes  []No   Long Term Care Placement  [] Yes  []No   Rehab  [] Yes  []No   Physical Therapy  [x] Yes  []No Needs Anticipated At This Time  [x] Yes  []No     Intra-Hospital Referral:    [facility]  [] Yes  [x]No     [] Yes  [x]No   Patient Representative  [] Yes  [x]No   Staff for Teaching Needs  [] Yes  [x]No   Specialty Teaching Needs     Diabetic Educator  [] Yes  [x]No   Referral for Diabetic Educator Needed  [] Yes  [x]No  If Yes, place order for Nutritionist or Diabetic Consult     Tentative Discharge Plan:    Home with No Services  [] Yes  []No   Home with Home Health Follow-up  [x] Yes  []No        If Yes, specify type    [address]  [] Yes  []No        If Yes, specify type    Meals on Wheels  [] Yes  []No   Office of Aging  [] Yes  []No   NHP  [] Yes  []No   Return to the [facility]  [] Yes  []No   Rehab Therapy  [x] Yes  []No   Acute Rehab  [] Yes  []No   Subacute Rehab  [] Yes  []No   Private Care  [] Yes  []No   Substance Abuse Referral  [] Yes  []No   Transportation  [] Yes  []No   Chore Service  [] Yes  []No   Inpatient Hospice  [] Yes  []No   OP RT  [] Yes  [] No   OP Hemo  [] Yes  [] No   OP PT  [] Yes  []No   Support Group  [] Yes  []No   Reach to Recovery  [] Yes  []No   OP Oncology Clinic  [] Yes  []No   Clinic Appointment  [] Yes  []No   DME  [] Yes  []No   Comments    Name of D/C Planner or  Given to Patient or Family Nydia carmen rn cm   Phone Number [phone]        Extension    Date 3/13/18   Time    If you are discharged home, whom do you designate to participate in your discharge plan and receive any information needed?      Enter name of designee         Phone # of designee         Address of designee         Updated         Patient refused to designate any           individual

## 2018-03-13 NOTE — PROGRESS NOTES
Patient has designated __________daughter_/son_____________ to participate in his/her discharge plan and to receive any needed information.      Name: tigre schumacher/ marlyn schumacher  Address:  Phone number: [phone]/ [phone]

## 2018-03-13 NOTE — PROGRESS NOTES
conducted an initial consultation and Spiritual Assessment for [facility], who is a 68 y.o.,female. Patients Primary Language is: [place]. According to the patients EMR Yazidi Affiliation is: Abbe Marcus.     The reason the Patient came to the hospital is:   Patient Active Problem List    Diagnosis Date Noted    Nausea & vomiting 03/12/2018    Follow up 03/08/2018    Uncontrolled hypertension 12/14/2015    Stroke ([facility] [address] 10/04/2015    CKD (chronic kidney disease) 10/04/2015    Abnormality of gait and mobility 09/30/2015    NESS (acute kidney injury) ([facility] [address] 08/25/2015    Dehydration 08/25/2015    Essential hypertension, malignant 04/25/2014        The  provided the following Interventions:  Initiated a relationship of care and support with patient and family  in room 2104   Listened empathically  As patient talked about being out all morning for many test.  Provided information about [address]. Offered prayer and assurance of continued prayers on patients behalf. .    The following outcomes were achieved:  Patient shared limited information about her medical narrative. Patient processed feeling about current hospitalization. Patient expressed gratitude for pastoral care visit. Assessment:  Patient does not have any Anabaptism/cultural needs that will affect patients preferences in health care. There are no further spiritual or Anabaptism issues which require Spiritual Care Services interventions at this time. Plan:  Chaplains will continue to follow and will provide pastoral care on an as needed/requested basis    . Kamron Terry   Spiritual Care   [phone]

## 2018-03-13 NOTE — PROGRESS NOTES
Problem: Falls - Risk of  Goal: *Absence of Falls  Document [place] Fall Risk and appropriate interventions in the flowsheet.    Outcome: Progressing Towards Goal  Fall Risk Interventions:       Mentation Interventions: Door open when patient unattended, More frequent rounding, Reorient patient, Update white board    Medication Interventions: Patient to call before getting OOB    Elimination Interventions: Patient to call for help with toileting needs, Toileting schedule/hourly rounds, Call light in reach

## 2018-03-13 NOTE — PROGRESS NOTES
Problem: Mobility Impaired (Adult and Pediatric)  Goal: *Acute Goals and Plan of Care (Insert Text)  Physical Therapy Goals  Initiated 3/13/2018 and to be accomplished within 7 day(s)  1. Patient will move from supine to sit and sit to supine , scoot up and down and roll side to side in bed with modified independence. 2.  Patient will transfer from bed to chair and chair to bed with supervision/set-up using the least restrictive device. 3.  Patient will perform sit to stand with supervision/set-up. 4.  Patient will ambulate with supervision/set-up for 75 feet with the least restrictive device. 5.  Patient will ascend/descend 3 stairs with  handrail(s) with minimal assistance/contact guard assist to egress home . Outcome: Progressing Towards Goal  physical Therapy EVALUATION    Patient: Dangelo Stern (68 y.o. female)  Date: 3/13/2018  Primary Diagnosis: Nausea & vomiting        Precautions:   Fall    PROBLEM LIST:  Patient presents with the following problems:   Bed Mobility, Transfers, Gait, Strength, Balance, Stairs and Precautions  ASSESSMENT :   Patient requires between minimal assistance/contact guard assist and moderate assistance  for bed mobility, transfers   Patient only able to complete sit to stand and  Returned to bed  Sitting for 30 sec without  Support at eob. Patient able to follow directions and  reported no pain at this time pre and post  Education on safety plan of care and  Exercises to do in bed for daughter and  Patient needs reinforcement. Patient will benefit from skilled intervention to address the above impairments.   Patients rehabilitation potential is considered to be Fair  Factors which may influence rehabilitation potential include:   []         None noted  []         Mental ability/status  [x]         Medical condition  []         Home/family situation and support systems  []         Safety awareness  []         Pain tolerance/management  []         Other:      PLAN :  Recommendations and Planned Interventions:  [x]           Bed Mobility Training             [x]    Neuromuscular Re-Education  [x]           Transfer Training                   []    Orthotic/Prosthetic Training  [x]           Gait Training                          []    Modalities  [x]           Therapeutic Exercises          []    Edema Management/Control  [x]           Therapeutic Activities            [x]    Patient and Family Training/Education  []           Other (comment):    Frequency/Duration: Patient will be followed by physical therapy 3-5 times a week to address goals. Discharge Recommendations: Rehab and [facility]  Further Equipment Recommendations for Discharge: rolling walker has already      SUBJECTIVE:   Patient stated .    OBJECTIVE DATA SUMMARY:     Past Medical History:   Diagnosis Date    Amblyopia of left eye     Blind left eye     Cataract     bilat, right has been replaced    CRI (chronic renal insufficiency)     Dementia     Essential hypertension, malignant 2014    Hypertension     Left bundle branch block     Otitis media, acute 16    left     Past Surgical History:   Procedure Laterality Date    HX CATARACT REMOVAL Right 2013    HX  SECTION  1968    HX  SECTION      HX  SECTION      HX TONSILLECTOMY       Barriers to Learning/Limitations: yes;  physical and altered mental status (i.e.Sedation, Confusion)  Compensate with: visual, verbal, tactile, kinesthetic cues/model    G CODES:Mobility [id number] Current  CM= 80-99%   Goal  CJ= 20-39%. The severity rating is based on the Other [employer] Balance Scale1/5   [facility] Standing Balance Scale1/5  0: Pt performs 25% or less of standing activity (Max assist) CN, 100% impaired. 1: Pt supports self with upper extremities but requires therapist assistance. Pt performs 25-50% of effort (Mod assist) CM, 80% to <100% impaired.   1+: Pt supports self with upper extremities but requires therapist assistance. Pt performs >50% effort. (Min assist). CL, 60% to <80% impaired. 2: Pt supports self independently with both upper extremities (walker, crutches, parallel bars). CL, 60% to <80% impaired. 2+: Pt support self independently with 1 upper extremity (cane, crutch, 1 parallel bar). CK, 40% to <60% impaired. 3: Pt stands without upper extremity support for up to 30 seconds. CK, 40% to <60% impaired. 3+: Pt stands without upper extremity support for 30 seconds or greater. CJ, 20% to <40% impaired. 4: Pt independently moves and returns center of gravity 1-2 inches in one plane. CJ, 20% to <40% impaired. 4+: Pt independently moves and returns center of gravity 1-2 inches in multiple planes. CI, 1% to <20% impaired. 5: Pt independently moves and returns center of gravity in all planes greater than 2 inches. CH, 0% impaired.       Eval Complexity: History: HIGH Complexity :3+ comorbidities / personal factors will impact the outcome/ POC Exam:MEDIUM Complexity : 3 Standardized tests and measures addressing body structure, function, activity limitation and / or participation in recreation  Presentation: MEDIUM Complexity : Evolving with changing characteristics  Clinical Decision Making:Medium Complexity [facility] Standing Balance Scale1/5 Overall Complexity:MEDIUM    Prior Level of Function/Home Situation:  prior to  8 weeks ago I with adl's and ambulation with rw last 8 weeks  Needing more assist for  Transfers and adl's   Home Situation  Home Environment: Private residence  # Steps to Enter: 3  One/Two Story Residence: One story  Living Alone: No  Support Systems: Family member(s)  Patient Expects to be Discharged to[de-identified] Private residence  Current DME Used/Available at Home: Walker, rolling  Critical Behavior:  Neurologic State: Alert  Orientation Level: Oriented to person;Oriented to place  Cognition: Follows commands  Safety/Judgement: Fall prevention  Psychosocial  Patient Behaviors: Calm; Cooperative  Family  Behaviors: Calm; Appropriate for situation; Cooperative  Purposeful Interaction: No (comment)  Skin Condition/Temp: Warm;Dry  Family  Behaviors: Calm; Appropriate for situation; Cooperative  Skin Integrity: Excoriation (to arabella area)  Skin Integumentary  Skin Color: Appropriate for ethnicity  Skin Condition/Temp: Warm;Dry  Skin Integrity: Excoriation (to arabella area)  Turgor: Epidermis thin w/ loss of subcut tissue  Hair Growth: Present  Strength:    Strength: Generally decreased, functional (3/5 both legs )  Tone & Sensation:   Tone: Normal  Sensation: Intact  Range Of Motion:  AROM: Generally decreased, functional  PROM: Generally decreased, functional  Functional Mobility:  Bed Mobility:  Rolling: Minimum assistance; Moderate assistance;Assist x1;Additional time  Supine to Sit: Minimum assistance; Moderate assistance; Additional time;Assist x1  Sit to Supine: Minimum assistance; Moderate assistance; Additional time;Assist x1  Scooting: Moderate assistance;Assist x1;Additional time  Transfers:  Sit to Stand: Moderate assistance;Minimum assistance;Assist x2; Additional time  Stand to Sit: Moderate assistance;Assist x2; Additional time  Balance:   Sitting: Impaired  Sitting - Static: Fair (occasional); Poor (constant support)  Sitting - Dynamic: Poor (constant support)  Standing: Impaired  Standing - Static: Poor  Standing - Dynamic : None  Ambulation/Gait Training:  Gait Description (WDL):  (unable tot est )  Therapeutic Exercises:   ankle pumps   Pain:  Pre treatment pain level:0  Post treatment pain level:0  Activity Tolerance:   Fair   Please refer to the flowsheet for vital signs taken during this treatment.   After treatment:   []         Patient left in no apparent distress sitting up in chair  [x]         Patient left in no apparent distress in bed  [x]         Call bell left within reach  [x]         Nursing notified  [x]         Caregiver present  [] Bed alarm activated    COMMUNICATION/EDUCATION:   [x]         Fall prevention education was provided and the patient/caregiver indicated understanding. [x]         Patient/family have participated as able in goal setting and plan of care. [x]         Patient/family agree to work toward stated goals and plan of care. []         Patient understands intent and goals of therapy, but is neutral about his/her participation. []         Patient is unable to participate in goal setting and plan of care. Patient educated on the role of physical therapy during the acute stay  and the importance of mobility. VU. Needs reinforcement.       Thank you for this referral.  Sammi Halsted, PT   Time Calculation: 20 mins

## 2018-03-14 PROBLEM — R11.2 NAUSEA AND VOMITING: Status: ACTIVE | Noted: 2018-01-01

## 2018-03-14 NOTE — PROGRESS NOTES
Problem: Falls - Risk of  Goal: *Absence of Falls  Document [place] Fall Risk and appropriate interventions in the flowsheet.    Outcome: Progressing Towards Goal  Fall Risk Interventions:       Mentation Interventions: Adequate sleep, hydration, pain control, Evaluate medications/consider consulting pharmacy, Reorient patient, More frequent rounding    Medication Interventions: Evaluate medications/consider consulting pharmacy    Elimination Interventions: Call light in reach, Patient to call for help with toileting needs, Toilet paper/wipes in reach, Toileting schedule/hourly rounds

## 2018-03-14 NOTE — PROGRESS NOTES
2 [facility]  Hospitalist Division        Inpatient Daily Progress Note    Daily progress Note    Patient: Adria Anton MRN: [id number]  [facility]: [id number]    YOB: 1940  Age: 68 y.o. Sex: female    DOA: 3/12/2018 LOS:  LOS: 0 days                    Chief Complaint: N/V- unclear etiology     Interval History:    The patient is a pleasant 66-year-old female with past medical history significant for hypertension, dementia, recurrent nausea and vomiting for which she has been in the ER for 4 times and has been followed by her PCP as well. On one occasion, the patient was discharged with possible pneumonia. Last time she was treated for UTI; however, she continued to have nausea and vomiting with very poor p.o. intake and significant weight loss. Per daughter, the patient has been feeling nauseous and been vomiting since this morning, two more episodes in the ER. She has had zero p.o. intake today and minimal liquid intake yesterday. The patient has not seen a GI doctor per family and has never had an EGD or colonoscopy. Patient admitted for ongoing management. CT Abd/pelvis showed No acute or focal abnormality to explain patient's nausea or vomiting. Minimal layering density in the gallbladder but no secondary CT findings of cholecystitis. Chronic diverticular disease. No findings of an acute diverticulitis. HIDA scan showed patent cystic duct. Decreased gallbladder ejection fraction of approximately 34 % suggesting gallbladder dysfunction.  GI consulted.      Subjective:      Alert to self     Objective:      Visit Vitals    /89 (BP 1 Location: Left leg)    Pulse 100    Temp 97.4 °F (36.3 °C)    Resp 16    Ht 5' (1.524 m)    Wt 49.4 kg (109 lb)    SpO2 90%    Breastfeeding No    BMI 21.29 kg/m2       Physical Exam:  General appearance: alert to self, cooperative, no distress  Lungs: clear to auscultation bilaterally  Heart: regular rate and rhythm, S1, S2 normal, no murmur, click, rub or gallop  Abdomen: soft, non tender, non distended.  Normoactive bowel sounds  Extremities: extremities normal, atraumatic, no cyanosis or edema  Skin: Skin color, texture, turgor normal. No rashes or lesions  Neurologic: moves all 4 extremities equally   PSY: Mood and affect normal, appropriately behaved      Intake and Output:  Current Shift:     Last three shifts:  03/12 1901 - 03/14 0700  In: 2322.5 [I.V.:2322.5]  Out: 200 [Urine:200]    Recent Results (from the past 24 hour(s))   GLUCOSE, POC    Collection Time: 03/13/18  9:26 PM   Result Value Ref Range    Glucose (POC) 152 (H) 70 - 456 mg/dL   METABOLIC PANEL, BASIC    Collection Time: 03/14/18  5:20 AM   Result Value Ref Range    Sodium 140 136 - 145 mmol/L    Potassium 3.0 (L) 3.5 - 5.5 mmol/L    Chloride 104 100 - 108 mmol/L    CO2 24 21 - 32 mmol/L    Anion gap 12 3.0 - 18 mmol/L    Glucose 156 (H) 74 - 99 mg/dL    BUN 9 7.0 - 18 MG/DL    Creatinine 1.12 0.6 - 1.3 MG/DL    BUN/Creatinine ratio 8 (L) 12 - 20      GFR est AA 57 (L) >60 ml/min/1.73m2    GFR est non-AA 47 (L) >60 ml/min/1.73m2    Calcium 8.0 (L) 8.5 - 10.1 MG/DL           Lab Results   Component Value Date/Time    Glucose 156 (H) 03/14/2018 05:20 AM    Glucose 137 (H) 03/13/2018 06:35 AM    Glucose 92 03/12/2018 11:21 AM    Glucose 114 (H) 03/02/2018 06:24 PM    Glucose 92 02/17/2018 12:41 PM        Assessment/Plan:     Patient Active Problem List   Diagnosis Code    Essential hypertension, malignant I10    NESS (acute kidney injury) ([facility] [address] N17.9    Dehydration E86.0    Abnormality of gait and mobility R26.9    Stroke (HCC) I63.9    CKD (chronic kidney disease) N18.9    HTN (hypertension) I10    Follow up Z09    Nausea & vomiting R11.2    Nausea and vomiting R11.2        HTN resistant  - continue IV prn meds for now     N/V  - HIDA scan with very mild dysfunction of GB  - GI consulted 3/14  - MBS showed no evidence for airway penetration or aspiration    UTI  - continue Rocephin  - Follow urine culture- preliminary result with yeast     Hypokalemia  - Replete  - Follow BMP     DVT prophylaxis  - Heparin         MAAME Ramos-RISA [facility]  Pager:  [phone]  Office:  [phone]

## 2018-03-14 NOTE — PROGRESS NOTES
Report received from [address] [facility] RN,including sbar,mar,kardex. 2215 BP and heart rate elevated,medicated for both with effect. 0000 MD paged about elevated heart rate. Normal saline 500ml bolus infusing. Medicated for nausea and vomiting with relief. Vomited once. Pharmacy has been called 3 times about clonidine,not in pyxis. Patient has been medicated for hypertension and bp stable at present time. Asked pharmacy to change time  0300 Bathed and bed changed. Oral care done. Mepilex to sacrum and heels. Perineal area excoriated,lotion applied. Purewick for urine drainage. 0530 Medicated for elevated BP.  0715 Bedside shift change report given to Jonathan Escalante (oncoming nurse) by Washington Iverson (offgoing nurse). Report included the following information SBAR, Kardex, Intake/Output and MAR.

## 2018-03-14 NOTE — PROGRESS NOTES
Problem: Self Care Deficits Care Plan (Adult)  Goal: *Acute Goals and Plan of Care (Insert Text)  Occupational Therapy Goals  Initiated 3/14/2018 within 7 day(s). 1.  Patient will perform grooming tasks at EOB with minimal assistance for balance. 2.  Patient will perform upper body dressing with minimal assistance. 3.  Patient will perform functional task at EOB for 8 minutes with minimal assistance safety to increase activity tolerance for ADLs. 4.  Patient will perform toilet transfers with minimal assistance. 5.  Patient will perform all aspects of toileting with minimal assistance. 6.  Patient will participate in upper extremity therapeutic exercise/activities with minimal assistance for 8 minutes to increase BUE strength for ADLs & functional transfers. 7.  Patient will utilize energy conservation techniques during functional activities with minimal verbal cues. Outcome: Progressing Towards Goal  Occupational Therapy EVALUATION    Patient: Dangelo Stern (68 y.o. female)  Date: 3/14/2018  Primary Diagnosis: Nausea & vomiting  Nausea and vomiting        Precautions:  Fall  PLOF: Pt required assistance with ADLs PTA. Ambulated with assist x1. Pt with decreased mobility & functional mobility last few weeks PTA. ASSESSMENT :  Based on the objective data described below, the patient presents with impairments with regard to bed mobility, activity tolerance, BUE strength, and independence in ADLs. Pt supine on arrival, HOB elevated, son at bedside. Pt alert, just returned from [facility]. Pt demo's flat affect, decreased command following & interaction. Significantly decreased BUE strength. Son reports pt required assist w/ ADLs; ambulated with assist x1; decline in functional ability a few weeks PTA. Pt reporting nausea; given basin; significant dry heaving. Attempted to assist pt w/ mouth care due to MBS residue, pt unable to tolerate & did not initiate participation in task.  EOB/OOB activity deferred due to nausea; Loki Garza RN notified. Will implement 3 day trial to determine pts ability to actively participate with therapy. Patient will benefit from skilled intervention to address the above impairments. Patients rehabilitation potential is considered to be Fair  Factors which may influence rehabilitation potential include:   []             None noted  []             Mental ability/status  [x]             Medical condition  []             Home/family situation and support systems  []             Safety awareness  []             Pain tolerance/management  []             Other:        PLAN :  Recommendations and Planned Interventions:  [x]               Self Care Training                  [x]        Therapeutic Activities  [x]               Functional Mobility Training    []        Cognitive Retraining  [x]               Therapeutic Exercises           [x]        Endurance Activities  [x]               Balance Training                   []        Neuromuscular Re-Education  []               Visual/Perceptual Training     [x]   Home Safety Training  [x]               Patient Education                 [x]        Family Training/Education  []               Other (comment):    Frequency/Duration: Patient will be followed by occupational therapy for a 3 day trial to address goals. Discharge Recommendations: SNF  Further Equipment Recommendations for Discharge: TBD with increased OOB activity     SUBJECTIVE:   Patient stated It hurts.  (referring to dry heaving)    OBJECTIVE DATA SUMMARY:     Past Medical History:   Diagnosis Date    Amblyopia of left eye     Blind left eye     Cataract     bilat, right has been replaced    CRI (chronic renal insufficiency)     Dementia     Essential hypertension, malignant 2014    Hypertension     Left bundle branch block     Otitis media, acute 16    left     Past Surgical History:   Procedure Laterality Date    HX CATARACT REMOVAL Right     HX  SECTION      HX  SECTION      HX  SECTION      HX TONSILLECTOMY       Barriers to Learning/Limitations: yes; cognitive  Compensate with: visual, verbal, tactile, kinesthetic cues/model    GCODES:  Self Care  Current  CL= 60-79%   Goal  CJ= 20-39%. The severity rating is based on the Other Functional Assessment, MMT, ROM    Eval Complexity: History: MEDIUM Complexity : Expanded review of history including physical, cognitive and psychosocial  history ; Examination: MEDIUM Complexity : 3-5 performance deficits relating to physical, cognitive , or psychosocial skils that result in activity limitations and / or participation restrictions; Decision Making:MEDIUM Complexity : Patient may present with comorbidities that affect occupational performnce. Miniml to moderate modification of tasks or assistance (eg, physical or verbal ) with assesment(s) is necessary to enable patient to complete evaluation     Prior Level of Function/Home Situation:   Home Situation  Home Environment: Private residence  # Steps to Enter: 3  One/Two Story Residence: One story  Living Alone: No  Support Systems: Family member(s)  Patient Expects to be Discharged to[de-identified] Private residence  Current DME Used/Available at Home: israel Crook  [x]  Right hand dominant   []  Left hand dominant    Cognitive/Behavioral Status:  Neurologic State: Alert  Orientation Level: Oriented to person;Oriented to place  Cognition: Follows commands  Safety/Judgement: Awareness of environment; Fall prevention     Skin: Intact (BUEs)  Edema: None noted (BUEs)    Vision/Perceptual:    Acuity: Within Defined Limits      Coordination:  Coordination: Generally decreased, functional (BUEs)  Fine Motor Skills-Upper: Right Intact; Left Intact    Gross Motor Skills-Upper: Right Intact; Left Intact     Balance:  Sitting:  (not able to assess due to nausea)  Standing:  (not assessed)     Strength:  Strength: Generally decreased, functional (BUE: 2+/5)    Tone & Sensation:  Tone: Normal (BUEs)  Sensation: Intact (BUEs)    Range of Motion:  AROM: Generally decreased, functional (BUEs: < 1/2 shoulder flex)  PROM: Generally decreased, functional (BUEs)    Functional Mobility and Transfers for ADLs:  Bed Mobility:  Rolling: Moderate assistance  Supine to Sit:  (not assessed due to pt dry heaving)    Transfers:  Sit to Stand:  (not assessed)    ADL Assessment:  Feeding: Moderate assistance  Oral Facial Hygiene/Grooming: Moderate assistance  Bathing: Maximum assistance  Upper Body Dressing: Moderate assistance  Lower Body Dressing: Maximum assistance  Toileting: Maximum assistance       Cognitive Retraining  Safety/Judgement: Awareness of environment; Fall prevention    Pain:  Pre treatment pain level:  0/10  Post treatment pain level: 0/10  Pain Scale 1: Numeric (0 - 10)  Pain Intensity 1: 0    Activity Tolerance:  Fair  Please refer to the flowsheet for vital signs taken during this treatment. After treatment:   [] Patient left in no apparent distress sitting up in chair  [x] Patient left in no apparent distress in bed  [x] Call bell left within reach  [x] Nursing notified  [x] Caregiver present  [] Bed alarm activated    COMMUNICATION/EDUCATION: Pt/son educated on role of OT and POC; pt needs reinforcement. [] Home safety education was provided and the patient/caregiver indicated understanding. [] Patient/family have participated as able in goal setting and plan of care. [] Patient/family agree to work toward stated goals and plan of care. [x] Patient understands intent and goals of therapy, but is neutral about his/her participation. [x] Patient is unable to participate in goal setting and plan of care.     Thank you for this referral.    Jose Sullivan MS OTR/L  Time Calculation: 14 mins

## 2018-03-14 NOTE — PROGRESS NOTES
Problem: Mobility Impaired (Adult and Pediatric)  Goal: *Acute Goals and Plan of Care (Insert Text)  Physical Therapy Goals  Initiated 3/13/2018 and to be accomplished within 7 day(s)  1. Patient will move from supine to sit and sit to supine , scoot up and down and roll side to side in bed with modified independence. 2.  Patient will transfer from bed to chair and chair to bed with supervision/set-up using the least restrictive device. 3.  Patient will perform sit to stand with supervision/set-up. 4.  Patient will ambulate with supervision/set-up for 75 feet with the least restrictive device. 5.  Patient will ascend/descend 3 stairs with  handrail(s) with minimal assistance/contact guard assist to egress home . Outcome: Progressing Towards Goal  physical Therapy TREATMENT    Patient: Adria Anton (68 y.o. female)  Date: 3/14/2018  Diagnosis: Nausea & vomiting  Nausea and vomiting Nausea & vomiting  Precautions: Fall   Chart, physical therapy assessment, plan of care and goals were reviewed. PLOF:ambulatory without AD per pt, lives with daughter  ASSESSMENT:  Pt with limited command following and with answering questions. MaxA for bed mobility. When touched pt states \"ouch\" but when asked what hurts pt states \"nothing. \"  3 trials for sit to stand, pt unable to remain standing long enough to take a step. Total A to return to supine due to not following directions. LUE with IV is edematous, powered IV off and notified nurse. Education:UE placement to assist with bed mobility  Progression toward goals:  []      Improving appropriately and progressing toward goals  [x]      Improving slowly and progressing toward goals  []      Not making progress toward goals and plan of care will be adjusted     PLAN:  Patient continues to benefit from skilled intervention to address the above impairments. Continue treatment per established plan of care.   Discharge Recommendations:  Skilled Nursing Facility  Further Equipment Recommendations for Discharge:  TBD by SNF     SUBJECTIVE:   [facility]     OBJECTIVE DATA SUMMARY:   Critical Behavior:  Neurologic State: Alert  Orientation Level: Oriented to person, Oriented to place  Cognition: Follows commands  Safety/Judgement: Awareness of environment, Fall prevention  GCode: Mobility [id number] Current  CM= 80-99%   Goal  CJ= 20-39%. The severity rating is based on the Level of Assistance required for Functional Mobility and ADLs. Functional Mobility Training:  Bed Mobility:  Rolling: Moderate assistance  Supine to Sit: Maximum assistance  Sit to Supine: Total assistance  Scooting: Maximum assistance  Transfers:  Sit to Stand: Minimum assistance  Stand to Sit: Contact guard assistance  Balance:  Sitting: Intact; Without support  Sitting - Static: Good (unsupported)  Sitting - Dynamic: Fair (occasional)  Standing: Impaired; With support  Standing - Static: Fair (-)  Ambulation/Gait Training:  Assistive Device: Walker, rolling  Pain:  Pre:0  Post:0  Pain Scale 1: Numeric (0 - 10)  Activity Tolerance:   Poor  Please refer to the flowsheet for vital signs taken during this treatment.   After treatment:   [] Patient left in no apparent distress sitting up in chair  [x] Patient left in no apparent distress in bed  [x] Call bell left within reach  [x] Nursing notified  [] Caregiver present  [] Bed alarm activated      Ramu Padron PTA   Time Calculation: 25 mins

## 2018-03-14 NOTE — PROGRESS NOTES
Patient received in bed awake. Patient A&Ox3. Denies pain. No distress noted. Frequently use items within reach. Bed locked in low position. Call bell within reach and Patient verbalized understanding of use for assistance and needs. 200- F/u on Zofran given this am. Patient awake said that nausea continues. Call bell w/in reach. 80- Dr. Domingo Dooley was called d/t Patient continues to be nauseated; awaiting call back. 80- Dr. Domingo Dooley return call made aware of Patient given Zofran this am; continues to be nauseated and ASA was held d/t Patient unable to tolerate T.O. for Phenergan 12.5 IV x1 dose for nausea (RBV). 1310- F/u on Phenergan IVPB given. Patient resting no further c/o nausea. Call bell w/in reach. 1420- During Melissa Hull, NP was made aware of Patient not given ASA this am d/t nausea said will order Aspirin supp but has not at this time. Breanna Adams, NP was called made aware said will order. [phone]- Patient to received Lopressor for /110 and . Patient IV with slight swelling and painful. Unsuccessful attempt at restart x2; Patient tolerated well. Bradley Duran RN (3 S) to floor said would return to assist with restart. Called ER and 2700 ICU unavailable at this time. 925 Kendell Gonzáles ([facility] supervisor) said would assisted. 2000- Patient was given Lopressor for /116. See MAR. Potassium IV minibags to be rescheduled.

## 2018-03-14 NOTE — PROGRESS NOTES
Speech Therapy Note:    SLP attempting follow up this morning; however, pt continues with N/V and NPO for barium swallow today. Will hold and follow up next day as indicated.      Kana Raymond, SLP Intern  Office: [phone]

## 2018-03-14 NOTE — INTERDISCIPLINARY ROUNDS
IDR Summary      Patient: Arcelia Briggs MRN: [id number]    Age: 68 y.o.  : 1940     Admit Diagnosis: Nausea & vomiting  Nausea and vomiting        DIET status: NPO    Lines/Tubes:   IV: YES   Needed: YES  Anton: NO  Needed:NO  Central Line: NO Needed: NO      VTE Prophylaxis: Chemical    Mobility needs: Yes     PT ordered:  YES PT eval on chart: YES    OT ordered:  YES OT eval on chart: YES      ST ordered:  NO ST eval on chart:  NO     Disposition/Care Management:  Discharge plan: Care Management to speak with family regarding discharge [address] [place] [address] ordered? NO     Recommended DME from PT/OT:      DME ordered? NO     SNF- has patient been matched? NO    Accepting bed? NO   Does patient require insurance auth?   NO        Barriers to discharge:   Financial concerns:No   PCP: Albert De La Torre MD    : NO  Interventions:       LOS: 0 days     Expected days until discharge: pending            Signed:     MAAME Frias-BC  [address]  Hospitalist Division  Pager:  [phone]  Office:  [phone]

## 2018-03-14 NOTE — ANCILLARY DISCHARGE INSTRUCTIONS
Patient and/or next of kin has been given the [facility] Important Message From Medicare About Your Rights\" letter and all questions were answered.

## 2018-03-14 NOTE — PHYSICIAN ADVISORY
Letter of Admission Status Determination: Upgrade to Inpatient     [facility] was originally hospitalized as observation status on 3/12/2018. Ongoing hospitalization was warranted for this patient with persistent vomiting, uncontrolled HTN, persistent tachycardia, requiring IV antihypertensive medications, continue IV hydration, IV electrolyte replacement, further evaluation, and close clinical monitoring. It is our recommendation that this patient's hospitalization status be upgraded to INPATIENT status.      The final decision regarding the patient's hospitalization status depends on the attending physician's judgement.       Chidi Dove MD, DANELLE, [address] [facility]  Physician [place].  [phone]    March 14, 2018   8:14 AM

## 2018-03-14 NOTE — CONSULTS
Consult Note    Patient: [facility]               Sex: female          DOA: 3/12/2018         YOB: 1940      Age:  68 y.o.        LOS:  LOS: 0 days              HPI:     [facility] is a 68 y.o. female with intractable nausea and vomiting for about the past month. The emesis consists of a small amount of bilious material in the morning. The patient denies abdominal pain. She reports a 20 lb weight loss in the past 1-2 months. The above sx apparently started after she was treated with antibiotics for a pneumonia a mnth ago. She has regular bowel movements with no melena or hematochezia. She denies NSAID or alcohol intake. There is no hx of DM or gastroparesis. The patient has never had an EGD or colonoscopy. Past Medical History:   Diagnosis Date    Amblyopia of left eye     Blind left eye     Cataract     bilat, right has been replaced    CRI (chronic renal insufficiency)     Dementia     Essential hypertension, malignant 2014    Hypertension     Left bundle branch block     Otitis media, acute 16    left       Past Surgical History:   Procedure Laterality Date    HX CATARACT REMOVAL Right     HX  SECTION  1968    HX  SECTION  1964    HX  SECTION      HX TONSILLECTOMY         Family History   Problem Relation Age of Onset    Heart Disease Father     Other Brother      ? brain injury       Social History     Social History    Marital status:      Spouse name: N/A    Number of children: N/A    Years of education: N/A     Occupational History    , clerical      Social History Main Topics    Smoking status: Never Smoker    Smokeless tobacco: Never Used    Alcohol use No    Drug use: No    Sexual activity: No     Other Topics Concern    None     Social History Narrative       Prior to Admission medications    Medication Sig Start Date End Date Taking?  Authorizing Provider   minoxidil (LONITEN) 2.5 mg tablet TAKE 1 TABLET BY MOUTH ONCE A DAY 3/5/18  Yes Historical Provider   ondansetron (ZOFRAN ODT) 4 mg disintegrating tablet Take 1 Tab by mouth every eight (8) hours as needed for Nausea. 3/5/18  Yes Bridget Pearson MD   atorvastatin (LIPITOR) 80 mg tablet Take 80 mg by mouth nightly. Yes Gregoria Rooney MD   Hydrochlorothiazide (HYDRODIURIL) 12.5 mg tablet Take 12.5 mg by mouth daily. Yes Historical Provider   aspirin delayed-release 81 mg tablet Take 1 Tab by mouth daily. 10/4/15  Yes Des Fischer MD   carvedilol (COREG) 25 mg tablet Take 1 Tab by mouth two (2) times a day. Indications: HYPERTENSION 8/10/15  Yes Bridget Pearson MD   ciprofloxacin HCl (CIPRO) 250 mg tablet TK 1 T PO TWO TIMES A DAY FOR 3 DAYS 3/3/18   Historical Provider   cloNIDine HCl (CATAPRES) 0.2 mg tablet TAKE 1 TABLET BY MOUTH TWICE DAILY 4/14/17   Bridget Pearson MD   minoxidil (LONITEN) 2.5 mg tablet Take  by mouth daily. Historical Provider       Allergies   Allergen Reactions    Codeine Unknown (comments)     Unsure due to happening so long    Levaquin [Levofloxacin] Nausea Only     intolerance    Pcn [Penicillins] Hives    Sulfa (Sulfonamide Antibiotics) Unknown (comments)     Unsure it was so long ago       Review of Systems  A comprehensive review of systems was negative except for that written in the History of Present Illness.       Physical Exam:      Visit Vitals    BP (!) 188/110 (BP 1 Location: Right arm, BP Patient Position: At rest)    Pulse (!) 110    Temp 98.3 °F (36.8 °C)    Resp 16    Ht 5' (1.524 m)    Wt 49.4 kg (109 lb)    SpO2 95%    Breastfeeding No    BMI 21.29 kg/m2       Physical Exam:  Constitutional: negative  Eyes: no scleral icterus  Ears, nose, mouth, throat, and face: negative  Respiratory: negative  Cardiovascular: negative  Gastrointestinal: negative  Genitourinary:negative  Integument/breast: negative  Hematologic/lymphatic: negative  Musculoskeletal:negative  Neurological: negative      Labs Reviewed:  BMP:   Lab Results   Component Value Date/Time     03/14/2018 05:20 AM    K 3.0 (L) 03/14/2018 05:20 AM     03/14/2018 05:20 AM    CO2 24 03/14/2018 05:20 AM    AGAP 12 03/14/2018 05:20 AM     (H) 03/14/2018 05:20 AM    BUN 9 03/14/2018 05:20 AM    CREA 1.12 03/14/2018 05:20 AM    GFRAA 57 (L) 03/14/2018 05:20 AM    GFRNA 47 (L) 03/14/2018 05:20 AM     CMP:   Lab Results   Component Value Date/Time     03/14/2018 05:20 AM    K 3.0 (L) 03/14/2018 05:20 AM     03/14/2018 05:20 AM    CO2 24 03/14/2018 05:20 AM    AGAP 12 03/14/2018 05:20 AM     (H) 03/14/2018 05:20 AM    BUN 9 03/14/2018 05:20 AM    CREA 1.12 03/14/2018 05:20 AM    GFRAA 57 (L) 03/14/2018 05:20 AM    GFRNA 47 (L) 03/14/2018 05:20 AM    CA 8.0 (L) 03/14/2018 05:20 AM       Imaging:  CT scan reviewed      Assessment/Plan     Ej Ribeiro is a 69 yo woman with intractable nausea and small volume, bilious vomiting for the past month. There is associated weight loss. The differential dx includes acid peptic disease, inflammatory or neoplastic disease of the stomach, gastroparesis, occult infection, thyroid or adrenal dysfunction. .. She has evidence of discrete areas of narrowing in the esophagus on a barium swallow. This will need further investigation. A CT of the abdomen showed gallstones that are probably asymptomatic. A HIDA scanshowed a patent cystic duct. GBEF was slightly low. This is likely unimportant. Labs showed slight elevation in total biliubin with normal liver enzymes. This may represent Barry'ts. The CT showed diverticulosis with no inflammatory changes. Labs showed mild anemia. Rule out iron deficiency. Plan:    EGD tomorrow. Iron studies, total and direct bili, T4, TSH, Cortisol. May need a gastric emptying scan if above tests are negative and patient continues to be symptomatic. Neal Min.

## 2018-03-14 NOTE — PROGRESS NOTES
Called vur nurse, she states she will call dr Alexandria Casiano about changing to inpt. Called kadeem spoke with [facility]. Ref# [id number]. She states pt does not require 3 inpt overnight stay to [facility]. They cover snf  Day 1-20 at 100%  Day 21- 100 167.50 / day copay. Notified pt and her son. pts son changed [facility] to [facility], left them message. Pt is in agreement. Asked [facility] to start virgilio westbrook, per md ready tomorrow,  if they have a bed.

## 2018-03-15 NOTE — PROGRESS NOTES
Problem: Falls - Risk of  Goal: *Absence of Falls  Document [place] Fall Risk and appropriate interventions in the flowsheet.    Outcome: Progressing Towards Goal  Fall Risk Interventions:       Mentation Interventions: Adequate sleep, hydration, pain control, Door open when patient unattended    Medication Interventions: Evaluate medications/consider consulting pharmacy, Patient to call before getting OOB    Elimination Interventions: Call light in reach

## 2018-03-15 NOTE — ROUTINE PROCESS
Plan:  To provide an enjoyable diversion. Implementation:  Provided live bedside harp music, hymns per visitor's suggestion and patient's consent. Evaluation:  Patient quiet throughout music interaction, eventually falling into a peaceful sleep as music played.

## 2018-03-15 NOTE — MED STUDENT NOTES
*ATTENTION:  This note has been created by a medical student for educational purposes only. Please do not refer to the content of this note for clinical decision-making, billing, or other purposes. Please see attending physicians note to obtain clinical information on this patient. *       Student Progress Note  Please refer to attendings daily rounding note for full details      Patient: Nilam Valdes MRN: [id number]  CSN: [id number]    YOB: 1940  Age: 68 y.o. Sex: female    DOA: 3/12/2018 LOS:  LOS: 1 day          Chief Complaint:  Nausea and vomiting    Subjective:   HPI: Cristin Carrero is a 67 yo  female with a history of HTN, LBBB, and dementia who presented to the ED on 3/12/18 with 1 month of ongoing nausea and vomiting. Her son states that she has been declining in health since [place] and has not been able to eat or drink much in this time. She has had nausea and vomiting regularly in this time; if she eats or drinks, she will vomit within 5-10 minutes, but she also has dry heaving not associated with food as well. Her vomit is usually green in color or clear. She has been more fatigued and lost some weight during this time as a result. She denies hematochezia or hematemesis. Since admit, she continues to have persistent nausea and vomiting while NPO.  She had barium swallow esophagram which showed 3 potential areas of stricturing with recommended EGD for further eval.     PMH:   Past Medical History:   Diagnosis Date    Amblyopia of left eye      Blind left eye      Cataract       bilat, right has been replaced    CRI (chronic renal insufficiency)      Dementia      Essential hypertension, malignant 4/25/2014    Hypertension      Left bundle branch block      Otitis media, acute 7/2/16     left     Allergies:   None     Home Meds:   ciprofloxacin HCl (CIPRO) 250 mg tablet TK 1 T PO TWO TIMES A DAY FOR 3 DAYS   0     minoxidil (LONITEN) 2.5 mg tablet TAKE 1 TABLET BY MOUTH ONCE A DAY        ondansetron (ZOFRAN ODT) 4 mg disintegrating tablet Take 1 Tab by mouth every eight (8) hours as needed for Nausea. 30 Tab 1    atorvastatin (LIPITOR) 80 mg tablet Take 80 mg by mouth nightly.        cloNIDine HCl (CATAPRES) 0.2 mg tablet TAKE 1 TABLET BY MOUTH TWICE DAILY 180 Tab 5    minoxidil (LONITEN) 2.5 mg tablet Take  by mouth daily.        Hydrochlorothiazide (HYDRODIURIL) 12.5 mg tablet Take 12.5 mg by mouth daily.        aspirin delayed-release 81 mg tablet Take 1 Tab by mouth daily. 1 Tab 0    carvedilol (COREG) 25 mg tablet Take 1 Tab by mouth two (2) times a day. Indications: HYPERTENSION         FamHx:  Mother: unable to report due to dementia  Father: unable to report due to dementia    Social Hx:  Tobacco: Never smoker  Alcohol: None      ROS:  Constitutional: Positive for weight loss, fatigue; denies fevers, chills. HEENT: Denies headache, vision changes, hearing loss, rhinitis, sore throat. Respiratory: Denies cough, SOB, wheezing. Cardiovascular: Denies chest pain, edema, orthopnea, claudication. Gastrointestinal: Positive for nausea and vomiting; denies abdomen pain, diarrhea, dark or bloody stool. Genitourinary: Denies dysuria, hematuria. Neurologic: Denies dizziness, weakness, syncope. Objective:      Visit Vitals    BP (!) 162/105 (BP 1 Location: Right arm, BP Patient Position: At rest)    Pulse 98    Temp 98.5 °F (36.9 °C)    Resp 16    Ht 5' (1.524 m)    Wt 49.4 kg (109 lb)    SpO2 96%    Breastfeeding No    BMI 21.29 kg/m2         Physical Exam:  General: Well appearing, well nourished. Alert and cooperative, in NAD. Appropriate mood and affect. Skin: Warm, dry, and intact without lesions, erythema, bruising, or pallor. HEENT: Normocephalic, atraumatic. PERRL, EOMI, vision grossly intact. Hearing grossly intact. Neck supple, without lymphadenopathy or thyromegaly. Trachea midline. Cardiovascular: RRR.  No peripheral edema, cyanosis, or pallor. Radial and dorsalis pedis pulses strong and equal bilaterally. Capillary refill <2 seconds. Respiratory: CTAB without wheezes. Abdominal: Soft, non-distended. Tender to palpation of RLQ, non-tender in other quadrants. Normoactive bowel sounds. No guarding or rebound tenderness. No CVA tenderness. Musculoskeletal: Normal muscular development. ROM spine and extremities grossly intact. Labs:   3/15/2018  6:39 AM - Joon, Lab In Confer   Component Results   Component Value Flag Ref Range Units Status   WBC 15.6 (H) 4.6 - 13.2 K/uL Final   RBC 3.79 (L) 4.20 - 5.30 M/uL Final   HGB 11.1 (L) 12.0 - 16.0 g/dL Final   HCT 33.4 (L) 35.0 - 45.0 % Final   MCV 88.1  74.0 - 97.0 FL Final   MCH 29.3  24.0 - 34.0 PG Final   MCHC 33.2  31.0 - 37.0 g/dL Final   RDW 15.9 (H) 11.6 - 14.5 % Final   PLATELET 601  090 - 947 K/uL Final   MPV 9.6  9.2 - 11.8 FL Final   NEUTROPHILS 84 (H) 40 - 73 % Final   LYMPHOCYTES 9 (L) 21 - 52 % Final   MONOCYTES 7  3 - 10 % Final   EOSINOPHILS 0  0 - 5 % Final   BASOPHILS 0  0 - 2 % Final   ABS. NEUTROPHILS 12.9 (H) 1.8 - 8.0 K/UL Final   ABS. LYMPHOCYTES 1.5  0.9 - 3.6 K/UL Final   ABS. MONOCYTES 1.1  0.05 - 1.2 K/UL Final   ABS. EOSINOPHILS 0.1  0.0 - 0.4 K/UL Final   ABS.  BASOPHILS 0.0  0.0 - 0.06 K/UL Final   DF AUTOMATED     Final     3/15/2018  6:50 AM - Joon, Lab In Confer   Component Results   Component Value Flag Ref Range Units Status   Sodium 143  136 - 145 mmol/L Final   Potassium 3.1 (L) 3.5 - 5.5 mmol/L Final   Chloride 108  100 - 108 mmol/L Final   CO2 23  21 - 32 mmol/L Final   Anion gap 12  3.0 - 18 mmol/L Final   Glucose 134 (H) 74 - 99 mg/dL Final   BUN 9  7.0 - 18 MG/DL Final   Creatinine 1.19  0.6 - 1.3 MG/DL Final   BUN/Creatinine ratio 8 (L) 12 - 20   Final   GFR est AA 53 (L) >60 ml/min/1.73m2 Final   GFR est non-AA 44 (L) >60 ml/min/1.73m2 Final   Calcium 7.9 (L) 8.5 - 10.1 MG/DL Final     3/15/2018  6:50 AM - Joon, Lab In Confer   Component Results Component Value Flag Ref Range Units Status   Magnesium 1.9  1.6 - 2.6 mg/dL Final     3/15/2018  7:47 AM - Joon, Lab In [facility]   Component Results   Component Value Flag Ref Range Units Status   Sed rate, automated 35 (H) 0 - 30 mm/hr Final     3/15/2018  7:48 AM - Joon, Lab In [facility]   Component Results   Component Value Flag Ref Range Units Status   Cortisol, random 44.7 (H) 3.09 - 22.40 ug/dL Final     3/15/2018  7:02 AM - Joon, Lab In [facility]   Component Results   Component Value Flag Ref Range Units Status   Iron 94  50 - 175 ug/dL Final   TIBC 171 (L) 250 - 450 ug/dL Final   Iron % saturation 55   % Final     3/15/2018  7:02 AM - Joon, Lab In [facility]   Component Results   Component Value Flag Ref Range Units Status   Ferritin 319  8 - 388 NG/ML Final       Imaging:  Barium esophagram:   Limited study secondary to refusal of the patient to drink the oral contrast.  The study was performed with hand-injection of contrast via syringes. 3 potential sites of focal narrowing involving the esophagus as noted. Endoscopy  correlation would be helpful. Barium swallow:   No evidence for airway penetration or aspiration. HIDA:  Patent cystic duct. Decreased gallbladder ejection fraction of approximately 34%   suggesting gallbladder dysfunction. CT abd pelvis:   No acute or focal abnormality to explain patient's nausea or vomiting. Minimal  layering density in the gallbladder but no secondary CT findings of  cholecystitis. Chronic diverticular disease. No findings of an acute  diverticulitis. Assessment/Plan:   *Medical student note*  1) Esophageal stricture: EGD eval for stricture and possible bx to rule out malignancy; consider dilation of stricture and ongoing PPI therapy. 2) Hypokalemia: start IV K+ replacement  3) HTN: continue antihypertensive therapy       Jade Altamirano  3/15/2018  9:47 AM    *ATTENTION:  This note has been created by a medical student for educational purposes only.   Please do not refer to the content of this note for clinical decision-making, billing, or other purposes. Please see attending physicians note to obtain clinical information on this patient. *

## 2018-03-15 NOTE — H&P
TRANSFER - IN REPORT:    Verbal report received from Satish on [place]  being received from 2100 for ordered procedure      Report consisted of patients Situation, Background, Assessment and   Recommendations(SBAR). Information from the following report(s) Kardex, Procedure Summary, MAR and Recent Results was reviewed with the receiving nurse. Opportunity for questions and clarification was provided. Assessment completed upon patients arrival to unit and care assumed.

## 2018-03-15 NOTE — PROGRESS NOTES
Problem: Mobility Impaired (Adult and Pediatric)  Goal: *Acute Goals and Plan of Care (Insert Text)  Physical Therapy Goals  Initiated 3/13/2018 and to be accomplished within 7 day(s)  1. Patient will move from supine to sit and sit to supine , scoot up and down and roll side to side in bed with modified independence. 2.  Patient will transfer from bed to chair and chair to bed with supervision/set-up using the least restrictive device. 3.  Patient will perform sit to stand with supervision/set-up. 4.  Patient will ambulate with supervision/set-up for 75 feet with the least restrictive device. 5.  Patient will ascend/descend 3 stairs with  handrail(s) with minimal assistance/contact guard assist to egress home . Outcome: Progressing Towards Goal  physical Therapy TREATMENT    Patient: Adria Anton (68 y.o. female)  Date: 3/15/2018  Diagnosis: Nausea & vomiting  Nausea and vomiting  nausea and vomiting Nausea & vomiting  Procedure(s) (LRB):  ESOPHAGOGASTRODUODENOSCOPY (EGD) (N/A) Day of Surgery  Precautions: Fall  Chart, physical therapy assessment, plan of care and goals were reviewed. PLOF: Assist x1 for amb    ASSESSMENT:  Oriented to person and place; disoriented to time. Mod A for supine to sit. Supervision for sitting balance. Completed seated BLE exercise. Mod A for sit to stand. Min A for side steps x4ft. Verbal and tactile cues for all exercises and mobility. Returned to supine in bed with mod A. All needs in reach. Verbalized appropriate use of call bell. EDUCATION: bed mobility, transfers, amb, balance, exercise, cognition  Progression toward goals:        Improving appropriately and progressing toward goals     PLAN:  Patient continues to benefit from skilled intervention to address the above impairments. Continue treatment per established plan of care.   Discharge Recommendations:  Indio Chowdhury  Further Equipment Recommendations for Discharge:  rolling walker SUBJECTIVE:   Not interactive    OBJECTIVE DATA SUMMARY:   Critical Behavior:  Neurologic State: Alert  Orientation Level: Oriented to person, Disoriented to time, Disoriented to situation, Oriented to place  Cognition: Decreased attention/concentration, Follows commands  Safety/Judgement: Decreased insight into deficits    [employer] Balance Scale 1+/5  0: Pt performs 25% or less of standing activity (Max assist) CN, 100% impaired. 1: Pt supports self with upper extremities but requires therapist assistance. Pt performs 25-50% of effort (Mod assist) CM, 80% to <100% impaired. 1+: Pt supports self with upper extremities but requires therapist assistance. Pt performs >50% effort. (Min assist). CL, 60% to <80% impaired. 2: Pt supports self independently with both upper extremities (walker, crutches, parallel bars). CL, 60% to <80% impaired. 2+: Pt support self independently with 1 upper extremity (cane, crutch, 1 parallel bar). CK, 40% to <60% impaired. 3: Pt stands without upper extremity support for up to 30 seconds. CK, 40% to <60% impaired. 3+: Pt stands without upper extremity support for 30 seconds or greater. CJ, 20% to <40% impaired. 4: Pt independently moves and returns center of gravity 1-2 inches in one plane. CJ, 20% to <40% impaired. 4+: Pt independently moves and returns center of gravity 1-2 inches in multiple planes. CI, 1% to <20% impaired. 5: Pt independently moves and returns center of gravity in all planes greater than 2 inches. CH, 0% impaired. G CODE:Mobility [id number] Current  CL= 60-79%   Goal  CI= 1-19%. The severity rating is based on the Other [facility] Sitting Balance Scale 2/5  Functional Mobility Training:  Bed Mobility:  Supine to Sit: Moderate assistance  Sit to Supine: Moderate assistance  Transfers:  Sit to Stand:  Moderate assistance  Stand to Sit: Moderate assistance  Balance:  Sitting: Impaired  Sitting - Static: Fair (occasional)  Sitting - Dynamic: Fair (occasional)  Standing: Impaired  Standing - Static: Fair  Standing - Dynamic : Fair  Ambulation/Gait Training:  Distance (ft): 4 Feet (ft)  Ambulation - Level of Assistance: Minimal assistance  Neuro Re-Education:  Seated EOB 8 minutes  Therapeutic Exercises:   Seated BLE hip flexion, knee extension x10  Vital Signs  Temp: 98.5 °F (36.9 °C)     Pulse (Heart Rate): 98     BP: 159/89     Resp Rate: 16     O2 Sat (%): 97 %  Pain:  Pre treatment pain level: 0  Post treatment pain level: 0  Pain Scale 1: Numeric (0 - 10)  Pain Intensity 1: 0  Activity Tolerance:   Fair     After treatment:   Patient left in no apparent distress in bed  Call bell left within reach  Nursing notified    Familia Sanchez PT   Time Calculation: 15 mins

## 2018-03-15 NOTE — ROUTINE PROCESS
Bedside / verbal shift change report given to 805 Bimal Varner  (oncoming nurse) by Joaquina Asif RN (offgoing nurse). Report included the following information SBAR, Kardex, Intake/Output, MAR and Recent Results.

## 2018-03-15 NOTE — PROGRESS NOTES
: Bedside verbal shift report received from [facility]. Pt is awake in bed, no signs of distress, instructed to press call light if assistance is needed, call light within reach. Daughter at bedside. BP recheck at shift change. 2100: Pt has 3 runs of potassium due at 1900, , and . This RN took the last bag from the pyxis and notified pharmacy that 2 more bags are needed. Pharmacy technician said that it will be loaded before they close. Checked again an hour later and it still has not been loaded. [address] is now closed. Potassium has  from the pyxis. Spoke with Lisa Thornton from pharmacy in [place] and he said that the order will need to be re-entered. This RN re-entered the potassium doses so that potassium will be available to pull from pyxis. : Pt's BP is elevated at 182/112. Hydralazine was given and metoprolol PRN and not due at this time. Labetalol PRN is available but pt is not on tele monitor. Paged Dr. Romie Farooq, he states to put pt on tele monitor. Labetalol will be given shortly    0735: Bedside and Verbal shift change report given to Cecily Yu, RN (oncoming nurse) by Fernando Bright RN (offgoing nurse). Report included the following information SBAR, Kardex, Intake/Output, MAR and Recent Results.

## 2018-03-15 NOTE — PROGRESS NOTES
Patient received in bed awake. Patient alert and oriented X4, behavior indicators pain and discomfort. Patient resting quietly. Frequent use items within reach. Bed locked in low position. Call bell within reach and patient verbalized understanding of use for assistance and needs. 1000:  Melony from Janie and Dr. Landis from anesthesia at bedside.

## 2018-03-15 NOTE — ANESTHESIA POSTPROCEDURE EVALUATION
Post-Anesthesia Evaluation and Assessment    Patient: Cb Ashford MRN: [id number]  SSN: [id number]    YOB: 1940  Age: 68 y.o. Sex: female       Cardiovascular Function/Vital Signs  Visit Vitals    /86 (BP 1 Location: Right arm, BP Patient Position: At rest)    Pulse 87    Temp 37.1 °C (98.7 °F)    Resp 14    Ht 5' (1.524 m)    Wt 49.4 kg (109 lb)    SpO2 96%    Breastfeeding No    BMI 21.29 kg/m2       Patient is status post MAC anesthesia for Procedure(s):  ESOPHAGOGASTRODUODENOSCOPY (EGD). Nausea/Vomiting: None    Postoperative hydration reviewed and adequate. Pain:  Pain Scale 1: Visual (03/15/18 1300)  Pain Intensity 1: 0 (03/15/18 1300)   Managed    Neurological Status:   Neuro  Neurologic State: Alert (03/15/18 1300)  Orientation Level: Oriented to person;Disoriented to time;Disoriented to situation;Oriented to place (03/15/18 1300)  Cognition: Decreased attention/concentration; Follows commands (03/15/18 1300)  Speech: Clear (03/15/18 0730)  Assessment L Pupil: Brisk;Round (03/15/18 0730)  Size L Pupil (mm): 2 (03/15/18 0730)  Assessment R Pupil: Brisk;Round (03/15/18 0730)  Size R Pupil (mm): 2 (03/15/18 0730)  LUE Motor Response: Purposeful;Weak (03/15/18 0730)  LLE Motor Response: Purposeful;Weak (03/15/18 0730)  RUE Motor Response: Purposeful;Weak (03/15/18 0730)  RLE Motor Response: Purposeful;Weak (03/15/18 0730)   At baseline    Mental Status and Level of Consciousness: Arousable    Pulmonary Status:   O2 Device: Room air (03/15/18 1304)   Adequate oxygenation and airway patent    Complications related to anesthesia: None    Post-anesthesia assessment completed.  No concerns    Signed By: Nisha Orozco MD     March 15, 2018

## 2018-03-15 NOTE — PROGRESS NOTES
Bedside and Verbal shift change report given to VOLODYMYR Bernstein (oncoming nurse) by Toni Roberto RN BSN (offgoing nurse). Report included the following information SBAR, Kardex, Intake/Output, MAR and Recent Results.

## 2018-03-15 NOTE — PROGRESS NOTES
Transportation at Discharge: 3/15/18    Transport Company: [place] [employer] Free)  Reference number:    Estimated pick-up time: Placed on Will Call    Method of Transport: [id number] [facility] 28: [facility] O  Authorization: Sydnie otero per [place]    Made D/C transportation arrangements at the request of Ramandeepmary 36, [address] [facility] Specialist ext [phone]

## 2018-03-15 NOTE — PROGRESS NOTES
Transportation at Discharge:  3/16/18 -Expected [address] ([address])  Reference number:    Estimated pick-up time: 930a    Method of Transport: [employer] Info: [employer]  Authorization: No auth requried    Made D/C tranportation arrangements at the request of Outcomes Manager Boone Lacy.        Cedrick Hernandez, Care- ext [phone]

## 2018-03-15 NOTE — ANESTHESIA PREPROCEDURE EVALUATION
Anesthetic History     PONV          Review of Systems / Medical History  Patient summary reviewed and pertinent labs reviewed    Pulmonary  Within defined limits                 Neuro/Psych         TIA     Cardiovascular    Hypertension: poorly controlled              Exercise tolerance: >4 METS     GI/Hepatic/Renal  Within defined limits              Endo/Other  Within defined limits           Other Findings   Comments: Documentation of current medication  Current medications obtained, documented and obtained? YES      Risk Factors for Postoperative nausea/vomiting:       History of postoperative nausea/vomiting? YES       Female? YES       Motion sickness? NO       Intended opioid administration for postoperative analgesia? NO      Smoking Abstinence:  Current Smoker? NO  Elective Surgery? YES  Seen preoperatively by anesthesiologist or proxy prior to day of surgery? YES  Pt abstained from smoking 24 hours prior to anesthesia?  N/A    Preventive care/screening for High Blood Pressure:  Aged 18 years and older: YES  Screened for high blood pressure: YES  Patients with high blood pressure referred to primary care provider   for BP management: YES                 Physical Exam    Airway  Mallampati: III  TM Distance: 4 - 6 cm  Neck ROM: normal range of motion   Mouth opening: Normal     Cardiovascular  Regular rate and rhythm,  S1 and S2 normal,  no murmur, click, rub, or gallop  Rhythm: regular  Rate: normal         Dental    Dentition: Lower dentition intact and Upper dentition intact     Pulmonary  Breath sounds clear to auscultation               Abdominal  GI exam deferred       Other Findings            Anesthetic Plan    ASA: 3  Anesthesia type: MAC            Anesthetic plan and risks discussed with: Patient and Son / Daughter

## 2018-03-15 NOTE — PROGRESS NOTES
[facility]  Hospitalist Division        Inpatient Daily Progress Note    Daily progress Note    Patient: Adria Anton MRN: [id number]  CSN: [id number]    YOB: 1940  Age: 68 y.o. Sex: female    DOA: 3/12/2018 LOS:  LOS: 1 day                    Chief Complaint: N/V- unclear etiology     Interval History:    The patient is a pleasant 59-year-old female with past medical history significant for hypertension, dementia, recurrent nausea and vomiting for which she has been in the ER for 4 times and has been followed by her PCP as well. On one occasion, the patient was discharged with possible pneumonia. Last time she was treated for UTI; however, she continued to have nausea and vomiting with very poor p.o. intake and significant weight loss. Per daughter, the patient has been feeling nauseous and been vomiting since this morning, two more episodes in the ER. She has had zero p.o. intake today and minimal liquid intake yesterday. The patient has not seen a GI doctor per family and has never had an EGD or colonoscopy. Patient admitted for ongoing management. CT Abd/pelvis showed No acute or focal abnormality to explain patient's nausea or vomiting. Minimal layering density in the gallbladder but no secondary CT findings of cholecystitis. Chronic diverticular disease. No findings of an acute diverticulitis. HIDA scan showed patent cystic duct. Decreased gallbladder ejection fraction of approximately 34 % suggesting gallbladder dysfunction. GI consulted 3/14. EGD on 3/15 1. -Normal upper endoscopy, with no endoscopic evidence of neoplasia or mucosal abnormality. 2. -Esophagus empirically dilated with #48 Munoz. GI soft diet ordered.      Subjective:      Lethargic- post EGD     Objective:      Visit Vitals    /86 (BP 1 Location: Right arm, BP Patient Position: At rest)    Pulse 87    Temp 98.7 °F (37.1 °C)    Resp 14    Ht 5' (1.524 m)    Wt 49.4 kg (109 lb)    SpO2 96%    Breastfeeding No    BMI 21.29 kg/m2       Physical Exam:  General appearance: lethargic, arouses easily   Lungs: clear to auscultation throughout, no wheezes   Heart: regular rate and rhythm, S1, S2 normal, no murmur, click, rub or gallop  Abdomen: soft, non tender, non distended. Normoactive bowel sounds  Extremities: extremities normal, atraumatic, no cyanosis or edema  Skin: Skin color, texture, turgor normal. No rashes or lesions  Neurologic: moves all 4 extremities equally   PSY: Mood and affect normal, appropriately behaved      Intake and Output:  Current Shift:  03/15 0701 - 03/15 1900  In: 50 [I.V.:50]  Out: -   Last three shifts:  03/13 1901 - 03/15 0700  In: 1635 [I.V.:1635]  Out: 600 [Urine:600]    Recent Results (from the past 24 hour(s))   METABOLIC PANEL, BASIC    Collection Time: 03/15/18  5:05 AM   Result Value Ref Range    Sodium 143 136 - 145 mmol/L    Potassium 3.1 (L) 3.5 - 5.5 mmol/L    Chloride 108 100 - 108 mmol/L    CO2 23 21 - 32 mmol/L    Anion gap 12 3.0 - 18 mmol/L    Glucose 134 (H) 74 - 99 mg/dL    BUN 9 7.0 - 18 MG/DL    Creatinine 1.19 0.6 - 1.3 MG/DL    BUN/Creatinine ratio 8 (L) 12 - 20      GFR est AA 53 (L) >60 ml/min/1.73m2    GFR est non-AA 44 (L) >60 ml/min/1.73m2    Calcium 7.9 (L) 8.5 - 10.1 MG/DL   MAGNESIUM    Collection Time: 03/15/18  5:05 AM   Result Value Ref Range    Magnesium 1.9 1.6 - 2.6 mg/dL   CBC WITH AUTOMATED DIFF    Collection Time: 03/15/18  5:05 AM   Result Value Ref Range    WBC 15.6 (H) 4.6 - 13.2 K/uL    RBC 3.79 (L) 4.20 - 5.30 M/uL    HGB 11.1 (L) 12.0 - 16.0 g/dL    HCT 33.4 (L) 35.0 - 45.0 %    MCV 88.1 74.0 - 97.0 FL    MCH 29.3 24.0 - 34.0 PG    MCHC 33.2 31.0 - 37.0 g/dL    RDW 15.9 (H) 11.6 - 14.5 %    PLATELET 781 291 - 327 K/uL    MPV 9.6 9.2 - 11.8 FL    NEUTROPHILS 84 (H) 40 - 73 %    LYMPHOCYTES 9 (L) 21 - 52 %    MONOCYTES 7 3 - 10 %    EOSINOPHILS 0 0 - 5 %    BASOPHILS 0 0 - 2 %    ABS.  NEUTROPHILS 12.9 (H) 1.8 - 8.0 K/UL    ABS. LYMPHOCYTES 1.5 0.9 - 3.6 K/UL    ABS. MONOCYTES 1.1 0.05 - 1.2 K/UL    ABS. EOSINOPHILS 0.1 0.0 - 0.4 K/UL    ABS.  BASOPHILS 0.0 0.0 - 0.06 K/UL    DF AUTOMATED     SED RATE (ESR)    Collection Time: 03/15/18  5:05 AM   Result Value Ref Range    Sed rate, automated 35 (H) 0 - 30 mm/hr   T4 (THYROXINE)    Collection Time: 03/15/18  5:05 AM   Result Value Ref Range    T4, Total 12.2 (H) 4.5 - 10.9 ug/dL   CORTISOL    Collection Time: 03/15/18  5:05 AM   Result Value Ref Range    Cortisol, random 44.7 (H) 3.09 - 22.40 ug/dL   BILIRUBIN, FRACTIONATED    Collection Time: 03/15/18  5:05 AM   Result Value Ref Range    Bilirubin, total 0.6 0.2 - 1.0 MG/DL    Bilirubin, direct 0.2 0.0 - 0.2 MG/DL    Bilirubin, indirect 0.4 MG/DL   IRON PROFILE    Collection Time: 03/15/18  5:05 AM   Result Value Ref Range    Iron 94 50 - 175 ug/dL    TIBC 171 (L) 250 - 450 ug/dL    Iron % saturation 55 %   FERRITIN    Collection Time: 03/15/18  5:05 AM   Result Value Ref Range    Ferritin 319 8 - 388 NG/ML           Lab Results   Component Value Date/Time    Glucose 134 (H) 03/15/2018 05:05 AM    Glucose 156 (H) 03/14/2018 05:20 AM    Glucose 137 (H) 03/13/2018 06:35 AM    Glucose 92 03/12/2018 11:21 AM    Glucose 114 (H) 03/02/2018 06:24 PM        Assessment/Plan:     Patient Active Problem List   Diagnosis Code    Essential hypertension, malignant I10    NESS (acute kidney injury) ([facility] [address] N17.9    Dehydration E86.0    Abnormality of gait and mobility R26.9    Stroke (HCC) I63.9    CKD (chronic kidney disease) N18.9    HTN (hypertension) I10    Follow up Z09    Nausea & vomiting R11.2    Nausea and vomiting R11.2        HTN resistant  - Coreg, Catapres restarted 3/15  - Hydralazine PRN      N/V  - HIDA scan with very mild dysfunction of GB  - MBS showed no evidence for airway penetration or aspiration  - GI consulted 3/14  - EGD 3/15- 1. -Normal upper endoscopy, with no endoscopic evidence of neoplasia or mucosal abnormality. 2. -Esophagus empirically dilated with #48 [place]    UTI  - Culture with Candida Albicans  - Discontinue Rocephin  - Diflucan for 5 days starting 3/15     Hypokalemia  - Replete with 4 runs IV now.  Magnesium 1.9   - Follow BMP     DVT prophylaxis  - Heparin         Nedra Alpers, FNP-[facility]  Hospitalist Division  Pager:  [phone]  Office:  [phone]

## 2018-03-15 NOTE — PROGRESS NOTES
Speech Therapy Note:     SLP attempting follow up this morning; however, pt NPO for EGD procedure today per RN Verner Justin.  Will hold and follow up next day as indicated.      Tadeo Metcalf, SLP Intern  Office: [phone]

## 2018-03-15 NOTE — PROGRESS NOTES
Per [facility] pt can [facility]. Set transport for tomorrow am at 0930. Pcs completed , envelope in nurses station. Left messages for both her son and dtr. pt sleeping soundly will notifiy her when awake.

## 2018-03-15 NOTE — PROGRESS NOTES
Problem: Falls - Risk of  Goal: *Absence of Falls  Document [place] Fall Risk and appropriate interventions in the flowsheet.    Outcome: Progressing Towards Goal  Fall Risk Interventions:       Mentation Interventions: Adequate sleep, hydration, pain control    Medication Interventions: Evaluate medications/consider consulting pharmacy    Elimination Interventions: Call light in reach

## 2018-03-15 NOTE — PROGRESS NOTES
Problem: Self Care Deficits Care Plan (Adult)  Goal: *Acute Goals and Plan of Care (Insert Text)  Occupational Therapy Goals  Initiated 3/14/2018 within 7 day(s). 1.  Patient will perform grooming tasks at EOB with minimal assistance for balance. 2.  Patient will perform upper body dressing with minimal assistance. 3.  Patient will perform functional task at EOB for 8 minutes with minimal assistance safety to increase activity tolerance for ADLs. 4.  Patient will perform toilet transfers with minimal assistance. 5.  Patient will perform all aspects of toileting with minimal assistance. 6.  Patient will participate in upper extremity therapeutic exercise/activities with minimal assistance for 8 minutes to increase BUE strength for ADLs & functional transfers. 7.  Patient will utilize energy conservation techniques during functional activities with minimal verbal cues. Occupational Therapy TREATMENT    Patient: Rafael Rodrigues (68 y.o. female)  Date: 3/15/2018  Diagnosis: Nausea & vomiting  Nausea and vomiting  nausea and vomiting Nausea & vomiting  Procedure(s) (LRB):  ESOPHAGOGASTRODUODENOSCOPY (EGD) (N/A) Day of Surgery  Precautions: Fall  Chart, occupational therapy assessment, plan of care, and goals were reviewed. PLOF: Pt required assistance with ADLs PTA. Pt with limited mobility and functional mobility last few weeks PTA. ASSESSMENT:  Pt presented supine in bed with HOB raised agreeable to skilled OT services this date. Supportive friend entered room during treatment session. Pt participated in [address] for increased  functioning for carryover to UB dressing. Pt needed to snap snaps on hospital gown. Pt had difficulty performing task. Pt educated on UB dressing technique to facilitate greater independence with ADLs. Mod A to thread BUE through sleeve of hospital gown. Pt had difficulty with following directions t/o treatment session.  Pt left comfortable in bed and call bell within reach.  EDUCATION Pt educated on UB dressing technique to facilitate increased independence with ADLs. Progression toward goals:  []          Improving appropriately and progressing toward goals  [x]          Improving slowly and progressing toward goals  []          Not making progress toward goals and plan of care will be adjusted     PLAN:  Patient continues to benefit from skilled intervention to address the above impairments. Continue treatment per established plan of care. Discharge Recommendations:  Indio Chowdhury  Further Equipment Recommendations for Discharge:  TBD     SUBJECTIVE:   Patient stated [employer].  When asked where she was. OBJECTIVE DATA SUMMARY:     G CODES:  Self Care  Current  CL= 60-79%. The severity rating is based on the Other functional assessment    Cognitive/Behavioral Status:  Neurologic State: Alert  Orientation Level: Oriented to person, Oriented to place  Cognition: Follows commands  Safety/Judgement: Awareness of environment, Fall prevention     ADL Intervention:    [address]: Moderate assistance    Pain:0/10  Pre Treatment:  Post Treatment:  Pain Scale 1: Numeric (0 - 10)  Pain Intensity 1: 0    Activity Tolerance:    Fair  Please refer to the flowsheet for vital signs taken during this treatment.   After treatment:   []  Patient left in no apparent distress sitting up in chair  [x]  Patient left in no apparent distress in bed  [x]  Call bell left within reach  []  Nursing notified  [x]  Caregiver present (supportive friend)  []  Bed alarm activated    BRET Honeycutt  Time Calculation: 20 mins

## 2018-03-15 NOTE — PROCEDURES
Endoscopy Procedure Note    Patient: Rafael Rodrigues MRN: [id number]  SSN: [id number]    YOB: 1940  Age: 68 y.o. Sex: female      Date/Time:  3/15/2018 1:07 PM    Esophagogastroduodenoscopy (EGD) Procedure Note    Procedure: Esophagogastroduodenoscopy with esophageal dilation    IMPRESSION:   1. -Normal upper endoscopy, with no endoscopic evidence of neoplasia or mucosal abnormality. 2. -Esophagus empirically dilated with #48 Munoz      RECOMMENDATIONS:  1. -Follow symptoms. 2. -GES if symptoms persist  3. -Feed pt    Indication: Nausea, vomiting ,weight loss  :  Maurizio Mistry MD  Referring Provider: Blayne Szymanski MD  History: The history and physical exam were reviewed and updated. Endoscope: [id number]  Extent of Exam: third portion of the duodenum  ASA: ASA 3 - Patient with moderate systemic disease with functional limitations  Anethesia/Sedation:  MAC anesthesia    Description of the procedure: The procedure was discussed with the patient including risks, benefits, alternatives including risks of iv sedation, bleeding, perforation and aspiration. A safety timeout was performed. The patient was placed in the left lateral decubitus position. A bite block was placed. The patient was given incremental doses of intravenous sedation until moderate sedation was achieved. The patients vital signs were monitored at all times including heart rate/rhythm, blood pressure and oxygen saturation. The endoscope was then passed under direct visualization to the third portion of the duodenum. The endoscope was then slowly withdrawn while visualizing the mucosa. In the stomach a retroflexion was performed and gastric fundus and cardia visualized. The endoscope was then slowly withdrawn. The patient was then transferred to recovery in stable condition. Findings:    Esophagus: The esophageal mucosa was normal with no ulceration, mass or  stricture.   There was no evidence of Jordan's esophagus or reflux esophagitis. Stomach: The gastric mucosa was normal with no ulceration, mass, stricture. Duodenum: The duodenum mucosa was normal with no ulceration, mass,  stricture and no evidence of villous atrophy. Therapies:  Esophageal dilatation, #48 young    Specimens: * No specimens in log *            Complications:   None; patient tolerated the procedure well.     EBL:None    Discharge disposition: Back to room  Nydia Terry MD, ANGELA Dolan  March 15, 2018  1:07 PM

## 2018-03-15 NOTE — PROGRESS NOTES
Assumed care of patient, patient in room with daughter. Call bell in reach, bed in lowest position. Bp elevated, PRN given by off going nurse. Sbar report received by Wilson Mendoza Rn.             500: heparin held due to eeg procedure today . Patient vomited x1, green in coloration .      605 Prn metoprolol given for bp 173/111, pulse 116    621 Recheck bp 173/105

## 2018-03-16 NOTE — PROGRESS NOTES
Nutrition follow-up/Plan of care      RECOMMENDATIONS:   1. Advance diet when medically indicated and per pt tolerance  2. Ensure Clear TID when not NPO  3. Monitor weight, labs and PO intake  4. RD to follow     GOALS:   1. PO intake meets >75% of protein/calorie needs by 3/19  2. Weight Maintenance (+/- 1-2 lb) by 3/23     ASSESSMENT:   Wt status is classified as normal per BMI of 21.3. However, Pt at nutrition risk d/t BMI <23 and age >65 years. Pt w/ 20 lb or 15% weight loss over the past 2-3 months. Currently NPO, but very poor PO intake x >5 days d/t N/V. Labs noted. Pt w/ hypernatremia and hypocalcemia. Nutrition recommendations listed. RD to follow. Meets Criteria for Acute Malnutrition   [x] Severe Malnutrition, as evidenced by:   [] Moderate muscle wasting, loss of subcutaneous fat   [x] Nutritional intake of <50% of recommended intake for >5 days   [x] Weight loss of >1-2% in 1 week, >5% in 1 month, >7.5% in 3 months, or >10% in 6 months   [] Moderate-severe edema        Nutrition Diagnoses:   Malnutrition related to decreased appetite with nausea/vomiting as evidenced by around a 20 lb or 15% loss over the past 2-3 months and inadequate PO intake x >5 days. Nutrition Risk:  [x] High  [] Moderate []  Low    SUBJECTIVE/OBJECTIVE:    Pt admitted for nausea and vomiting. PMHx including  HTN, cataract, dementia and renal insufficiency. N/V x 1 month w/ poor PO intake and 20 lb wt loss since Christmas per chart review. SLP following and GI consulted. Pt is s/p MBS (3/13) showed no evidence for airway penetration or aspiration; HIDA scan (3/13) showed very mild dysfunction of GB and EGD on (3/15) showing no endoscopic evidence of neoplasia or mucosal abnormality and esophagus empirically dilated. Daughter provided Hx during last visit. Reports that \"It has been one thing after the other starting in Jan\" and noted wt loss of 20 lb or 15% over the past 2-3 months; UBW ~126 lb.  Denies pt having any food allergies or problems chewing/swallowig PTA. Stated pt has preferred softer foods d/t the N/V though. Per d/w RN, Rosanna De La Rosa, Pt still not tolerating any PO diet d/t N/V and plan was for gastritic emptying scan, but postponed d/t Pt receiving Reglan and still with N/V. Will monitor. Information Obtained from:    [x] Chart Review   [x] Patient   [x] Family/Caregiver   [x] Nurse/Physician   [] Interdisciplinary Meeting/Rounds      Diet: NPO  Medications: [x] Reviewed  Lipitor, Reglan  Allergies: [x] Reviewed   Encounter Diagnoses     ICD-10-CM ICD-9-CM   1. Intractable vomiting with nausea, unspecified vomiting type R11.2 536.2   2. Adult failure to thrive R62.7 783.7   3.  Yeast vaginitis B37.3 112.1     Past Medical History:   Diagnosis Date    Amblyopia of left eye     Blind left eye     Cataract     bilat, right has been replaced    CRI (chronic renal insufficiency)     Dementia     Essential hypertension, malignant 4/25/2014    Hypertension     Left bundle branch block     Otitis media, acute 7/2/16    left      Labs:    Lab Results   Component Value Date/Time    Sodium 146 (H) 03/16/2018 04:22 AM    Potassium 3.7 03/16/2018 04:22 AM    Chloride 110 (H) 03/16/2018 04:22 AM    CO2 23 03/16/2018 04:22 AM    Anion gap 13 03/16/2018 04:22 AM    Glucose 92 03/16/2018 04:22 AM    BUN 10 03/16/2018 04:22 AM    Creatinine 1.07 03/16/2018 04:22 AM    Calcium 7.8 (L) 03/16/2018 04:22 AM    Magnesium 1.6 03/16/2018 04:22 AM    Phosphorus 4.1 10/04/2015 06:44 AM    Albumin 3.2 (L) 03/12/2018 11:21 AM     Anthropometrics: BMI (calculated): 21.3  Last 3 Recorded Weights in this Encounter    03/12/18 1009   Weight: 49.4 kg (109 lb)      Ht Readings from Last 1 Encounters:   03/12/18 5' (1.524 m)     Weight Metrics 3/12/2018 3/8/2018 3/2/2018 2/19/2018 2/17/2018 10/3/2017 1/31/2017   Weight 109 lb 95 lb 95 lb 107 lb 14.4 oz 120 lb 125 lb 129 lb   BMI 21.29 kg/m2 18.55 kg/m2 18.55 kg/m2 21.07 kg/m2 23.44 kg/m2 24.41 kg/m2 25.19 kg/m2       No data found.       IBW: 100 lb %IBW: 109% UBW: 125-130 lb  [x] Weight Loss [] Weight Gain [] Weight Stable    Estimated Nutrition Needs: [x] MSJ  [] Other:  Calories: 1400-6189 kcal Based on:   [x] Actual BW    Protein:   59-64 g Based on:   [x] Actual BW    Fluid:       5837-4393 ml Based on:   [x] Actual BW     Nutrition Problems Identified:   [x] Suboptimal PO intake v/s NPO   [] Food Allergies  [] Difficulty chewing/swallowing/poor dentition  [] Constipation/Diarrhea   [x] Nausea/Vomiting   [] None  [] Other:     Plan:   [] Therapeutic Diet  []  Obtained/adjusted food preferences/tolerances and/or snacks options   [x]  Continue supplements added when not NPO  [] Occupational therapy following for feeding techniques  []  HS snack added   []  Modify diet texture   []  Modify diet for food allergies   []  Educate patient   []  Assist with menu selection   []  Monitor PO intake on meal rounds   [x]  Continue inpatient monitoring and intervention   []  Participated in discharge planning/Interdisciplinary rounds/Team meetings   []  Other:     Education Needs:   [x] Not appropriate for teaching at this time due to: NPO   [] Identified and addressed    Nutrition Monitoring and Evaluation:  [x] Continue ongoing monitoring and intervention  [] [place]

## 2018-03-16 NOTE — PROGRESS NOTES
Spoke with VOLODYMYR Bernstein regarding PT ability to complete gastric emptying study. RN stated PT was vomiting before and after being given applesauce around 1030 last night, 03/15/2018. Advised RN that in order to complete the gastric emptying study PT will need to be able to eat eggs and keep them down throughout the 4 hours of imaging. RN stated she did not believe the PT would be able to do that and will contact Dr. Anshul Mena to see how he wants to proceed.

## 2018-03-16 NOTE — ANCILLARY DISCHARGE INSTRUCTIONS
Patient and/or next of kin has been given the [facility] Important Message From Medicare About Your Rights\" letter and all questions were answered.

## 2018-03-16 NOTE — ADT AUTH CERT NOTES
Patient Demographics        Patient Name [address] Sex  Address Phone       Clayton Ornel[place] [id number] Female [address] [place] [address] Center   [address] [place] [address] [phone] (Home)  [phone] (Mobile)           CSN:       [id number]           Admit Date: Admit Time Room Bed       Mar 12, 2018 10:04 AM 2104 [[id number]] 01 [[id number]]           Attending Providers        Provider Pager From To       Donte Garza MD  18       Donaldo Gallo MD  18       Umberto Sosa MD  18            Emergency Contact(s)        Name Relation Home Work Mobile       Jamison Santos [phone]       Riley Kong Child [phone]         Utilization Review           Vomiting - Care Day 4 (3/15/2018) by Eliezer Flores RN        Review Entered Review Status       3/16/2018 Completed       Details              Care Day: 4 Care Date: 3/15/2018 Level of Care: Inpatient Floor       Guideline Day 2        Clinical Status       ( ) * Hemodynamic stability       3/16/2018 10:58 AM EDT by Radha Pemberton         98 101 16 95% 182/112 BP: 188/121 189/110 173/111 166/104 162/105 -117              ( ) * Vomiting absent or controlled       3/16/2018 10:58 AM EDT by Radha Key         STILL GETTING PRN IV ZOFRAN X 1 TODAY              (X) * Electrolyte levels adequate       3/16/2018 10:58 AM EDT by Radha Key         GETTING RUNS OF K AND MAG              (X) * Life-threatening causes of vomiting excluded       (X) * Pain absent or managed       3/16/2018 10:58 AM EDT by Radha Pemberton         N/V PRESENT              ( ) * Discharge plans and education understood              Activity       (X) * Ambulatory              Routes       (X) * Oral hydration       3/16/2018 10:58 AM EDT by Radha Key         NPO              (X) * Liquid or advanced diet       3/16/2018 10:58 AM EDT by Radha Pemberton         NPO              (X) Oral medications       3/16/2018 10:58 AM EDT by Radha Sweet         CLONIDINE 0.2 MG PO BID                     Interventions       (X) Electrolytes       3/16/2018 10:58 AM EDT by Radha Key         RUNS OF K AND MAG                     Medications       (X) Possible antiemetics       3/16/2018 10:58 AM EDT by Radha Key         ZOFRAN 4 MG IV Q 6 HOURS PRN X 1                                          * Milestone              Additional Notes       3.15.18              VS: 98 101 16 95% 182/112       BP: 188/121 189/110 173/111 166/104 162/105       -117              IM:       The patient is a pleasant 68-year-old female with past medical history significant for hypertension, dementia, recurrent nausea and vomiting for which she has been in the ER for 4 times and has been followed by her PCP as well.  On one occasion, the patient was discharged with possible pneumonia.  Last time she was treated for UTI; however, she continued to have nausea and vomiting with very poor p.o. intake and significant weight loss.  Per daughter, the patient has been feeling nauseous and been vomiting since this morning, two more episodes in the ER. Kimberly Alfaro has had zero p.o. intake today and minimal liquid intake yesterday.  The patient has not seen a GI doctor per family and has never had an EGD or colonoscopy. Patient admitted for ongoing management. CT Abd/pelvis showed No acute or focal abnormality to explain patient's nausea or vomiting. Minimal layering density in the gallbladder but no secondary CT findings of cholecystitis. Chronic diverticular disease. No findings of an acute diverticulitis. HIDA scan showed patent cystic duct.  Decreased gallbladder ejection fraction of approximately 34 % suggesting gallbladder dysfunction. GI consulted 3/14. EGD on 3/15 1. -Normal upper endoscopy, with no endoscopic evidence of neoplasia or mucosal abnormality. 2. -Esophagus empirically dilated with #48 Munoz.  GI soft diet ordered.              HTN resistant       - Coreg, Catapres restarted 3/15       - Hydralazine PRN                 N/V       - HIDA scan with very mild dysfunction of GB       - MBS showed no evidence for airway penetration or aspiration       - GI consulted 3/14       - EGD 3/15- 1. -Normal upper endoscopy, with no endoscopic evidence of neoplasia or mucosal abnormality.                                      2. -Esophagus empirically dilated with #48 Munoz               UTI       - Culture with Candida Albicans       - Discontinue Rocephin       - Diflucan for 5 days starting 3/15                Hypokalemia       - Replete with 4 runs IV now.  Magnesium 1.9        - Follow BMP                DVT prophylaxis       - Heparin              RESULTS:       WBC 15.6       RBC 3.79       HGB 11.1       HCT 33.4       RDW 15.9       NEUTROPHILS 84       LYMPHOCYTES 9        K 3.1       GLUCOSE 134       CA 7.9       TIBS 171       CORTISOL 44.7       T4 12.2              MEDS:        MG CRISTHIAN RE       LIPITOR 80 MG PO BEDTIME       COREG 25 MG PO BID       ROCEPHIN 2 G IV EVERY 24 HOURS       DIFLUCAN 200 MG IV EVERY 24 HOURS       HYDRALAZINE 10 MG EVERY 6 HOURS PRN X 2       LOPRESSOR 5 MG IV EVERY 6 HRS PRN X 1       ZOFRAN 4 MG IV Q 6 HOURS PRN X 1        ML BOLUS X 1       MAG SULFATE 2 G IV ONCE        POTASSIUM 10 MEQ IV EVERY 1 HOUR        CLONIDINE 0.2 MG PO BID       dextrose 5% and 0.9% NaCl infusion  75 ML/HR CONT IV               PLAN: GATRIC EMPTYING STUDY, IV FLUIDS AND ABX              NUC MED:       Spoke with VOLODYMYR Bernstein regarding PT ability to complete gastric emptying study. Abi Otoole stated PT was vomiting before and after being given applesauce around 1030 last night, 03/15/2018.  Advised RN that in order to complete the gastric emptying study PT will need to be able to eat eggs and keep them down throughout the 4 hours of imaging.  RN stated she did not believe the PT would be able to do that and will contact  Melyssa Ingram to see how he wants to proceed           EGD 3.15.18 by Andrew Suggs RN        Review Entered Review Status       3/15/2018 In Primary       Details         3/15/18 EGD     Esophagogastroduodenoscopy (EGD) Procedure Note     Procedure: Esophagogastroduodenoscopy with esophageal dilation     IMPRESSION:   1. -Normal upper endoscopy, with no endoscopic evidence of neoplasia or mucosal abnormality. 2. -Esophagus empirically dilated with #48 Munoz        RECOMMENDATIONS:  1. -Follow symptoms. 2. -GES if symptoms persist  3. -Feed pt     Indication: Nausea, vomiting ,weight loss  Lavonne Guardado MD  Referring Jian Kendall MD  History: The history and physical exam were reviewed and updated. Endoscope: [id number]  Extent of Exam: third portion of the duodenum  ASA: ASA 3 - Patient with moderate systemic disease with functional limitations  Anethesia/Sedation:  MAC anesthesia     Description of the procedure: The procedure was discussed with the patient including risks, benefits, alternatives including risks of iv sedation, bleeding, perforation and aspiration.  A safety timeout was performed. The patient was placed in the left lateral decubitus position.  A bite block was placed.  The patient was given incremental doses of intravenous sedation until moderate sedation was achieved.  The patients vital signs were monitored at all times including heart rate/rhythm, blood pressure and oxygen saturation.  The endoscope was then passed under direct visualization to the third portion of the duodenum.  The endoscope was then slowly withdrawn while visualizing the mucosa.  In the stomach a retroflexion was performed and gastric fundus and cardia visualized.  The endoscope was then slowly withdrawn.  The patient was then transferred to recovery in stable condition.      Findings:                         Esophagus: The esophageal mucosa was normal with no ulceration, mass or                 [id number]. Callahan Shay was no evidence of Jordan's esophagus or reflux esophagitis.                         Stomach: The gastric mucosa was normal with no ulceration, mass, stricture.                         Duodenum: The duodenum mucosa was normal with no ulceration, mass,                     stricture and no evidence of villous atrophy.      Therapies:  Esophageal dilatation, #48 young     Specimens: * No specimens in log *      Complications:   None; patient tolerated the procedure well.     EBL:None     Discharge disposition: Back to room

## 2018-03-16 NOTE — PROGRESS NOTES
Spoke with Timothy Vargas RN to follow-up regarding PT ability to complete gastric emptying study. RN stated neither Dr. Macarena Huerta or NP have been by to round. Nurse was informed that once a decision is made doses will need to be ordered and the study can take up to 4 hours. RN will contact [facility] once MD or NP are consulted.

## 2018-03-16 NOTE — PROGRESS NOTES
Ins auth good through sat 3/17 to go to [facility]. They can accept her on sat if ready. If does not transfer sat Faina will  and new auth will be needed to be obtained on Monday. Placed on will call for sat, envelope in chart.

## 2018-03-16 NOTE — PROGRESS NOTES
Dysphagia tx attempted this afternoon however, Pt currently NPO for procedure. Pt continues to complain on N/V. SLP will f/u next business day or as medically indicated. Og August. YAMILETH.  [id number] [facility]  Speech-Language Pathologist

## 2018-03-16 NOTE — PROGRESS NOTES
03/16/2018 1252pm Just spoke with Len Wolff RN, Stated the patient had Reglan today. Due to our protocol patient will have to wait until Monday to have gastric emptying scan. In order to have scan done pt must be off Reglan for 48 hours and be NPO after midnight before test on monday. Will check on Monday if procedure is still being done since RN stated they were waiting on a GI doctor consult.  SS

## 2018-03-16 NOTE — PROGRESS NOTES
69 yo WF with nausea and vomiting. EGD yesterday:  1. -Normal upper endoscopy, with no endoscopic evidence of neoplasia or mucosal abnormality. 2. -Esophagus empirically dilated with #48 Munoz  CT abd, Hida  Without significant abnormalities.   GES  Ordered but unable to complete 2/2 N and V.    Suggest: IV Reglan 5 mg q4h                 Clear liquid diet

## 2018-03-16 NOTE — PROGRESS NOTES
Problem: Falls - Risk of  Goal: *Absence of Falls  Document [place] Fall Risk and appropriate interventions in the flowsheet.    Outcome: Progressing Towards Goal  Fall Risk Interventions:       Mentation Interventions: Adequate sleep, hydration, pain control, Update white board, Toileting rounds, Bed/chair exit alarm    Medication Interventions: Evaluate medications/consider consulting pharmacy    Elimination Interventions: Call light in reach, Bed/chair exit alarm, Toileting schedule/hourly rounds

## 2018-03-16 NOTE — PROGRESS NOTES
Problem: Falls - Risk of  Goal: *Absence of Falls  Document [place] Fall Risk and appropriate interventions in the flowsheet.    Outcome: Progressing Towards Goal  Fall Risk Interventions:       Mentation Interventions: Adequate sleep, hydration, pain control, Door open when patient unattended, More frequent rounding, Reorient patient, Toileting rounds, Update white board    Medication Interventions: Patient to call before getting OOB, Teach patient to arise slowly    Elimination Interventions: Call light in reach, Patient to call for help with toileting needs, Toileting schedule/hourly rounds

## 2018-03-16 NOTE — PROGRESS NOTES
transpor set for 0930 this am to [facility] as auth obtained last pm. Asked tere to call dr Farfan Police, if not sending this am please cancel  Transport,call [facility] [phone].

## 2018-03-16 NOTE — PROGRESS NOTES
5519 - Bedside and Verbal shift change report given to Марина Luna RN (oncoming nurse) by Karyn Urias RN (offgoing nurse). Report included the following information SBAR, Kardex, Intake/Output and MAR.     0930 - shift assessment performed. Called pharmacy regarding 8 AM clonidine patch. Will send to unit when tech is available. 1036 - applied clonidine patch on pt & took vitals. Pt in NAD, will cont to monitor. 1250 - if pt is going to have the nuclear gastric emptying study the pt can not be on reglan for 48hrs prior to test.     1536 - review of pts chart I noticed no BP was recorded for 1400 vitals. Asked CNA to take vitals. BP elevated. Administered hydrolazine 10mg & will reassess BP in 15 min. 1620 - spoke w/Dr. Nolan Baez reg pts daughter dietary concerns. Advanced pts diet to CLD per Dr orders. Will cont to monitor. 1926 - Bedside and Verbal shift change report given to Felicity Ann RN (oncoming nurse) by Марина Luna RN (offgoing nurse). Report included the following information SBAR, Kardex, Intake/Output and MAR.

## 2018-03-16 NOTE — MED STUDENT NOTES
Progress Note    Patient: Comfort Ingram MRN: [id number]  SSN: [id number]    YOB: 1940  Age: 68 y.o. Sex: female      Admit Date: 3/12/2018    LOS: 2 days       Subjective: Comfort Ingram is a 68 y.o. female who is admitted for ongoing nausea, vomiting, weight loss, and decreased appetite over the last 2.5 months, worse in the last month. She has not had appetite since [place] per her family, and in the last month she has developed worsening vomiting of any liquid or solid food she tries to eat as well as dry heaving without meals as well. She had EGD with dilation yesterday, exam showed no abnormalities of esophagus or gastric region but esophagus was empirically dilated. She denied having much appetite after the procedure and had vomiting before and after attempting to eat some applesauce. She has not had anything to eat this morning, currently denies nausea or vomiting but denies appetite. She denies fevers or chills, dyspnea or chest pain. She has lost about 18lbs since [place]. Objective:     Physical Exam:   Vitals:    03/16/18 0110 03/16/18 0244 03/16/18 0505 03/16/18 0520   BP: (!) 196/102 (!) 180/102 (!) 169/99 (!) 154/96   Pulse: 92 88 93 74   Resp: 18 18 16 16   Temp: 98.5 °F (36.9 °C) 98 °F (36.7 °C) 98.5 °F (36.9 °C) 98.5 °F (36.9 °C)   SpO2: 95% 95% 99% 98%   Weight:       Height:          GENERAL: alert, cooperative, no distress, appears stated age  EYE: negative  THROAT & NECK: normal and no erythema or exudates noted. LUNG: clear to auscultation bilaterally  HEART: regular rate and rhythm, S1, S2 normal, systolic murmur: holosystolic 1/6, at 2nd left intercostal space  ABDOMEN: soft, non-tender.  Bowel sounds normal. No masses,  no organomegaly  EXTREMITIES:  extremities normal, atraumatic, no cyanosis or edema  SKIN: Normal.    Intake and Output:  Current Shift:    Last three shifts: 03/14 1901 - 03/16 0700  In: 50 [I.V.:50]  Out: 400 [Urine:400]      Lab/Data Review:  BMP:   Lab Results   Component Value Date/Time     (H) 03/16/2018 04:22 AM    K 3.7 03/16/2018 04:22 AM     (H) 03/16/2018 04:22 AM    CO2 23 03/16/2018 04:22 AM    AGAP 13 03/16/2018 04:22 AM    GLU 92 03/16/2018 04:22 AM    BUN 10 03/16/2018 04:22 AM    CREA 1.07 03/16/2018 04:22 AM    GFRAA >60 03/16/2018 04:22 AM    GFRNA 50 (L) 03/16/2018 04:22 AM     3/15/2018  6:39 AM - Joon, Lab In [employer]   Component Results   Component Value Flag Ref Range Units Status   WBC 15.6 (H) 4.6 - 13.2 K/uL Final   RBC 3.79 (L) 4.20 - 5.30 M/uL Final   HGB 11.1 (L) 12.0 - 16.0 g/dL Final   HCT 33.4 (L) 35.0 - 45.0 % Final   MCV 88.1  74.0 - 97.0 FL Final   MCH 29.3  24.0 - 34.0 PG Final   MCHC 33.2  31.0 - 37.0 g/dL Final   RDW 15.9 (H) 11.6 - 14.5 % Final   PLATELET 308  227 - 042 K/uL Final   MPV 9.6  9.2 - 11.8 FL Final   NEUTROPHILS 84 (H) 40 - 73 % Final   LYMPHOCYTES 9 (L) 21 - 52 % Final   MONOCYTES 7  3 - 10 % Final   EOSINOPHILS 0  0 - 5 % Final   BASOPHILS 0  0 - 2 % Final   ABS. NEUTROPHILS 12.9 (H) 1.8 - 8.0 K/UL Final   ABS. LYMPHOCYTES 1.5  0.9 - 3.6 K/UL Final   ABS. MONOCYTES 1.1  0.05 - 1.2 K/UL Final   ABS. EOSINOPHILS 0.1  0.0 - 0.4 K/UL Final   ABS. BASOPHILS 0.0  0.0 - 0.06 K/UL Final   DF AUTOMATED     Final       Lab Results   Component Value Date/Time    GLU 92 03/16/2018 04:22 AM        Assessment:   1) Nausea and vomiting  2) Hypertension  3) UTI  4) Hypokalemia    Plan:   *Medical student note*  1) Gastric emptying study scheduled for today if patient able to tolerate meal required without vomiting  2) Continue medication as directed for resistant HTN, with coreg catapres and hydralazine  3) Continue fluconazole as directed  4) Improved, at 3.7 today      Signed By: Lauren Walters     March 16, 2018      *ATTENTION:  This note has been created by a medical student for educational purposes only.   Please do not refer to the content of this note for clinical decision-making, billing, or other purposes. Please see attending physicians note to obtain clinical information on this patient. *

## 2018-03-16 NOTE — PROGRESS NOTES
Problem: Self Care Deficits Care Plan (Adult)  Goal: *Acute Goals and Plan of Care (Insert Text)  Occupational Therapy Goals  Initiated 3/14/2018 within 7 day(s). 1.  Patient will perform grooming tasks at EOB with minimal assistance for balance. 2.  Patient will perform upper body dressing with minimal assistance. 3.  Patient will perform functional task at EOB for 8 minutes with minimal assistance safety to increase activity tolerance for ADLs. 4.  Patient will perform toilet transfers with minimal assistance. 5.  Patient will perform all aspects of toileting with minimal assistance. 6.  Patient will participate in upper extremity therapeutic exercise/activities with minimal assistance for 8 minutes to increase BUE strength for ADLs & functional transfers. 7.  Patient will utilize energy conservation techniques during functional activities with minimal verbal cues. Occupational Therapy TREATMENT    Patient: Yareli Posadas (68 y.o. female)  Date: 3/16/2018  Diagnosis: Nausea & vomiting  Nausea and vomiting  nausea and vomiting Nausea & vomiting  Procedure(s) (LRB):  ESOPHAGOGASTRODUODENOSCOPY (EGD) (N/A) 1 Day Post-Op  Precautions: Fall  Chart, occupational therapy assessment, plan of care, and goals were reviewed. PLOF: Pt required assistance with ADLs PTA. Pt limited with mobility and functional mobility last few weeks PTA. ASSESSMENT:  Pt presented supine in bed with HOB raised agreeable to skilled OT services this date. Supportive friend present during treatment session. Pt participated in TherEx Min/Mod A ~5min to increase BUE strength for carryover to ADLs and functional transfers. Pt performed  strengthening exercises (x10 each hand) and elbow flexion/extension. Pt had slight edema in RUE. Educated pt on exercises to perform for edema reduction. Pt was left comfortable in bed and call bell within reach. EDUCATION Pt educated on exercises for edema reduction.   Progression toward goals:  []          Improving appropriately and progressing toward goals  [x]          Improving slowly and progressing toward goals  []          Not making progress toward goals and plan of care will be adjusted     PLAN:  Patient continues to benefit from skilled intervention to address the above impairments. Continue treatment per established plan of care. Discharge Recommendations:  Indio Chowdhury  Further Equipment Recommendations for Discharge:  TBD     SUBJECTIVE:   Patient stated No.    OBJECTIVE DATA SUMMARY:     G CODES:  Self Care  Current  CL= 60-79%. The severity rating is based on the Other functional assessment    Cognitive/Behavioral Status:  Neurologic State: Alert  Orientation Level: Oriented to person, Oriented to place  Cognition: Follows commands  Safety/Judgement: Decreased insight into deficits    Therapeutic Exercises:   Pt participated in TherEx Min/Mod A ~5min to increase BUE strength for carryover to ADLs and functional transfers. Pt performed  strengthening exercises (x10 each hand) and elbow flexion/extension. Pt had slight edema in RUE. Educated pt on exercises to perform for edema reduction. Pain:0/10  Pre Treatment:  Post Treatment:  Pain Scale 1: Visual  Pain Intensity 1: 0    Activity Tolerance:    Fair  Please refer to the flowsheet for vital signs taken during this treatment.   After treatment:   []  Patient left in no apparent distress sitting up in chair  [x]  Patient left in no apparent distress in bed  [x]  Call bell left within reach  []  Nursing notified  [x]  Caregiver present (close friend)  []  Bed alarm activated    BRET Ward  Time Calculation: 17 mins

## 2018-03-16 NOTE — PROGRESS NOTES
1041: 1st PT attempt. OT working with patient. Will follow up.   1318: 2nd PT attempt. Patient sleeping. Arouses with voice and light touch. Refusing mobility and closing eyes. Will follow up.      Thank you,     Eduardo Aguilera, PT, DPT

## 2018-03-17 NOTE — PROGRESS NOTES
0747  Pt medicated with PRN metoprolol for elevated BP    0959  Paged Dr Mikey Tolliver for BP and K.    1031  Dr Mikey Tolliver adjusted meds. PRN hydralazine dose increased, and pt medicated    Bedside and Verbal shift change report given to [address] by Edgar Guzman RN. Report included the following information SBAR, Kardex, Intake/Output and MAR.

## 2018-03-17 NOTE — PROGRESS NOTES
Problem: Falls - Risk of  Goal: *Absence of Falls  Document [place] Fall Risk and appropriate interventions in the flowsheet.    Outcome: Progressing Towards Goal  Fall Risk Interventions:     Mentation Interventions: Adequate sleep, hydration, pain control, Door open when patient unattended, More frequent rounding, Toileting rounds, Update white board    Medication Interventions: Patient to call before getting OOB, Teach patient to arise slowly    Elimination Interventions: Bed/chair exit alarm, Call light in reach, Patient to call for help with toileting needs, Toileting schedule/hourly rounds

## 2018-03-17 NOTE — PROGRESS NOTES
1925 - Assumed pt care from [facility], [address] [place] [address]. Pt in bed, alert and oriented to self. Not in any form of distress. Denies pain. Frequent use items and call bell within reach. Bed locked in lowest position. Family at bedside. 2358 - /118, P - 88, O2 - 94%, RR - 16, T - 98.1. Administered PRN Metoprolol 5 mg IV.     0025 - Rechecked BP - 164/109, P - 84. Not in any form of distress. 0400 - Pt's BP within goal as of the moment. 0530 - Bed bath completed. Gown, linen, pad and periwicke changed. 0720 - Bedside and Verbal shift change report given to VOLODYMYR Schulte (oncoming nurse) by Bishnu Galindo RN (offgoing nurse). Report included the following information SBAR, Kardex, Intake/Output and MAR.

## 2018-03-17 NOTE — PROGRESS NOTES
1925: Assumed pt care. Received pt resting in bed, pt awake. No signs of distress. With family at bedside. Call bell within reach. 3-17-18    0540: bathed patient. 0740: Bedside and Verbal shift change report given to Noe Kimball (oncoming nurse) by Travis Cortez   (offgoing nurse). Report included the following information SBAR, Kardex, Intake/Output, MAR and Recent Results.

## 2018-03-17 NOTE — PROGRESS NOTES
Gastrointestinal Progress Note    Patient Name: Comfort Ingram    Today's Date: 3/17/2018    Admit Date: 3/12/2018    Assessment-Recommendation:   1. Nausea and vomiting: Continue reglan IV. If patient does not have vomiting would proceed with oral reglan 5 mg TID before meals. Subjective:     Daughter at bedside. No vomiting reported.      Current Facility-Administered Medications   Medication Dose Route Frequency    hydrALAZINE (APRESOLINE) 20 mg/mL injection 20 mg  20 mg IntraVENous Q6H PRN    lisinopril (PRINIVIL, ZESTRIL) tablet 20 mg  20 mg Oral DAILY    amLODIPine (NORVASC) tablet 5 mg  5 mg Oral DAILY    metoprolol (LOPRESSOR) injection 5 mg  5 mg IntraVENous Q4H PRN    cloNIDine (CATAPRES) 0.2 mg/24 hr patch 1 Patch  1 Patch TransDERmal Q7D    metoclopramide HCl (REGLAN) injection 5 mg  5 mg IntraVENous Q6H    atorvastatin (LIPITOR) tablet 80 mg  80 mg Oral QHS    carvedilol (COREG) tablet 25 mg  25 mg Oral BID    fluconazole (DIFLUCAN) 200mg/100 mL IVPB (premix)  200 mg IntraVENous Q24H    aspirin (ASA) suppository 300 mg  300 mg Rectal DAILY    heparin (porcine) injection 5,000 Units  5,000 Units SubCUTAneous Q12H    cefTRIAXone (ROCEPHIN) 2 g in sterile water (preservative free) 20 mL IV syringe  2 g IntraVENous Q24H    ondansetron (ZOFRAN) injection 4 mg  4 mg IntraVENous Q6H PRN          Objective:     Physical Exam:    NAD  CV RRR  Abdomen soft NT ND    Data Review:    Labs: Results:       Chemistry Recent Labs      03/17/18   0515  03/16/18   0422  03/15/18   0505   GLU  91  92  134*   NA  143  146*  143   K  3.1*  3.7  3.1*   CL  107  110*  108   CO2  27  23  23   BUN  13  10  9   CREA  0.97  1.07  1.19   CA  7.8*  7.8*  7.9*   AGAP  9  13  12   BUCR  13  9*  8*      CBC w/Diff Recent Labs      03/15/18   0505   WBC  15.6*   RBC  3.79*   HGB  11.1*   HCT  33.4*   PLT  280   GRANS  84*   LYMPH  9*   EOS  0      Coagulation No results for input(s): PTP, INR, APTT in the last 72 hours.    No lab exists for component: INREXT    Liver Enzymes No results for input(s): TP, ALB, TBIL, AP, SGOT, ALT in the last 72 hours.     No lab exists for component: DINO Arambula MD  March 17, 2018

## 2018-03-17 NOTE — PROGRESS NOTES
2 [facility]  Hospitalist Division        Inpatient Daily Progress Note    Daily progress Note    Patient: Elvira Contreras MRN: [id number]  [facility]: [id number]    YOB: 1940  Age: 68 y.o. Sex: female    DOA: 3/12/2018 LOS:  LOS: 3 days                    Chief Complaint: N/V- unclear etiology     Interval History:    The patient is a pleasant 15-year-old female with past medical history significant for hypertension, dementia, recurrent nausea and vomiting for which she has been in the ER for 4 times and has been followed by her PCP as well. On one occasion, the patient was discharged with possible pneumonia. Last time she was treated for UTI; however, she continued to have nausea and vomiting with very poor p.o. intake and significant weight loss. Per daughter, the patient has been feeling nauseous and been vomiting since this morning, two more episodes in the ER. She has had zero p.o. intake today and minimal liquid intake yesterday. The patient has not seen a GI doctor per family and has never had an EGD or colonoscopy. Patient admitted for ongoing management. CT Abd/pelvis showed No acute or focal abnormality to explain patient's nausea or vomiting. Minimal layering density in the gallbladder but no secondary CT findings of cholecystitis. Chronic diverticular disease. No findings of an acute diverticulitis. HIDA scan showed patent cystic duct. Decreased gallbladder ejection fraction of approximately 34 % suggesting gallbladder dysfunction. GI consulted 3/14. EGD on 3/15 1. -Normal upper endoscopy, with no endoscopic evidence of neoplasia or mucosal abnormality. 2. -Esophagus empirically dilated with #48 Munoz. GI soft diet ordered. Patient continued to vomit overnight 3/15 into 3/16. Unable to complete GES 2/2 to vomiting. IV Reglan added.      Subjective:      Sleeping on exam, updated family    Objective:      Visit Vitals    /88    Pulse 90    Temp 98 °F (36.7 °C)    Resp 16    Ht 5' (1.524 m)    Wt 49.4 kg (109 lb)    SpO2 95%    Breastfeeding No    BMI 21.29 kg/m2       Physical Exam:  General appearance: sleeping soundly, easily arouses, no distress   Lungs: clear to auscultation throughout, no wheezes   Heart: regular rate and rhythm, S1, S2 normal, no murmur, click, rub or gallop  Abdomen: soft, non tender, non distended.  Normoactive bowel sounds  Extremities: extremities normal, atraumatic, no cyanosis or edema  Skin: Skin color, texture, turgor normal. No rashes or lesions  Neurologic: moves all 4 extremities equally   PSY: Mood and affect normal, appropriately behaved      Intake and Output:  Current Shift:  03/17 0701 - 03/17 1900  In: 70 [I.V.:70]  Out: 100 [Urine:100]  Last three shifts:  03/15 1901 - 03/17 0700  In: 20 [I.V.:20]  Out: 600 [Urine:600]    Recent Results (from the past 24 hour(s))   MAGNESIUM    Collection Time: 03/17/18  5:15 AM   Result Value Ref Range    Magnesium 2.3 1.6 - 2.6 mg/dL   METABOLIC PANEL, BASIC    Collection Time: 03/17/18  5:15 AM   Result Value Ref Range    Sodium 143 136 - 145 mmol/L    Potassium 3.1 (L) 3.5 - 5.5 mmol/L    Chloride 107 100 - 108 mmol/L    CO2 27 21 - 32 mmol/L    Anion gap 9 3.0 - 18 mmol/L    Glucose 91 74 - 99 mg/dL    BUN 13 7.0 - 18 MG/DL    Creatinine 0.97 0.6 - 1.3 MG/DL    BUN/Creatinine ratio 13 12 - 20      GFR est AA >60 >60 ml/min/1.73m2    GFR est non-AA 56 (L) >60 ml/min/1.73m2    Calcium 7.8 (L) 8.5 - 10.1 MG/DL           Lab Results   Component Value Date/Time    Glucose 91 03/17/2018 05:15 AM    Glucose 92 03/16/2018 04:22 AM    Glucose 134 (H) 03/15/2018 05:05 AM    Glucose 156 (H) 03/14/2018 05:20 AM    Glucose 137 (H) 03/13/2018 06:35 AM        Assessment/Plan:     Patient Active Problem List   Diagnosis Code    Essential hypertension, malignant I10    [facility] N17.9    Dehydration E86.0    Abnormality of gait and mobility R26.9    [facility] I63.9    CKD (chronic kidney disease) N18.9    HTN (hypertension) I10    Follow up Z09    Nausea & vomiting R11.2    Nausea and vomiting R11.2        HTN resistant  - Clonidine patch started 3/16   - Hydralazine, Metoprolol PRN    - start norvasc and lisinopril    N/V  - HIDA scan with very mild dysfunction of GB  - MBS showed no evidence for airway penetration or aspiration  - GI consulted 3/14  - EGD 3/15- 1. -Normal upper endoscopy, with no endoscopic evidence of neoplasia or mucosal abnormality.             2. -Esophagus empirically dilated with #48 Eris Eaves  - Unable to complete GES as patient continues to vomit  - IV Reglan q6h     UTI  - Culture with Candida Albicans  - Discontinue Rocephin  - Diflucan for 5 days starting 3/15- day 3 today      Hypokalemia  - Potassium 3.1, 5 runs ordered    Hypomagnesemia  - resolved    DVT prophylaxis  - Heparin

## 2018-03-17 NOTE — PROGRESS NOTES
Problem: Falls - Risk of  Goal: *Absence of Falls  Document [place] Fall Risk and appropriate interventions in the flowsheet.    Outcome: Progressing Towards Goal  Fall Risk Interventions:       Mentation Interventions: Adequate sleep, hydration, pain control, Door open when patient unattended, Evaluate medications/consider consulting pharmacy, Family/sitter at bedside, Reorient patient, Toileting rounds    Medication Interventions: Evaluate medications/consider consulting pharmacy, Patient to call before getting OOB    Elimination Interventions: Call light in reach, Patient to call for help with toileting needs, Toileting schedule/hourly rounds

## 2018-03-18 NOTE — PROGRESS NOTES
Gastrointestinal Progress Note    Patient Name: Flora Allan    Today's Date: 3/18/2018    Admit Date: 3/12/2018    Assessment-Recommendation:   1. Nausea and vomiting: Proceed with oral reglan 5 mg TID before meals. Ok to discharge if not vomiting. Subjective: Tolerating diet. Current Facility-Administered Medications   Medication Dose Route Frequency    hydrALAZINE (APRESOLINE) 20 mg/mL injection 20 mg  20 mg IntraVENous Q6H PRN    lisinopril (PRINIVIL, ZESTRIL) tablet 20 mg  20 mg Oral DAILY    amLODIPine (NORVASC) tablet 5 mg  5 mg Oral DAILY    metoprolol (LOPRESSOR) injection 5 mg  5 mg IntraVENous Q4H PRN    cloNIDine (CATAPRES) 0.2 mg/24 hr patch 1 Patch  1 Patch TransDERmal Q7D    metoclopramide HCl (REGLAN) injection 5 mg  5 mg IntraVENous Q6H    atorvastatin (LIPITOR) tablet 80 mg  80 mg Oral QHS    carvedilol (COREG) tablet 25 mg  25 mg Oral BID    fluconazole (DIFLUCAN) 200mg/100 mL IVPB (premix)  200 mg IntraVENous Q24H    aspirin (ASA) suppository 300 mg  300 mg Rectal DAILY    heparin (porcine) injection 5,000 Units  5,000 Units SubCUTAneous Q12H    cefTRIAXone (ROCEPHIN) 2 g in sterile water (preservative free) 20 mL IV syringe  2 g IntraVENous Q24H    ondansetron (ZOFRAN) injection 4 mg  4 mg IntraVENous Q6H PRN          Objective:     Physical Exam:    NAD  CV RRR  Abdomen soft NT ND    Data Review:    Labs: Results:       Chemistry Recent Labs      03/18/18   0521  03/17/18   0515  03/16/18   0422   GLU  86  91  92   NA  139  143  146*   K  3.6  3.1*  3.7   CL  103  107  110*   CO2  22  27  23   BUN  15  13  10   CREA  0.99  0.97  1.07   CA  7.9*  7.8*  7.8*   AGAP  14  9  13   BUCR  15  13  9*      CBC w/Diff No results for input(s): WBC, RBC, HGB, HCT, PLT, GRANS, LYMPH, EOS, RETIC, HGBEXT, HCTEXT, PLTEXT, HGBEXT, HCTEXT, PLTEXT in the last 72 hours. Coagulation No results for input(s): PTP, INR, APTT in the last 72 hours.     No lab exists for component: INREXT, INREXT    Liver Enzymes No results for input(s): TP, ALB, TBIL, AP, SGOT, ALT in the last 72 hours.     No lab exists for component: DINO Vargas MD  March 18, 2018

## 2018-03-18 NOTE — PROGRESS NOTES
2 [facility]  Hospitalist Division        Inpatient Daily Progress Note    Daily progress Note    Patient: Arcelia Briggs MRN: [id number]  CSN: [id number]    YOB: 1940  Age: 68 y.o. Sex: female    DOA: 3/12/2018 LOS:  LOS: 4 days                    Chief Complaint: N/V- unclear etiology     Interval History:    The patient is a pleasant 59-year-old female with past medical history significant for hypertension, dementia, recurrent nausea and vomiting for which she has been in the ER for 4 times and has been followed by her PCP as well. On one occasion, the patient was discharged with possible pneumonia. Last time she was treated for UTI; however, she continued to have nausea and vomiting with very poor p.o. intake and significant weight loss. Per daughter, the patient has been feeling nauseous and been vomiting since this morning, two more episodes in the ER. She has had zero p.o. intake today and minimal liquid intake yesterday. The patient has not seen a GI doctor per family and has never had an EGD or colonoscopy. Patient admitted for ongoing management. CT Abd/pelvis showed No acute or focal abnormality to explain patient's nausea or vomiting. Minimal layering density in the gallbladder but no secondary CT findings of cholecystitis. Chronic diverticular disease. No findings of an acute diverticulitis. HIDA scan showed patent cystic duct. Decreased gallbladder ejection fraction of approximately 34 % suggesting gallbladder dysfunction. GI consulted 3/14. EGD on 3/15 1. -Normal upper endoscopy, with no endoscopic evidence of neoplasia or mucosal abnormality. 2. -Esophagus empirically dilated with #48 Munoz. GI soft diet ordered. Patient continued to vomit overnight 3/15 into 3/16. Unable to complete GES 2/2 to vomiting. Mentation has improved. Reglan switched to PO. Discussed with CM- insurance auth for SNF has - CM to work on 3/19.      Subjective: Awake, alert. Daughter at bedside. Denies pain. Objective:      Visit Vitals    /80 (BP 1 Location: Right arm, BP Patient Position: At rest)    Pulse (!) 103    Temp 98 °F (36.7 °C)    Resp 15    Ht 5' (1.524 m)    Wt 49.4 kg (109 lb)    SpO2 96%    Breastfeeding No    BMI 21.29 kg/m2       Physical Exam:  General appearance: awake, alert to person and place, no distress   Lungs: clear to auscultation bilaterally, no wheezes   Heart: regular rate and rhythm, S1, S2 normal, no murmur, click, rub or gallop  Abdomen: soft, non tender, non distended. Normoactive bowel sounds  Extremities: extremities normal, atraumatic, no cyanosis or edema  Skin: Skin color, texture, turgor normal. No rashes or lesions  Neurologic: moves all 4 extremities equally   PSY: Mood and affect normal, appropriately behaved      Intake and Output:  Current Shift:     Last three shifts:  03/16 1901 - 03/18 0700  In: 1140 [P.O.:150;  I.V.:990]  Out: 1000 [Urine:1000]    Recent Results (from the past 24 hour(s))   MAGNESIUM    Collection Time: 03/18/18  5:21 AM   Result Value Ref Range    Magnesium 1.8 1.6 - 2.6 mg/dL   METABOLIC PANEL, BASIC    Collection Time: 03/18/18  5:21 AM   Result Value Ref Range    Sodium 139 136 - 145 mmol/L    Potassium 3.6 3.5 - 5.5 mmol/L    Chloride 103 100 - 108 mmol/L    CO2 22 21 - 32 mmol/L    Anion gap 14 3.0 - 18 mmol/L    Glucose 86 74 - 99 mg/dL    BUN 15 7.0 - 18 MG/DL    Creatinine 0.99 0.6 - 1.3 MG/DL    BUN/Creatinine ratio 15 12 - 20      GFR est AA >60 >60 ml/min/1.73m2    GFR est non-AA 54 (L) >60 ml/min/1.73m2    Calcium 7.9 (L) 8.5 - 10.1 MG/DL           Lab Results   Component Value Date/Time    Glucose 86 03/18/2018 05:21 AM    Glucose 91 03/17/2018 05:15 AM    Glucose 92 03/16/2018 04:22 AM    Glucose 134 (H) 03/15/2018 05:05 AM    Glucose 156 (H) 03/14/2018 05:20 AM        Assessment/Plan:     Patient Active Problem List   Diagnosis Code    Essential hypertension, malignant I10    [facility]ESS [facility]acute [facility]) ([facility] N17.9    Dehydration E86.0    Abnormality of gait and mobility R26.9    Stroke ([facility]) I63.9    CKD (chronic kidney disease) N18.9    HTN (hypertension) I10    Follow up Z09    Nausea & vomiting R11.2    Nausea and vomiting R11.2        HTN resistant  - Clonidine patch started 3/16   - Amlodipine 5 mg daily   - Coreg 25 BID  - Lisinopril 40 mg daily     N/V  - HIDA scan with very mild dysfunction of GB  - MBS showed no evidence for airway penetration or aspiration  - GI consulted 3/14  - EGD 3/15- 1. -Normal upper endoscopy, with no endoscopic evidence of neoplasia or mucosal abnormality.             2. -Esophagus empirically dilated with #48 [facility]  - Unable to complete GES as patient continues to vomit  - Reglan q6 by mouth  - GI signed off     UTI  - Culture with Candida Albicans  - Discontinue Rocephin  - Diflucan for 5 days starting 3/15- day 4 today      Hypokalemia  - Resolved     DVT prophylaxis  - Heparin     PT/OT     SNF once insurance auth obtained       MAAME Hugo-BC  [address]  Pager:  [phone]  Office:  [phone]

## 2018-03-18 NOTE — PROGRESS NOTES
Problem: Falls - Risk of  Goal: *Absence of Falls  Document [place] Fall Risk and appropriate interventions in the flowsheet.    Outcome: Progressing Towards Goal  Fall Risk Interventions:       Mentation Interventions: Adequate sleep, hydration, pain control    Medication Interventions: Patient to call before getting OOB    Elimination Interventions: Patient to call for help with toileting needs, Bed/chair exit alarm

## 2018-03-18 NOTE — PROGRESS NOTES
Assumed care of pt from Jose Perez [facility] pt awake in bed alert to self , no distress noted and denies pain. Frequently used items and call bell within reach. Patient verbalized understanding to use call bell for any needs or assistance. Bed locked in lowest position. 1551 Pt complained of sore throat. MD was paged and gave a order for PRN Chloarseptic throat spray as needed for sore throat. 1950 Bedside and Verbal shift change report given to Jose Perez RN (oncoming nurse) by Logan Hardy RN (offgoing nurse). Report included the following information SBAR, Kardex, Intake/Output, MAR and Recent Results.

## 2018-03-18 NOTE — PROGRESS NOTES
Noted order for D/C today. Contacted Dave, . Insurance Auth for SNF  yesterday. New Auth will need to be started. Dave to contact [facility] to inquire if new Auth can be started over the weekend.

## 2018-03-19 NOTE — PROGRESS NOTES
Nutrition follow-up/Plan of care      RECOMMENDATIONS:   1. Advance diet when medically indicated and per pt tolerance  2. Ensure Clear TID  3. Monitor weight, labs and PO intake  4. RD to follow     GOALS:   1. PO intake meets >75% of protein/calorie needs by 3/24  2. Weight Maintenance (+/- 1-2 lb) by 3/26     ASSESSMENT:   Wt status is classified as normal per BMI of 21.3. However, Pt at nutrition risk d/t BMI <23 and age >65 years. Pt w/ 20 lb or 15% weight loss over the past 2-3 months. Pt on CL, but poor PO intake x >5 days Labs noted. Pt w/ hypokalemia and hypocalcemia. Nutrition recommendations listed. RD to follow. Meets Criteria for Acute Malnutrition   [x] Severe Malnutrition, as evidenced by:   [] Moderate muscle wasting, loss of subcutaneous fat   [x] Nutritional intake of <50% of recommended intake for >5 days   [x] Weight loss of >1-2% in 1 week, >5% in 1 month, >7.5% in 3 months, or >10% in 6 months   [] Moderate-severe edema        Nutrition Diagnoses:   Malnutrition related to decreased appetite with nausea/vomiting as evidenced by around a 20 lb or 15% loss over the past 2-3 months and inadequate PO intake x >5 days. Nutrition Risk:  [x] High  [] Moderate []  Low    SUBJECTIVE/OBJECTIVE:    Pt admitted for nausea and vomiting. PMHx including  HTN, cataract, dementia and renal insufficiency. N/V x 1 month w/ poor PO intake and 20 lb wt loss since Christmas per chart review. SLP following and GI consulted. Pt is s/p MBS (3/13) showed no evidence for airway penetration or aspiration; HIDA scan (3/13) showed very mild dysfunction of GB and EGD on (3/15) showing no endoscopic evidence of neoplasia or mucosal abnormality and esophagus empirically dilated. Daughter provided Hx during initial visit. Reports that \"It has been one thing after the other starting in Jan\" and noted wt loss of 20 lb or 15% over the past 2-3 months; UBW ~126 lb.  Denies pt having any food allergies or problems chewing/swallowig PTA. Stated pt has preferred softer foods d/t the N/V though. Plan was for gastritic emptying scan, but not completed. Per d/w RNEris, Pt tolerating CL over the weekend with no episodes of emesis and GI ordered oral Reglan TID before meals. Pt still with poor appetite and very lethargic today. Observed lunch tray at bedside and 0% consumed. Will monitor. Information Obtained from:    [x] Chart Review   [x] Patient   [] Family/Caregiver   [x] Nurse/Physician   [] Interdisciplinary Meeting/Rounds      Diet: Clear Liquids  Medications: [x] Reviewed  Lipitor, Reglan  Allergies: [x] Reviewed   Encounter Diagnoses     ICD-10-CM ICD-9-CM   1. Intractable vomiting with nausea, unspecified vomiting type R11.2 536.2   2. Adult failure to thrive R62.7 783.7   3.  Yeast vaginitis B37.3 112.1     Past Medical History:   Diagnosis Date    Amblyopia of left eye     Blind left eye     Cataract     bilat, right has been replaced    CRI (chronic renal insufficiency)     Dementia     Essential hypertension, malignant 4/25/2014    Hypertension     Left bundle branch block     Otitis media, acute 7/2/16    left      Labs:    Lab Results   Component Value Date/Time    Sodium 137 03/19/2018 04:35 AM    Potassium 3.0 (L) 03/19/2018 04:35 AM    Chloride 99 (L) 03/19/2018 04:35 AM    CO2 24 03/19/2018 04:35 AM    Anion gap 14 03/19/2018 04:35 AM    Glucose 85 03/19/2018 04:35 AM    BUN 16 03/19/2018 04:35 AM    Creatinine 0.92 03/19/2018 04:35 AM    Calcium 8.0 (L) 03/19/2018 04:35 AM    Magnesium 1.6 03/19/2018 04:35 AM    Phosphorus 4.1 10/04/2015 06:44 AM    Albumin 3.2 (L) 03/12/2018 11:21 AM     Anthropometrics: BMI (calculated): 21.3  Last 3 Recorded Weights in this Encounter    03/12/18 1009   Weight: 49.4 kg (109 lb)      Ht Readings from Last 1 Encounters:   03/12/18 5' (1.524 m)     Weight Metrics 3/12/2018 3/8/2018 3/2/2018 2/19/2018 2/17/2018 10/3/2017 1/31/2017   Weight 109 lb 95 lb 95 lb 107 lb 14.4 oz 120 lb 125 lb 129 lb   BMI 21.29 kg/m2 18.55 kg/m2 18.55 kg/m2 21.07 kg/m2 23.44 kg/m2 24.41 kg/m2 25.19 kg/m2       Patient Vitals for the past 100 hrs:   % Diet Eaten   03/18/18 1820 0 %     [] Weight Loss  [] Weight Gain  [] Weight Stable  [x] New wt n/a on record    Estimated Nutrition Needs: [x] MSJ  [] Other:  Calories: 1463-0094 kcal Based on:   [x] Actual BW    Protein:   59-64 g Based on:   [x] Actual BW    Fluid:       5839-3640 ml Based on:   [x] Actual BW     Nutrition Problems Identified:   [x] Suboptimal PO intake   [] Food Allergies  [] Difficulty chewing/swallowing/poor dentition  [] Constipation/Diarrhea   [] Nausea/Vomiting   [] None  [] Other:     Plan:   [] Therapeutic Diet  []  Obtained/adjusted food preferences/tolerances and/or snacks options   [x]  Continue supplements added  [] Occupational therapy following for feeding techniques  []  HS snack added   []  Modify diet texture   []  Modify diet for food allergies   []  Educate patient   []  Assist with menu selection   [x]  Monitor PO intake on meal rounds   [x]  Continue inpatient monitoring and intervention   []  Participated in discharge planning/Interdisciplinary rounds/Team meetings   []  Other:     Education Needs:   [x] Not appropriate for teaching at this time    [] Identified and addressed    Nutrition Monitoring and Evaluation:  [x] Continue ongoing monitoring and intervention  [] Other    Sukumar Easley

## 2018-03-19 NOTE — PROGRESS NOTES
Day #8 of ceftriaxone    Indication:  UTI  Current regimen:  2 gm IV every 24 hours  Abx regimen: ceftriaxone + fluconazole    Recent Labs      18   0435  18   0521  18   0515   CREA  0.92  0.99  0.97   BUN  16  15  13     Est CrCl: 37 ml/min    Temp (24hrs), Av.3 °F (36.8 °C), Min:98 °F (36.7 °C), Max:98.5 °F (36.9 °C)    Cultures:   3/12 Urine - Candida    Recommendation: Discontinue ceftriaxone and continue fluconazole.      Thanks,  Walt Jj, PHARMD

## 2018-03-19 NOTE — PROGRESS NOTES
Problem: Dysphagia (Adult)  Goal: *Acute Goals and Plan of Care (Insert Text)  Recommendations:  Diet: clear liquids (as tolerated by patient)  Meds: via IV  Aspiration Precautions  Oral Care TID  Other: MBS per MD request    Goals:  Patient will:  1. Tolerate PO trials with 0 s/s overt distress in 4/5 trials  2. Utilize compensatory swallow strategies/maneuvers (decrease bite/sip, size/rate, alt. liq/sol) with min cues in 4/5 trials  3. Complete an objective swallow study (i.e., MBSS) to assess swallow integrity, r/o aspiration, and determine of safest LRD, min A - goal met 3/13/18       Outcome: Not Progressing Towards Goal  Speech language pathology dysphagia treatment    Patient: Armida Munguia (68 y.o. female)  Date: 3/19/2018  Diagnosis: Nausea & vomiting  Nausea and vomiting  nausea and vomiting Nausea & vomiting  Procedure(s) (LRB):  ESOPHAGOGASTRODUODENOSCOPY (EGD) (N/A) 4 Days Post-Op  Precautions:  Fall     ASSESSMENT:  Follow up this am with pt requiring max stim to alert to po intake. Attempted sips of thin liquids + straw. Delayed oral bolus prep and transit on x 3 trials. No aspiration s/s. Pt refused further trials. Poor response to tx this day. SLP will continue to follow as indicated. Progression toward goals:  []         Improving appropriately and progressing toward goals  []         Improving slowly and progressing toward goals  [x]         Not making progress toward goals and plan of care will be adjusted     PLAN:  Recommendations and Planned Interventions:  Patient continues to benefit from skilled intervention to address the above impairments. Continue treatment per established plan of care. Discharge Recommendations:  Skilled Nursing Facility     SUBJECTIVE:   Patient stated no.     OBJECTIVE:   Cognitive and Communication Status:  Neurologic State: Alert  Orientation Level: Oriented to person  Cognition: Decreased command following  Perception: Appears intact  Perseveration: No perseveration noted  Safety/Judgement: Decreased insight into deficits  Dysphagia Treatment:  Oral Assessment:  Oral Assessment  Labial: Decreased rate, Decreased seal  Dentition: Natural  Oral Hygiene: Fair  Lingual: Decreased rate, Decreased strength  Velum: No impairment  Mandible: No impairment       PAIN:  Start of Tx: 0  End of Tx: 0     After treatment:   []              Patient left in no apparent distress sitting up in chair  [x]              Patient left in no apparent distress in bed  [x]              Call bell left within reach  [x]              Nursing notified  []              Family present  []              Caregiver present  []              Bed alarm activated      COMMUNICATION/EDUCATION:   [x] Aspiration precautions; swallow safety; compensatory techniques  [x]        Patient unable to participate in education; education ongoing with staff  []  Posted safety precautions in patient's room.   [] Oral-motor/laryngeal strengthening exercises      JENNIFER Miramontes  Time Calculation: 11 mins

## 2018-03-19 NOTE — PROGRESS NOTES
[facility]  Hospitalist Division        Inpatient Daily Progress Note    Daily progress Note    Patient: Elvira Contreras MRN: [id number]  CSN: [id number]    YOB: 1940  Age: 68 y.o. Sex: female    DOA: 3/12/2018 LOS:  LOS: 5 days                    Chief Complaint: N/V- unclear etiology     Interval History:    The patient is a pleasant 49-year-old female with past medical history significant for hypertension, dementia, recurrent nausea and vomiting for which she has been in the ER for 4 times and has been followed by her PCP as well. On one occasion, the patient was discharged with possible pneumonia. Last time she was treated for UTI; however, she continued to have nausea and vomiting with very poor p.o. intake and significant weight loss. Per daughter, the patient has been feeling nauseous and been vomiting since this morning, two more episodes in the ER. She has had zero p.o. intake today and minimal liquid intake yesterday. The patient has not seen a GI doctor per family and has never had an EGD or colonoscopy. Patient admitted for ongoing management. CT Abd/pelvis showed No acute or focal abnormality to explain patient's nausea or vomiting. Minimal layering density in the gallbladder but no secondary CT findings of cholecystitis. Chronic diverticular disease. No findings of an acute diverticulitis. HIDA scan showed patent cystic duct. Decreased gallbladder ejection fraction of approximately 34 % suggesting gallbladder dysfunction. GI consulted 3/14. EGD on 3/15 1. -Normal upper endoscopy, with no endoscopic evidence of neoplasia or mucosal abnormality. 2. -Esophagus empirically dilated with #48 Munoz. GI soft diet ordered. Patient continued to vomit overnight 3/15 into 3/16. Unable to complete GES 2/2 to vomiting. Mentation has improved. Reglan switched to PO. Discussed with CM- insurance auth for SNF has - CM to work on 3/19.      Subjective: Awake, alert. Daughter at bedside. Mental status improved from admission date    Objective:      Visit Vitals    BP (!) 176/102 (BP 1 Location: Left arm, BP Patient Position: At rest)    Pulse 90    Temp 98.3 °F (36.8 °C)    Resp 15    Ht 5' (1.524 m)    Wt 49.4 kg (109 lb)    SpO2 96%    Breastfeeding No    BMI 21.29 kg/m2       Physical Exam:  General appearance: awake, alert to person and place, no distress   Lungs: clear to auscultation bilaterally, no wheezes   Heart: regular rate and rhythm, S1, S2 normal, no murmur, click, rub or gallop  Abdomen: soft, non tender, non distended. Normoactive bowel sounds  Extremities: extremities normal, atraumatic, no cyanosis or edema  Skin: Skin color, texture, turgor normal. No rashes or lesions  Neurologic: moves all 4 extremities equally   PSY: Mood and affect normal, appropriately behaved      Intake and Output:  Current Shift:     Last three shifts:  03/17 1901 - 03/19 0700  In: 450 [P.O.:250;  I.V.:200]  Out: 600 [Urine:600]    Recent Results (from the past 24 hour(s))   MAGNESIUM    Collection Time: 03/19/18  4:35 AM   Result Value Ref Range    Magnesium 1.6 1.6 - 2.6 mg/dL   METABOLIC PANEL, BASIC    Collection Time: 03/19/18  4:35 AM   Result Value Ref Range    Sodium 137 136 - 145 mmol/L    Potassium 3.0 (L) 3.5 - 5.5 mmol/L    Chloride 99 (L) 100 - 108 mmol/L    CO2 24 21 - 32 mmol/L    Anion gap 14 3.0 - 18 mmol/L    Glucose 85 74 - 99 mg/dL    BUN 16 7.0 - 18 MG/DL    Creatinine 0.92 0.6 - 1.3 MG/DL    BUN/Creatinine ratio 17 12 - 20      GFR est AA >60 >60 ml/min/1.73m2    GFR est non-AA 59 (L) >60 ml/min/1.73m2    Calcium 8.0 (L) 8.5 - 10.1 MG/DL           Lab Results   Component Value Date/Time    Glucose 85 03/19/2018 04:35 AM    Glucose 86 03/18/2018 05:21 AM    Glucose 91 03/17/2018 05:15 AM    Glucose 92 03/16/2018 04:22 AM    Glucose 134 (H) 03/15/2018 05:05 AM        Assessment/Plan:     Patient Active Problem List   Diagnosis Code    Essential hypertension, malignant I10    NESS (acute kidney injury) ([facility] [address] N17.9    Dehydration E86.0    Abnormality of gait and mobility R26.9    Stroke (HCC) I63.9    CKD (chronic kidney disease) N18.9    HTN (hypertension) I10    Follow up Z09    Nausea & vomiting R11.2    Nausea and vomiting R11.2        HTN resistant  - Clonidine patch started 3/16   - Amlodipine 5 mg daily   - Coreg 25 BID  - Lisinopril 40 mg daily     N/V  - HIDA scan with very mild dysfunction of GB  - MBS showed no evidence for airway penetration or aspiration  - GI consulted 3/14  - EGD 3/15- 1. -Normal upper endoscopy, with no endoscopic evidence of neoplasia or mucosal abnormality.             2. -Esophagus empirically dilated with #48 Hetal Do  - Unable to complete GES as patient continues to vomit  - Reglan TID  - GI signed off     UTI  - Culture with Candida Albicans  - Discontinue Rocephin  - off abx / diflucan    Hypokalemia  - Resolved     DVT prophylaxis  - Heparin     PT/OT     SNF once insurance auth obtained

## 2018-03-19 NOTE — PROGRESS NOTES
Problem: Self Care Deficits Care Plan (Adult)  Goal: *Acute Goals and Plan of Care (Insert Text)  Occupational Therapy Goals  Initiated 3/14/2018 within 7 day(s). 1.  Patient will perform grooming tasks at EOB with minimal assistance for balance. 2.  Patient will perform upper body dressing with minimal assistance. 3.  Patient will perform functional task at EOB for 8 minutes with minimal assistance safety to increase activity tolerance for ADLs. 4.  Patient will perform toilet transfers with minimal assistance. 5.  Patient will perform all aspects of toileting with minimal assistance. 6.  Patient will participate in upper extremity therapeutic exercise/activities with minimal assistance for 8 minutes to increase BUE strength for ADLs & functional transfers. 7.  Patient will utilize energy conservation techniques during functional activities with minimal verbal cues. Occupational Therapy TREATMENT    Patient: Destini Welsh (68 y.o. female)  Date: 3/19/2018  Diagnosis: Nausea & vomiting  Nausea and vomiting  nausea and vomiting Nausea & vomiting  Procedure(s) (LRB):  ESOPHAGOGASTRODUODENOSCOPY (EGD) (N/A) 4 Days Post-Op  Precautions: Fall  Chart, occupational therapy assessment, plan of care, and goals were reviewed. PLOF:  Pt required assistance with ADLs PTA. Pt limited with functional mobility last few weeks PTA. ASSESSMENT:  Pt presented supine in bed. Co treated with PTA. During static sitting EOB (x6min), pt performed simple grooming task. Max A required to comb hair. Pt would start combing hair and quickly stop. Pt returned supine in bed Max A. Pt was left with PTA at end of session. EDUCATION Pt unable to participate with education this date.   Progression toward goals:  []          Improving appropriately and progressing toward goals  [x]          Improving slowly and progressing toward goals  []          Not making progress toward goals and plan of care will be adjusted PLAN:  Patient continues to benefit from skilled intervention to address the above impairments. Continue treatment per established plan of care. Discharge Recommendations:  Skilled Nursing Facility  Further Equipment Recommendations for Discharge:  TBD     SUBJECTIVE:   Patient stated No. When asked if she would like to stand. OBJECTIVE DATA SUMMARY:     G CODES:  Self Care  Current  CL= 60-79%. The severity rating is based on the Other functional assessment    Cognitive/Behavioral Status:  Neurologic State: Alert  Orientation Level: Oriented to person  Cognition: Decreased command following  Safety/Judgement: Decreased insight into deficits  Functional Mobility and Transfers for ADLs:   Bed Mobility:  Rolling: Total assistance  Supine to Sit: Maximum assistance; Total assistance  Sit to Supine: Maximum assistance  Scooting: Total assistance;Assist x2 (supine to [facility])       Balance:  Sitting: Impaired  Sitting - Static: Poor (constant support)  Sitting - Dynamic: Poor (constant support)  Standing:  (refused to attempt)  ADL Intervention:       Grooming  Brushing/Combing Hair: Maximum assistance (seated EOB)         Pain:Pt shook head No when asked if in any pain. Pre Treatment:  Post Treatment:  Pain Scale 1: Numeric (0 - 10)  Pain Intensity 1: 0    Activity Tolerance:    Fair-  Please refer to the flowsheet for vital signs taken during this treatment.   After treatment:   []  Patient left in no apparent distress sitting up in chair  [x]  Patient left in no apparent distress in bed (Left with PT)  []  Call bell left within reach  []  Nursing notified  []  Caregiver present  []  Bed alarm activated    BRET Browne  Time Calculation: 14 mins

## 2018-03-19 NOTE — ANCILLARY DISCHARGE INSTRUCTIONS
Patient and/or next of kin has been given the [facility] Important Message From Medicare About Your Rights\" letter and all questions were answered.

## 2018-03-19 NOTE — PROGRESS NOTES
Will need auth prior to dc to snf. Need current therapy notes. Asked signature to restart auth , they will need todays therapy.

## 2018-03-19 NOTE — PROGRESS NOTES
Estela 82 pt care from [address]. Pt in bed, alert and oriented x 3 (self, place, situation). Not in any form of distress. Denies pain. Frequent use items and call bell within reach. Verbalized understanding to call for assistance. Bed locked in lowest position. Family at bedside. 0012 - Pt alert and oriented x 3. Pt stated: \" Maninder Kennedy!!! Where is Margaret?!.\" Explained that Pt's daughter, Maninder Kennedy, already went home. Pt stated: \"She knows better not to leave me here. \" Reassured pt that she will be back to visit her. BP - 181/121, P - 94. Administered PRN metoprolol 5 mg IV.    0122 - Pt alert and oriented x 3. Pt stated: \" Have you found Maninder Kennedy? \" Reminded pt that her daughter went home already. BP - 193/124, P - 88. Administered PRN Hydralazine 20 mg IV.     0138 - BP - 137/102, P - 90. Will continue to monitor pt.     0400 - Bedbath completed. External cather, meseret, linen changed. 5259 - Bedside and Verbal shift change report given to VOLODYMYR Schulte (oncoming nurse) by Cassidy Wilkes RN (offgoing nurse). Report included the following information FEROZ Anderson SBAR.

## 2018-03-19 NOTE — PROGRESS NOTES
67 yo WF on IV reglan for nausea and vomiting. Nurses report poor oral intake.  Appears lethargic  WBC=15.6    Suggest: r/o infection, UTI, pneumonia,etc                  ? Peg                  May want to d/c reglan if lethargy persists

## 2018-03-19 NOTE — PROGRESS NOTES
Problem: Mobility Impaired (Adult and Pediatric)  Goal: *Acute Goals and Plan of Care (Insert Text)  Physical Therapy Goals  Initiated 3/13/2018 and to be accomplished within 7 day(s)  1. Patient will move from supine to sit and sit to supine , scoot up and down and roll side to side in bed with modified independence. 2.  Patient will transfer from bed to chair and chair to bed with supervision/set-up using the least restrictive device. 3.  Patient will perform sit to stand with supervision/set-up. 4.  Patient will ambulate with supervision/set-up for 75 feet with the least restrictive device. 5.  Patient will ascend/descend 3 stairs with  handrail(s) with minimal assistance/contact guard assist to egress home . Outcome: Not Progressing Towards Goal  physical Therapy TREATMENT    Patient: Rafael Rodrigues (68 y.o. female)  Date: 3/19/2018  Diagnosis: Nausea & vomiting  Nausea and vomiting  nausea and vomiting Nausea & vomiting  Procedure(s) (LRB):  ESOPHAGOGASTRODUODENOSCOPY (EGD) (N/A) 4 Days Post-Op  Precautions: Fall  Chart, physical therapy assessment, plan of care and goals were reviewed. PLOF: unclear    ASSESSMENT:  Pt presents in bed. Did shake head \"no\" when asked if having any pain in her body. Essentially non verbal until attempted to stand and she cried out \"NO. \" Co treated w DARLINE. Worked on static sitting balance x 6 mins at EOB. DARLINE working on grooming. Pt required max A total assist for mobility. She did participate in some AA to AROM B LE's however self discontinued after 5 reps. Not oriented to self today. EDUCATION:  Unable to participate 2/2 cognitive barriers. Progression toward goals:      Minimial progress to goals this visit     PLAN:  Patient continues to benefit from skilled intervention to address the above impairments. Continue treatment per established plan of care.   Discharge Recommendations:  home w hh and 24/7 care , hospital bed vs SNF  Further Equipment Recommendations for Discharge:       SUBJECTIVE:   Patient stated NO.     OBJECTIVE DATA SUMMARY:   Critical Behavior:  Neurologic State: Alert  Orientation Level: not Oriented to person, not Oriented to place, not Oriented to situation  Cognition: Decreased command following  Safety/Judgement: Decreased insight into deficits    G CODE:Mobility  Current  CN= 100%   Goal  CJ= 20-39%. The severity rating is based on the Other [facility] Sitting Balance Scale  0: Pt performs 25% or less of sitting activity (Max assist) CN, 100% impaired. Functional Mobility Training:  Bed Mobility:  Rolling: Total assistance  Supine to Sit: Maximum assistance; Total assistance  Sit to Supine: Maximum assistance  Scooting: Total assistance;Assist x2 (supine to [facility])  Balance:  Sitting: Impaired  Sitting - Static: Poor (constant support)  Sitting - Dynamic: Poor (constant support)  Standing:  (refused to attempt)  Therapeutic Exercises:   B LE hip and knee F/E AA to AROM x 5; SLR unable   Vital Signs  BP:  142/94  Pain:  Pre treatment pain level:  0  Post treatment pain level:  0  Pain Scale 1: Numeric (0 - 10)  Pain Intensity 1: 0  Activity Tolerance:   poor  After treatment:   Patient left in no apparent distress in bed with B prevalons applied in chair position & lateral support of pillows to assist w maintaining midline trunk positioning.    Call bell left within reach  Nursing notified  Danae Benton PTA   Time Calculation: 16 mins

## 2018-03-19 NOTE — PROGRESS NOTES
Assumed care of pt from [facility] pt resting in bed with no signs of pain or distress. Frequently used items and call bell within reach. Bed locked in lowest position. Throughout the day pt ate very minium. She took a few sips and 3 tablespoons for lunch and dinner. I encouraged her but continued to say no and she doesn't want anymore. Pt also rested throughout the day and had no complaints when awakened and assessed hourly. Had no episodes of nausea and vomiting. Pt also takes very small amount ( teaspoon) with meds crushed. If given too much applesauce she stats to refuse and needs reinforcement. 1955 Bedside and Verbal shift change report given to Cathryn RN (oncoming nurse) by Fang Hernandez RN (offgoing nurse). Report included the following information SBAR, Kardex, Intake/Output, MAR and Recent Results.

## 2018-03-19 NOTE — PROGRESS NOTES
Problem: Falls - Risk of  Goal: *Absence of Falls  Document [place] Fall Risk and appropriate interventions in the flowsheet.    Outcome: Progressing Towards Goal  Fall Risk Interventions:       Mentation Interventions: Door open when patient unattended    Medication Interventions: Patient to call before getting OOB    Elimination Interventions: Patient to call for help with toileting needs

## 2018-03-20 PROBLEM — R53.81 DEBILITY: Status: ACTIVE | Noted: 2018-01-01

## 2018-03-20 PROBLEM — Z71.89 ADVANCED CARE PLANNING/COUNSELING DISCUSSION: Status: ACTIVE | Noted: 2018-01-01

## 2018-03-20 PROBLEM — F03.90 DEMENTIA (HCC): Status: ACTIVE | Noted: 2018-01-01

## 2018-03-20 NOTE — PROGRESS NOTES
2 [facility]  Hospitalist Division        Inpatient Daily Progress Note    Daily progress Note    Patient: Trey Bennett MRN: [id number]  CSN: [id number]    YOB: 1940  Age: 68 y.o. Sex: female    DOA: 3/12/2018 LOS:  LOS: 6 days                    Chief Complaint:  N/V    Interval History: 67 y/o  female with hx of hypertension, dementia, recurrent nausea and vomiting for which she has been in the ER for 4 times and has been followed by her PCP as well.  On one occasion, the patient was discharged with possible pneumonia.  Last time she was treated for UTI; however, she continued to have nausea and vomiting with very poor p.o. intake and significant weight loss.  Per daughter, the patient has been feeling nauseous and been vomiting since earlier the same day, two more episodes in the ER. Judah Esparza has had zero p.o. intake and minimal liquid intake the day prior.  The patient has not seen a GI doctor per family and has never had an EGD or colonoscopy. Patient admitted for ongoing management. CT Abd/pelvis showed No acute or focal abnormality to explain patient's nausea or vomiting. Minimal layering density in the gallbladder but no secondary CT findings of cholecystitis. Chronic diverticular disease. No findings of an acute diverticulitis. HIDA scan 3/13/2018 showed patent cystic duct.  Decreased gallbladder ejection fraction of approximately 34 % suggesting gallbladder dysfunction. GI consulted 3/14. EGD on 3/15 1. -Normal upper endoscopy, with no endoscopic evidence of neoplasia or mucosal abnormality. 2. -Esophagus empirically dilated with #48 Munoz. GI soft diet ordered. Patient continued to vomit overnight 3/15 into 3/16. Unable to complete GES 2/2 to vomiting. Mentation has improved. Reglan switched to PO but later dc'd due to lethargy.    Discussed with CM- insurance auth for SNF has , on 3/19/2018, case management notes Danie Fontenot denied SNF. Palliative care consulted to assist with goals of care, GI recommends considering PEG tube. Assessment/Plan:     Patient Active Problem List   Diagnosis Code    Essential hypertension, malignant I10    NESS (acute kidney injury) ([address] N17.9    Dehydration E86.0    Abnormality of gait and mobility R26.9    Stroke ([address] I63.9    CKD (chronic kidney disease) N18.9    HTN (hypertension) I10    Follow up Z09    Nausea & vomiting R11.2    Nausea and vomiting R11.2    Advanced care planning/counseling discussion Z71.89    Dementia F03.90    Debility R53.81       A/P:  HTN resistant  - Norvasc 5 mg daily, coreg 25 mg BID, Clonidine 0.2 mg patch every 7 days, Lisinopril 40 mg daily  - Hydralazine 20 mg IV Q6H PRN SBP > 170 or DBP > 110, Lopressor 5 mg IV Q4H PRN SBP > 170     N/V  - HIDA scan with very mild dysfunction of GB  - MBS showed no evidence for airway penetration or aspiration  - GI consulted 3/14  - EGD 3/15/2018 note normal upper endoscopy, esophagus empirically dilated with #48 Marisela Vick  - Unable to complete GES as patient continues to vomit  - Reglan d/c'd today due to lethargy  - consider PEG per GI - palliative care consulted     Leukocytosis  - WBC 15 today  - awaiting repeat UA today  - trend CBC    UTI  - Culture with Candida Albicans  - received IV Diflucan x 5 days  - awaiting repeat UA as WBC up to 15 today       Hypokalemia  - Replace and continue to monitor     DVT prophylaxis  - Heparin subcutaneously TID     PT/OT following  - recommend home w/ home health and 24/7 care, hospital bed vs SNF    Disposition  - case management following, per notes [employer] insurance denied auth for SNF  - Palliative care consulted- awaiting meeting with daughter      Hans Piedra, [facility]  [address]  Pager:  [phone]  Office:  [phone]        Subjective:      No events overnight.   Drowsy, arouses to verbal and tactile stimuli- denies abdominal pain- no further N/V. Objective:      Visit Vitals    BP (!) 169/98 (BP 1 Location: Left arm, BP Patient Position: At rest)    Pulse 83    Temp 98.1 °F (36.7 °C)    Resp 17    Ht 5' (1.524 m)    Wt 49.4 kg (109 lb)    SpO2 92%    Breastfeeding No    BMI 21.29 kg/m2         Physical Exam:  General appearance: alert, cooperative, no distress  Lungs: clear to auscultation bilaterally  Heart: regular rate and rhythm, S1, S2 normal, no murmur, click, rub or gallop  Abdomen: soft, non tender, non distended. Normoactive bowel sounds. Extremities: extremities normal, atraumatic, no cyanosis or edema  Skin: Skin color, texture, turgor normal. No rashes or lesions  Neurologic: drowsy, arouses to verbal and tactile stimuli, follows simple commands      Intake and Output:  Current Shift:  03/20 0701 - 03/20 1900  In: 480 [P.O.:480]  Out: -   Last three shifts:  03/18 1901 - 03/20 0700  In: 60 [P.O.:60]  Out: 250 [Urine:250]    Recent Results (from the past 24 hour(s))   MAGNESIUM    Collection Time: 03/20/18  5:05 AM   Result Value Ref Range    Magnesium 1.8 1.6 - 2.6 mg/dL   METABOLIC PANEL, BASIC    Collection Time: 03/20/18  5:05 AM   Result Value Ref Range    Sodium 138 136 - 145 mmol/L    Potassium 2.9 (LL) 3.5 - 5.5 mmol/L    Chloride 101 100 - 108 mmol/L    CO2 22 21 - 32 mmol/L    Anion gap 15 3.0 - 18 mmol/L    Glucose 63 (L) 74 - 99 mg/dL    BUN 22 (H) 7.0 - 18 MG/DL    Creatinine 1.03 0.6 - 1.3 MG/DL    BUN/Creatinine ratio 21 (H) 12 - 20      GFR est AA >60 >60 ml/min/1.73m2    GFR est non-AA 52 (L) >60 ml/min/1.73m2    Calcium 8.1 (L) 8.5 - 10.1 MG/DL           Lab Results   Component Value Date/Time    Glucose 63 (L) 03/20/2018 05:05 AM    Glucose 85 03/19/2018 04:35 AM    Glucose 86 03/18/2018 05:21 AM    Glucose 91 03/17/2018 05:15 AM    Glucose 92 03/16/2018 04:22 AM        Imaging:  No results found.     Medication List Reviewed:  Current Facility-Administered Medications   Medication Dose Route Frequency    lisinopril (PRINIVIL, ZESTRIL) tablet 40 mg  40 mg Oral DAILY    loratadine (CLARITIN) tablet 10 mg  10 mg Oral DAILY PRN    hydrALAZINE (APRESOLINE) 20 mg/mL injection 20 mg  20 mg IntraVENous Q6H PRN    amLODIPine (NORVASC) tablet 5 mg  5 mg Oral DAILY    metoprolol (LOPRESSOR) injection 5 mg  5 mg IntraVENous Q4H PRN    cloNIDine (CATAPRES) 0.2 mg/24 hr patch 1 Patch  1 Patch TransDERmal Q7D    atorvastatin (LIPITOR) tablet 80 mg  80 mg Oral QHS    carvedilol (COREG) tablet 25 mg  25 mg Oral BID    aspirin (ASA) suppository 300 mg  300 mg Rectal DAILY    heparin (porcine) injection 5,000 Units  5,000 Units SubCUTAneous Q12H    cefTRIAXone (ROCEPHIN) 2 g in sterile water (preservative free) 20 mL IV syringe  2 g IntraVENous Q24H    ondansetron (ZOFRAN) injection 4 mg  4 mg IntraVENous Q6H PRN

## 2018-03-20 NOTE — PROGRESS NOTES
conducted a Follow up consultation and Spiritual Assessment for [facility], who is a 68 y.o.,female. The  provided the following Interventions:  Continued the relationship of care and support. Listened empathically. Offered prayer and assurance of continued prayer on patients behalf. Chart reviewed. The following outcomes were achieved:  Patient expressed gratitude for 's visit. Assessment:  There are no spiritual or Jew issues which require intervention at this time. Plan:  Chaplains will continue to follow and will provide pastoral care on an as needed/requested basis.  recommends bedside caregivers page  on duty if patient shows signs of acute spiritual or emotional distress. Cally Santa M.Div.   [facility]  [phone]

## 2018-03-20 NOTE — PROGRESS NOTES
69 yo WF with nausea and vomiting (workup -non diagnostic)  Pt appears lethargic, poor oral intake. Reglan discontinued.     Suggest: Repeat cbc and U/A                 Consider Peg                 R/O UTI ,pneumonia, etc

## 2018-03-20 NOTE — PROGRESS NOTES
[facility]: [phone]  [facility]: [address] [place] [address] Way: [phone]    Patient Name: Nemesio Eduardo  YOB: 1940    Date of Initial Consult: 3/20/2018  Reason for Consult: care decisions  Requesting Provider: Ms Leticia Philippe NP  Primary Care Physician: Nargis Andujar MD      SUMMARY:   Nemesio Eduardo is a 68y.o. year old with a past history of hypertension, mild dementia, renal insufficiency, who was admitted on 3/12/2018 from home with a diagnosis of nausea and vomiting x 1 month with weight loss of 20 pounds. Current medical issues leading to Palliative Medicine involvement include: 68year old who has had n/v and poor oral intake for around 1 month. GI has been consulted with EGD, CT scans with essentially non diagnostic work up. She was placed on Reglan for a short period to help with nausea and vomiting, however been increasingly less interactive for that reason Reglan was stopped. She continues to have a very poor appetite. Palliative medicine has been consulted for care decisions. PALLIATIVE DIAGNOSES:   1. Advanced care plan discussions  2. Nausea / vomiting   3. Dementia   4. Debility        PLAN:   1. Patient seen at bedside along with Ms. Shashank Willoughby NP. Eyes closed when we entered room but she quickly opened her eyes and tracked us in room. Could tell me her full name, smiled when I asked if she were watching her \" stories\" . Could not elicit more history from her and family not at bedside. 2. Advanced care plan discussions Dani Bell has executed an AMD and is on file with our facility. Naming her daughter Latha Obrien as MPOA . Her medical wishes are also denoted for a terminal illness no long prolonging procedures including nutrition. Will need to discuss with her daughter as patient is alert and answers few questions, but unable to determine if she understands complex medical decisions.  We have left a message for [employer] to set up a family meeting. For now Goals of care remain full. 3. Nausea / vomiting discussed with RN no vomiting today or yesterday. GI on board, has had EGD, CT of abd pelvis which are no diagnostic. Started on Reglan for N/V, but now not as interactive, unsure etiology, Reglan has been stopped. 4. Dementia per chart review mild dementia, unsure of her base line and will discuss with daughter. Admission Alb 3.4. H/o strokes, wonder if depression plays some part in this. ? Psych eval.   5. Debility very poor oral intake, not sure of her base line level of functioning. Once we meet with daughter will discuss with her. 6. Initial consult note routed to primary continuity provider  7. Communicated plan of care with: Palliative IDT    GOALS OF CARE:         TREATMENT PREFERENCES:   Code Status: Full Code    Advance Care Planning:  Advance Care Planning 3/12/2018   Patient's Healthcare Decision Maker is: Verbal statement (Legal Next of Kin remains as decision maker)   Primary Decision Maker Name Yolis Forrest   Primary Decision Maker Phone Number [phone] (I) [phone]( cell)   Primary Decision Maker Relationship to Patient Adult child   Confirm Advance Directive Yes, on file   Patient Would Like to Complete Advance Directive -   Does the patient have other document types Other (comment)           Other Instructions: Other:  As far as possible, the palliative care team has discussed with patient / health care proxy about goals of care / treatment preferences for patient. HISTORY:     History obtained from: chart    CHIEF COMPLAINT: nausea / vomiting     HPI/SUBJECTIVE:    The patient is:   [] Verbal and participatory  [x] Non-participatory due to:  Does not engage in conversation  68year old female who was admitted with nausea and vomiting. GI work up non diagnostic. Now with very poor po intake. She is alert but minimally engaged in conversation. Nausea and vomiting improved.     Clinical Pain Assessment (nonverbal scale for nonverbal patients): Clinical Pain Assessment  Severity: 0     Activity (Movement): Lying quietly, normal position    Duration: for how long has pt been experiencing pain (e.g., 2 days, 1 month, years)  Frequency: how often pain is an issue (e.g., several times per day, once every few days, constant)     FUNCTIONAL ASSESSMENT:     Palliative Performance Scale (PPS):  PPS: 30    ECOG        PSYCHOSOCIAL/SPIRITUAL SCREENING:      Any spiritual / Sikhism concerns:  [] Yes /  [] No unable to assess    Caregiver Burnout:  [] Yes /  [] No /  [x] No Caregiver Present      Anticipatory grief assessment:   [] Normal  / [] Maladaptive  Unable to assess      REVIEW OF SYSTEMS:     Positive and pertinent negative findings in ROS are noted above in HPI. The following systems were [] reviewed / [x] unable to be reviewed as noted in HPI  Other findings are noted below. Systems: constitutional, ears/nose/mouth/throat, respiratory, gastrointestinal, genitourinary, musculoskeletal, integumentary, neurologic, psychiatric, endocrine. Positive findings noted below. Modified ESAS Completed by: provider   Fatigue: 5       Pain: 0     Nausea: 0   Anorexia: 9             Stool Occurrence(s): 0        PHYSICAL EXAM:     Wt Readings from Last 3 Encounters:   03/12/18 49.4 kg (109 lb)   03/08/18 43.1 kg (95 lb)   03/02/18 43.1 kg (95 lb)     Blood pressure (!) 166/107, pulse 88, temperature 98.4 °F (36.9 °C), resp. rate 18, height 5' (1.524 m), weight 49.4 kg (109 lb), SpO2 99 %, not currently breastfeeding.   Pain:  Pain Scale 1: Visual  Pain Intensity 1: 0                 Last bowel movement: not recorded     Constitutional: comfortable appearing female who is reclining in bed in NAD  Eyes: pupils equal, anicteric  ENMT: no nasal discharge, moist mucous membranes  Respiratory: breathing not labored, symmetric  Skin: warm, dry  Neurologic: alert, can tell me her name, tracks us in room, does not readily engage in conversation, but will shake her head appropriately yes and no  Psychiatric: affect flat          HISTORY:     Principal Problem:    Nausea & vomiting (3/12/2018)    Active Problems:    HTN (hypertension) (2015)      Nausea and vomiting (3/14/2018)      Past Medical History:   Diagnosis Date    Amblyopia of left eye     Blind left eye     Cataract     bilat, right has been replaced    CRI (chronic renal insufficiency)     Dementia     Essential hypertension, malignant 2014    Hypertension     Left bundle branch block     Otitis media, acute 16    left      Past Surgical History:   Procedure Laterality Date    HX CATARACT REMOVAL Right     HX  SECTION      HX  SECTION      HX  SECTION      HX TONSILLECTOMY        Family History   Problem Relation Age of Onset    Heart Disease Father     Other Brother      ? brain injury     History reviewed, no pertinent family history.   Social History   Substance Use Topics    Smoking status: Never Smoker    Smokeless tobacco: Never Used    Alcohol use No     Allergies   Allergen Reactions    Codeine Unknown (comments)     Unsure due to happening so long    Levaquin [Levofloxacin] Nausea Only     intolerance    Pcn [Penicillins] Hives    Sulfa (Sulfonamide Antibiotics) Unknown (comments)     Unsure it was so long ago      Current Facility-Administered Medications   Medication Dose Route Frequency    potassium chloride 10 mEq in 100 ml IVPB  10 mEq IntraVENous Q1H    lisinopril (PRINIVIL, ZESTRIL) tablet 40 mg  40 mg Oral DAILY    loratadine (CLARITIN) tablet 10 mg  10 mg Oral DAILY PRN    hydrALAZINE (APRESOLINE) 20 mg/mL injection 20 mg  20 mg IntraVENous Q6H PRN    amLODIPine (NORVASC) tablet 5 mg  5 mg Oral DAILY    metoprolol (LOPRESSOR) injection 5 mg  5 mg IntraVENous Q4H PRN    cloNIDine (CATAPRES) 0.2 mg/24 hr patch 1 Patch  1 Patch TransDERmal Q7D    atorvastatin (LIPITOR) tablet 80 mg  80 mg Oral QHS    carvedilol (COREG) tablet 25 mg  25 mg Oral BID    aspirin (ASA) suppository 300 mg  300 mg Rectal DAILY    heparin (porcine) injection 5,000 Units  5,000 Units SubCUTAneous Q12H    cefTRIAXone (ROCEPHIN) 2 g in sterile water (preservative free) 20 mL IV syringe  2 g IntraVENous Q24H    ondansetron (ZOFRAN) injection 4 mg  4 mg IntraVENous Q6H PRN        LAB AND IMAGING FINDINGS:     Lab Results   Component Value Date/Time    WBC 15.6 (H) 03/15/2018 05:05 AM    HGB 11.1 (L) 03/15/2018 05:05 AM    PLATELET 398 50/35/8217 05:05 AM     Lab Results   Component Value Date/Time    Sodium 138 03/20/2018 05:05 AM    Potassium 2.9 (LL) 03/20/2018 05:05 AM    Chloride 101 03/20/2018 05:05 AM    CO2 22 03/20/2018 05:05 AM    BUN 22 (H) 03/20/2018 05:05 AM    Creatinine 1.03 03/20/2018 05:05 AM    Calcium 8.1 (L) 03/20/2018 05:05 AM    Magnesium 1.8 03/20/2018 05:05 AM    Phosphorus 4.1 10/04/2015 06:44 AM      Lab Results   Component Value Date/Time    AST (SGOT) 28 03/12/2018 11:21 AM    Alk.  phosphatase 68 03/12/2018 11:21 AM    Protein, total 6.6 03/12/2018 11:21 AM    Albumin 3.2 (L) 03/12/2018 11:21 AM    Globulin 3.4 03/12/2018 11:21 AM     Lab Results   Component Value Date/Time    INR 1.0 01/04/2017 05:00 PM    Prothrombin time 13.2 01/04/2017 05:00 PM    aPTT 25.1 01/04/2017 05:00 PM      Lab Results   Component Value Date/Time    Iron 94 03/15/2018 05:05 AM    TIBC 171 (L) 03/15/2018 05:05 AM    Iron % saturation 55 03/15/2018 05:05 AM    Ferritin 319 03/15/2018 05:05 AM      No results found for: PH, PCO2, PO2  No components found for: [place]   Lab Results   Component Value Date/Time    CK 26 02/12/2018 06:45 PM    CK - MB <1.0 02/12/2018 06:45 PM              Total time: 50 minutes  Counseling / coordination time, spent as noted above: 30 minutes   > 50% counseling / coordination: yes    Prolonged service was provided for  []30 min   []75 min in face to face time in the presence of the patient, spent as noted above. Time Start:   Time End:   Note: this can only be billed with 43528 (initial) or 37573 (follow up). If multiple start / stop times, list each separately.

## 2018-03-20 NOTE — PROGRESS NOTES
Speech Therapy Note:    Attempted follow up this day. Pt refused all po intake at this time; dtr at b/s for education. Pt continued to adamantly refuse. ST will follow up next day. Slime Mehta., [id number] [facility]  Office: [phone]  Pager: [phone]

## 2018-03-20 NOTE — ROUTINE PROCESS
Bedside and Verbal shift change report given to Lizette Landeros (oncoming nurse) by manolo xavier rn (offgoing nurse). Report given with SBAR, Kardex, Intake/Output and Recent Results.

## 2018-03-20 NOTE — PROGRESS NOTES
Problem: Mobility Impaired (Adult and Pediatric)  Goal: *Acute Goals and Plan of Care (Insert Text)  Physical Therapy Goals  No progress on goals  New goals established. PHYSICAL THERAPY SHORT TERM GOALS :   1.  Patient will perform/completeroll to both sides  with minimal assistance/contact guard assist within 1 week(s). 2.  Patient will participate in  lower extremity therapeutic exercise/activities with minimal assistance/contact guard assist for 5 minutes within 1 week(s). 3. Patient will be able to be positioned by family with verbal cues  By therapist.     Therapist Quay Hodgkins, PT  3/20/2018   Time Calculation: 15 mins  Initiated 3/13/2018 and to be accomplished within 7 day(s)  1. Patient will move from supine to sit and sit to supine , scoot up and down and roll side to side in bed with modified independence. 2.  Patient will transfer from bed to chair and chair to bed with supervision/set-up using the least restrictive device. 3.  Patient will perform sit to stand with supervision/set-up. 4.  Patient will ambulate with supervision/set-up for 75 feet with the least restrictive device. 5.  Patient will ascend/descend 3 stairs with  handrail(s) with minimal assistance/contact guard assist to egress home . Outcome: Progressing Towards Goal  physical Therapy RE-EVALUATION and TREATMENT    Patient: [address] [place] [address] (68 y.o. female)  Date: 3/20/2018  Diagnosis: Nausea & vomiting  Nausea and vomiting  nausea and vomiting Nausea & vomiting  Procedure(s) (LRB):  ESOPHAGOGASTRODUODENOSCOPY (EGD) (N/A) 5 Days Post-Op  Precautions: Fall    ASSESSMENT:  Patient re-evaluated following one week of treatment . During re-evaluation patient  With  Decreased ability to stand and only able to sit for 3 minutes  At edge of bed. Non verbal shaking head to questions. Left in bed will move to [facility] for one week to keep leg mobility no pain reported.   Patient has experienced a delay in progress due to patient unable to attend/participate in therapy as expected. The following measures have been taken to improve response to treatment: new goals established. Uzma Gone PLAN:  Goals have been updated based on progression since last assessment. Patient continues to benefit from skilled intervention to address the above impairments. Continue to follow the patient 3 -5times a week by rehab tech for exercises and  A minimum of one time per week by physical therapist   to address goals. Planned Interventions:  [x]     Bed Mobility Training          [x]     Neuromuscular Re-Education  [x]     Transfer Training                []    Orthotic/Prosthetic Training  [x]     Gait Training                       []     Modalities  [x]     Therapeutic Exercises       []     Edema Management/Control  [x]     Therapeutic Activities         [x]     Patient and Family Training/Education  []     Other (comment):  Discharge Recommendations: [facility]  Further Equipment Recommendations for Discharge: hospital bed and wheelchair      SUBJECTIVE:   Patient stated .    OBJECTIVE DATA SUMMARY:   GCODES(GP):Mobility  Current  CL= 60-79%   Goal  CJ= 20-39%. The severity rating is based on the [employer] Sitting Balance Scale2/5   [employer] Sitting Balance Scale2/5  0: Pt performs 25% or less of sitting activity (Max assist) CN, 100% impaired. 1: Pt supports self with upper extremities but requires therapist assistance. Pt performs 25-50% of effort (Mod assist) CM, 80% to <100% impaired. 1+: Pt supports self with upper extremities but requires therapist assistance. Pt performs >50% effort. (Min assist). CL, 60% to <80% impaired. 2: Pt supports self independently with both upper extremities. CL, 60% to <80% impaired. 2+: Pt support self independently with 1 upper extremity. CK, 40% to <60% impaired. 3: Pt sits without upper extremity support for up to 30 seconds.  CK, 40% to <60% impaired. 3+: Pt sits without upper extremity support for 30 seconds or greater. CJ, 20% to <40% impaired. 4: Pt moves and returns trunkal midpoint 1-2 inches in one plane. CJ, 20% to <40% impaired. 4+: Pt moves and returns trunkal midpoint 1-2 inches in multiple planes. CI, 1% to <20% impaired. 5: Pt moves and returns trunkal midpoint in all planes greater than 2 inches. CH, 0% impaired. Critical Behavior:  Neurologic State: Alert  Orientation Level: Oriented to person  Cognition: Decreased command following, Decreased attention/concentration  Safety/Judgement: Fall prevention  Tone & Sensation:   Tone: Normal    Range Of Motion:  AROM: Generally decreased, functional  PROM: Generally decreased, functional  Strength:    Strength: Generally decreased, functional   Functional Mobility Training:  Bed Mobility:  Supine to Sit: Maximum assistance; Additional time;Assist x2;Assist x1  Sit to Supine: Maximum assistance; Additional time;Assist x1;Assist x2  Scooting: Total assistance;Assist x2; Additional time  Transfers:  Sit to Stand:  (uanble to test )  Balance:  Sitting: Impaired  Sitting - Static: Fair (occasional); Poor (constant support) (only 3 minutes )  Sitting - Dynamic: Poor (constant support)  Standing:  (unable to test )  Ambulation/Gait Training:  Gait Description (WDL):  (unalbe to test )  Neuro Re-Education and Therapeutic Exercises:   Upright posutre in sitting and leg aarom in all planes  Pain:  Pain Scale 1: Numeric (0 - 10)  Pain Intensity 1: 0  Activity Tolerance:   Fair   Please refer to the flowsheet for vital signs taken during this treatment. After treatment:   []  Patient left in no apparent distress sitting up in chair  [x]  Patient left in no apparent distress in bed  [x]  Call bell left within reach  [x]  Nursing notified  []  Caregiver present  []  Bed alarm activated    Patient education:  Continuing to provide education to increase safety awareness and increased mobility. needs reinforcement.        Yoselin Quiñonez, PT   Time Calculation: 15 mins

## 2018-03-20 NOTE — PROGRESS NOTES
Problem: Falls - Risk of  Goal: *Absence of Falls  Document [place] Fall Risk and appropriate interventions in the flowsheet.    Outcome: Progressing Towards Goal  Fall Risk Interventions:       Mentation Interventions: Door open when patient unattended    Medication Interventions: Patient to call before getting OOB    Elimination Interventions: Call light in reach

## 2018-03-20 NOTE — PROGRESS NOTES
Assumed care of pt from Fe , RN pt awake in bed alert to self , no distress noted and denies pain. Frequently used items and call bell within reach. Patient verbalized understanding to use call bell for any needs or assistance. Bed locked in lowest position. 1236 Pt drank  25% of her lunch with encouragement and with no nausea or vomiting. 1740 pt drank 75% of dinner with no nausea and vomiting. 1925 Bedside and Verbal shift change report given to VOLODYMYR Lockett (oncoming nurse) by Layla Day RN (offgoing nurse). Report included the following information SBAR, Kardex, Intake/Output, MAR and Recent Results.

## 2018-03-20 NOTE — PROGRESS NOTES
Received drowsy,in no acute distress. Family member at bedside. Hob elevated. 3/20/18 0600 Uneventful shift.

## 2018-03-21 NOTE — PROGRESS NOTES
Assumed patient care from Jt Bolivar RN. Received patient asleep. No s/s of pain/ discomfort noted. Bed is locked and in lowest position and call bell is within reach. Not in acute distress.

## 2018-03-21 NOTE — PROGRESS NOTES
Problem: Falls - Risk of  Goal: *Absence of Falls  Document [place] Fall Risk and appropriate interventions in the flowsheet. Outcome: Progressing Towards Goal  Fall Risk Interventions:       Mentation Interventions: Bed/chair exit alarm, Door open when patient unattended    Medication Interventions: Assess postural VS orthostatic hypotension    Elimination Interventions:  Toileting schedule/hourly rounds

## 2018-03-21 NOTE — ROUTINE PROCESS
Bedside and Verbal shift change report given to Day Johnsonrn (oncoming nurse) by manolo xavier rn (offgoing nurse). Report given with SBAR, Kardex, Intake/Output and Recent Results.

## 2018-03-21 NOTE — PROGRESS NOTES
Problem: Mobility Impaired (Adult and Pediatric)  Goal: *Acute Goals and Plan of Care (Insert Text)  Physical Therapy Goals  Re-evaluated 3/21/2018 and to be accomplished within 3 day trial.   If appropriate continue per plan of care to accomplish goals within 7 days. 1.  Patient will move from supine to sit and sit to supine  in bed with supervision/set-up. 2.  Patient will maintain seated at edge of bed for 8 min with supervision/set-up to prepare for out of bed activity. 3.  Patient will perform sit to stand with minimal assistance/contact guard assist.  4.  Patient will transfer from bed to chair and chair to bed with minimal assistance/contact guard assist using the least restrictive device. 5.  Patient will perform BLE exercise with supervision to maintain/increase strength for out of bed activity. No progress on goals  New goals established. PHYSICAL THERAPY SHORT TERM GOALS :   1.  Patient will perform/completeroll to both sides  with minimal assistance/contact guard assist within 1 week(s). 2.  Patient will participate in  lower extremity therapeutic exercise/activities with minimal assistance/contact guard assist for 5 minutes within 1 week(s). 3. Patient will be able to be positioned by family with verbal cues  By therapist.     Therapist Hugh Wilkerson, PT  3/20/2018   Time Calculation: 15 mins  Initiated 3/13/2018 and to be accomplished within 7 day(s)  1. Patient will move from supine to sit and sit to supine , scoot up and down and roll side to side in bed with modified independence. 2.  Patient will transfer from bed to chair and chair to bed with supervision/set-up using the least restrictive device. 3.  Patient will perform sit to stand with supervision/set-up. 4.  Patient will ambulate with supervision/set-up for 75 feet with the least restrictive device.    5.  Patient will ascend/descend 3 stairs with  handrail(s) with minimal assistance/contact guard assist to egress home .     Outcome: Progressing Towards Goal  physical Therapy RE-EVALUATION and TREATMENT    Patient: Yaima Pelletier (68 y.o. female)  Date: 3/21/2018  Diagnosis: Nausea & vomiting  Nausea and vomiting  nausea and vomiting Nausea & vomiting  Procedure(s) (LRB):  ESOPHAGOGASTRODUODENOSCOPY (EGD) (N/A) 6 Days Post-Op  Precautions: Fall, Skin  PLOF: Amb with assist x1    ASSESSMENT:  MD request PT re-evaluation d/t patient with increased alertness. Eyes open upon entry. Family friend at bedside; offered encouragement. Answers simple questions and follows simple commands with increased time. Oriented to self and place. Disoriented to time. Mod A for supine to sit. Verbal and tactile cues for sequencing. Seated EOB with lateral lean to left. Mod A to correct to midline. Able to maintain midline sitting for approximately 2 minutes with supervision. With increased sitting time posture with increasing left lean requiring max A to maintain upright. Constant support to maintain sitting for dynamic BUE. Sit to stand with max A. Max A to maintain standing balance. Deferred gait d/t poor static standing balance. Returned to supine in bed with mod A. Demonstrated appropriate use of call bell. All needs in reach at end of session. D/t patient increased alertness and participation at present session will follow for 3 day trial to better determine maintenance of active participation in skilled treatment and prior level of function. If appropriate, continue per plan of care 3-5x/week to address goals. EDUCATION: bed mobility, transfers, balance, cognition  Patient's progression toward goals since last assessment: Increased alertness and participation in PT; cognitive level limits PT progress     PLAN:  Goals have been updated based on progression since last assessment. Will follow for 3 day trial to better determine maintenance of active participation in skilled treatment and prior level of function.  If appropriate, continue per plan of care 3-5x/week to address goals. Planned Interventions:  [x]     Bed Mobility Training          [x]     Neuromuscular Re-Education  [x]     Transfer Training                []    Orthotic/Prosthetic Training  [x]     Gait Training                       []     Modalities  [x]     Therapeutic Exercises       []     Edema Management/Control  [x]     Therapeutic Activities         [x]     Patient and Family Training/Education  []     Other (comment):  Discharge Recommendations: Skilled Nursing Facility  Further Equipment Recommendations for Discharge: TBD with amb     SUBJECTIVE:   Patient stated I live with my daughter.     OBJECTIVE DATA SUMMARY:     G CODES:  Mobility [id number] Current  CM= 80-99%  [id number] Goal  CK= 40-59%. The severity rating is based on the [facility] Controls Balance Scale 1/5    [facility] Sitting Balance Scale 1/5  0: Pt performs 25% or less of sitting activity (Max assist) CN, 100% impaired. 1: Pt supports self with upper extremities but requires therapist assistance. Pt performs 25-50% of effort (Mod assist) CM, 80% to <100% impaired. 1+: Pt supports self with upper extremities but requires therapist assistance. Pt performs >50% effort. (Min assist). CL, 60% to <80% impaired. 2: Pt supports self independently with both upper extremities. CL, 60% to <80% impaired. 2+: Pt support self independently with 1 upper extremity. CK, 40% to <60% impaired. 3: Pt sits without upper extremity support for up to 30 seconds. CK, 40% to <60% impaired. 3+: Pt sits without upper extremity support for 30 seconds or greater. CJ, 20% to <40% impaired. 4: Pt moves and returns trunkal midpoint 1-2 inches in one plane. CJ, 20% to <40% impaired. 4+: Pt moves and returns trunkal midpoint 1-2 inches in multiple planes. CI, 1% to <20% impaired. 5: Pt moves and returns trunkal midpoint in all planes greater than 2 inches. CH, 0% impaired.     Critical Behavior:  Neurologic State: Alert  Orientation Level: Oriented to person, Oriented to place, Disoriented to time  Cognition: Follows commands  Safety/Judgement: Decreased insight into deficits  Functional Mobility Training:  Bed Mobility:  Supine to Sit: Moderate assistance; Additional time  Sit to Supine: Moderate assistance; Additional time  Transfers:  Sit to Stand: Maximum assistance  Stand to Sit: Maximum assistance  Balance:  Sitting: Impaired  Sitting - Static: Poor (constant support)  Sitting - Dynamic: Poor (constant support)  Standing: Impaired  Standing - Static: Poor  Standing - Dynamic :  (not tested)  Ambulation/Gait Training:  Gait Description (WDL):  (not tested)  Neuro Re-Education:  Sitting EOB 8 minutes  Therapeutic Exercises:   Reaching BUE  Pain:   Pre treatment pain:   0  Post treatment pain:  0  Pain Scale 1: Visual  Pain Intensity 1: 0  Activity Tolerance:   Fair  Please refer to the flowsheet for vital signs taken during this treatment.   After treatment:   []  Patient left in no apparent distress sitting up in chair  [x]  Patient left in no apparent distress in bed  [x]  Call bell left within reach  [x]  Nursing notified  []  Caregiver present  []  Bed alarm activated    Deanne Bernardo PT   Time Calculation: 20 mins

## 2018-03-21 NOTE — PROGRESS NOTES
2055: Bedside verbal shift report received from VOLODYMYR Lockett. Pt is awake in bed, no signs of distress, instructed to press call light if assistance is needed, call light within reach. 2340: Tried to administer pt's atorvastatin crushed in apple sauce. Pt became agitated and was screaming \"No! I don't want to take it! \", documented as refused. Bedside and Verbal shift change report given to Padmaja Lozano RN (oncoming nurse) by Cole Prescott RN (offgoing nurse). Report included the following information SBAR, Kardex, Intake/Output, MAR and Recent Results.

## 2018-03-21 NOTE — PROGRESS NOTES
Problem: Falls - Risk of  Goal: *Absence of Falls  Document [place] Fall Risk and appropriate interventions in the flowsheet.    Outcome: Progressing Towards Goal  Fall Risk Interventions:       Mentation Interventions: Adequate sleep, hydration, pain control    Medication Interventions: Patient to call before getting OOB    Elimination Interventions: Call light in reach

## 2018-03-21 NOTE — PROGRESS NOTES
notiified dr Radha Contreras, pt, son and  dtr that  [place] declined auth for snf. Pt is not making any progress with therapy. Spoke with dtr tigre last pm, gave her option  Long term nsg home pvt pay or home with hh. She states hh would need to be option.

## 2018-03-21 NOTE — PROGRESS NOTES
Day #10 of ceftriaxone    Indication:  UTI  Current regimen:  2 gm IV every 24 hours  Abx regimen: ceftriaxone (completed 6 days of fluconazole)    Recent Labs      18   0738  18   0505  18   0435   WBC  7.3   --    --    CREA  0.83  1.03  0.92   BUN  19*  22*  16     Est CrCl: 40 ml/min    Temp (24hrs), Av.3 °F (36.8 °C), Min:98.1 °F (36.7 °C), Max:98.9 °F (37.2 °C)    Cultures:   3/12 Urine - Candida    Recommendation: Consider streamlining antibiotic therapy if patient is clinically stable.     Thanks,  Bernie Ness, PHARMD

## 2018-03-21 NOTE — PROGRESS NOTES
[facility]: [phone])  [facility]: [address] [place] [address] Way: [phone]    Patient Name: Tess Brown  YOB: 1940    Date of Initial Consult: 3/20/2018  Reason for Consult: care decisions  Requesting Provider: Ms Ceci Torre NP  Primary Care Physician: Tye Beltran MD      SUMMARY:   Tess Brown is a 68y.o. year old with a past history of hypertension, mild dementia, renal insufficiency, who was admitted on 3/12/2018 from home with a diagnosis of nausea and vomiting x 1 month with weight loss of 20 pounds. Current medical issues leading to Palliative Medicine involvement include: 68year old who has had n/v and poor oral intake for around 1 month. GI has been consulted with EGD, CT scans with essentially non diagnostic work up. She was placed on Reglan for a short period to help with nausea and vomiting, however been increasingly less interactive for that reason Reglan was stopped. She continues to have a very poor appetite. Palliative medicine has been consulted for care decisions. PALLIATIVE DIAGNOSES:   1. Advanced care plan discussions  2. Nausea / vomiting   3. Dementia   4. Debility        PLAN:   1. Patient seen at bedside along with Ms. Jeff Teague NP x 2 today. Much more alert today,more verbal, asked for coffee took about 1/2 cup physical therapy also present and was agreeable to participation. We later met her daughter Domingo Childers at bedside. 2. Advanced care plan discussions Jatinder Torres has executed an AMD and is on file with our facility. Naming her daughter Domingo Childers as MPOA . Her medical wishes are also denoted for a terminal illness no long prolonging procedures including nutrition. Daughter aware of document and reinforced Jatinder Torres would not wish for alternative feeding options short or long term. Allow to eat as able. Daughter is aware her oral intake is poor however she feels given time it will improve.  We did discuss risks of poor nutrition. Goals of care addressed in which Bj Frank reports she and her mother have discussed; no chest compressions. Shock for cardiac arrest, no intubation for respiratory arrest. However if she were to develop a reversible such as pneumonia severe respiratory illness she would be accepting of intubation for a short term. Goals of care are DNR, but allow intubation for reversible respiratory illness. Have placed as partial code in our system for clarity. DDNR signed by daughter   1. Nausea / vomiting no further vomiting and Anders José denies nausea. W/u non diagnostic, shared this with daughter. .   4. Dementia per chart review mild dementia. Discussed further with daughter who denies formal dx, she has had effects from her stroke which the daughter shares recovery from her stroke was also slow. Prior to illness ( pneumonia , UTI ) in January Judith was ambulatory, participating in her ADL's and some IADL's ( minor meal preparation). Since her pneumonia and UTI has been in bed more. Daughter denies any depressive symptoms and does not feel she is depressed. 5. Debility oral intake with some improvement, daughter feels she is improving, reportedly highly functional at home by daughter. She would like SNF stay prior to home, is aware Lida Kapoor is pending form [employer]  6. Constipation unable last bowel movement, will monitor as oral intake improves may need stimulant   7. Initial consult note routed to primary continuity provider  8.  Communicated plan of care with: Palliative IDT, daughter Min Smith:  Patient/Health Care Proxy Stated Goals: Rehabilitation      TREATMENT PREFERENCES:   Code Status: Partial Code no chest compressions, no shock for cardiac arrest, intubation ok for reversible illness     Advance Care Planning:  Advance Care Planning 3/12/2018   Patient's Healthcare Decision Maker is: Verbal statement (Legal Next of Kin remains as decision maker)   Primary Decision Maker Name Lamonte Hancock   Primary Decision Maker Phone Number [phone] (D) [phone]( cell)   Primary Decision Maker Relationship to Patient Adult child   Confirm Advance Directive Yes, on file   Patient Would Like to Complete Advance Directive -   Does the patient have other document types Other (comment)           Other Instructions:   Artificially Administered Nutrition: No feeding tube     Other:  As far as possible, the palliative care team has discussed with patient / health care proxy about goals of care / treatment preferences for patient. HISTORY:     History obtained from: chart    CHIEF COMPLAINT: nausea / vomiting     HPI/SUBJECTIVE:    The patient is:   [] Verbal and participatory  [x] Non-participatory due to:  Does not engage in conversation  68year old female who was admitted with nausea and vomiting. GI work up non diagnostic. Now with very poor po intake. She is alert but minimally engaged in conversation. Nausea and vomiting improved. Clinical Pain Assessment (nonverbal scale for nonverbal patients): Clinical Pain Assessment  Severity: 0     Activity (Movement): Lying quietly, normal position    Duration: for how long has pt been experiencing pain (e.g., 2 days, 1 month, years)  Frequency: how often pain is an issue (e.g., several times per day, once every few days, constant)     FUNCTIONAL ASSESSMENT:     Palliative Performance Scale (PPS):  PPS: 30    ECOG  ECOG Status : Limited self-care     PSYCHOSOCIAL/SPIRITUAL SCREENING:      Any spiritual / Jew concerns:  [] Yes /  [] No unable to assess    Caregiver Burnout:  [] Yes /  [x] No /  [] No Caregiver Present      Anticipatory grief assessment:   [] Normal  / [] Maladaptive  Unable to assess      REVIEW OF SYSTEMS:     Positive and pertinent negative findings in ROS are noted above in HPI. The following systems were [] reviewed / [x] unable to be reviewed as noted in HPI  Other findings are noted below.   Systems: constitutional, ears/nose/mouth/throat, respiratory, gastrointestinal, genitourinary, musculoskeletal, integumentary, neurologic, psychiatric, endocrine. Positive findings noted below. Modified ESAS Completed by: provider   Fatigue: 6 Drowsiness: 1     Pain: 0   Anxiety: 0 Nausea: 0   Anorexia: 6 Dyspnea: 0     Constipation: Yes     Stool Occurrence(s): 0        PHYSICAL EXAM:     Wt Readings from Last 3 Encounters:   18 49.4 kg (109 lb)   18 43.1 kg (95 lb)   18 43.1 kg (95 lb)     Blood pressure 142/90, pulse 89, temperature 97.9 °F (36.6 °C), resp. rate 16, height 5' (1.524 m), weight 49.4 kg (109 lb), SpO2 95 %, not currently breastfeeding. Pain:  Pain Scale 1: Visual  Pain Intensity 1: 0                 Last bowel movement: not recorded     Constitutional: alert more verbal follows commands, in NAD   Eyes: pupils equal, anicteric  ENMT: no nasal discharge, moist mucous membranes  Respiratory: breathing not labored, symmetric  Skin: warm, dry  Neurologic: alert, can tell me her name, where she is, why she came to hospital, does not offer up conversation and at times responses slow.  Follows commands  Psychiatric: affect flat          HISTORY:     Principal Problem:    Nausea & vomiting (3/12/2018)    Active Problems:    HTN (hypertension) (2015)      Nausea and vomiting (3/14/2018)      Advanced care planning/counseling discussion (3/20/2018)      Dementia (3/20/2018)      Debility (3/20/2018)      Past Medical History:   Diagnosis Date    Amblyopia of left eye     Blind left eye     Cataract     bilat, right has been replaced    CRI (chronic renal insufficiency)     Dementia     Essential hypertension, malignant 2014    Hypertension     Left bundle branch block     Otitis media, acute 16    left      Past Surgical History:   Procedure Laterality Date    HX CATARACT REMOVAL Right     HX  SECTION  1968    HX  SECTION  1964    [facility] TONSILLECTOMY  1952      Family History   Problem Relation Age of Onset    Heart Disease Father     Other Brother      ? brain injury     History reviewed, no pertinent family history.   Social History   Substance Use Topics    Smoking status: Never Smoker    Smokeless tobacco: Never Used    Alcohol use No     Allergies   Allergen Reactions    Codeine Unknown (comments)     Unsure due to happening so long    Levaquin [Levofloxacin] Nausea Only     intolerance    Pcn [Penicillins] Hives    Sulfa (Sulfonamide Antibiotics) Unknown (comments)     Unsure it was so long ago      Current Facility-Administered Medications   Medication Dose Route Frequency    lisinopril (PRINIVIL, ZESTRIL) tablet 40 mg  40 mg Oral DAILY    loratadine (CLARITIN) tablet 10 mg  10 mg Oral DAILY PRN    hydrALAZINE (APRESOLINE) 20 mg/mL injection 20 mg  20 mg IntraVENous Q6H PRN    amLODIPine (NORVASC) tablet 5 mg  5 mg Oral DAILY    metoprolol (LOPRESSOR) injection 5 mg  5 mg IntraVENous Q4H PRN    cloNIDine (CATAPRES) 0.2 mg/24 hr patch 1 Patch  1 Patch TransDERmal Q7D    atorvastatin (LIPITOR) tablet 80 mg  80 mg Oral QHS    carvedilol (COREG) tablet 25 mg  25 mg Oral BID    aspirin (ASA) suppository 300 mg  300 mg Rectal DAILY    heparin (porcine) injection 5,000 Units  5,000 Units SubCUTAneous Q12H    ondansetron (ZOFRAN) injection 4 mg  4 mg IntraVENous Q6H PRN        LAB AND IMAGING FINDINGS:     Lab Results   Component Value Date/Time    WBC 7.3 03/21/2018 07:38 AM    HGB 10.1 (L) 03/21/2018 07:38 AM    PLATELET 191 (L) 45/81/8708 07:38 AM     Lab Results   Component Value Date/Time    Sodium 135 (L) 03/22/2018 04:10 AM    Potassium 3.8 03/22/2018 04:10 AM    Chloride 100 03/22/2018 04:10 AM    CO2 23 03/22/2018 04:10 AM    BUN 15 03/22/2018 04:10 AM    Creatinine 0.81 03/22/2018 04:10 AM    Calcium 8.3 (L) 03/22/2018 04:10 AM    Magnesium 1.7 03/22/2018 04:10 AM    Phosphorus 4.1 10/04/2015 06:44 AM      Lab Results Component Value Date/Time    AST (SGOT) 28 03/12/2018 11:21 AM    Alk. phosphatase 68 03/12/2018 11:21 AM    Protein, total 6.6 03/12/2018 11:21 AM    Albumin 3.2 (L) 03/12/2018 11:21 AM    Globulin 3.4 03/12/2018 11:21 AM     Lab Results   Component Value Date/Time    INR 1.0 01/04/2017 05:00 PM    Prothrombin time 13.2 01/04/2017 05:00 PM    aPTT 25.1 01/04/2017 05:00 PM      Lab Results   Component Value Date/Time    Iron 94 03/15/2018 05:05 AM    TIBC 171 (L) 03/15/2018 05:05 AM    Iron % saturation 55 03/15/2018 05:05 AM    Ferritin 319 03/15/2018 05:05 AM      No results found for: PH, PCO2, PO2  No components found for: [place]   Lab Results   Component Value Date/Time    CK 26 02/12/2018 06:45 PM    CK - MB <1.0 02/12/2018 06:45 PM              Total time: 65 minutes  Counseling / coordination time, spent as noted above: 55 minutes   > 50% counseling / coordination: yes with daughter and friend    Prolonged service was provided for  [x]30 min   []75 min in face to face time in the presence of the patient, spent as noted above. Time Start: [id number]   [id number]   Time End:   Note: this can only be billed with  (initial) or 21 [phone] (follow up). If multiple start / stop times, list each separately.

## 2018-03-21 NOTE — PROGRESS NOTES
Received awake,alert to person. In no acute distress. Hob elevated. Phone,call light in reach. 2030 Daughter at bedside. States pt has been tolerating clear liquids.

## 2018-03-21 NOTE — INTERDISCIPLINARY ROUNDS
IDR Summary      Patient: Arcelia Briggs MRN: [id number]    Age: 68 y.o.  : 1940     Admit Diagnosis: Nausea & vomiting  Nausea and vomiting  nausea and vomiting        DIET status: Full liquids, dietary supplements TID    Lines/Tubes:   IV: YES   Needed: YES  Anton: NO  Needed:NO  Central Line: NO Needed: NO      VTE Prophylaxis: Chemical    Mobility needs: Yes     PT ordered:  YES PT eval on chart: YES    OT ordered:  YES OT eval on chart: YES      ST ordered:  YES ST eval on chart:  YES     Disposition/Care Management:  Discharge plan: TBD- [employer] has denied SNF [facility] ordered? YES     Recommended DME from PT/OT:      DME ordered? NO     SNF- has patient been matched? YES    Accepting bed? YES   Does patient require insurance auth?   YES       Barriers to discharge:   Financial concerns:No   PCP: Albert De La Torre MD    : NO  Interventions:       LOS: 7 days     Expected days until discharge: TBD            Signed:     ALIYAH PorterP-BC  [address]  Hospitalist Division  Pager:  [phone]  Office:  [phone]

## 2018-03-21 NOTE — PALLIATIVE CARE
Full note to follow     Discussed with daughter Mian Chinchilla who is MPOA at length this pm. Affirmed patient would not wish for PEG or even short term NGT feeds. Goals of care of DNR no chest compressions or shock for cardiac arrest no intubation for respiratory arrest. However would be accepting of short term intubation for reversible cause such as pneumonia.

## 2018-03-21 NOTE — PROGRESS NOTES
67 yo WF with nausea and vomiting (workup -non diagnostic)  Pt appears lethargic, poor oral intake. Reglan discontinued. Family does not desire Peg. Plan:  Will sign off

## 2018-03-21 NOTE — PROGRESS NOTES
Problem: Dysphagia (Adult)  Goal: *Acute Goals and Plan of Care (Insert Text)  Recommendations:  Diet: clear liquids (as tolerated by patient)  Meds: via IV  Aspiration Precautions  Oral Care TID  Other: MBS per MD request    Goals:  Patient will:  1. Tolerate PO trials with 0 s/s overt distress in 4/5 trials - goal met  2. Utilize compensatory swallow strategies/maneuvers (decrease bite/sip, size/rate, alt. liq/sol) with min cues in 4/5 trials - goal met  3. Complete an objective swallow study (i.e., MBSS) to assess swallow integrity, r/o aspiration, and determine of safest LRD, min A - goal met 3/13/18       Outcome: Resolved/Met Date Met: 03/21/18  Speech language pathology dysphagia treatment & discharge    Patient: [facility] (68 y.o. female)  Date: 3/21/2018  Diagnosis: Nausea & vomiting  Nausea and vomiting  nausea and vomiting Nausea & vomiting  Procedure(s) (LRB):  ESOPHAGOGASTRODUODENOSCOPY (EGD) (N/A) 6 Days Post-Op  Precautions: Fall, Skin  PLOF: Home, regular/thin     ASSESSMENT:  Followed up this day with thin liquid therapeutic snack. Pt A&O x 3. Pt accepted self-fed thin liquid trial + straw x1 with no overt s/s of aspiration. Adamantly refused all further trials. Reported there was no discomfort, stating \"I just don't want to\". ST recommending continuation of clear thin liquid diet. At this time, pt is not appropriate for skilled therapy. Maximum therapeutic gains met; safest, least restrictive diet achieved. D/C ST intervention at this time. Progression toward goals:  []         Improving appropriately - goals met/approximated  [x]         Not making progress/Not appropriate - will d/c POC     PLAN:  Recommendations and Planned Interventions:  Maximum therapeutic gains met; safest, least restrictive diet achieved. D/C ST intervention at this time.    Discharge Recommendations:  Home Health and [address] [place] [address]:   Patient stated I want to leave this hospital now.    OBJECTIVE:   Cognitive and Communication Status:  Neurologic State: Alert, Confused  Orientation Level: Oriented to person, Oriented to place, Oriented to situation  Cognition: Follows commands, Poor safety awareness  Perception: Appears intact  Perseveration: Perseverates during conversation  Safety/Judgement: Awareness of environment, Lack of insight into deficits  Dysphagia Treatment:  Oral Assessment:  Oral Assessment  Labial: Decreased rate  Dentition: Natural, Limited  Oral Hygiene: Fair  Lingual: Decreased strength, Decreased rate  Velum: No impairment  Mandible: No impairment  P.O. Trials:   Patient Position: HOB 60   Vocal quality prior to P.O.: Hoarse   Consistency Presented:  Thin liquid   How Presented: Self-fed/presented, Straw       Bolus Acceptance: No impairment   Bolus Formation/Control: No impairment       Propulsion: No impairment   Oral Residue: None   Initiation of Swallow: No impairment   Laryngeal Elevation: Functional   Aspiration Signs/Symptoms: None   Pharyngeal Phase Characteristics: No impairment, issues, or problems                  Oral Phase Severity: No impairment   Pharyngeal Phase Severity : No impairment     PAIN:  Start of Tx: 0  End of Tx: 0     GCODESwallowing:  Swallow Goal Status CI= 1-19%   Swallow D/C Status CK= 40-59%    The severity rating is based on the following outcomes:  LANEY Noms Swallow Level 4    Clinical Judgement    After treatment:   []              Patient left in no apparent distress sitting up in chair  [x]              Patient left in no apparent distress in bed  [x]              Call bell left within reach  [x]              Nursing notified  []              Caregiver present  []              Bed alarm activated      COMMUNICATION/EDUCATION:   [x] Aspiration precautions; swallow safety; compensatory techniques  [x]        Patient unable to participate in education; education ongoing with staff  []  Posted safety precautions in patient's room.  [] Oral-motor/laryngeal strengthening exercises    JENNIFER Rose  Time Calculation: 8 mins

## 2018-03-21 NOTE — PROGRESS NOTES
Internal Medicine Progress Note    Patient's Name: Yaz Bradley Date: 3/12/2018  Length of Stay: 7      Assessment/Plan     Active Hospital Problems    Diagnosis Date Noted    Advanced care planning/counseling discussion 03/20/2018    Dementia 03/20/2018    Debility 03/20/2018    Nausea and vomiting 03/14/2018    Nausea & vomiting 03/12/2018    HTN (hypertension) 12/14/2015     - Advance diet  - Pt and friend at bedside confirmed pt would never want PEG  - F/u GI  - WBCs back to WNL  - UA negative  - Completed therapy w/ IVAB and antifungal, will d/c rocephin today as pt completed over 7 days of tx  - Palliative care following  - PT/OT, pt willing to cooperate, hope to get to rehab  - Cont acceptable home medications for chronic conditions   - DVT protocol    I have personally reviewed all pertinent labs and films that have officially resulted over the last 24 hours. I have personally checked for all pending labs that are awaiting final results.     Subjective     Pt s/e @ bedside  No major events overnight  Pt wants to go home, but agreed to rehab  No further N/V  Ate liquid dinner last night  Denies CP or SOB    Objective     Visit Vitals    BP (!) 138/96 (BP 1 Location: Right arm, BP Patient Position: At rest)    Pulse 88    Temp 97.7 °F (36.5 °C)    Resp 16    Ht 5' (1.524 m)    Wt 49.4 kg (109 lb)    SpO2 95%    Breastfeeding No    BMI 21.29 kg/m2       Physical Exam:  General Appearance: NAD, minimal interaction  Lungs: CTA with normal respiratory effort  CV: RRR, no m/r/g  Abdomen: soft, non-tender, normal bowel sounds  Extremities: no cyanosis, no peripheral edema  Neuro: No focal deficits, motor/sensory intact, generalized weakness    Lab/Data Reviewed:  BMP:   Lab Results   Component Value Date/Time     03/21/2018 07:38 AM    K 3.4 (L) 03/21/2018 07:38 AM     03/21/2018 07:38 AM    CO2 21 03/21/2018 07:38 AM    AGAP 12 03/21/2018 07:38 AM    GLU 71 (L) 03/21/2018 07:38 AM    BUN 19 (H) 03/21/2018 07:38 AM    CREA 0.83 03/21/2018 07:38 AM    GFRAA >60 03/21/2018 07:38 AM    GFRNA >60 03/21/2018 07:38 AM     CBC:   Lab Results   Component Value Date/Time    WBC 7.3 03/21/2018 07:38 AM    HGB 10.1 (L) 03/21/2018 07:38 AM    HCT 31.1 (L) 03/21/2018 07:38 AM     (L) 03/21/2018 07:38 AM       Imaging Reviewed:  No results found.     Medications Reviewed:  Current Facility-Administered Medications   Medication Dose Route Frequency    lisinopril (PRINIVIL, ZESTRIL) tablet 40 mg  40 mg Oral DAILY    loratadine (CLARITIN) tablet 10 mg  10 mg Oral DAILY PRN    hydrALAZINE (APRESOLINE) 20 mg/mL injection 20 mg  20 mg IntraVENous Q6H PRN    amLODIPine (NORVASC) tablet 5 mg  5 mg Oral DAILY    metoprolol (LOPRESSOR) injection 5 mg  5 mg IntraVENous Q4H PRN    cloNIDine (CATAPRES) 0.2 mg/24 hr patch 1 Patch  1 Patch TransDERmal Q7D    atorvastatin (LIPITOR) tablet 80 mg  80 mg Oral QHS    carvedilol (COREG) tablet 25 mg  25 mg Oral BID    aspirin (ASA) suppository 300 mg  300 mg Rectal DAILY    heparin (porcine) injection 5,000 Units  5,000 Units SubCUTAneous Q12H    cefTRIAXone (ROCEPHIN) 2 g in sterile water (preservative free) 20 mL IV syringe  2 g IntraVENous Q24H    ondansetron (ZOFRAN) injection 4 mg  4 mg IntraVENous Q6H PRN           Angelo Flynn DO  Internal Medicine, Hospitalist  Pager: [phone]  [address] [place] [address]

## 2018-03-22 PROBLEM — E43 SEVERE PROTEIN-CALORIE MALNUTRITION (GOMEZ: LESS THAN 60% OF STANDARD WEIGHT) (HCC): Status: ACTIVE | Noted: 2018-01-01

## 2018-03-22 NOTE — PROGRESS NOTES
Pt had an RRT earlier this afternoon for lethargy- no recent medication administration, she is responsive to verbal and tactile stimuli. Stat CT head negative. She has returned to baseline. She is still clear for discharge to [facility] [place] [facility].       Wiliam Wright, [facility]  Hospitalist Division  Pager:  [phone]  Office:  [phone]

## 2018-03-22 NOTE — PROGRESS NOTES
Nutrition follow-up/Plan of care      RECOMMENDATIONS:   1. GI Soft and advance as tolerated by Pt  2. Ensure Clear TID and magic cup   3. Monitor weight, labs and PO intake  4. RD to follow     GOALS:   1. PO intake meets >75% of protein/calorie needs by 3/25  2. Weight Maintenance (+/- 1-2 lb) by 3/29    ASSESSMENT:   Wt status is classified as normal per BMI of 21.3. However, Pt at nutrition risk d/t BMI <23 and age >65 years. Pt w/ 20 lb or 15% weight loss over the past 2-3 months. Still variable PO intake. Labs noted. Pt w/ hyponatremia and hypocalcemia. Nutrition recommendations listed. RD to follow. Nutrition Risk:  [x] High  [] Moderate []  Low    SUBJECTIVE/OBJECTIVE:    Pt admitted for nausea and vomiting. PMHx including  HTN, cataract, dementia and renal insufficiency. N/V x 1 month w/ poor PO intake and 20 lb wt loss since Christmas per chart review. SLP following and GI consulted. Pt is s/p MBS (3/13) showed no evidence for airway penetration or aspiration; HIDA scan (3/13) showed very mild dysfunction of GB and EGD on (3/15) showing no endoscopic evidence of neoplasia or mucosal abnormality and esophagus empirically dilated. Daughter provided Hx during initial visit. Reports that \"It has been one thing after the other starting in Jan\" and noted wt loss of 20 lb or 15% over the past 2-3 months; UBW ~126 lb. Denies pt having any food allergies or problems chewing/swallowig PTA. Stated pt has preferred softer foods d/t the N/V though. Plan was for gastritic emptying scan last Fri, but not completed. Had d/w Dr. Ivania Becker yesterday and Pt advanced to [facility] and tolerated dinner well. Also added magic cup as Pt does not like Ensure or CIB. Pt seen in room w/ visitor at bedside. Advanced to GI Soft this morning and per visitor pt was eating. Encouraged adequate intake and made suggestions on how to increase kcals/protein. Plan to D/C to SNF this afternoon.       Information Obtained from:    [x] Chart Review   [x] Patient   [x] Family/Caregiver   [x] Nurse/Physician   [] Interdisciplinary Meeting/Rounds      Diet: GI Soft  Medications: [x] Reviewed  Lipitor Reglan  Allergies: [x] Reviewed   Encounter Diagnoses     ICD-10-CM ICD-9-CM   1. Intractable vomiting with nausea, unspecified vomiting type R11.2 536.2   2. Adult failure to thrive R62.7 783.7   3. Yeast vaginitis B37.3 112.1   4. Advanced care planning/counseling discussion Z71.89 V65.49   5. Debility R53.81 799.3   6. Dementia without behavioral disturbance, unspecified dementia type F03.90 294.20   7.  Nausea and vomiting, intractability of vomiting not specified, unspecified vomiting type R11.2 787.01     Past Medical History:   Diagnosis Date    Amblyopia of left eye     Blind left eye     Cataract     bilat, right has been replaced    CRI (chronic renal insufficiency)     Dementia     Essential hypertension, malignant 4/25/2014    Hypertension     Left bundle branch block     Otitis media, acute 7/2/16    left      Labs:    Lab Results   Component Value Date/Time    Sodium 135 (L) 03/22/2018 04:10 AM    Potassium 3.8 03/22/2018 04:10 AM    Chloride 100 03/22/2018 04:10 AM    CO2 23 03/22/2018 04:10 AM    Anion gap 12 03/22/2018 04:10 AM    Glucose 79 03/22/2018 04:10 AM    BUN 15 03/22/2018 04:10 AM    Creatinine 0.81 03/22/2018 04:10 AM    Calcium 8.3 (L) 03/22/2018 04:10 AM    Magnesium 1.7 03/22/2018 04:10 AM    Phosphorus 4.1 10/04/2015 06:44 AM    Albumin 3.2 (L) 03/12/2018 11:21 AM     Anthropometrics: BMI (calculated): 21.3  Last 3 Recorded Weights in this Encounter    03/12/18 1009   Weight: 49.4 kg (109 lb)      Ht Readings from Last 1 Encounters:   03/12/18 5' (1.524 m)     Weight Metrics 3/12/2018 3/8/2018 3/2/2018 2/19/2018 2/17/2018 10/3/2017 1/31/2017   Weight 109 lb 95 lb 95 lb 107 lb 14.4 oz 120 lb 125 lb 129 lb   BMI 21.29 kg/m2 18.55 kg/m2 18.55 kg/m2 21.07 kg/m2 23.44 kg/m2 24.41 kg/m2 25.19 kg/m2       Patient Vitals for the past 100 hrs:   % Diet Eaten   03/21/18 1311 0 %   03/20/18 1244 200 %   03/20/18 0916 50 %   03/18/18 1820 0 %     [] Weight Loss  [] Weight Gain  [] Weight Stable  [x] New wt n/a on record    Estimated Nutrition Needs: [x] MSJ  [] Other:  Calories: 5763-9329 kcal Based on:   [x] Actual BW    Protein:   59-64 g Based on:   [x] Actual BW    Fluid:       0658-2645 ml Based on:   [x] Actual BW     Nutrition Problems Identified:   [x] Suboptimal PO intake   [] Food Allergies  [] Difficulty chewing/swallowing/poor dentition  [] Constipation/Diarrhea   [] Nausea/Vomiting   [] None  [] Other:     Plan:   [x] Therapeutic Diet  [x]  Obtained/adjusted food preferences/tolerances and/or snacks options   [x]  Continue supplements added  [] Occupational therapy following for feeding techniques  []  HS snack added   [x]  Modify diet texture   []  Modify diet for food allergies   []  Educate patient   []  Assist with menu selection   [x]  Monitor PO intake on meal rounds   [x]  Continue inpatient monitoring and intervention   []  Participated in discharge planning/Interdisciplinary rounds/Team meetings   []  Other:     Education Needs:   [x] Not appropriate for teaching at this time    [] Identified and addressed    Nutrition Monitoring and Evaluation:  [x] Continue ongoing monitoring and intervention  [] Other    Delene Nadeem

## 2018-03-22 NOTE — PROGRESS NOTES
Transport set for 1230(earliest transport time avail) to [facility]. Pcs completed, envelope in nurses station. No auth with [employer] required. pt has 265.00 co pay. vm message left for both dtr and son , transport time and copay. dnr  And dc summary placed in envelope. Dc summ uploaded in edc.notified pt, she is in agreement.

## 2018-03-22 NOTE — PROGRESS NOTES
Pt in bed resting alert and oriented x 2 denies pain at the moment. Assessement completed plan of care for the shift explained pt verbalized understanding. HOB elevated, bed low and in locked position. Call light and frequently used items within reach. 2140- Pt incontinent of urine given a bed bath, periwick changed pt repositioned made comfortable in bed. HOB elevated bed low and in locked position.

## 2018-03-22 NOTE — CDMP QUERY
Please clarify if  you agree with the dietician assessment:    =>Severe protein malnutrition  =>Other Explanation of clinical findings  =>Unable to Determine (no explanation of clinical findings)    The medical record reflects the following:    Risk: 67 yo female w/PMH dementia, UTI, nausea vomiting    Clinical Indicators: per Dietician note  3/16  Meets Criteria for Acute Malnutrition   Severe Malnutrition, as evidenced by: Moderate muscle wasting, loss of subcutaneous fat                         Nutritional intake of <50% of recommended intake for >5 days                         Weight loss of >1-2% in 1 week, >5% in 1 month, >7.5% in 3 months, or >10% in 6 months                         Moderate-severe edema  Malnutrition related to decreased appetite with nausea/vomiting as evidenced by around a 20 lb or 15% loss over the past 2-3 months and inadequate PO intake x >5 days.       Treatment: 1. Advance diet when medically indicated and per pt tolerance  2. Ensure Clear TID when not NPO  3. Monitor weight, labs and PO intake  4. RD to follow        Please clarify and document your clinical opinion in the progress notes and discharge summary including the definitive and/or presumptive diagnosis, (suspected or probable), related to the above clinical findings. Please include clinical findings supporting your diagnosis. If you DECLINE this query or would like to communicate with [facility], please utilize the \"[employer] message box\" at the TOP of the Progress Note on the right.       Thank you,    Chapin Julien RN [facility] [phone]

## 2018-03-22 NOTE — PROGRESS NOTES
Pt in bed resting alert and oriented x 2 denies pain at the moment. Assessement completed plan of care for the shift explained pt verbalized understanding. HOB elevated, bed low and in locked position. Call light and frequently used items within reach. 2200- Pt refused vitals to be taken. 2350- Pt incontinent of urine given a bed bath, periwick changed pt repositioned made comfortable in bed. HOB elevated bed low and in locked position. 0630- Pt resting, Heparin sq given as ordered. No concerns voiced. 7283- Bedside and Verbal shift change report given to VOLODYMYR Diaz (oncoming nurse) by Akanksha Bradley RN (offgoing nurse). Report included the following information SBAR, Kardex, Intake/Output, MAR and Recent Results.

## 2018-03-22 NOTE — PROGRESS NOTES
Patient received in bed awake. Patient A&Ox2, denies pain and discomfort. No distress noted. Frequently used items within reach. Bed locked in low position. Call bell within reach and patient verbalized understanding of use for assistance and needs. 1100 -- Head to toe assessment performed at this time prior to 1230 discharge to [facility] via Medical Transport. Patient is AOx self and place and blind in left eye. Lungs clear on RA. Cardiac WDL with no tele monitoring. Abdomen is intact and soft. Patient has a pure wick in place that is clean, dry and intact. Patient's skin is intact with scattered bruising and EPC cream applied to groin. Patient has a #24 gauge IV placed in the left arm that is clean, dry and intact and flushes well. Patient reports no nausea and vomiting at this time or overnight. Per Horacio Mar RN (off-going RN) report has been called to [facility].

## 2018-03-22 NOTE — DISCHARGE INSTRUCTIONS
DISCHARGE SUMMARY from Nurse    PATIENT INSTRUCTIONS:    After general anesthesia or intravenous sedation, for 24 hours or while taking prescription Narcotics:  · Limit your activities  · Do not drive and operate hazardous machinery  · Do not make important personal or business decisions  · Do  not drink alcoholic beverages  · If you have not urinated within 8 hours after discharge, please contact your surgeon on call. Report the following to your surgeon:  · Excessive pain, swelling, redness or odor of or around the surgical area  · Temperature over 100.5  · Nausea and vomiting lasting longer than 4 hours or if unable to take medications  · Any signs of decreased circulation or nerve impairment to extremity: change in color, persistent  numbness, tingling, coldness or increase pain  · Any questions    What to do at Home:  Recommended activity: Activity as tolerated. If you experience any of the following symptoms nausea or vomiting, please follow up with your primary care provider. .    *  Please give a list of your current medications to your Primary Care Provider. *  Please update this list whenever your medications are discontinued, doses are      changed, or new medications (including over-the-counter products) are added. *  Please carry medication information at all times in case of emergency situations. These are general instructions for a healthy lifestyle:    No smoking/ No tobacco products/ Avoid exposure to second hand smoke  Surgeon General's Warning:  Quitting smoking now greatly reduces serious risk to your health.     Obesity, smoking, and sedentary lifestyle greatly increases your risk for illness    A healthy diet, regular physical exercise & weight monitoring are important for maintaining a healthy lifestyle    You may be retaining fluid if you have a history of heart failure or if you experience any of the following symptoms:  Weight gain of 3 pounds or more overnight or 5 pounds in a week, increased swelling in our hands or feet or shortness of breath while lying flat in bed. Please call your doctor as soon as you notice any of these symptoms; do not wait until your next office visit. Recognize signs and symptoms of STROKE:    F-face looks uneven    A-arms unable to move or move unevenly    S-speech slurred or non-existent    T-time-call 911 as soon as signs and symptoms begin-DO NOT go       Back to bed or wait to see if you get better-TIME IS BRAIN. Warning Signs of HEART ATTACK     Call 911 if you have these symptoms:   Chest discomfort. Most heart attacks involve discomfort in the center of the chest that lasts more than a few minutes, or that goes away and comes back. It can feel like uncomfortable pressure, squeezing, fullness, or pain.  Discomfort in other areas of the upper body. Symptoms can include pain or discomfort in one or both arms, the back, neck, jaw, or stomach.  Shortness of breath with or without chest discomfort.  Other signs may include breaking out in a cold sweat, nausea, or lightheadedness. Don't wait more than five minutes to call 911 - MINUTES MATTER! Fast action can save your life. Calling 911 is almost always the fastest way to get lifesaving treatment. Emergency Medical Services staff can begin treatment when they arrive -- up to an hour sooner than if someone gets to the hospital by car. The discharge information has been reviewed with the patient. The patient verbalized understanding. Discharge medications reviewed with the patient and appropriate educational materials and side effects teaching were provided. ______Patient armband removed and shredded. _____________________________________________________________________________________________________________________________   Nausea and Vomiting: Care Instructions  Your Care Instructions    When you are nauseated, you may feel weak and sweaty and notice a lot of saliva in your mouth. Nausea often leads to vomiting. Most of the time you do not need to worry about nausea and vomiting, but they can be signs of other illnesses. Two common causes of nausea and vomiting are stomach flu and food poisoning. Nausea and vomiting from viral stomach flu will usually start to improve within 24 hours. Nausea and vomiting from food poisoning may last from 12 to 48 hours. The doctor has checked you carefully, but problems can develop later. If you notice any problems or new symptoms, get medical treatment right away. Follow-up care is a key part of your treatment and safety. Be sure to make and go to all appointments, and call your doctor if you are having problems. It's also a good idea to know your test results and keep a list of the medicines you take. How can you care for yourself at home? · To prevent dehydration, drink plenty of fluids, enough so that your urine is light yellow or clear like water. Choose water and other caffeine-free clear liquids until you feel better. If you have kidney, heart, or liver disease and have to limit fluids, talk with your doctor before you increase the amount of fluids you drink. · Rest in bed until you feel better. · When you are able to eat, try clear soups, mild foods, and liquids until all symptoms are gone for 12 to 48 hours. Other good choices include dry toast, crackers, cooked cereal, and gelatin dessert, such as Jell-O. When should you call for help? Call 911 anytime you think you may need emergency care. For example, call if:  ? · You passed out (lost consciousness). ?Call your doctor now or seek immediate medical care if:  ? · You have symptoms of dehydration, such as:  ¨ Dry eyes and a dry mouth. ¨ Passing only a little dark urine. ¨ Feeling thirstier than usual.   ? · You have new or worsening belly pain. ? · You have a new or higher fever. ? · You vomit blood or what looks like coffee grounds. ? Watch closely for changes in your health, and be sure to contact your doctor if:  ? · You have ongoing nausea and vomiting. ? · Your vomiting is getting worse. ? · Your vomiting lasts longer than 2 days. ? · You are not getting better as expected. Where can you learn more? Go to http://jadyn-ivette.info/. Enter 25 [id number] in the search box to learn more about \"Nausea and Vomiting: Care Instructions. \"  Current as of: March 20, 2017  Content Version: 11.4  © 1548-4606 "[employer]. Care instructions adapted under license by [employer] (which disclaims liability or warranty for this information). If you have questions about a medical condition or this instruction, always ask your healthcare professional. [place] [address] any warranty or liability for your use of this information. DISCHARGE SUMMARY from Nurse    PATIENT INSTRUCTIONS:    After general anesthesia or intravenous sedation, for 24 hours or while taking prescription Narcotics:  · Limit your activities  · Do not drive and operate hazardous machinery  · Do not make important personal or business decisions  · Do  not drink alcoholic beverages  · If you have not urinated within 8 hours after discharge, please contact your surgeon on call. Report the following to your surgeon:  · Excessive pain, swelling, redness or odor of or around the surgical area  · Temperature over 100.5  · Nausea and vomiting lasting longer than 4 hours or if unable to take medications  · Any signs of decreased circulation or nerve impairment to extremity: change in color, persistent  numbness, tingling, coldness or increase pain  · Any questions    What to do at Home:  Recommended activity: Activity as tolerated.     If you experience any of the following symptoms chest pain, shortness of breath, altered mental status, fever, chills, continued nausea and vomiting, abdominal pain, or any other concerns, please follow up with your primary care provider and/or call 911.    *  Please give a list of your current medications to your Primary Care Provider. *  Please update this list whenever your medications are discontinued, doses are      changed, or new medications (including over-the-counter products) are added. *  Please carry medication information at all times in case of emergency situations. These are general instructions for a healthy lifestyle:    No smoking/ No tobacco products/ Avoid exposure to second hand smoke  Surgeon General's Warning:  Quitting smoking now greatly reduces serious risk to your health. Obesity, smoking, and sedentary lifestyle greatly increases your risk for illness    A healthy diet, regular physical exercise & weight monitoring are important for maintaining a healthy lifestyle    You may be retaining fluid if you have a history of heart failure or if you experience any of the following symptoms:  Weight gain of 3 pounds or more overnight or 5 pounds in a week, increased swelling in our hands or feet or shortness of breath while lying flat in bed. Please call your doctor as soon as you notice any of these symptoms; do not wait until your next office visit. Recognize signs and symptoms of STROKE:    F-face looks uneven    A-arms unable to move or move unevenly    S-speech slurred or non-existent    T-time-call 911 as soon as signs and symptoms begin-DO NOT go       Back to bed or wait to see if you get better-TIME IS BRAIN. Warning Signs of HEART ATTACK     Call 911 if you have these symptoms:   Chest discomfort. Most heart attacks involve discomfort in the center of the chest that lasts more than a few minutes, or that goes away and comes back. It can feel like uncomfortable pressure, squeezing, fullness, or pain.  Discomfort in other areas of the upper body. Symptoms can include pain or discomfort in one or both arms, the back, neck, jaw, or stomach.  Shortness of breath with or without chest discomfort.    Other signs may include breaking out in a cold sweat, nausea, or lightheadedness. Don't wait more than five minutes to call 911 - MINUTES MATTER! Fast action can save your life. Calling 911 is almost always the fastest way to get lifesaving treatment. Emergency Medical Services staff can begin treatment when they arrive -- up to an hour sooner than if someone gets to the hospital by car. The discharge information has been reviewed with the patient. The patient verbalized understanding. Discharge medications reviewed with the patient and appropriate educational materials and side effects teaching were provided.   ___________________________________________________________________________________________________________________________________    Patient armband removed and shredded

## 2018-03-22 NOTE — PROGRESS NOTES
0830 pt was hesitant to take her am pills, we agreed she would take these and then she could talk with her daughter about any more meds. 8535 pt has discharge orders. 4532 pt will go to [facility] per priscilla, case management at 1230 via transport. 1015 report called to [facility] to tony. 1055 Bedside and Verbal shift change report given to Nicolasa Barnes rn (oncoming nurse) by Christian Hughes RN   (offgoing nurse). Report included the following information Kardex, MAR and Recent Results.

## 2018-03-22 NOTE — PROGRESS NOTES
Problem: Falls - Risk of  Goal: *Absence of Falls  Document [place] Fall Risk and appropriate interventions in the flowsheet.    Outcome: Progressing Towards Goal  Fall Risk Interventions:       Mentation Interventions: Door open when patient unattended, Reorient patient    Medication Interventions: Assess postural VS orthostatic hypotension    Elimination Interventions: Call light in reach, Patient to call for help with toileting needs

## 2018-03-22 NOTE — PROGRESS NOTES
Spoke with [address], pt's ct ok and can still transfer to [facility]. Transport changed to 430pm. Notified pt dtr.

## 2018-03-22 NOTE — PROGRESS NOTES
[facility]: [phone] [id number]  [facility]: [address] [place] [address] Way: [phone]    Patient Name: John Suero  YOB: 1940    Date of Initial Consult: 3/20/2018  Reason for Consult: care decisions  Requesting Provider: Ms Andreia Mae NP  Primary Care Physician: Suzanne Byers MD      SUMMARY:   John Suero is a 68y.o. year old with a past history of hypertension, mild dementia, renal insufficiency, who was admitted on 3/12/2018 from home with a diagnosis of nausea and vomiting x 1 month with weight loss of 20 pounds. Current medical issues leading to Palliative Medicine involvement include: 68year old who has had n/v and poor oral intake for around 1 month. GI has been consulted with EGD, CT scans with essentially non diagnostic work up. She was placed on Reglan for a short period to help with nausea and vomiting, however been increasingly less interactive for that reason Reglan was stopped. She continues to have a very poor appetite. Palliative medicine has been consulted for care decisions. PALLIATIVE DIAGNOSES:   1. Advanced care plan discussions  2. Nausea / vomiting   3. Dementia   4. Debility        PLAN:   1. Patient seen at bedside along with Ms. Cazares,STER[place]ING alert, son at bedside, drinking coffee, held cup. Son shared a few pictures of Karis Sawyer at [employer]. 2. Advanced care plan discussions Karis Sawyer has executed an AMD and is on file with our facility. Naming her daughter Cesar Cameron as MPOA . Goals of care are DNR, but allow intubation for reversible respiratory illness. Have placed as partial code in our system for clarity. DDNR signed by daughter . Care decisions daughter affirmed patient would not wish for PEG or short term feeding with NGT. 3. Nausea / vomiting no further vomiting, tolerated diet of full liquids this am   4. Dementia per chart review mild dementia.  Discussed further with daughter who denies formal dx, she has had effects from her stroke which the daughter shares recovery from her stroke was also slow. Prior to illness ( pneumonia , UTI ) in January Judith was ambulatory, participating in her ADL's and some IADL's ( minor meal preparation). Since her pneumonia and UTI has been in bed more. Daughter denies any depressive symptoms and does not feel she is depressed. 5. Debility oral intake continues to improve although she does need consistent encouragement. Making slow gains with PT, can sit on side of bed. Son updated at bedside and pleased by progress. Plan SNF for ongoing therapy today  6. Initial consult note routed to primary continuity provider  7. Communicated plan of care with: Palliative IDT, patient, son    GOALS OF CARE:  Patient/Health Care Proxy Stated Goals: Rehabilitation      TREATMENT PREFERENCES:   Code Status: Partial Code no chest compressions, no shock for cardiac arrest, intubation ok for reversible illness     Advance Care Planning:  Advance Care Planning 3/12/2018   Patient's Healthcare Decision Maker is: Verbal statement (Legal Next of Kin remains as decision maker)   Primary Decision Maker Name [place]   Primary Decision Maker Phone Number [phone] (X) [phone]( cell)   Primary Decision Maker Relationship to Patient Adult child   Confirm Advance Directive Yes, on file   Patient Would Like to Complete Advance Directive -   Does the patient have other document types Other (comment)           Other Instructions:   Artificially Administered Nutrition: No feeding tube     Other:  As far as possible, the palliative care team has discussed with patient / health care proxy about goals of care / treatment preferences for patient.      HISTORY:     History obtained from: chart    CHIEF COMPLAINT: nausea / vomiting     HPI/SUBJECTIVE:    The patient is:   [] Verbal and participatory  [x] Non-participatory due to:  Does not engage in conversation  68year old female who was admitted with nausea and vomiting. GI work up non diagnostic. Now with very poor po intake. She is alert but minimally engaged in conversation. Nausea and vomiting improved. Clinical Pain Assessment (nonverbal scale for nonverbal patients): Clinical Pain Assessment  Severity: 0     Activity (Movement): Lying quietly, normal position    Duration: for how long has pt been experiencing pain (e.g., 2 days, 1 month, years)  Frequency: how often pain is an issue (e.g., several times per day, once every few days, constant)     FUNCTIONAL ASSESSMENT:     Palliative Performance Scale (PPS):  PPS: 30    ECOG  ECOG Status : Limited self-care     PSYCHOSOCIAL/SPIRITUAL SCREENING:      Any spiritual / Religion concerns:  [] Yes /  [] No unable to assess    Caregiver Burnout:  [] Yes /  [x] No /  [] No Caregiver Present      Anticipatory grief assessment:   [] Normal  / [] Maladaptive  Unable to assess      REVIEW OF SYSTEMS:     Positive and pertinent negative findings in ROS are noted above in HPI. The following systems were [] reviewed / [x] unable to be reviewed as noted in HPI  Other findings are noted below. Systems: constitutional, ears/nose/mouth/throat, respiratory, gastrointestinal, genitourinary, musculoskeletal, integumentary, neurologic, psychiatric, endocrine. Positive findings noted below. Modified ESAS Completed by: provider   Fatigue: 5 Drowsiness: 1     Pain: 0   Anxiety: 0 Nausea: 0   Anorexia: 4 Dyspnea: 0     Constipation: Yes     Stool Occurrence(s): 0        PHYSICAL EXAM:     Wt Readings from Last 3 Encounters:   03/12/18 49.4 kg (109 lb)   03/08/18 43.1 kg (95 lb)   03/02/18 43.1 kg (95 lb)     Blood pressure 107/75, pulse 90, temperature 98.3 °F (36.8 °C), resp. rate 14, height 5' (1.524 m), weight 49.4 kg (109 lb), SpO2 96 %, not currently breastfeeding.   Pain:  Pain Scale 1: Numeric (0 - 10)  Pain Intensity 1: 0                 Last bowel movement: not recorded     Constitutional sitting up in bed, alert oriented x 2, son at bedside. Thought process seems a little slow, but better then yesterday, in NAD  Eyes: pupils equal, anicteric  ENMT: no nasal discharge, moist mucous membranes  Respiratory: breathing not labored, symmetric  Skin: warm, dry  Neurologic: alert, can tell me her name, where she is, why she in hospital, follow simple commands for me  Psychiatric: affect flat          HISTORY:     Principal Problem:    Nausea & vomiting (3/12/2018)    Active Problems:    HTN (hypertension) (2015)      Nausea and vomiting (3/14/2018)      Advanced care planning/counseling discussion (3/20/2018)      Dementia (3/20/2018)      Debility (3/20/2018)      Past Medical History:   Diagnosis Date    Amblyopia of left eye     Blind left eye     Cataract     bilat, right has been replaced    CRI (chronic renal insufficiency)     Dementia     Essential hypertension, malignant 2014    Hypertension     Left bundle branch block     Otitis media, acute 16    left      Past Surgical History:   Procedure Laterality Date    HX CATARACT REMOVAL Right     HX  SECTION  1968    HX  SECTION  1964    HX  SECTION      HX TONSILLECTOMY        Family History   Problem Relation Age of Onset    Heart Disease Father     Other Brother      ? brain injury     History reviewed, no pertinent family history.   Social History   Substance Use Topics    Smoking status: Never Smoker    Smokeless tobacco: Never Used    Alcohol use No     Allergies   Allergen Reactions    Codeine Unknown (comments)     Unsure due to happening so long    Levaquin [Levofloxacin] Nausea Only     intolerance    Pcn [Penicillins] Hives    Sulfa (Sulfonamide Antibiotics) Unknown (comments)     Unsure it was so long ago      Current Facility-Administered Medications   Medication Dose Route Frequency    lisinopril (PRINIVIL, ZESTRIL) tablet 40 mg  40 mg Oral DAILY    loratadine (CLARITIN) tablet 10 mg  10 mg Oral DAILY PRN    hydrALAZINE (APRESOLINE) 20 mg/mL injection 20 mg  20 mg IntraVENous Q6H PRN    amLODIPine (NORVASC) tablet 5 mg  5 mg Oral DAILY    metoprolol (LOPRESSOR) injection 5 mg  5 mg IntraVENous Q4H PRN    cloNIDine (CATAPRES) 0.2 mg/24 hr patch 1 Patch  1 Patch TransDERmal Q7D    atorvastatin (LIPITOR) tablet 80 mg  80 mg Oral QHS    carvedilol (COREG) tablet 25 mg  25 mg Oral BID    aspirin (ASA) suppository 300 mg  300 mg Rectal DAILY    heparin (porcine) injection 5,000 Units  5,000 Units SubCUTAneous Q12H    ondansetron (ZOFRAN) injection 4 mg  4 mg IntraVENous Q6H PRN        LAB AND IMAGING FINDINGS:     Lab Results   Component Value Date/Time    WBC 7.3 03/21/2018 07:38 AM    HGB 10.1 (L) 03/21/2018 07:38 AM    PLATELET 634 (L) 76/19/9122 07:38 AM     Lab Results   Component Value Date/Time    Sodium 135 (L) 03/22/2018 04:10 AM    Potassium 3.8 03/22/2018 04:10 AM    Chloride 100 03/22/2018 04:10 AM    CO2 23 03/22/2018 04:10 AM    BUN 15 03/22/2018 04:10 AM    Creatinine 0.81 03/22/2018 04:10 AM    Calcium 8.3 (L) 03/22/2018 04:10 AM    Magnesium 1.7 03/22/2018 04:10 AM    Phosphorus 4.1 10/04/2015 06:44 AM      Lab Results   Component Value Date/Time    AST (SGOT) 28 03/12/2018 11:21 AM    Alk.  phosphatase 68 03/12/2018 11:21 AM    Protein, total 6.6 03/12/2018 11:21 AM    Albumin 3.2 (L) 03/12/2018 11:21 AM    Globulin 3.4 03/12/2018 11:21 AM     Lab Results   Component Value Date/Time    INR 1.0 01/04/2017 05:00 PM    Prothrombin time 13.2 01/04/2017 05:00 PM    aPTT 25.1 01/04/2017 05:00 PM      Lab Results   Component Value Date/Time    Iron 94 03/15/2018 05:05 AM    TIBC 171 (L) 03/15/2018 05:05 AM    Iron % saturation 55 03/15/2018 05:05 AM    Ferritin 319 03/15/2018 05:05 AM      No results found for: PH, PCO2, PO2  No components found for: [place]   Lab Results   Component Value Date/Time    CK 26 02/12/2018 06:45 PM    CK - MB <1.0 02/12/2018 06:45 PM Total time: 25 minutes  Counseling / coordination time, spent as noted above: 15 minutes   > 50% counseling / coordination: yes     Prolonged service was provided for  []30 min   []75 min in face to face time in the presence of the patient, spent as noted above. Time Start:   Time End:   Note: this can only be billed with 83036 (initial) or 93575 (follow up). If multiple start / stop times, list each separately.

## 2018-03-22 NOTE — ROUTINE PROCESS
Hospital to [facility] [place] [address]                                                                        68 y.o.   female    [address] [facility]   Room: 2104/01    [facility] 2W NEURO MED  Unit Phone# :  [phone]      [facility] 2W NEURO MED  [address] [place] [address]  Dept: [phone]  Loc: [phone]                    SITUATION     Admitted:  3/12/2018         Attending Provider:  Araceli Nieto DO       Consultations:  IP CONSULT TO HOSPITALIST  IP CONSULT TO GASTROENTEROLOGY  IP CONSULT TO PALLIATIVE CARE - PROVIDER    PCP:  Heather Farooq MD   [phone]    Treatment Team: Attending Provider: Araceli Nieto DO; Consulting Provider: Torrey Meza MD; Utilization Review: Rubi Forman RN; Nurse Practitioner: Phill Geiger NP; Consulting Provider: Yaz Moss MD; Consulting Provider: Stella Hinkle NP; Charge Nurse: Gildardo Senior RN; Care Manager: Traci Connelly    Admitting Dx:  Nausea & vomiting  Nausea and vomiting  nausea and vomiting       Principal Problem: Nausea & vomiting    7 Days Post-Op of   Procedure(s):  ESOPHAGOGASTRODUODENOSCOPY (EGD)   BY: Jaquelin Rawls MD             ON: 3/15/2018                  Code Status: Partial Code                Advance Directives:   Advance Care Planning 3/12/2018   Patient's Healthcare Decision Maker is: Verbal statement (Legal Next of Kin remains as decision maker)   Primary Decision Maker Name Calles Staff   Primary Decision Maker Phone Number [phone] Loco Al [phone]( cell)   Primary Decision Maker Relationship to Patient Adult child   Confirm Advance Directive Yes, on file   Patient Would Like to Complete Advance Directive -   Does the patient have other document types Other (comment)    (Send w/patient)   No Doesnt Have       Isolation:  There are currently no Active Isolations       MDRO: No current active infections    Pain Medications given:  none    Last dose: none    Special Equipment needed: no  Type of equipment:      (Not currently on dialysis)  (Not currently on dialysis)  (Not currently on dialysis)     BACKGROUND     Allergies: Allergies   Allergen Reactions    Codeine Unknown (comments)     Unsure due to happening so long    Levaquin [Levofloxacin] Nausea Only     intolerance    Pcn [Penicillins] Hives    Sulfa (Sulfonamide Antibiotics) Unknown (comments)     Unsure it was so long ago       Past Medical History:   Diagnosis Date    Amblyopia of left eye     Blind left eye     Cataract     bilat, right has been replaced    CRI (chronic renal insufficiency)     Dementia     Essential hypertension, malignant 2014    Hypertension     Left bundle branch block     Otitis media, acute 16    left       Past Surgical History:   Procedure Laterality Date    HX CATARACT REMOVAL Right     HX  SECTION      HX  SECTION      HX  SECTION      HX TONSILLECTOMY         Prescriptions Prior to Admission   Medication Sig    minoxidil (LONITEN) 2.5 mg tablet TAKE 1 TABLET BY MOUTH ONCE A DAY    ondansetron (ZOFRAN ODT) 4 mg disintegrating tablet Take 1 Tab by mouth every eight (8) hours as needed for Nausea.  atorvastatin (LIPITOR) 80 mg tablet Take 80 mg by mouth nightly.  Hydrochlorothiazide (HYDRODIURIL) 12.5 mg tablet Take 12.5 mg by mouth daily.  aspirin delayed-release 81 mg tablet Take 1 Tab by mouth daily.  carvedilol (COREG) 25 mg tablet Take 1 Tab by mouth two (2) times a day. Indications: HYPERTENSION    ciprofloxacin HCl (CIPRO) 250 mg tablet TK 1 T PO TWO TIMES A DAY FOR 3 DAYS    cloNIDine HCl (CATAPRES) 0.2 mg tablet TAKE 1 TABLET BY MOUTH TWICE DAILY    minoxidil (LONITEN) 2.5 mg tablet Take  by mouth daily.        Hard scripts included in transfer packet no    Vaccinations:    Immunization History   Administered Date(s) Administered    Pneumococcal Conjugate (PCV-13) 08/10/2015    Pneumococcal Polysaccharide (PPSV-23) 05/20/2014    Td 04/25/2011       Readmission Risks:    Known Risks: none        The Charlson CoMorbitiy Index tool is an evidenced based tool that has more automatic generated information. The tool looks at many different items such as the age of the patient, how many times they were admitted in the last calendar year, current length of stay in the hospital and their diagnosis. All of these items are pulled automatically from information documented in the chart from various places and will generate a score that predicts whether a patient is at low (less than 13), medium (13-20) or high (21 or greater) risk of being readmitted.         ASSESSMENT                Temp: 98.3 °F (36.8 °C) (03/22/18 1012) Pulse (Heart Rate): 90 (03/22/18 1012)     Resp Rate: 14 (03/22/18 1012)           BP: 107/75 (03/22/18 1012)     O2 Sat (%): 96 % (03/22/18 1012)     Weight: 49.4 kg (109 lb)    Height: 5' (152.4 cm) (03/12/18 1009)       If above not within 1 hour of discharge:    BP:_____  P:____  R:____ T:_____ O2 Sat: ___%  O2: ______    Active Orders   Diet    DIET GI SOFT No options chosen         Orientation: only aware of  place, person and situation     Active Behaviors: None                                   Active Lines/Drains:  (Peg Tube / Anton / CL or S/L?): no    Urinary Status: External catheter     Last BM: Last Bowel Movement Date: 03/20/18     Skin Integrity: Excoriation             Mobility: Very limited   Weight Bearing Status: NWB (Non Weight Bearing)      Gait Training  Assistive Device: Walker, rolling  Ambulation - Level of Assistance: Minimal assistance  Distance (ft): 4 Feet (ft)         Lab Results   Component Value Date/Time    Glucose 79 03/22/2018 04:10 AM    Hemoglobin A1c 5.5 09/30/2015 02:20 PM    INR 1.0 01/04/2017 05:00 PM    INR 1.0 09/30/2015 06:00 AM    HGB 10.1 (L) 03/21/2018 07:38 AM    HGB 11.1 (L) 03/15/2018 05:05 AM        RECOMMENDATION     See After Visit Summary (AVS) for:  · Discharge instructions  · After [address]   · Special equipment needed (entered pre-discharge by Care Management)  · Medication Reconciliation    · Follow up Appointment(s)         Report given/sent by:  Alia Estes RN                    Verbal report given to: tony  FAXED to:           Estimated discharge time:  3/22/2018 at [phone]

## 2018-03-22 NOTE — PROGRESS NOTES
Rounded on patient and found patient lethargic and unresponsive to verbal stimuli, but responded to vigorous tactile stimuli. [id number] [phone] -- called rapid response and charge nurse, Deana Landry RN, to bedside. 1218 -- Checked blood glucose 150. VS taken (see flowsheets). 2L O2 applied via nasal cannula. RRT team arrived. Dr. Maisha Rendon, Hang Alamo, NP, Araceli Owen (ICU Charge RN), [facility] staff, and Yamilex Gautam RN (Nurse Supervisor). STAT CT of head ordered. 1224 -- RRT ended. Patient responded to simple commands. However, remained lethargic. 1228 -- Patient awakened to verbal stimuli. Patient alert and responsive to verbal stimuli. 1230 -- Patient transported down to CT via bed accompanied by Yogi Smith RN (Stroke Coordinator) and Chela Choi CNA. No distress noted. [address] [place] [phone] [facility]) to update receiving nurse on status of patient. Informed Brittny Abreu LPN, of the events of the RRT, the negative STAT CT head per Dr. Devorah Dotson, the patient's current mental status (awakens to verbal stimuli), and that medical transport will be here to  the patient at 1630. Brittny Abreu LPN verbalized understanding. 1725 -- Patient cleaned up and discharged instructions reviewed with patient and the daughter. All questions and concerns answered and addressed. Daughter verbalized understanding. Patient responsive to verbal stimuli with no distress noted. Patient discharged via medical transport at this time with daughter.

## 2018-03-22 NOTE — DISCHARGE SUMMARY
[facility]    Discharge Summary    Patient: Micky Chang MRN: [id number]  CSN: [id number]    YOB: 1940  Age: 68 y.o.   Sex: female    DOA: 3/12/2018 LOS:  LOS: 8 days   Discharge Date:      Admission Diagnoses: Nausea & vomiting  Nausea and vomiting  nausea and vomiting    Discharge Diagnoses:    Problem List as of 3/22/2018  Date Reviewed: 3/8/2018          Codes Class Noted - Resolved    Advanced care planning/counseling discussion ICD-10-CM: Z71.89  ICD-9-CM: V65.49  3/20/2018 - Present        Dementia ICD-10-CM: F03.90  ICD-9-CM: 294.20  3/20/2018 - Present        Debility ICD-10-CM: R53.81  ICD-9-CM: 799.3  3/20/2018 - Present        Nausea and vomiting ICD-10-CM: R11.2  ICD-9-CM: 787.01  3/14/2018 - Present        * (Principal)Nausea & vomiting ICD-10-CM: R11.2  ICD-9-CM: 787.01  3/12/2018 - Present        Follow up ICD-10-CM: Z47  ICD-9-CM: V67.9  3/8/2018 - Present        HTN (hypertension) ICD-10-CM: I10  ICD-9-CM: 401.9  12/14/2015 - Present        Stroke ([facility] ICD-10-CM: I63.9  ICD-9-CM: 434.91  10/4/2015 - Present        CKD (chronic kidney disease) ICD-10-CM: N18.9  ICD-9-CM: 585.9  10/4/2015 - Present        Abnormality of gait and mobility ICD-10-CM: R26.9  ICD-9-CM: 781.2  9/30/2015 - Present        NESS (acute kidney injury) ([facility] ICD-10-CM: N17.9  ICD-9-CM: 584.9  8/25/2015 - Present        Dehydration ICD-10-CM: E86.0  ICD-9-CM: 276.51  8/25/2015 - Present        Essential hypertension, malignant (Chronic) ICD-10-CM: I10  ICD-9-CM: 401.0  4/25/2014 - Present        RESOLVED: UTI (urinary tract infection) ICD-10-CM: N39.0  ICD-9-CM: 599.0  8/25/2015 - 10/4/2015        RESOLVED: Leukocytosis ICD-10-CM: P92.167  ICD-9-CM: 288.60  8/25/2015 - 10/4/2015              Discharge Condition: Stable    Discharge To: SNF    Consults: Gastroenterology, Hospitalist and [facility] 1139 Course: 67 y/o  female with hx of hypertension, dementia, recurrent nausea and vomiting for which she has been in the ER for 4 times and has been followed by her PCP as well.  On one occasion, the patient was discharged with possible pneumonia.  Last time she was treated for UTI; however, she continued to have nausea and vomiting with very poor p.o. intake and significant weight loss.  Per daughter, the patient has been feeling nauseous and been vomiting since earlier the same day, two more episodes in the ER. Adria Ceballos has had zero p.o. intake and minimal liquid intake the day prior.  The patient has not seen a GI doctor per family and has never had an EGD or colonoscopy. Patient admitted for ongoing management. CT Abd/pelvis showed No acute or focal abnormality to explain patient's nausea or vomiting. Minimal layering density in the gallbladder but no secondary CT findings of cholecystitis. Chronic diverticular disease. No findings of an acute diverticulitis. HIDA scan 3/13/2018 showed patent cystic duct.  Decreased gallbladder ejection fraction of approximately 34 % suggesting gallbladder dysfunction. GI consulted 3/14. EGD on 3/15 1. -Normal upper endoscopy, with no endoscopic evidence of neoplasia or mucosal abnormality. 2. -Esophagus empirically dilated with #48 Munoz. GI soft diet ordered. Patient continued to vomit overnight 3/15 into 3/16. Unable to complete GES 2/2 to vomiting. Mentation has improved. Reglan switched to PO but later dc'd due to lethargy. Discussed with CM- insurance auth for SNF has , on 3/19/2018, case management notes [employer] insurance denied SNF- options would be private pay long term nursing care vs home w/ home health. Palliative care consulted- no plans for PEG, even for short term feeds, pt is DNR no chest compressions or shock for cardiac arrest, no intubation for respiratory arrest but accepting of short term intubation for reversible causes such as pneumonia. Pt received auth for SNF, [facility] and stable for discharge.   No further N/V, tolerating diet, labs and vital signs WNL. She is to follow up with her PCP within one week. Physical Exam:  General appearance: alert, cooperative, no distress  Lungs: clear to auscultation bilaterally  Heart: regular rate and rhythm, S1, S2 normal, no murmur, click, rub or gallop  Abdomen: soft, non tender, non distended. Normoactive bowel sounds. Extremities: extremities normal, atraumatic, no cyanosis or edema  Skin: Skin color, texture, turgor normal. No rashes or lesions  Neurologic: more awake and alert, follows simple commands    Significant Diagnostic Studies: labs:   Recent Results (from the past 24 hour(s))   MAGNESIUM    Collection Time: 03/22/18  4:10 AM   Result Value Ref Range    Magnesium 1.7 1.6 - 2.6 mg/dL   METABOLIC PANEL, BASIC    Collection Time: 03/22/18  4:10 AM   Result Value Ref Range    Sodium 135 (L) 136 - 145 mmol/L    Potassium 3.8 3.5 - 5.5 mmol/L    Chloride 100 100 - 108 mmol/L    CO2 23 21 - 32 mmol/L    Anion gap 12 3.0 - 18 mmol/L    Glucose 79 74 - 99 mg/dL    BUN 15 7.0 - 18 MG/DL    Creatinine 0.81 0.6 - 1.3 MG/DL    BUN/Creatinine ratio 19 12 - 20      GFR est AA >60 >60 ml/min/1.73m2    GFR est non-AA >60 >60 ml/min/1.73m2    Calcium 8.3 (L) 8.5 - 10.1 MG/DL         Discharge Medications:     Current Discharge Medication List      START taking these medications    Details   loratadine (CLARITIN) 10 mg tablet Take 1 Tab by mouth daily as needed for Allergies. Indications: SNEEZING  Qty: 30 Tab, Refills: 0      lisinopril (PRINIVIL, ZESTRIL) 40 mg tablet Take 1 Tab by mouth daily. Qty: 30 Tab, Refills: 0      amLODIPine (NORVASC) 5 mg tablet Take 1 Tab by mouth daily. Qty: 30 Tab, Refills: 0         CONTINUE these medications which have NOT CHANGED    Details   ondansetron (ZOFRAN ODT) 4 mg disintegrating tablet Take 1 Tab by mouth every eight (8) hours as needed for Nausea. Qty: 30 Tab, Refills: 1      atorvastatin (LIPITOR) 80 mg tablet Take 80 mg by mouth nightly. aspirin delayed-release 81 mg tablet Take 1 Tab by mouth daily. Qty: 1 Tab, Refills: 0      carvedilol (COREG) 25 mg tablet Take 1 Tab by mouth two (2) times a day. Indications: HYPERTENSION  Qty: 60 Tab, Refills: 5    Associated Diagnoses: Medication refill      cloNIDine HCl (CATAPRES) 0.2 mg tablet TAKE 1 TABLET BY MOUTH TWICE DAILY  Qty: 180 Tab, Refills: 5    Comments: **Patient requests 90 days supply**         STOP taking these medications       minoxidil (LONITEN) 2.5 mg tablet Comments:   Reason for Stopping:         Hydrochlorothiazide (HYDRODIURIL) 12.5 mg tablet Comments:   Reason for Stopping:         ciprofloxacin HCl (CIPRO) 250 mg tablet Comments:   Reason for Stopping:         minoxidil (LONITEN) 2.5 mg tablet Comments:   Reason for Stopping:               Activity: Activity as tolerated and PT/OT Eval and Treat    Diet: GI Soft- chicken or vegetable flavor broth, clear Ensure TID    Wound Care: None needed    Follow-up: Follow up with PCP within one week.       Discharge time: > 35 mins  Diana Zepeda NP  3/22/2018, 9:01 AM

## 2018-03-26 NOTE — PROGRESS NOTES
Patient was in [facility] from 3/12/18 -3//22/18 for weakness, nausea and vomiting and weight loss. She was transferred to [facility] on discharge for further rehabilitation. Medication Reconciliation done. Will follow peripherally for now.     Christelle Winter RN

## 2018-04-06 PROBLEM — R77.8 ELEVATED TROPONIN: Status: ACTIVE | Noted: 2018-01-01

## 2018-04-06 PROBLEM — G93.41 ACUTE METABOLIC ENCEPHALOPATHY: Status: ACTIVE | Noted: 2018-01-01

## 2018-04-06 NOTE — PROGRESS NOTES
Skin assessment done, rash noted to sacrum. Skin intact. Adult diaper removed, diaper dry, dtr states pt has not voided since 0800 today.

## 2018-04-06 NOTE — PROGRESS NOTES
Problem: Falls - Risk of  Goal: *Absence of Falls  Document [place] Fall Risk and appropriate interventions in the flowsheet. Outcome: Progressing Towards Goal  Fall Risk Interventions:       Mentation Interventions: Door open when patient unattended, Family/sitter at bedside    Medication Interventions: Bed/chair exit alarm    Elimination Interventions:  Toileting schedule/hourly rounds    History of Falls Interventions: Bed/chair exit alarm, Consult care management for discharge planning, Door open when patient unattended

## 2018-04-06 NOTE — IP AVS SNAPSHOT
[address] [address] 
[phone] Patient: [facility] MRN: [id number] CUV:[id number] A check zan indicates which time of day the medication should be taken. My Medications START taking these medications Instructions Each Dose to Equal  
 Morning Noon Evening Bedtime  
 famotidine 20 mg tablet Commonly known as:  PEPCID Start taking on:  4/24/2018 Your next dose is:  4/24/18- Daily Dose 1 Tab by Per G Tube route daily. 20 mg  
    
   
   
   
  
 haloperidol 2 mg/mL oral concentrate Commonly known as:  HALDOL  
   
 0.5 mL by Per G Tube route every six (6) hours as needed. 1 mg  
    
   
   
   
  
 hyoscyamine sulfate 0.125 mg tablet Commonly known as:  CYSTOSPAZ 1 Tab by SubLINGual route every four (4) hours as needed for Other (Secretions). 0.125 mg LORazepam 2 mg/mL concentrated solution Commonly known as:  INTENSOL  
   
 0.5 mL by Per G Tube route every six (6) hours as needed for Anxiety or Agitation. Max Daily Amount: 4 mg.  
 1 mg  
    
   
   
   
  
 mirtazapine 15 mg tablet Commonly known as:  Marney Gold Your next dose is:  4/23/18- PM dose 1 Tab by Per G Tube route nightly. 15 mg  
    
   
   
   
  
 morphine 100 mg/5 mL (20 mg/mL) concentrated solution Commonly known as:  ROXANOL  
   
 0.25 mL by SubLINGual route every four (4) hours as needed. Max Daily Amount: 30 mg.  
 5 mg  
    
   
   
   
  
 prochlorperazine 5 mg tablet Commonly known as:  COMPAZINE  
   
 1 Tab by Per G Tube route every six (6) hours as needed for up to 7 days. 5 mg  
    
   
   
   
  
 scopolamine 1 mg over 3 days Pt3d Commonly known as:  TRANSDERM-SCOP  
   
 1 Patch by TransDERmal route every seventy-two (72) hours as needed for Other (SECRETIONS). 1 Patch  
    
   
   
   
  
 vancomycin 50 mg/mL oral solution (compounded) 5 mL by Per G Tube route every six (6) hours for 10 days. 250 mg  
    
   
   
   
  
  
STOP taking these medications   
 amLODIPine 5 mg tablet Commonly known as:  NORVASC  
   
  
 aspirin delayed-release 81 mg tablet  
   
  
 atorvastatin 80 mg tablet Commonly known as:  LIPITOR  
   
  
 carvedilol 25 mg tablet Commonly known as:  COREG  
   
  
 cloNIDine HCl 0.2 mg tablet Commonly known as:  CATAPRES  
   
  
 lisinopril 40 mg tablet Commonly known as:  PRINIVIL, ZESTRIL  
   
  
 loratadine 10 mg tablet Commonly known as:  CLARITIN  
   
  
 ondansetron 4 mg disintegrating tablet Commonly known as:  ZOFRAN ODT Where to Get Your Medications Information on where to get these meds will be given to you by the nurse or doctor. ! Ask your nurse or doctor about these medications  
  famotidine 20 mg tablet  
 haloperidol 2 mg/mL oral concentrate  
 hyoscyamine sulfate 0.125 mg tablet LORazepam 2 mg/mL concentrated solution  
 mirtazapine 15 mg tablet  
 morphine 100 mg/5 mL (20 mg/mL) concentrated solution  
 prochlorperazine 5 mg tablet  
 scopolamine 1 mg over 3 days Pt3d  
 vancomycin 50 mg/mL oral solution (compounded)

## 2018-04-06 NOTE — ED NOTES
TRANSFER - ED to INPATIENT REPORT:    SBAR report made available to receiving floor on this patient being transferred to 3000(unit) for routine progression of care       Report consisted of patients Situation, Background, Assessment and   Recommendations(SBAR). Admitting diagnosis NESS. Information from the following report(s) SBAR and ED Summary was made available. Lines:   Peripheral IV 04/06/18 Right Antecubital (Active)   Site Assessment Clean, dry, & intact 4/6/2018 11:47 AM   Phlebitis Assessment 0 4/6/2018 11:47 AM   Infiltration Assessment 0 4/6/2018 11:47 AM   Dressing Status Clean, dry, & intact 4/6/2018 11:47 AM   Dressing Type Transparent 4/6/2018 11:47 AM   Action Taken Blood drawn 4/6/2018 11:47 AM        Medication list updated in progress. Opportunity for questions and clarification was provided. Patient is telemetry. Valuables transported with patient including family has belongings. Patient transported with:   Monitor, ED tech.

## 2018-04-06 NOTE — ED PROVIDER NOTES
[facility] EMERGENCY DEPT      11:09 AM    Date: 4/6/2018  Patient Name: Keerthi Gutierrez    History of Presenting Illness     Chief Complaint   Patient presents with    Lethargy       History Provided By: Patient, Patient's Son and Patient's Daughter    Chief Complaint: Fatigue  Duration:  1 day ago  Timing:  Acute  Location: N/A  Quality: N/A  Severity: Moderate  Modifying Factors: N/A  Associated Symptoms: denies any other associated signs or symptoms    68 y.o. female with noted past medical history of HTN who presents to the emergency department complaining of moderate acute fatigue onset 1 day ago. Patients daughter and son states that the patient was DC'd [facility] yesterday and since then she has not been acting like herself and has been fatigued. Son states that the patient has been in the ER 4 times since February 12th for PNA, Dehydration, UTI, and the fourth she was admitted. Daughter states that the patients blood pressure is normal for them but low for the patient and she has not been on any pain medications. Patients children says that the patient was on 3 medications but then they were changed and they do not know why. Children also states that she has been seeing a speech therapist while at rehab. Patient via head nod admits to her thoughts being foggy. Patient via head not denies painful urination. Patients children states that the patient has not been ambulatory for 6 weeks. No other complaints. Nursing notes regarding the HPI and triage nursing notes were reviewed. Prior medical records were reviewed. Current Outpatient Prescriptions   Medication Sig Dispense Refill    loratadine (CLARITIN) 10 mg tablet Take 1 Tab by mouth daily as needed for Allergies. Indications: SNEEZING 30 Tab 0    lisinopril (PRINIVIL, ZESTRIL) 40 mg tablet Take 1 Tab by mouth daily. 30 Tab 0    amLODIPine (NORVASC) 5 mg tablet Take 1 Tab by mouth daily.  30 Tab 0    ondansetron (ZOFRAN ODT) 4 mg disintegrating tablet Take 1 Tab by mouth every eight (8) hours as needed for Nausea. 30 Tab 1    atorvastatin (LIPITOR) 80 mg tablet Take 80 mg by mouth nightly.  cloNIDine HCl (CATAPRES) 0.2 mg tablet TAKE 1 TABLET BY MOUTH TWICE DAILY 180 Tab 5    aspirin delayed-release 81 mg tablet Take 1 Tab by mouth daily. 1 Tab 0    carvedilol (COREG) 25 mg tablet Take 1 Tab by mouth two (2) times a day. Indications: HYPERTENSION 60 Tab 5       Past History     Past Medical History:  Past Medical History:   Diagnosis Date    Amblyopia of left eye     Blind left eye     Cataract     bilat, right has been replaced    CRI (chronic renal insufficiency)     Dementia     Essential hypertension, malignant 2014    Hypertension     Left bundle branch block     Otitis media, acute 16    left       Past Surgical History:  Past Surgical History:   Procedure Laterality Date    HX CATARACT REMOVAL Right 2013    HX  SECTION  1968    HX  SECTION      HX  SECTION      HX TONSILLECTOMY         Family History:  Family History   Problem Relation Age of Onset    Heart Disease Father     Other Brother      ? brain injury       Social History:  Social History   Substance Use Topics    Smoking status: Never Smoker    Smokeless tobacco: Never Used    Alcohol use No       Allergies: Allergies   Allergen Reactions    Codeine Unknown (comments)     Unsure due to happening so long    Levaquin [Levofloxacin] Nausea Only     intolerance    Pcn [Penicillins] Hives    Sulfa (Sulfonamide Antibiotics) Unknown (comments)     Unsure it was so long ago       Patient's primary care provider (as noted in EPIC): Sulema Bennett MD    REVIEW OF SYSTEMS:  Limited secondary to altered mental status. If ROS is provided, it came from collateral sources such as family, friends, or nursing faclity where appropriate.      Visit Vitals    /88 (BP 1 Location: Right arm, BP Patient Position: At rest)    Pulse 76    Temp 98 °F (36.7 °C)    Resp 16    Wt 44.9 kg (99 lb)    SpO2 100%    BMI 19.33 kg/m2       PHYSICAL EXAM:    CONSTITUTIONAL:  Well developed;  well nourished. Limited secondary to altered mental status. HEAD:  Normocephalic, atraumatic. EYES:  Non-icteric sclera. Normal conjunctiva. Limited secondary to altered mental status. ENTM:  Nose:  no rhinorrhea. Throat:  no erythema or exudate, mucous membranes moist.  NECK:  No JVD. Limited secondary to altered mental status. RESPIRATORY:  Chest clear, equal breath sounds, good air movement. CARDIOVASCULAR:  Regular rate and rhythm. No murmurs, rubs, or gallops. GI:  Normal bowel sounds, abdomen soft. Limited secondary to altered mental status. BACK:  Non-tender. UPPER EXT:  Normal inspection. LOWER EXT:  No edema, no calf tenderness. Distal pulses intact. NEURO:  Limited secondary to altered mental status. Patient will squeeze hands and wiggle toes but will not perform CN exam.    SKIN:  No rashes;  Normal for age. PSYCH:  Limited secondary to altered mental status. DIFFERENTIAL DIAGNOSES/ MEDICAL DECISION MAKING:  Hypoglycemia, acute alcohol, drug, or multipharmacy intoxication, sepsis from numerous possible sources including urosepsis, pneumonia, meningitis, significant CVA, TIA, intracerebral hemorrhage, subdural hemorrhage, seizure, significant trauma, electrolyte or hormonal imbalance, other etiologies, versus a combination of the above.     Diagnostic Study Results     Abnormal lab results from this emergency department encounter:  Labs Reviewed   CBC WITH AUTOMATED DIFF - Abnormal; Notable for the following:        Result Value    RBC 3.62 (*)     HGB 10.9 (*)     HCT 33.3 (*)     RDW 17.5 (*)     MYELOCYTES 4 (*)     All other components within normal limits   METABOLIC PANEL, COMPREHENSIVE - Abnormal; Notable for the following:     BUN 36 (*)     Creatinine 2.30 (*)     GFR est AA 25 (*)     GFR est non-AA 21 (*)     All other components within normal limits   CARDIAC PANEL,(CK, CKMB & TROPONIN) - Abnormal; Notable for the following:     Troponin-I, Qt. 0.33 (*)     All other components within normal limits   URINALYSIS W/ RFLX MICROSCOPIC - Abnormal; Notable for the following:     Protein 30 (*)     Ketone TRACE (*)     Bilirubin MODERATE (*)     Leukocyte Esterase TRACE (*)     All other components within normal limits   AMMONIA - Abnormal; Notable for the following:     Ammonia <10 (*)     All other components within normal limits   URINE MICROSCOPIC ONLY - Abnormal; Notable for the following:     Amorphous Crystals 3+ (*)     All other components within normal limits   CULTURE, BLOOD   CULTURE, BLOOD   MAGNESIUM   ETHYL ALCOHOL   DRUG SCREEN, URINE   POC LACTIC ACID   GLUCOSE, POC       Lab values for this patient within approximately the last 12 hours:  Recent Results (from the past 12 hour(s))   EKG, 12 LEAD, SUBSEQUENT    Collection Time: 04/06/18 11:18 AM   Result Value Ref Range    Ventricular Rate 76 BPM    Atrial Rate 76 BPM    P-R Interval 146 ms    QRS Duration 130 ms    Q-T Interval 456 ms    QTC Calculation (Bezet) 513 ms    Calculated P Axis 30 degrees    Calculated R Axis -43 degrees    Calculated T Axis 118 degrees    Diagnosis       Normal sinus rhythm  Left axis deviation  Left bundle branch block  Abnormal ECG  When compared with ECG of 12-MAR-2018 11:10,  Left bundle branch block is now present     CULTURE, BLOOD    Collection Time: 04/06/18 11:25 AM   Result Value Ref Range    Special Requests: PERIPHERAL      Culture result: PENDING    POC LACTIC ACID    Collection Time: 04/06/18 11:28 AM   Result Value Ref Range    Lactic Acid (POC) 1.9 0.4 - 2.0 mmol/L   GLUCOSE, POC    Collection Time: 04/06/18 11:33 AM   Result Value Ref Range    Glucose (POC) 92 70 - 110 mg/dL   CBC WITH AUTOMATED DIFF    Collection Time: 04/06/18 11:40 AM   Result Value Ref Range    WBC 10.2 4.6 - 13.2 K/uL    RBC 3.62 (L) 4.20 - 5.30 M/uL    HGB 10.9 (L) 12.0 - 16.0 g/dL    HCT 33.3 (L) 35.0 - 45.0 %    MCV 92.0 74.0 - 97.0 FL    MCH 30.1 24.0 - 34.0 PG    MCHC 32.7 31.0 - 37.0 g/dL    RDW 17.5 (H) 11.6 - 14.5 %    PLATELET 195 279 - 482 K/uL    MPV 10.4 9.2 - 11.8 FL    NEUTROPHILS 57 42 - 75 %    LYMPHOCYTES 31 20 - 51 %    MONOCYTES 7 2 - 9 %    EOSINOPHILS 1 0 - 5 %    BASOPHILS 0 0 - 3 %    MYELOCYTES 4 (H) 0 %    ABS. NEUTROPHILS 5.8 1.8 - 8.0 K/UL    ABS. LYMPHOCYTES 3.2 0.8 - 3.5 K/UL    ABS. MONOCYTES 0.7 0 - 1.0 K/UL    ABS. EOSINOPHILS 0.1 0.0 - 0.4 K/UL    ABS. BASOPHILS 0.0 0.0 - 0.1 K/UL    RBC COMMENTS NORMOCYTIC, NORMOCHROMIC      DF MANUAL     METABOLIC PANEL, COMPREHENSIVE    Collection Time: 04/06/18 11:40 AM   Result Value Ref Range    Sodium 140 136 - 145 mmol/L    Potassium 4.3 3.5 - 5.5 mmol/L    Chloride 103 100 - 108 mmol/L    CO2 25 21 - 32 mmol/L    Anion gap 12 3.0 - 18 mmol/L    Glucose 89 74 - 99 mg/dL    BUN 36 (H) 7.0 - 18 MG/DL    Creatinine 2.30 (H) 0.6 - 1.3 MG/DL    BUN/Creatinine ratio 16 12 - 20      GFR est AA 25 (L) >60 ml/min/1.73m2    GFR est non-AA 21 (L) >60 ml/min/1.73m2    Calcium 8.9 8.5 - 10.1 MG/DL    Bilirubin, total 0.9 0.2 - 1.0 MG/DL    ALT (SGPT) 16 13 - 56 U/L    AST (SGOT) 22 15 - 37 U/L    Alk.  phosphatase 66 45 - 117 U/L    Protein, total 7.0 6.4 - 8.2 g/dL    Albumin 3.5 3.4 - 5.0 g/dL    Globulin 3.5 2.0 - 4.0 g/dL    A-G Ratio 1.0 0.8 - 1.7     MAGNESIUM    Collection Time: 04/06/18 11:40 AM   Result Value Ref Range    Magnesium 2.0 1.6 - 2.6 mg/dL   CARDIAC PANEL,(CK, CKMB & TROPONIN)    Collection Time: 04/06/18 11:40 AM   Result Value Ref Range    CK 82 26 - 192 U/L    CK - MB 2.3 <3.6 ng/ml    CK-MB Index 2.8 0.0 - 4.0 %    Troponin-I, Qt. 0.33 (H) 0.0 - 0.045 NG/ML   ETHYL ALCOHOL    Collection Time: 04/06/18 11:40 AM   Result Value Ref Range    ALCOHOL(ETHYL),SERUM <3 0 - 3 MG/DL   DRUG SCREEN, URINE    Collection Time: 04/06/18 11:40 AM Result Value Ref Range    BENZODIAZEPINES NEGATIVE  NEG      BARBITURATES NEGATIVE  NEG      THC (TH-CANNABINOL) NEGATIVE  NEG      OPIATES NEGATIVE  NEG      PCP(PHENCYCLIDINE) NEGATIVE  NEG      COCAINE NEGATIVE  NEG      AMPHETAMINES NEGATIVE  NEG      METHADONE NEGATIVE  NEG      HDSCOM (NOTE)    URINALYSIS W/ RFLX MICROSCOPIC    Collection Time: 04/06/18 11:40 AM   Result Value Ref Range    Color DARK YELLOW      Appearance CLOUDY      Specific gravity 1.022 1.005 - 1.030      pH (UA) 5.0 5.0 - 8.0      Protein 30 (A) NEG mg/dL    Glucose NEGATIVE  NEG mg/dL    Ketone TRACE (A) NEG mg/dL    Bilirubin MODERATE (A) NEG      Blood NEGATIVE  NEG      Urobilinogen 1.0 0.2 - 1.0 EU/dL    Nitrites NEGATIVE  NEG      Leukocyte Esterase TRACE (A) NEG     AMMONIA    Collection Time: 04/06/18 11:40 AM   Result Value Ref Range    Ammonia <10 (L) 11 - 32 UMOL/L   CULTURE, BLOOD    Collection Time: 04/06/18 11:40 AM   Result Value Ref Range    Special Requests: PERIPHERAL      Culture result: PENDING    URINE MICROSCOPIC ONLY    Collection Time: 04/06/18 11:40 AM   Result Value Ref Range    WBC NONE 0 - 4 /hpf    RBC NONE 0 - 5 /hpf    Epithelial cells 1+ 0 - 5 /lpf    Bacteria NEGATIVE  NEG /hpf    Amorphous Crystals 3+ (A) NEG    Hyaline cast 4 to 10 0 - 2 /lpf       Radiologist and cardiologist interpretations if available at time of this note:  Ct Head Wo Cont    Result Date: 4/6/2018  EXAM: CT head INDICATION: Fatigue, drowsiness, altered level of consciousness. COMPARISON: 3/22/2018. TECHNIQUE: Axial CT imaging of the head  was obtained from skull base to vertex without intravenous contrast. Coronal and sagittal reconstructions were obtained. One or more dose reduction techniques were used on this CT: automated exposure control, adjustment of the mAs and/or kVp according to patient's size, and iterative reconstruction techniques.  The specific techniques utilized on this CT exam have been documented in the patient's electronic medical record. _______________ FINDINGS: BRAIN AND POSTERIOR FOSSA: Stable chronic small vessel infarcts bilateral basal ganglia and ventral right thalamus, right central yuliya. Chronic left cerebellar infarct and several small chronic right cerebellar cortical infarcts. There is no intracranial hemorrhage, mass effect, or shift of midline structures. Stable bilateral deep white matter hypodensity nonspecific. Bilateral carotid siphon atherosclerosis. Stable moderate diffuse central and cortical volume loss. EXTRA-AXIAL SPACES AND MENINGES: There are no abnormal extra-axial fluid collections. CALVARIUM: No acute osseous abnormality. SINUSES: The visualized portions of the paranasal sinuses and mastoid air cells are well aerated. OTHER: None. _______________     IMPRESSION: 1. No acute intracranial abnormalities are identified. No CT evidence to suggest acute intracranial hematoma, cortical infarct, or mass effect/mass lesion. No significant interval change. 2. Numerous chronic infarcts involving bilateral basal ganglia and right thalamus, yuliya, and bilateral cerebellum. Bilateral deep white matter hypodensity likely chronic microvascular ischemic changes. Interpreted by ED Physician:  Cardiac Monitor Strip interpretation:  Normal Sinus, No ST changes noted, Rate is normal, LBBB. 12 lead EKG interpreted by ED Physician:  normal EKG, Interpretation: NSR ABOUT 75 BPM WITH LBBB. NO ACUTE ST SEGMENT OR T WAVE CHANGES. Medication(s) ordered for patient during this emergency visit encounter:  Medications - No data to display    Medical Decision Making     I am the first provider for this patient. I reviewed the vital signs, available nursing notes, past medical history, past surgical history, family history and social history. Vital Signs:  Reviewed the patient's vital signs. ED COURSE:    Critical Care Note:  Altered Mental Status    Critical care minutes: 35 MINUTES.      Given patients presentation to ed with noted change in mental status, numerous serial neurological examinations were performed, as well as evaluation of vital signs. Given the patients underlying condition medical intervention(s) were needed, requiring numerous reevaluations of patient's vital signs and response to different emergency department therapies, total bedside time evaluating and/or treating the patient, not including procedures, is noted below. Admit to Hospitalist    The patient was presented to the accepting hospitalist, Dr. Donnita Frankel. The patient's primary doctor is Nadege Gaines MD, and admissions for this physician are with the hospitalist.  If the patient has no primary doctor, then admission is to the hospitalist as well. As the emergency physician, I wrote courtesy admission orders for the hospitalist physician. The courtesy orders included explicit instructions for the floor nursing staff to call the admitting attending physician upon patient arrival on the floor. DIAGNOSIS:  1. Altered mental status. 2. Acute renal insufficiency. 3. Elevated troponin    PLAN:  Admit     Coding Diagnoses     Clinical Impression:   1. Acute renal insufficiency    2. Elevated troponin    3. Altered mental status, unspecified altered mental status type        Disposition     Disposition:  [address] [place] [address]. Jody Zhou M.D. GAUTAM Board Certified Emergency Physician    Provider Attestation:  If a scribe was utilized in generation of this patient record, I personally performed the services described in the documentation, reviewed the documentation, as recorded by the scribe in my presence, and it accurately records the patient's history of presenting illness, review of systems, patient physical examination, and procedures performed by me as the attending physician. RODRIGO Smith Board Certified Emergency Physician  4/6/2018.  11:10 AM    Monae Ferrera Rachel Quintero acting as a scribe for and in the presence of Franki Lesch, MD      April 06, 2018 at 11:26 AM       Provider Attestation:      I personally performed the services described in the documentation, reviewed the documentation, as recorded by the scribe in my presence, and it accurately and completely records my words and actions.  April 06, 2018 at 11:26 AM - Franki Lesch, MD

## 2018-04-06 NOTE — PROGRESS NOTES
Received alert and verbally responsive female pt. Pt oriented to self only, accompanied by dtr and son. Family states pt has not moved her bowels since early February. Abdomen soft, bowel sounds present but hypoactive. Family reports pt appetite is poor and pt only drinks about 8 oz of fluid per day. Pt denies pain. SR on monitor, tele box # 8 verified with Bradley Cleary.

## 2018-04-06 NOTE — ROUTINE PROCESS
Bedside shift change report given to Daniela Gutierrez (oncoming nurse) by Tristin Irizarry RN (offgoing nurse). Report included the following information SBAR, Kardex, Intake/Output, MAR, Recent Results and Cardiac Rhythm SR HR 81.

## 2018-04-06 NOTE — IP AVS SNAPSHOT
[address] [address] 
[phone] Patient: [facility] MRN: [id number] IAC:[id number] About your hospitalization You were admitted on:  April 6, 2018 You last received care in the:  [address] [place] [address] Road You were discharged on:  April 23, 2018 Why you were hospitalized Your primary diagnosis was:  Septic Shock (Hcc) Your diagnoses also included:  Acute Renal Failure Superimposed On Stage 2 Chronic Kidney Disease (Hcc), Acute Metabolic Encephalopathy, Dehydration, Nausea And Vomiting, Severe Protein-Calorie Malnutrition [place]: Less Than 60% Of Standard Weight) (Hcc), Abnormality Of Gait And Mobility, Elevated Troponin, Urinary Tract Infection Without Hematuria, Altered Mental Status, Diarrhea, Comfort Measures Only Status Follow-up Information Follow up With Details Comments Contact Info MD Son Haider [address] [id number] [facility] 83 [address] [phone] Discharge Orders None A check zan indicates which time of day the medication should be taken. My Medications START taking these medications Instructions Each Dose to Equal  
 Morning Noon Evening Bedtime  
 famotidine 20 mg tablet Commonly known as:  PEPCID Start taking on:  4/24/2018 Your next dose is:  4/24/18- Daily Dose 1 Tab by Per G Tube route daily. 20 mg  
    
   
   
   
  
 haloperidol 2 mg/mL oral concentrate Commonly known as:  HALDOL  
   
 0.5 mL by Per G Tube route every six (6) hours as needed. 1 mg  
    
   
   
   
  
 hyoscyamine sulfate 0.125 mg tablet Commonly known as:  CYSTOSPAZ 1 Tab by SubLINGual route every four (4) hours as needed for Other (Secretions). 0.125 mg LORazepam 2 mg/mL concentrated solution Commonly known as:  INTENSOL 0.5 mL by Per G Tube route every six (6) hours as needed for Anxiety or Agitation. Max Daily Amount: 4 mg.  
 1 mg  
    
   
   
   
  
 mirtazapine 15 mg tablet Commonly known as:  Jac Maldonado Your next dose is:  4/23/18- PM dose 1 Tab by Per G Tube route nightly. 15 mg  
    
   
   
   
  
 morphine 100 mg/5 mL (20 mg/mL) concentrated solution Commonly known as:  ROXANOL  
   
 0.25 mL by SubLINGual route every four (4) hours as needed. Max Daily Amount: 30 mg.  
 5 mg  
    
   
   
   
  
 prochlorperazine 5 mg tablet Commonly known as:  COMPAZINE  
   
 1 Tab by Per G Tube route every six (6) hours as needed for up to 7 days. 5 mg  
    
   
   
   
  
 scopolamine 1 mg over 3 days Pt3d Commonly known as:  TRANSDERM-SCOP  
   
 1 Patch by TransDERmal route every seventy-two (72) hours as needed for Other (SECRETIONS). 1 Patch  
    
   
   
   
  
 vancomycin 50 mg/mL oral solution (compounded) 5 mL by Per G Tube route every six (6) hours for 10 days. 250 mg  
    
   
   
   
  
  
STOP taking these medications   
 amLODIPine 5 mg tablet Commonly known as:  NORVASC  
   
  
 aspirin delayed-release 81 mg tablet  
   
  
 atorvastatin 80 mg tablet Commonly known as:  LIPITOR  
   
  
 carvedilol 25 mg tablet Commonly known as:  COREG  
   
  
 cloNIDine HCl 0.2 mg tablet Commonly known as:  CATAPRES  
   
  
 lisinopril 40 mg tablet Commonly known as:  PRINIVIL, ZESTRIL  
   
  
 loratadine 10 mg tablet Commonly known as:  CLARITIN  
   
  
 ondansetron 4 mg disintegrating tablet Commonly known as:  ZOFRAN ODT Where to Get Your Medications Information on where to get these meds will be given to you by the nurse or doctor. ! Ask your nurse or doctor about these medications  
  famotidine 20 mg tablet  
 haloperidol 2 mg/mL oral concentrate  
 hyoscyamine sulfate 0.125 mg tablet LORazepam 2 mg/mL concentrated solution mirtazapine 15 mg tablet  
 morphine 100 mg/5 mL (20 mg/mL) concentrated solution  
 prochlorperazine 5 mg tablet  
 scopolamine 1 mg over 3 days Pt3d  
 vancomycin 50 mg/mL oral solution (compounded) Opioid Education Prescription Opioids: What You Need to Know: 
 
 
 
F-face looks uneven A-arms unable to move or move unevenly S-speech slurred or non-existent T-time-call 911 as soon as signs and symptoms begin-DO NOT go Back to bed or wait to see if you get better-TIME IS BRAIN. Warning Signs of HEART ATTACK Call 911 if you have these symptoms: 
? Chest discomfort. Most heart attacks involve discomfort in the center of the chest that lasts more than a few minutes, or that goes away and comes back. It can feel like uncomfortable pressure, squeezing, fullness, or pain. ? Discomfort in other areas of the upper body. Symptoms can include pain or discomfort in one or both arms, the back, neck, jaw, or stomach. ? Shortness of breath with or without chest discomfort. ? Other signs may include breaking out in a cold sweat, nausea, or lightheadedness. Don't wait more than five minutes to call [address]! Fast action can save your life. Calling 911 is almost always the fastest way to get lifesaving treatment.  Emergency Medical Services staff can begin treatment when they arrive  up to an hour sooner than if someone gets to the hospital by car. The discharge information has been reviewed with the patient. The patient verbalized understanding. Discharge medications reviewed with the patient and appropriate educational materials and side effects teaching were provided. ___________________________________________________________________________________________________________________________________ [employer] Announcement We are excited to announce that we are making your provider's discharge notes available to you in Innolight. You will see these notes when they are completed and signed by the physician that discharged you from your recent hospital stay. If you have any questions or concerns about any information you see in Innolight, please call the Health Information Department where you were seen or reach out to your Primary Care Provider for more information about your plan of care. Introducing [facility]! Dear Pastor Rodriguez: 
Thank you for requesting a Innolight account. Our records indicate that you already have an active Innolight account. You can access your account anytime at [email or web address] Did you know that you can access your hospital and ER discharge instructions at any time in Innolight? You can also review all of your test results from your hospital stay or ER visit. Additional Information If you have questions, please visit the Frequently Asked Questions section of the Innolight website at [email or web address] Remember, Innolight is NOT to be used for urgent needs. For medical emergencies, dial 911. Now available from your iPhone and Android! Introducing Rayshawn Newell As a [facility] patient, I wanted to make you aware of our electronic visit tool called Rayshawn Newell. [facility] 24[facility]7 allows you to connect within minutes with a medical provider 24 hours a day, seven days a week via a mobile device or tablet or logging into a secure website from your computer. You can access [employer] online [employer] and technology from anywhere in the [place]. A virtual visit might be right for you when you have a simple condition and feel like you just dont want to get out of bed, or cant get away from work for an appointment, when your regular [employer] provider is not available (evenings, weekends or holidays), or when youre out of town and need minor care. Electronic visits cost only $49 and if the [employer] 24/7 provider determines a prescription is needed to treat your condition, one can be electronically transmitted to a nearby pharmacy*. Please take a moment to enroll today if you have not already done so. The enrollment process is free and takes just a few minutes. To enroll, please download the [employer] 24/7 jim to your tablet or phone, or visit [email or web address] to enroll on your computer. And, as an [address] [place] [address] patient with a [employer] account, the results of your visits will be scanned into your electronic medical record and your primary care provider will be able to view the scanned results. We urge you to continue to see your regular [employer] provider for your ongoing medical care. And while your primary care provider may not be the one available when you seek a Rayshawn Chirinosmaynorfin virtual visit, the peace of mind you get from getting a real diagnosis real time can be priceless. For more information on [employer] online [employer] and technology, view our Frequently Asked Questions (FAQs) at [email or web address]. Sincerely, 
 
Maria R Reinoso MD 
Chief Medical Officer Mac Reyna *:  certain medications cannot be prescribed via [employer] Providers Seen During Your Hospitalization Provider Specialty Primary office phone Margy Alicia MD Emergency Medicine [phone] Kiah Felipe MD Internal Medicine [phone] Misti Vega MD Internal Medicine [phone] Aquilino Pierce DO Internal Medicine [phone] Your Primary Care Physician (PCP) Primary Care Physician Office Phone Office Fax [address], [address] [phone] You are allergic to the following Allergen Reactions Codeine Unknown (comments) Unsure due to happening so long Levaquin (Levofloxacin) Nausea Only  
 intolerance Pcn (Penicillins) Hives Sulfa (Sulfonamide Antibiotics) Unknown (comments) Unsure it was so long ago Recent Documentation Height Weight Breastfeeding? BMI OB Status Smoking Status 1.524 m 51.7 kg No 22.26 kg/m2 Postmenopausal Never Smoker Emergency Contacts Name Discharge Info Relation Home Work Mobile Jamison Santos DISCHARGE CAREGIVER [3] Child [2] [phone] [facility] DISCHARGE CAREGIVER [3] Child [2] [phone] Patient Belongings The following personal items are in your possession at time of discharge: 
  Dental Appliances: None  Visual Aid: None      Home Medications: None   Jewelry: None  Clothing: None    Other Valuables: None Discharge Instructions Attachments/References SEPSIS (ENGLISH) Patient Handouts Sepsis: Care Instructions Your Care Instructions Sepsis is an infection that has spread throughout your body. It is a life-threatening condition and often causes extremely low blood pressure. This can lead to problems with many different organs. The cause of sepsis is not always clear, but it can happen as part of a long-term or sudden illness. Sometimes even a mild illness can lead to sepsis. Follow-up care is a key part of your treatment and safety.  Be sure to make and go to all appointments, and call your doctor if you are having problems. It's also a good idea to know your test results and keep a list of the medicines you take. How can you care for yourself at home? · If your doctor prescribed antibiotics, take them as directed. Do not stop taking them just because you feel better. You need to take the full course of antibiotics. · Drink plenty of fluids, enough so that your urine is light yellow or clear like water. Choose water or caffeine-free clear liquids until you feel better. If you have kidney, heart, or liver disease and have to limit fluids, talk with your doctor before you increase your fluid intake. You can try rehydration drinks, such as Gatorade or Powerade. · Do not drink alcohol. · Eat a healthy diet. Include fruits, vegetables, and whole grains in your diet every day. · Walking is an easy way to get exercise. Gradually increase the amount you walk every day. Make sure your doctor knows that you are starting an exercise program. 
· Do not smoke or use other tobacco products. If you need help quitting, talk to your doctor about stop-smoking programs and medicines. These can increase your chances of quitting for good. When should you call for help? Call 911 anytime you think you may need emergency care. For example, call if: 
? · You passed out (lost consciousness). ?Call your doctor now or seek immediate medical care if: 
? · You have a fever or chills. ? · You have cool, pale, or clammy skin. ? · You are dizzy or lightheaded, or you feel like you may faint. ? · You have any new symptoms, such as a cough, pain in one part of your body, or urinary problems. ? Watch closely for changes in your health, and be sure to contact your doctor if: 
? · You do not get better as expected. Where can you learn more? Go to http://jadyn-ivette.info/. Enter H810 in the search box to learn more about \"Sepsis: Care Instructions. \" Current as of: March 20, 2017 Content Version: 11.4 © 6159-9517 Healthwise[employer]. Care instructions adapted under license by [employer] (which disclaims liability or warranty for this information). If you have questions about a medical condition or this instruction, always ask your healthcare professional. [address] any warranty or liability for your use of this information. Please provide this summary of care documentation to your next provider. Signatures-by signing, you are acknowledging that this After Visit Summary has been reviewed with you and you have received a copy. Patient Signature:  ____________________________________________________________ Date:  ____________________________________________________________  
  
[facility] Provider Signature:  ____________________________________________________________ Date:  ____________________________________________________________

## 2018-04-06 NOTE — ED TRIAGE NOTES
Just got out of rehab. Family thinks sleeping too much. Told to go to ED if felt she was sleeping too much.  Thinks gets sleepy after BP meds

## 2018-04-06 NOTE — H&P
GENERAL GENERIC H&P/CONSULT    Luc Nash is a 67 y/o  female with hx of hypertension, dementia, recurrent nausea and vomiting for which she has been in the ER for 4 times, recent UTI with treatment and recently discharged 3/2018 following n/v who presents to the ED for acute fatigue x1 day ago noting excessive tired and drowsy and \"sleeping too much. \" Per family patient was recently discharged from Rehab yesterday following hospital admission for dehydration 2/2 N/V. Of note on previous admission per previous d/c summary \"Patient admitted for ongoing management. CT Abd/pelvis showed No acute or focal abnormality to explain patient's nausea or vomiting. Minimal layering density in the gallbladder but no secondary CT findings of cholecystitis. Chronic diverticular disease. No findings of an acute diverticulitis.  HIDA scan 3/13/2018 showed patent cystic duct.  Decreased gallbladder ejection fraction of approximately 34 % suggesting gallbladder dysfunction. GI consulted 3/14. EGD on 3/15 1. -Normal upper endoscopy, with no endoscopic evidence of neoplasia or mucosal abnormality. 2. -Esophagus empirically dilated with #48 Munoz. GI soft diet ordered. Patient continued to vomit overnight 3/15 into 3/16. Unable to complete GES 2/2 to vomiting. Mentation has improved. Reglan switched to PO but later dc'd due to lethargy.   Discussed with CM- insurance auth for SNF has , on 3/19/2018, case management notes [employer] insurance denied SNF- options would be private pay long term nursing care vs home w/ home health.  Palliative care consulted- no plans for PEG, even for short term feeds, pt is DNR no chest compressions or shock for cardiac arrest, no intubation for respiratory arrest but accepting of short term intubation for reversible causes such as pneumonia.   Pt [place] [facility] for [place]. \" In the interim hx is diffifcult to obtain 2/2 AMS.  Upon arrival patient was found to have NESS. Per family patient is fairly talktative. On my arrival she denies any pain or cp however is able to tell the names of her children. Patient will be admitted for further evaluation and treatment. Past Medical History:   Diagnosis Date    Amblyopia of left eye     Blind left eye     Cataract     bilat, right has been replaced    CRI (chronic renal insufficiency)     Dementia     Essential hypertension, malignant 2014    Hypertension     Left bundle branch block     Otitis media, acute 16    left      Past Surgical History:   Procedure Laterality Date    HX CATARACT REMOVAL Right 2013    HX  SECTION  1968    HX  SECTION  1964    HX  SECTION  1970    [address]      Prior to Admission medications    Medication Sig Start Date End Date Taking? Authorizing Provider   loratadine (CLARITIN) 10 mg tablet Take 1 Tab by mouth daily as needed for Allergies. Indications: SNEEZING 3/22/18   Denisha Brock NP   lisinopril (PRINIVIL, ZESTRIL) 40 mg tablet Take 1 Tab by mouth daily. 3/19/18   Antonino Weaver MD   amLODIPine (NORVASC) 5 mg tablet Take 1 Tab by mouth daily. 3/19/18   Antonino Weaver MD   ondansetron (ZOFRAN ODT) 4 mg disintegrating tablet Take 1 Tab by mouth every eight (8) hours as needed for Nausea. 3/5/18   Rickie Franklin MD   atorvastatin (LIPITOR) 80 mg tablet Take 80 mg by mouth nightly. Gregoria Rooney MD   cloNIDine HCl (CATAPRES) 0.2 mg tablet TAKE 1 TABLET BY MOUTH TWICE DAILY 17   Rickie Franklin MD   aspirin delayed-release 81 mg tablet Take 1 Tab by mouth daily. 10/4/15   Claudine Guajardo MD   carvedilol (COREG) 25 mg tablet Take 1 Tab by mouth two (2) times a day.  Indications: HYPERTENSION 8/10/15   Rickie Franklin MD     Allergies   Allergen Reactions    Codeine Unknown (comments)     Unsure due to happening so long    Levaquin [Levofloxacin] Nausea Only     intolerance    Pcn [Penicillins] Hives    Sulfa (Sulfonamide Antibiotics) Unknown (comments)     Unsure it was so long ago      Social History   Substance Use Topics    Smoking status: Never Smoker    Smokeless tobacco: Never Used    Alcohol use No      Family History   Problem Relation Age of Onset    Heart Disease Father     Other Brother      ? brain injury      Review of Systems   Respiratory: Positive for chest tightness. Cardiovascular: Positive for chest pain. Genitourinary: Positive for genital sores. Objective:          Patient Vitals for the past 8 hrs:   BP Temp Pulse Resp SpO2 Weight   04/06/18 1345 149/85 - 74 23 95 % -   04/06/18 1230 (!) 149/93 - 72 22 96 % -   04/06/18 1200 140/89 - 67 18 98 % -   04/06/18 1053 128/88 98 °F (36.7 °C) 76 16 100 % 44.9 kg (99 lb)     Physical Exam   Constitutional: She appears cachectic. HENT:   Head: Normocephalic. Eyes: Pupils are equal, round, and reactive to light. Neck: Normal range of motion. Cardiovascular: Normal rate. No murmur heard. Pulmonary/Chest: Effort normal and breath sounds normal. No respiratory distress. She has no wheezes. She has no rales. Abdominal: Soft. Bowel sounds are normal.   Neurological:   Drowsy occasionally says Yes or no   Skin: Skin is dry. She is not diaphoretic.         Labs:    Recent Results (from the past 24 hour(s))   EKG, 12 LEAD, SUBSEQUENT    Collection Time: 04/06/18 11:18 AM   Result Value Ref Range    Ventricular Rate 76 BPM    Atrial Rate 76 BPM    P-R Interval 146 ms    QRS Duration 130 ms    Q-T Interval 456 ms    QTC Calculation (Bezet) 513 ms    Calculated P Axis 30 degrees    Calculated R Axis -43 degrees    Calculated T Axis 118 degrees    Diagnosis       Normal sinus rhythm  Left axis deviation  Left bundle branch block  Abnormal ECG  When compared with ECG of 12-MAR-2018 11:10,  Left bundle branch block is now present     CULTURE, BLOOD    Collection Time: 04/06/18 11:25 AM   Result Value Ref Range Special Requests: PERIPHERAL      Culture result: PENDING    POC LACTIC ACID    Collection Time: 04/06/18 11:28 AM   Result Value Ref Range    Lactic Acid (POC) 1.9 0.4 - 2.0 mmol/L   GLUCOSE, POC    Collection Time: 04/06/18 11:33 AM   Result Value Ref Range    Glucose (POC) 92 70 - 110 mg/dL   CBC WITH AUTOMATED DIFF    Collection Time: 04/06/18 11:40 AM   Result Value Ref Range    WBC 10.2 4.6 - 13.2 K/uL    RBC 3.62 (L) 4.20 - 5.30 M/uL    HGB 10.9 (L) 12.0 - 16.0 g/dL    HCT 33.3 (L) 35.0 - 45.0 %    MCV 92.0 74.0 - 97.0 FL    MCH 30.1 24.0 - 34.0 PG    MCHC 32.7 31.0 - 37.0 g/dL    RDW 17.5 (H) 11.6 - 14.5 %    PLATELET 531 313 - 298 K/uL    MPV 10.4 9.2 - 11.8 FL    NEUTROPHILS 57 42 - 75 %    LYMPHOCYTES 31 20 - 51 %    MONOCYTES 7 2 - 9 %    EOSINOPHILS 1 0 - 5 %    BASOPHILS 0 0 - 3 %    MYELOCYTES 4 (H) 0 %    ABS. NEUTROPHILS 5.8 1.8 - 8.0 K/UL    ABS. LYMPHOCYTES 3.2 0.8 - 3.5 K/UL    ABS. MONOCYTES 0.7 0 - 1.0 K/UL    ABS. EOSINOPHILS 0.1 0.0 - 0.4 K/UL    ABS. BASOPHILS 0.0 0.0 - 0.1 K/UL    RBC COMMENTS NORMOCYTIC, NORMOCHROMIC      DF MANUAL     METABOLIC PANEL, COMPREHENSIVE    Collection Time: 04/06/18 11:40 AM   Result Value Ref Range    Sodium 140 136 - 145 mmol/L    Potassium 4.3 3.5 - 5.5 mmol/L    Chloride 103 100 - 108 mmol/L    CO2 25 21 - 32 mmol/L    Anion gap 12 3.0 - 18 mmol/L    Glucose 89 74 - 99 mg/dL    BUN 36 (H) 7.0 - 18 MG/DL    Creatinine 2.30 (H) 0.6 - 1.3 MG/DL    BUN/Creatinine ratio 16 12 - 20      GFR est AA 25 (L) >60 ml/min/1.73m2    GFR est non-AA 21 (L) >60 ml/min/1.73m2    Calcium 8.9 8.5 - 10.1 MG/DL    Bilirubin, total 0.9 0.2 - 1.0 MG/DL    ALT (SGPT) 16 13 - 56 U/L    AST (SGOT) 22 15 - 37 U/L    Alk.  phosphatase 66 45 - 117 U/L    Protein, total 7.0 6.4 - 8.2 g/dL    Albumin 3.5 3.4 - 5.0 g/dL    Globulin 3.5 2.0 - 4.0 g/dL    A-G Ratio 1.0 0.8 - 1.7     MAGNESIUM    Collection Time: 04/06/18 11:40 AM   Result Value Ref Range    Magnesium 2.0 1.6 - 2.6 mg/dL CARDIAC PANEL,(CK, CKMB & TROPONIN)    Collection Time: 04/06/18 11:40 AM   Result Value Ref Range    CK 82 26 - 192 U/L    CK - MB 2.3 <3.6 ng/ml    CK-MB Index 2.8 0.0 - 4.0 %    Troponin-I, Qt. 0.33 (H) 0.0 - 0.045 NG/ML   ETHYL ALCOHOL    Collection Time: 04/06/18 11:40 AM   Result Value Ref Range    ALCOHOL(ETHYL),SERUM <3 0 - 3 MG/DL   DRUG SCREEN, URINE    Collection Time: 04/06/18 11:40 AM   Result Value Ref Range    BENZODIAZEPINES NEGATIVE  NEG      BARBITURATES NEGATIVE  NEG      THC (TH-CANNABINOL) NEGATIVE  NEG      OPIATES NEGATIVE  NEG      PCP(PHENCYCLIDINE) NEGATIVE  NEG      COCAINE NEGATIVE  NEG      AMPHETAMINES NEGATIVE  NEG      METHADONE NEGATIVE  NEG      HDSCOM (NOTE)    URINALYSIS W/ RFLX MICROSCOPIC    Collection Time: 04/06/18 11:40 AM   Result Value Ref Range    Color DARK YELLOW      Appearance CLOUDY      Specific gravity 1.022 1.005 - 1.030      pH (UA) 5.0 5.0 - 8.0      Protein 30 (A) NEG mg/dL    Glucose NEGATIVE  NEG mg/dL    Ketone TRACE (A) NEG mg/dL    Bilirubin MODERATE (A) NEG      Blood NEGATIVE  NEG      Urobilinogen 1.0 0.2 - 1.0 EU/dL    Nitrites NEGATIVE  NEG      Leukocyte Esterase TRACE (A) NEG     AMMONIA    Collection Time: 04/06/18 11:40 AM   Result Value Ref Range    Ammonia <10 (L) 11 - 32 UMOL/L   CULTURE, BLOOD    Collection Time: 04/06/18 11:40 AM   Result Value Ref Range    Special Requests: PERIPHERAL      Culture result: PENDING    URINE MICROSCOPIC ONLY    Collection Time: 04/06/18 11:40 AM   Result Value Ref Range    WBC NONE 0 - 4 /hpf    RBC NONE 0 - 5 /hpf    Epithelial cells 1+ 0 - 5 /lpf    Bacteria NEGATIVE  NEG /hpf    Amorphous Crystals 3+ (A) NEG    Hyaline cast 4 to 10 0 - 2 /lpf           Assessment:  Principal Problem:    Acute metabolic encephalopathy (7/2/7031)    Active Problems:    Essential hypertension, malignant (4/25/2014)      NESS (acute kidney injury) ([facility] (8/25/2015)      Dehydration (8/25/2015)      Abnormality of gait and mobility (9/30/2015)      CKD (chronic kidney disease) (10/4/2015)      Nausea and vomiting (3/14/2018)      Severe protein-calorie malnutrition Adam Peto: less than 60% of standard weight) ([facility] [address] (3/22/2018)      Elevated troponin (4/6/2018)        Plan:    Acute metabolic encephalopathy  -likely 2/2 dehydration  -IVF  -CT head negative for acute intracranial abnormalities  -labs essentially unremakrable for other etiologies or infectious process  -monitor for now    Acute on Chronic renal failure  -dehydration  -IVF  -trend for now  -hold ace  -wegith I&O's    Elevated Troponin  -likely 2/2 NESS  -however EKG showing new LBBB compared to previous however noted on 3/2/18 had LBBB  -no CP  -trend for now  -if CP and increasing despite decreasing creat may need cards involvement    N/V  -resolved  -zofran  -Previous GI workup essentially unremarkable  -unable to complete GES 2/2 vomitting on previous admission    Essential HTN  -hold ace for now  -resume home anti-htn meds  -family feels as if sleep after anti-htn meds consider parititioning of them  -BP stable here    Constipation  -likely 2/2 poor PO intake  -no abd pain    Debilitation  -PT    DVT prophylaxis  -scd/teds  -heparin sq    SHE is a DNR however can be on pressors and intubation for short term however NO CPR. Discussed with family. RN at bedside to witness discussion.       Signed:  Veda Velarde NP 4/6/2018

## 2018-04-07 NOTE — ROUTINE PROCESS
0400: Patient off floor to Radiology via bed for CXR on RA.     0430: Patient back on floor. No discomfort noted. Procedure tolerated well.

## 2018-04-07 NOTE — PROGRESS NOTES
Physical Therapy Note    8:54am  PT Order noted. Chart reviewed. Patient currently on bedrest.  Will hold off PT Eval until patient off bedrest.          Lucía Rena.  Fanta Buckner

## 2018-04-07 NOTE — PROGRESS NOTES
Turnover from [employer] at bedside in presence of adult son. Pt inattentive, responded with one word answers when prompted. Over the course of the shift pt expressed preferences (refusal of atorvastatin) and identified sensory stimuli such as being cold when exposed for examination. Per family report pt had not had bowel mvmt for over a month, expressed sense of needing to defecate but unable to produce stool. Pt had liquid bowel movements at midnight and shift change after use of laxatives during day shift. Bedside shift change report given to Sal Ballesteros (oncoming nurse) by Samir Lopez RN (offgoing nurse). Report included the following information SBAR, Intake/Output, MAR and Recent Results.

## 2018-04-07 NOTE — MANAGEMENT PLAN
Discharge/Transition Planning    Care Management Interventions  PCP Verified by CM: Yes (Dr Rody Fontaine)  Last Visit to PCP: 02/07/18  Mode of Transport at Discharge: Other (see comment) (to be determined)  Transition of Care Consult (CM Consult): Discharge Planning  Current Support Network: [place] (lives with daughter)  Confirm Follow Up Transport: Family  Plan discussed with Pt/Family/Caregiver: Yes  Discharge Location  Discharge Placement: Home     Reason for Readmission: Acute Kidney Injury, Dehydration      RRAT Score and Risk Level:  29 and High      Level of Readmission: 2      Care Conference scheduled:        Resources/supports as identified by patient/family:        Challenges facing patient:       Finances  Pt in Copay days for SNF rehab       Transportation  depending on level of ambulation at d/c      Support system   Multiple family members    Living arrangements Lives with daughter in 1 story home       Self-care/ADLs/Cognition  Dependent with ADLs currently        Connection to healthcare providers/adherence to healthcare plan   Dr Rody Fontaine is PCP and has good Follow up        Health literacy                      Prescription concerns  none       Current Advanced Care Plan:  DNR on file and AMD         Plan for utilizing home health:   Yes. SN, PT/OT/ST? aide           Plan while patient is hospitalized: Work on mobility and daughter would like her up in chair as much as possible         Expected Date of Discharge: 2-3 days          Plan for communication with patient post discharge (who, when, how): Follow up with PCP and HH        Likelihood of additional readmission:  HIGH              Transition of Care Plan:    Based on readmission, the patient's previous Plan of Care has been evaluated and/or modified. The current Transition of Care Plan is: Likely Home with [facility] patient's daughter as pt in deep sleep.  Verified demographics listed on face sheet with patient; all information correct. Pt has [employer] for insurance. PCP is Dr Tamika Naqvi and last appt Feb. Patient lives with daughter in single family home. 1 story and 3 steps at entrance. Patient's NOK is daughter, Bridget Liu at [phone]. Patient dependent with ADLs prior to admission. Was at [facility]. Daughter states they gave her Clonidine in daytime which makes her sleep and then she could not do PT/OT so became more debilitated. She does not want her back there. Open to SNF rehab; however sounds as if she will be in higher copay days? D/C'd from SNF home for one night and readmitted. DME prior to admission: hospital bed, rolling walker. Discharge plan is Home with [place] if pt able to increase strength enough to ambulate some. Otherwise, daughter wants only [facility]  Notified her [facility], [facility]. unlikely to accept pt that are not able to actively be up and work with therapy      Pedro Dove RN BSN  Outcomes Manager  Pager # [phone]

## 2018-04-07 NOTE — PROGRESS NOTES
Respond to RRT - pt had been difficult to arouse - was already awake when arrived at room - no further interventions needed

## 2018-04-07 NOTE — PROGRESS NOTES
conducted an initial consultation and Spiritual Assessment for [facility], who is a 68 y.o.,female. Patients Primary Language is: [place]. According to the patients EMR Christianity Affiliation is: [facility].     The reason the Patient came to the hospital is:   Patient Active Problem List    Diagnosis Date Noted    Acute metabolic encephalopathy 56/22/6351    Elevated troponin 04/06/2018    Severe protein-calorie malnutrition Gillie Fairly: less than 60% of standard weight) ([facility] [address] 03/22/2018    Advanced care planning/counseling discussion 03/20/2018    Dementia 03/20/2018    Debility 03/20/2018    Nausea and vomiting 03/14/2018    Nausea & vomiting 03/12/2018    Follow up 03/08/2018    HTN (hypertension) 12/14/2015    Stroke ([facility] [address] 10/04/2015    CKD (chronic kidney disease) 10/04/2015    Abnormality of gait and mobility 09/30/2015    NESS (acute kidney injury) ([facility] [address] 08/25/2015    Dehydration 08/25/2015    Essential hypertension, malignant 04/25/2014        The  provided the following Interventions:  Initiated a relationship of care and support. Explored issues of dianna, belief, spirituality and Buddhist/ritual needs while hospitalized. Listened empathically. Provided information about Spiritual Care Services. Offered prayer and assurance of continued prayers on patient's behalf. Chart reviewed. The following outcomes were achieved:  Patient shared limited information about both their medical narrative and spiritual journey/beliefs. Patient processed feeling about current hospitalization. Patient expressed gratitude for 's visit. Assessment:  Patient does not have any Buddhist/cultural needs that will affect patients preferences in health care. There are no further spiritual or Buddhist issues which require intervention at this time. Plan:  Chaplains will continue to follow and will provide pastoral care on an as needed/requested basis.    recommends bedside caregivers page  on duty if patient shows signs of acute spiritual or emotional distress. Nanette Ivory M.Div.   [place] [id number]  [phone]

## 2018-04-07 NOTE — PROGRESS NOTES
Problem: Mobility Impaired (Adult and Pediatric)  Goal: *Acute Goals and Plan of Care (Insert Text)  Physical Therapy Goals  Initiated 4/7/2018 and to be accomplished within 7 day(s)  1. Patient will move from supine to sit and sit to supine , scoot up and down and roll side to side in bed with minimal assistance/contact guard assist.     2.  Patient will transfer from bed to chair and chair to bed with minimal assistance/contact guard assist using the least restrictive device. 3.  Patient will perform sit to stand with minimal assistance/contact guard assist.  4.  Patient will ambulate with minimal assistance/contact guard assist for >/= 10 feet with the least restrictive device. 5.  Patient will demonstrate independence with performance of home exercise program.     physical Therapy EVALUATION    Patient: Naida Anaya (68 y.o. female)  Date: 4/7/2018  Primary Diagnosis: NESS (acute kidney injury) [facility])        Precautions:       PLOF: According to son, was in rehab for 12 days but only stood x 1 with assistance, was supposed to start home PT Friday, 4/6/2018, however admitted to [facility]. ASSESSMENT :  Patient requires between moderate assistance  and maximal assistance for bed mobility. Patient very weak with poor static and poor minus dynamic sitting balance sitting edge of bed. Declined to try sit to stand and requested to be assisted back to bed. Patient noted to have had a BM. Nursing informed. Patient with flat affect and required max encouragement to participate. Will benefit from skilled PT intervention to increase overall functional mobility independence and safety. Slow progress expected at best.    Patient presents with deficits in:   Bed Mobility, Transfers, Gait, Strength and Home Exercise Program    Patient will benefit from skilled intervention to address the above impairments.   Patients rehabilitation potential is considered to be Guarded  Factors which may influence rehabilitation potential include:   []         None noted  [x]         Mental ability/status  [x]         Medical condition  [x]         Home/family situation and support systems  [x]         Safety awareness  []         Pain tolerance/management  []         Other:     Recommendations for nursing:   Written on communication board: sit edge of bed with assist of 1  Verbally communicated to: nurse Francia Carrington     PLAN :  Recommendations and Planned Interventions:*  [x]           Bed Mobility Training             []    Neuromuscular Re-Education  [x]           Transfer Training                   []    Orthotic/Prosthetic Training  []           Gait Training                          []    Modalities  [x]           Therapeutic Exercises          []    Edema Management/Control  []           Therapeutic Activities            [x]    Patient and Family Training/Education*  [x]           Other (comment):Plan of care. Importance of increasing functional mobility to facilitate recovery. Frequency/Duration: Patient will be followed by physical therapy 1 time per day/3-5 days per week to address goals. Discharge Recommendations: Home Health versus [facility]  Further Equipment Recommendations for Discharge: [facility]     SUBJECTIVE:   Patient stated I want to go back to bed.     OBJECTIVE DATA SUMMARY:     Past Medical History:   Diagnosis Date    Amblyopia of left eye     Blind left eye     Cataract     bilat, right has been replaced    CRI (chronic renal insufficiency)     Dementia     Essential hypertension, malignant 2014    Hypertension     Left bundle branch block     Otitis media, acute 16    left     Past Surgical History:   Procedure Laterality Date    HX CATARACT REMOVAL Right     HX  SECTION      HX  SECTION      HX  SECTION      HX TONSILLECTOMY       Barriers to Learning/Limitations: yes;  other confusion  Compensate with: visual, verbal, tactile, kinesthetic cues/model    G CODE:Mobility [id number] Current  CM= 80-99%   Goal  CK= 40-59%. The severity rating is based on the Other [facility] Sitting Balance Scale    Eval Complexity: History: MEDIUM  Complexity : 1-2 comorbidities / personal factors will impact the outcome/ POC Exam:MEDIUM Complexity : 3 Standardized tests and measures addressing body structure, function, activity limitation and / or participation in recreation  Presentation: MEDIUM Complexity : Evolving with changing characteristics  Clinical Decision Making:Medium Complexity [facility] Sitting Balance Scale Overall Complexity:MEDIUM    [facility] Sitting Balance Scale  0: Pt performs 25% or less of sitting activity (Max assist) CN, 100% impaired. 1: Pt supports self with upper extremities but requires therapist assistance. Pt performs 25-50% of effort (Mod assist) CM, 80% to <100% impaired. 1+: Pt supports self with upper extremities but requires therapist assistance. Pt performs >50% effort. (Min assist). CL, 60% to <80% impaired. 2: Pt supports self independently with both upper extremities. CL, 60% to <80% impaired. 2+: Pt support self independently with 1 upper extremity. CK, 40% to <60% impaired. 3: Pt sits without upper extremity support for up to 30 seconds. CK, 40% to <60% impaired. 3+: Pt sits without upper extremity support for 30 seconds or greater. CJ, 20% to <40% impaired. 4: Pt moves and returns trunkal midpoint 1-2 inches in one plane. CJ, 20% to <40% impaired. 4+: Pt moves and returns trunkal midpoint 1-2 inches in multiple planes. CI, 1% to <20% impaired. 5: Pt moves and returns trunkal midpoint in all planes greater than 2 inches. CH, 0% impaired. Prior Level of Function/Home Situation: According to son, was in rehab for 12 days but only stood x 1 with assistance, was supposed to start home PT Friday, 4/6/2018, however admitted to [facility].     Home Situation  Home Environment: Private residence  # Steps to Enter: 3  Rails to Enter: Yes  Hand Rails : Bilateral  One/Two Story Residence: Two story, live on 1st floor  Living Alone: No  Support Systems: Child(ming)  Patient Expects to be Discharged to[de-identified] Private residence  Current DME Used/Available at Home: Jeff Miners, rolling  Critical Behavior:  Neurologic State: Confused;Drowsy  Orientation Level: Disoriented to place; Disoriented to situation;Disoriented to time;Oriented to person  Cognition: Follows commands  Safety/Judgement: Awareness of environment; Fall prevention  Psychosocial  Patient Behaviors: Calm;Flat affect  Family  Behaviors: Calm;Supportive  Purposeful Interaction: Yes  Family  Behaviors: Calm;Supportive      Strength:    Strength: Generally decreased, functional (both LE grossly 2+/3-/5)      Tone & Sensation:   Tone: Normal (both LE)       Range Of Motion:  AROM: Generally decreased, functional (both LE)      Functional Mobility:  Bed Mobility:  Rolling: Moderate assistance;Maximum assistance  Supine to Sit: Moderate assistance;Maximum assistance  Sit to Supine: Moderate assistance;Maximum assistance  Scooting: Total assistance  Transfers:  Sit to Stand:  (Not tested, patient declined to try)  Balance:   Sitting: Impaired; With support  Sitting - Static: Poor (constant support)  Sitting - Dynamic: Poor (constant support) (Minus)  Standing:  (Not tested, patient declined to try to stand)  Ambulation/Gait Training:  Unable to assess at this time    Pain:  Pre treatment pain level:  0  Post treatment pain level:  0  Pain Scale 1: Numeric (0 - 10)  Pain Intensity 1: 0      Activity Tolerance:   Poor    Please refer to the flowsheet for vital signs taken during this treatment.   After treatment:   []         Patient left in no apparent distress sitting up in chair  [x]         Patient left in no apparent distress in bed  [x]         Call bell left within reach  [x]         Nursing notified  []         Caregiver present  []         Bed alarm activated    COMMUNICATION/EDUCATION:*   [x]         Fall prevention education was provided and the patient/caregiver indicated understanding. [x]         Patient/family have participated as able in goal setting and plan of care. [x]         Patient/family agree to work toward stated goals and plan of care. []         Patient understands intent and goals of therapy, but is neutral about his/her participation. []         Patient is unable to participate in goal setting and plan of care.     Thank you for this referral.  Lele Torres, PT   Time Calculation: 27 mins

## 2018-04-07 NOTE — ACP (ADVANCE CARE PLANNING)
Patient has designated __unable, did not arouse______________________ to participate in his/her discharge plan and to receive any needed information.    Per AMD: daughter  Name: Ramya Kumari as pt  Phone [phone]

## 2018-04-07 NOTE — PROGRESS NOTES
Medical Progress Note      NAME: Zee Prado   :  1940  MRM:  [id number]    Date/Time: 2018  12:21 PM         Subjective:     Drowsy on my exam however per Family is talking more on my exam.     Past Medical History reviewed and unchanged from Admission History and Physical    Review of Systems   Unable to perform ROS: Acuity of condition            Objective:     Overall improved. Daughter apparently wants PEG however will try to exhaust medical treatments first as unlikely to benefit from PEG. Noted previously Palliative care noted no PEG tube however appears family has changed their mind. Nutrition consult ordered. Encourage PO intake Nursing Misc order placed. Renal function improving with IVF. Troponin trending down no need for cardiac workup. Enema ordered    Vitals:      Last 24hrs VS reviewed since prior progress note. Most recent are:    Visit Vitals    BP (!) 140/92    Pulse 80    Temp 98.3 °F (36.8 °C)    Resp 17    Ht 5' (1.524 m)    Wt 41.7 kg (92 lb)    SpO2 99%    Breastfeeding No    BMI 17.97 kg/m2     SpO2 Readings from Last 6 Encounters:   18 99%   18 97%   18 98%   18 97%   18 94%   18 95%          Intake/Output Summary (Last 24 hours) at 18 1221  Last data filed at 18 1838   Gross per 24 hour   Intake               30 ml   Output                0 ml   Net               30 ml          Exam:      Physical Exam   Constitutional: She appears cachectic. HENT:   Head: Normocephalic. Eyes: Pupils are equal, round, and reactive to light. Neck: Normal range of motion. Cardiovascular: Normal rate. Pulmonary/Chest: Effort normal and breath sounds normal.   Abdominal: Soft. Bowel sounds are normal. She exhibits no distension. Musculoskeletal: Normal range of motion. She exhibits no edema. Neurological: She is alert. Skin: Skin is dry.            Medications:  Current Facility-Administered Medications   Medication Dose Route Frequency    mirtazapine (REMERON) tablet 15 mg  15 mg Oral QHS    amLODIPine (NORVASC) tablet 5 mg  5 mg Oral DAILY    aspirin delayed-release tablet 81 mg  81 mg Oral DAILY    atorvastatin (LIPITOR) tablet 80 mg  80 mg Oral QHS    carvedilol (COREG) tablet 25 mg  25 mg Oral BID    cloNIDine HCl (CATAPRES) tablet 0.2 mg  0.2 mg Oral BID    loratadine (CLARITIN) tablet 10 mg  10 mg Oral DAILY PRN    0.9% sodium chloride infusion  75 mL/hr IntraVENous CONTINUOUS    acetaminophen (TYLENOL) tablet 650 mg  650 mg Oral Q4H PRN    ondansetron (ZOFRAN) injection 4 mg  4 mg IntraVENous Q4H PRN    heparin (porcine) injection 5,000 Units  5,000 Units SubCUTAneous Q8H    bisacodyl (DULCOLAX) suppository 10 mg  10 mg Rectal DAILY PRN    docusate (COLACE) 50 mg/5 mL oral liquid 100 mg  100 mg Oral DAILY       ______________________________________________________________________      Lab Review:     Recent Labs      04/07/18   0440  04/06/18   1140   WBC  9.4  10.2   HGB  8.8*  10.9*   HCT  27.4*  33.3*   PLT  174  225     Recent Labs      04/07/18   0440  04/06/18   1140   NA  142  140   K  3.7  4.3   CL  110*  103   CO2  18*  25   GLU  66*  89   BUN  33*  36*   CREA  1.42*  2.30*   CA  7.9*  8.9   MG   --   2.0   ALB  2.8*  3.5   SGOT  18  22   ALT  12*  16     No components found for: GLPOC  No results for input(s): PH, PCO2, PO2, HCO3, FIO2 in the last 72 hours. No results for input(s): INR in the last 72 hours.     No lab exists for component: INREXT, INREXT             Assessment:     Principal Problem:    Acute metabolic encephalopathy (3/5/3512)    Active Problems:    Essential hypertension, malignant (4/25/2014)      NESS (acute kidney injury) ([facility] [address] (8/25/2015)      Dehydration (8/25/2015)      Abnormality of gait and mobility (9/30/2015)      CKD (chronic kidney disease) (10/4/2015)      Nausea and vomiting (3/14/2018)      Severe protein-calorie malnutrition Delfin Rump: less than 60% of standard weight) ([facility] [address] (3/22/2018)      Elevated troponin (4/6/2018)           Plan:       Acute metabolic encephalopathy  -improving  -likely 2/2 dehydration  -IVF  -CT head negative for acute intracranial abnormalities  -labs essentially unremakrable for other etiologies or infectious process  -monitor for now     Acute on Chronic renal failure  -dehydration  -IVF  -trend for now improving  -hold ace  -wegith I&O's     Elevated Troponin  -likely 2/2 NESS  -however EKG showing new LBBB compared to previous however noted on 3/2/18 had LBBB  -no CP  -trend for now  -troponin decreasing     N/V  -resolved  -zofran  -Previous GI workup essentially unremarkable  -unable to complete GES 2/2 vomitting on previous admission     Essential HTN  -hold ace for now  -resume home anti-htn meds  -family feels as if sleep after anti-htn meds consider parititioning of them  -BP stable here     Constipation  -likely 2/2 poor PO intake  -no abd pain     Debilitation  -PT     DVT prophylaxis  -scd/teds  -heparin sq     SHE is a DNR however can be on pressors and intubation for short term however NO CPR. Discussed with family.  RN at bedside to witness discussion.               ___________________________________________________    Attending Physician: Aftab Vargas NP

## 2018-04-07 NOTE — ROUTINE PROCESS
1400 pts family called out saying they \"could not wake mom up\", upon entry to room, patient laying on side, eyes closed, no movement, would not wake up to sternal chest rub or shouting, respirations even and unlabored. Called RRT. Vitals assessed and documented. Dr Chavez Anis arrival and sternal rubbed and pt opened eyes and said \"what? \" - pt opened eyes, Vinod De Oliveira RN arrived and stated this is patients baseline and she is a deep sleeper

## 2018-04-07 NOTE — PROGRESS NOTES
Nutrition initial assessment/Plan of care      RECOMMENDATIONS:   1. Dental Soft Diet (High protein/calorie)  2. SF CIB TID (trial) and Ensure Clear @ lunch with magic cup  3. Monitor weight, labs and PO intake  4. Calorie count started and RD to follow     GOALS:   1. PO intake meets >75% of protein/calorie needs by 4/10  2. Weight Maintenance (+/- 1-2 lb) by 4/14    ASSESSMENT:   Wt status is classified as underweight per BMI of 18.0. Pt w/ 30 lb or 23% weight loss over the past 3 months. Trial nutritonal supplements for additional calorie/protein. Labs noted. Pt w/ hypocalcemia, hypoalbuminemia, elevated Bili (1.1) and elevated BUN/Cr; GFR (36). Nutrition recommendations listed. RD to follow. Nutrition Diagnoses:   Unintentional weight loss related to decreased appetite with failure to thrive as evidenced by around a 30 lb or 23% loss over the past 3 months. Nutrition Risk:  [x] High  [] Moderate []  Low    SUBJECTIVE/OBJECTIVE:    Pt admitted for NESS. PMHx including  HTN, cataract, dementia and renal insufficiency. Familiar with Pt from previous admission last month at which point SLP, GI and Palliative care were consulted. The Pt underwent a lengthy work up for her N/V  and was D/C on GI Soft Diet. Also per Palliative; stated there were no plans for a PEG even for short term feeds. Pt has been with a poor appetite since Dec 2017 and has continued to lose weight now equating to around 30 lb or 23% loss. She did not care for Ensure or CIB at last admission and would refuse them. Pt seen in room after a RRT was called d/t family unable to arouse Pt. Pt appears very thin and did not respond to any questions asked. Daughter provided information of Pt preferences of food types and supplements as well as Hx since last D/C from here. Per NP, Angie Aggarwal, note today daughter apparently has changed her mind and wants to consider a PEG now.  Also a calorie count was ordered so had d/w RNVictor Hugo [place], and provided sheets for documentation. Liberalized diet, added supplements and entered preferences. Will monitor. Information Obtained from:    [x] Chart Review   [x] Patient   [] Family/Caregiver   [] Nurse/Physician   [] Interdisciplinary Meeting/Rounds      Diet:Cardiac Diet  Medications: [x] Reviewed  IV: NS @75 mL/hr  Lipitor,   Allergies: [x] Reviewed   Encounter Diagnoses     ICD-10-CM ICD-9-CM   1. Acute renal insufficiency N28.9 593.9   2. Elevated troponin R74.8 790.6   3.  Altered mental status, unspecified altered mental status type R41.82 780.97     Past Medical History:   Diagnosis Date    Amblyopia of left eye     Blind left eye     Cataract     bilat, right has been replaced    CRI (chronic renal insufficiency)     Dementia     Essential hypertension, malignant 4/25/2014    Hypertension     Left bundle branch block     Otitis media, acute 7/2/16    left      Labs:    Lab Results   Component Value Date/Time    Sodium 142 04/07/2018 04:40 AM    Potassium 3.7 04/07/2018 04:40 AM    Chloride 110 (H) 04/07/2018 04:40 AM    CO2 18 (L) 04/07/2018 04:40 AM    Anion gap 14 04/07/2018 04:40 AM    Glucose 66 (L) 04/07/2018 04:40 AM    BUN 33 (H) 04/07/2018 04:40 AM    Creatinine 1.42 (H) 04/07/2018 04:40 AM    Calcium 7.9 (L) 04/07/2018 04:40 AM    Magnesium 2.0 04/06/2018 11:40 AM    Phosphorus 4.1 10/04/2015 06:44 AM    Albumin 2.8 (L) 04/07/2018 04:40 AM     Anthropometrics: BMI (calculated): 18  Last 3 Recorded Weights in this Encounter    04/06/18 1053 04/06/18 1449   Weight: 44.9 kg (99 lb) 41.7 kg (92 lb)      Ht Readings from Last 1 Encounters:   04/06/18 5' (1.524 m)     Weight Metrics 4/6/2018 3/12/2018 3/8/2018 3/2/2018 2/19/2018 2/17/2018 10/3/2017   Weight 92 lb 109 lb 95 lb 95 lb 107 lb 14.4 oz 120 lb 125 lb   BMI 17.97 kg/m2 21.29 kg/m2 18.55 kg/m2 18.55 kg/m2 21.07 kg/m2 23.44 kg/m2 24.41 kg/m2       Patient Vitals for the past 100 hrs:   % Diet Eaten   04/06/18 1838 0 %       IBW: 100 lb %IBW: 92% UBW: 125-130 lb  [x] Weight Loss [] Weight Gain [] Weight Stable    Estimated Nutrition Needs: [x] MSJ  [] Other:  Calories: 6271-1361 kcal Based on:   [x] Actual BW    Protein:   55-65 g Based on:   [x] Actual BW    Fluid:       7738-5982 ml Based on:   [x] Actual BW      [x] No Cultural, Restorationist or ethnic dietary need identified.     [] Cultural, Restorationist and ethnic food preferences identified and addressed     Wt Status:  [] Normal (18.6 - 24.9) [x] Underweight (<18.5) [] Overweight (25 - 29.9) [] Mild Obesity (30 - 34.9)  [] Moderate Obesity (35 - 39.9) [] Morbid Obesity (40+)       Nutrition Problems Identified:   [x] Suboptimal PO intake   [] Food Allergies  [] Difficulty chewing/swallowing/poor dentition  [x] Constipation/Diarrhea (Last BM 4/7)  [] Nausea/Vomiting   [] None  [] Other:     Plan:   [] Therapeutic Diet  [x]  Obtained/adjusted food preferences/tolerances and/or snacks options   [x]  Supplements added   [] Occupational therapy following for feeding techniques  []  HS snack added   [x]  Modify diet texture   []  Modify diet for food allergies   []  Educate patient   [x]  Assist with menu selection   [x]  Monitor PO intake on meal rounds   [x]  Continue inpatient monitoring and intervention   []  Participated in discharge planning/Interdisciplinary rounds/Team meetings   [x]  Other: Calorie Count    Education Needs:   [x] Not appropriate for teaching at this time    [] Identified and addressed    Nutrition Monitoring and Evaluation:  [x] Continue ongoing monitoring and intervention  [] Other    Beatriz Petties

## 2018-04-08 NOTE — ROUTINE PROCESS
0800 - assumed care of patient - sleeping, arouses to voice - denies any pain -     0930 - AM meds given - crushed with sips of ginger ale and juice - patient refuses PO breakfast    1030 - family at bedside and updated     1230 - no PO intake for lunch - a few sips of juice    1400 - son at bedside and trying to feed patient chocolate milk with protein powder - patient taking a few sips

## 2018-04-08 NOTE — ROUTINE PROCESS
Bedside and Verbal shift change report given to [facility] RN (oncoming nurse) by Luis Salguero RN (offgoing nurse). Report included the following information SBAR, Kardex and MAR.

## 2018-04-08 NOTE — PROGRESS NOTES
Internal Medicine Progress Note    Patient's Name: Yvrose Ceballos Date: 4/6/2018  Length of Stay: 2      Assessment/Plan     Active Hospital Problems    Diagnosis Date Noted    Acute metabolic encephalopathy 74/43/3112    Elevated troponin 04/06/2018    Severe protein-calorie malnutrition [place]: less than 60% of standard weight) (HCC) 03/22/2018    Nausea and vomiting 03/14/2018    Abnormality of gait and mobility 09/30/2015    Acute renal failure superimposed on stage 2 chronic kidney disease (HCC) 08/25/2015    Dehydration 08/25/2015    Essential hypertension, malignant 04/25/2014     - Pt at baseline mental status  - Cont to encourage PO intake  - Calorie count ongoing  - If not enough intake, will need GI eval  - Renal fxn back to basline  - PT/OT  - Cont acceptable home medications for chronic conditions   - DVT protocol    I have personally reviewed all pertinent labs and films that have officially resulted over the last 24 hours. I have personally checked for all pending labs that are awaiting final results.     Subjective     Pt s/e @ bedside  No major events overnight  Had low sugar this AM given amp D50  Appetite still poor  Denies CP or SOB    Objective     Visit Vitals    /85 (BP 1 Location: Left arm, BP Patient Position: At rest;Supine)    Pulse 87    Temp 97.9 °F (36.6 °C)    Resp 16    Ht 5' (1.524 m)    Wt 41.7 kg (92 lb)    SpO2 92%    Breastfeeding No    BMI 17.97 kg/m2       Physical Exam:  General Appearance: NAD, conversant w/ mild dementia, temporal wasting  Lungs: CTA with normal respiratory effort  CV: RRR, no m/r/g  Abdomen: soft, non-tender, normal bowel sounds  Extremities: no cyanosis, no peripheral edema  Neuro: No focal deficits, motor/sensory intact    Lab/Data Reviewed:  BMP:   Lab Results   Component Value Date/Time     04/08/2018 04:20 AM    K 3.5 04/08/2018 04:20 AM     (H) 04/08/2018 04:20 AM    CO2 21 04/08/2018 04:20 AM    AGAP 10 04/08/2018 04:20 AM    GLU 54 (LL) 04/08/2018 04:20 AM    BUN 21 (H) 04/08/2018 04:20 AM    CREA 0.95 04/08/2018 04:20 AM    GFRAA >60 04/08/2018 04:20 AM    GFRNA 57 (L) 04/08/2018 04:20 AM     CBC:   Lab Results   Component Value Date/Time    WBC 7.2 04/08/2018 04:20 AM    HGB 8.3 (L) 04/08/2018 04:20 AM    HCT 26.3 (L) 04/08/2018 04:20 AM     04/08/2018 04:20 AM       Imaging Reviewed:  No results found.     Medications Reviewed:  Current Facility-Administered Medications   Medication Dose Route Frequency    dextrose (D50) infusion        dextrose 5 % - 0.45% NaCl infusion  75 mL/hr IntraVENous CONTINUOUS    mirtazapine (REMERON) tablet 15 mg  15 mg Oral QHS    amLODIPine (NORVASC) tablet 5 mg  5 mg Oral DAILY    aspirin delayed-release tablet 81 mg  81 mg Oral DAILY    atorvastatin (LIPITOR) tablet 80 mg  80 mg Oral QHS    carvedilol (COREG) tablet 25 mg  25 mg Oral BID    cloNIDine HCl (CATAPRES) tablet 0.2 mg  0.2 mg Oral BID    loratadine (CLARITIN) tablet 10 mg  10 mg Oral DAILY PRN    acetaminophen (TYLENOL) tablet 650 mg  650 mg Oral Q4H PRN    ondansetron (ZOFRAN) injection 4 mg  4 mg IntraVENous Q4H PRN    heparin (porcine) injection 5,000 Units  5,000 Units SubCUTAneous Q8H    bisacodyl (DULCOLAX) suppository 10 mg  10 mg Rectal DAILY PRN    docusate (COLACE) 50 mg/5 mL oral liquid 100 mg  100 mg Oral DAILY           Ene Suarez DO  Internal Medicine, Hospitalist  Pager: [phone]  [facility]

## 2018-04-08 NOTE — ROUTINE PROCESS
2398 - Notified by primary nurse Patel Soriano, RN that Laboratory called with critical Glucose 54. 26 - Checked pt's Blood Glucose with finger stick, BG 66.    0752 - Paged Dr Nik Stiles, awaiting return call back. 65 - Dr Nik Stiles returned call, notified pt had critical Lab Glucose 54 and finger stick BG 66, and pt will not take anything by mouth. Dr Nik Stiles ordered D50 IV 25 g one time and to start pt on D5 1/2 NS IVF at 75 ml/hr; discontinue NS IVF.    0301 - Notified primary nurse Patel Soriano, RN of Dr Suzanne Ponce orders.

## 2018-04-08 NOTE — ROUTINE PROCESS
0730- Assumed care. Shift assessment completed. Pt is non verbal at this time. No c/o pain. No visual evidence of pain. No respiratory distress noted. Call light in reach. 26- Received critical blood glucose of 54. Dr. Toyin Malin paged. 0753- POC blood glucose 66. Dr. Toyin Malin notified and orders received for D50 1 amp and D51/2NS at 75. Amp given. D5 1/2NS started. Will continue to monitor. 0815- Blood glucose recheck is 186. Pt stable. 1050- Prn zofran given for nausea and vomiting    Bedside and Verbal shift change report given to [address] [place] [address] (oncoming nurse) by Bennett Alexander RN   (offgoing nurse). Report included the following information SBAR, Kardex, Procedure Summary, Intake/Output, MAR, Recent Results and Med Rec Status.

## 2018-04-08 NOTE — PROGRESS NOTES
Received turnover from [employer] at bedside in presence of daughter. Pt drowsy and lethargic, refused drink, resisted physical assessment of pupils. Instance of incontinence, during pericare pt would cry out and say \"It hurts. \" Attempted administration of oral medication per family direction, pt shouted \"No\" repeatedly and made a fist. Similar behavior was observed last night during medication administration. Occasional grimacing, possible guarding noted during rounds. Pt roused for intermittently for medication administration, assesment. Bedside and Verbal shift change report given to Issa Campos (oncoming nurse) by Lizz Crabtree RN (offgoing nurse). Report included the following information SBAR, Kardex, Intake/Output and MAR.

## 2018-04-08 NOTE — PROGRESS NOTES
Problem: Falls - Risk of  Goal: *Absence of Falls  Document [place] Fall Risk and appropriate interventions in the flowsheet. Fall Risk Interventions:       Mentation Interventions: Bed/chair exit alarm, Room close to nurse's station    Medication Interventions: Evaluate medications/consider consulting pharmacy    Elimination Interventions: Bed/chair exit alarm    History of Falls Interventions: Room close to nurse's station        Problem: Fluid Volume - Risk of, Imbalanced  Goal: *Balanced intake and output  Outcome: Not Progressing Towards Goal  Pt refusing oral intake.

## 2018-04-09 NOTE — ANCILLARY DISCHARGE INSTRUCTIONS
Patient and/or next of kin has been given the [facility] Important Message From Medicare About Your Rights\" letter and all questions were answered.

## 2018-04-09 NOTE — PROGRESS NOTES
Problem: Mobility Impaired (Adult and Pediatric)  Goal: *Acute Goals and Plan of Care (Insert Text)  Physical Therapy Goals  Initiated 4/7/2018 and to be accomplished within 7 day(s)  1. Patient will move from supine to sit and sit to supine , scoot up and down and roll side to side in bed with minimal assistance/contact guard assist.     2.  Patient will transfer from bed to chair and chair to bed with minimal assistance/contact guard assist using the least restrictive device. 3.  Patient will perform sit to stand with minimal assistance/contact guard assist.  4.  Patient will ambulate with minimal assistance/contact guard assist for >/= 10 feet with the least restrictive device. 5.  Patient will demonstrate independence with performance of home exercise program.   Outcome: Progressing Towards Goal  [facility]  PHYSICAL THERAPY: Daily Note   INPATIENT: Medicare: Hospital Day: 4     Patient: Karlos Silva (68 y.o. female)    Date: 4/9/2018  Primary Diagnosis: NESS (acute kidney injury) ([facility] [address]   Precautions: Falls, skin  PLOF: Report pt was independent for mobility up until end JAN 2018 however was dep for ADL. ASSESSMENT: Pt presents in bed. Dtr & son at bedside. Dtr. Reports pt was independent for mobility up until end JAN 2018 however was dep for ADL. Was at [facility] prior to this admission. Dtr reports first few days at SNF able to stand (two weeks ago)  but ongoing  therapy sessions not coordinated w BP  Meds in SNF and pt w minimal participation w therapies and poor progress 2/2 same and  effects of BP meds which \"knock her out. \" Dependent for mobility. Placed TAPS system to maximize positioning in bed. Dtr will  Bring prevalon boots over tonight received last admission  for positioning heels. EQUIPMENT RECS: [facility]  DC RECS: SNF  Or home w HH. Would benefit from palliative consult.        Problems:  Decreased Strength  Decreased ADL/Functional Activities  Decreased Transfer Abilities  Decreased Ambulation Ability/Technique  Decreased Balance  Decreased Activity Tolerance  Decreased Flexibility/Joint Mobility  Decreased Skin Integrity/Hygeine  Decreased [place] with Home Exercise Program  Decreased Cognition    Interventions: Bed Mobility; rolling, sit <> supine, posiitoning HOB raised. Family Education  Neuromuscular Re-education/Strengthening: static sitting balance EOB w MAX A for balance support. Therapeutic Exercise/Strengthening:   SUBJECTIVE:  Patient family member  Stated pt favorite word is \"no. \"     OBJECTIVE/TREATMENT:     Past Medical History:   Diagnosis Date    Amblyopia of left eye     Blind left eye     Cataract     bilat, right has been replaced    CRI (chronic renal insufficiency)     Dementia     Essential hypertension, malignant 2014    Hypertension     Left bundle branch block     Otitis media, acute 16    left     Past Surgical History:   Procedure Laterality Date    HX CATARACT REMOVAL Right     HX  SECTION      HX  SECTION      HX  SECTION      HX TONSILLECTOMY                Functional Status      Indep   (I)   Mod I   Stand-by Assist   Contact Guard   Min Assist   Mod Assist   Max assist   Total Assist   Comments   Rolling []  []  []  []    []    []    []  [x]     Supine to sit []  []  []  []  []  []  []  [x]     Sit to supine []  []  []  []  []  []  []  [x]     Sit to stand []  []  []  []  []  []  []  []  Not tested   Stand to sit []  []  []  []  []  []  []  []  NT   Bed to chair transfers []  []  []  []  []  []  []  []  NT       Balance    Good   Fair   Poor   Unable   Comments   Sitting static []  []  [x]  []     Sitting dynamic []  []  []  [x]  UA to reach out of PITO   Standing static []  []  []  [x]     Standing dynamic []  []  []  [x]     Reaction Time []  []  [x]  []       Mobility/Gait: Patient unable to perform dynamic mobility (ambulation, wheelchair mobility, etc.) at this time.   Functional Measure:    [facility] Sitting Balance Scale  0: Pt performs 25% or less of sitting activity (Max assist) CN, 100% impaired. Therapeutic Exercise:  Seated EOB attempting B knee w max coaxing from son x 4 reps. PROM B LE's  Pain:   Scale: numeric  Pre-treatment Intensity 0-10: 0  Post-treatment Intensity 0-10: 0  Activity tolerance:   poor      COMMUNICATION/EDUCATION:   [x]      Family have participated as able in goal setting and plan of care. [x]  Family agree to work toward stated goals and plan of care. EDUCATION:   Education:  Patient and Family member were educated on the following topics : pressure relief, skin integrity, PT POC,  LE Ex., bed mobility, use of TAPS positioning system  Treatment/Session Assessment:    · After treatment position/precautions:   o Float heels, float UE's   · Compliance with Program/Exercises: Will assess as treatment progresses. Recommendations/Intent for next treatment session: \"Next visit will focus on transfer to chair\".   Rafaela Jackson PTA   Start Time: 0876   Stop Time: 1655   Time Calculation: 28 mins

## 2018-04-09 NOTE — PROGRESS NOTES
Internal Medicine Progress Note    Patient's Name: Ezell Runner Date: 4/6/2018  Length of Stay: 3      Assessment/Plan     Active Hospital Problems    Diagnosis Date Noted    Acute metabolic encephalopathy 00/37/3998    Elevated troponin 04/06/2018    Severe protein-calorie malnutrition [place]: less than 60% of standard weight) (HCC) 03/22/2018    Nausea and vomiting 03/14/2018    Abnormality of gait and mobility 09/30/2015    Acute renal failure superimposed on stage 2 chronic kidney disease ([facility] [address] 08/25/2015    Dehydration 08/25/2015    Essential hypertension, malignant 04/25/2014     - Pt at baseline mental status  - Cont to encourage PO intake  - Family wants PEG, but pt explicitly told me today she would not want this  - Calorie count ongoing and if not enough intake, which I suspect will be the case, PEG tube discussion will need to happen   - Replete lytes PRN  - PT/OT  - Cont acceptable home medications for chronic conditions   - DVT protocol    I have personally reviewed all pertinent labs and films that have officially resulted over the last 24 hours. I have personally checked for all pending labs that are awaiting final results.     Subjective     Pt s/e @ bedside  No major events overnight  Pt still refusing to eat with very poor appetite  Does NOT want PEG tube  Denies CP or SOB    Objective     Visit Vitals    /87 (BP 1 Location: Left arm)    Pulse 97    Temp 97.5 °F (36.4 °C)    Resp 18    Ht 5' (1.524 m)    Wt 41.7 kg (92 lb)    SpO2 99%    Breastfeeding No    BMI 17.97 kg/m2       Physical Exam:  General Appearance: NAD, conversant w/ mild dementia, temporal wasting  Lungs: CTA with normal respiratory effort  CV: RRR, no m/r/g  Abdomen: soft, non-tender, normal bowel sounds  Extremities: no cyanosis, no peripheral edema  Neuro: No focal deficits, motor/sensory intact    Lab/Data Reviewed:  BMP:   Lab Results   Component Value Date/Time     04/09/2018 01:49 PM    K 2.8 (LL) 04/09/2018 01:49 PM     04/09/2018 01:49 PM    CO2 23 04/09/2018 01:49 PM    AGAP 11 04/09/2018 01:49 PM    GLU 98 04/09/2018 01:49 PM    BUN 8 04/09/2018 01:49 PM    CREA 0.88 04/09/2018 01:49 PM    GFRAA >60 04/09/2018 01:49 PM    GFRNA >60 04/09/2018 01:49 PM     CBC:   Lab Results   Component Value Date/Time    WBC 7.2 04/09/2018 04:15 AM    HGB 8.4 (L) 04/09/2018 04:15 AM    HCT 25.5 (L) 04/09/2018 04:15 AM     04/09/2018 04:15 AM       Imaging Reviewed:  No results found.     Medications Reviewed:  Current Facility-Administered Medications   Medication Dose Route Frequency    potassium chloride 10 mEq in 100 ml IVPB  10 mEq IntraVENous ONCE    dextrose 5 % - 0.45% NaCl infusion  75 mL/hr IntraVENous CONTINUOUS    mirtazapine (REMERON) tablet 15 mg  15 mg Oral QHS    amLODIPine (NORVASC) tablet 5 mg  5 mg Oral DAILY    aspirin delayed-release tablet 81 mg  81 mg Oral DAILY    atorvastatin (LIPITOR) tablet 80 mg  80 mg Oral QHS    carvedilol (COREG) tablet 25 mg  25 mg Oral BID    cloNIDine HCl (CATAPRES) tablet 0.2 mg  0.2 mg Oral BID    loratadine (CLARITIN) tablet 10 mg  10 mg Oral DAILY PRN    acetaminophen (TYLENOL) tablet 650 mg  650 mg Oral Q4H PRN    ondansetron (ZOFRAN) injection 4 mg  4 mg IntraVENous Q4H PRN    heparin (porcine) injection 5,000 Units  5,000 Units SubCUTAneous Q8H    bisacodyl (DULCOLAX) suppository 10 mg  10 mg Rectal DAILY PRN    docusate (COLACE) 50 mg/5 mL oral liquid 100 mg  100 mg Oral DAILY           Molly Cunningham DO  Internal Medicine, Hospitalist  Pager: [phone]  [facility]

## 2018-04-09 NOTE — PROGRESS NOTES
Chart reviewed. Noted in prior case management note that family only wants [facility].. [facility]. has declined to accept, as they do not have an appropriate bed. Noted that pt not really participating with therapy & does not have skilled need @ this time for SNF, which will require [facility] authorization. Call placed to pt's dtr, Alexia Spicer, no answer. VM left re above & to return my call. Will cont to follow. Oly Calix RN,ext. [phone].

## 2018-04-09 NOTE — PROGRESS NOTES
Problem: Falls - Risk of  Goal: *Absence of Falls  Document [place] Fall Risk and appropriate interventions in the flowsheet.    Outcome: Progressing Towards Goal  Fall Risk Interventions:       Mentation Interventions: Adequate sleep, hydration, pain control    Medication Interventions: Bed/chair exit alarm    Elimination Interventions: Bed/chair exit alarm    History of Falls Interventions: Bed/chair exit alarm

## 2018-04-09 NOTE — ROUTINE PROCESS
0630 Received Critical lab results by Daniella Jose of potassium level of 2.8. MD on call paged and awaiting call back.

## 2018-04-09 NOTE — PROGRESS NOTES
1940 Bedside and Verbal shift change report given to Vitor Valdez RN (oncoming nurse) by Konstantin Caldwell (offgoing nurse). Report included the following information SBAR, Kardex, Intake/Output, Accordion and Recent Results. 2025: shift assessment completed and tolerated well. Patient sleeping and resting well. Yet refuses medication. Unsuccessfully redirected patient. If patient left alone she returned to sleep mode. 2333 Reassessment competed. Patein et continue sleeping in bed resting quietly. Pt incontinence B/B, increase urine output. 0242 Reassessment completed. No changes. Started Minerva Postal. Tolerated well. 0730 Bedside and Verbal shift change report given to Rocio Delgado RN (oncoming nurse) by Vitor Valdez RN (offgoing nurse). Report included the following information SBAR, Kardex, Intake/Output, Accordion and Recent Results.

## 2018-04-09 NOTE — PROGRESS NOTES
08:00- Assessment completed- see charting. Patient resting quietly-No s/s discomfort. Continue to monitor. Call light in reach. 09:00- Patient refused breakfast and po meds. Tried 3x to feed patient breakfast.   10:50- Med with zofran 4 mg IV for nausea/vomit x 1 small amount. 12:00- No further nausea (patient denies) or vomiting. 12:40- Dr Ajit Perez at the bedside. Reported K=2.8 from this A.M lab and patient has refused to eat and take her medications. See new orders. 16:00- No change in condition. Continue to monitor,.  19:10- report given at the bedside to oncoming nurse Stuart Do.

## 2018-04-09 NOTE — PROGRESS NOTES
Problem: Self Care Deficits Care Plan (Adult)  Goal: *Acute Goals and Plan of Care (Insert Text)  Outcome: Resolved/Met Date Met: 18  Occupational Therapy EVALUATION/discharge    Patient: Rafi Danielson (68 y.o. female)  Date: 2018  Primary Diagnosis: NESS (acute kidney injury) [facility])        Precautions:  Fall  PLOF:  Pt unable to provide PLOF. ASSESSMENT AND RECOMMENDATIONS:  Based on the objective data described below, the patient presents with impairments with regard to bed mobility, activity tolerance, BUE strength and independence in ADLs. Pt supine on arrival. A&O x1. Pt uncooperative and noncompliant t/o most of session. Minimal to no command following. Max/total A to EOB in prep for ADLs; R lateral lean with poor sitting balance. Standing not assessed due to poor sitting balance. Pt unable to provide PLOF. Skilled therapy not indicated. Will defer to PT for bed mobility & functional mobility. Pt refusing to eat lunch at end of session; becoming agitated when encouraged to eat. Needs within reach. Skilled occupational therapy is not indicated at this time. Discharge Recommendations: SNF  Further Equipment Recommendations for Discharge: TBD     SUBJECTIVE:   Patient stated I'm going to fall! \"    OBJECTIVE DATA SUMMARY:     Past Medical History:   Diagnosis Date    Amblyopia of left eye     Blind left eye     Cataract     bilat, right has been replaced    CRI (chronic renal insufficiency)     Dementia     Essential hypertension, malignant 2014    Hypertension     Left bundle branch block     Otitis media, acute 16    left     Past Surgical History:   Procedure Laterality Date    HX CATARACT REMOVAL Right     HX  SECTION      HX  SECTION  1964    HX  SECTION      HX TONSILLECTOMY       Barriers to Learning/Limitations: yes;  altered mental status (i.e.Sedation, Confusion); cognitive  Compensate with: visual, verbal, tactile, kinesthetic cues/model    G CODES:  Self Care  Current  CL= 60-79%   Goal  CL= 60-79%   D/C  CL= 60-79%. The severity rating is based on the Other Functional Assessment, MMT, ROM    Eval Complexity: History: MEDIUM Complexity : Expanded review of history including physical, cognitive and psychosocial  history ; Examination: MEDIUM Complexity : 3-5 performance deficits relating to physical, cognitive , or psychosocial skils that result in activity limitations and / or participation restrictions; Decision Making:MEDIUM Complexity : Patient may present with comorbidities that affect occupational performnce. Miniml to moderate modification of tasks or assistance (eg, physical or verbal ) with assesment(s) is necessary to enable patient to complete evaluation      Prior Level of Function/Home Situation: Pt unable to provide PLOF. Home Situation  Home Environment: Private residence  # Steps to Enter: 3  Rails to Enter: Yes  Hand Rails : Bilateral  One/Two Story Residence: Two story, live on 1st floor  Living Alone: No  Support Systems: Child(ming)  Patient Expects to be Discharged to[de-identified] Private residence  Current DME Used/Available at Home: [employer], rolling  [x]     Right hand dominant   []     Left hand dominant    Cognitive/Behavioral Status:  Neurologic State: Confused  Orientation Level: Oriented to person  Cognition: Decreased command following;Decreased attention/concentration; No command following  Safety/Judgement: Awareness of environment; Fall prevention     Skin: Intact (BUEs)  Edema: None noted (BUEs)    Vision/Perceptual:    Acuity: Able to read clock/calendar on wall without difficulty      Coordination:  Coordination: Generally decreased, functional (BUEs)  Fine Motor Skills-Upper: Right Intact; Left Intact    Gross Motor Skills-Upper: Right Intact; Left Intact     Balance:  Sitting: Impaired  Sitting - Static: Poor (constant support)  Sitting - Dynamic: Poor (constant support)  Standing:  (not assessed due to poor sitting balance)     Strength:  Strength: Generally decreased, functional (BUEs: 3+/5)    Tone & Sensation:  Tone: Normal (BUEs)    Range of Motion:  AROM: Generally decreased, functional (BUEs: approx 3/4 shoulder flex)    Functional Mobility and Transfers for ADLs:  Bed Mobility:  Supine to Sit: Maximum assistance; Total assistance;Assist x2  Sit to Supine: Moderate assistance;Maximum assistance;Assist x2  Scooting: Total assistance     Transfers:  Sit to Stand:  (not assessed due to poor sitting balance)    ADL Assessment:  Feeding: Moderate assistance  Oral Facial Hygiene/Grooming: Moderate assistance  Bathing: Maximum assistance  Upper Body Dressing: Maximum assistance  Lower Body Dressing: Maximum assistance  Toileting: Maximum assistance (periwick)    Cognitive Retraining  Safety/Judgement: Awareness of environment; Fall prevention    Pain:  Pre-treatment pain level: 0/10  Post treatment pain level: 0/10  Pain Scale 1: Adult Nonverbal Pain Scale  Pain Intensity 1: 0    Activity Tolerance:   Poor  Please refer to the flowsheet for vital signs taken during this treatment. After treatment:   []  Patient left in no apparent distress sitting up in chair  [x]  Patient left in no apparent distress in bed  [x]  Call bell left within reach  [x]  Nursing notified  []  Caregiver present  []  Bed alarm activated    COMMUNICATION/EDUCATION: Pt educated on role of OT and POC; needs reinforcement. Communication/Collaboration:  []      Home safety education was provided and the patient/caregiver indicated understanding. [x]      Patient/family have participated as able and agree with findings and recommendations. []      Patient is unable to participate in plan of care at this time.     Laurent Grajeda MS OTR/L  Time Calculation: 8 mins

## 2018-04-10 NOTE — PROGRESS NOTES
Internal Medicine Progress Note    Patient's Name: Rossana Gusman Date: 4/6/2018  Length of Stay: 4      Assessment/Plan     C/Sanna Rosa 1106 Problems    Diagnosis Date Noted    Acute metabolic encephalopathy 99/03/0598    Elevated troponin 04/06/2018    Severe protein-calorie malnutrition Jena Salter: less than 60% of standard weight) ([facility] 03/22/2018    Nausea and vomiting 03/14/2018    Abnormality of gait and mobility 09/30/2015    Acute renal failure superimposed on stage 2 chronic kidney disease ([facility] 08/25/2015    Dehydration 08/25/2015    Essential hypertension, malignant 04/25/2014     - Pt at baseline mental status  - Cont to encourage PO intake  - meeting with family at 5 29 tomorrow to discuss peg. If no peg then comfort measures      Subjective     Pt without distress. She appears to understand but has a hard time communicating. Objective     Visit Vitals    /87 (BP 1 Location: Left arm)    Pulse 95    Temp 98.4 °F (36.9 °C)    Resp 20    Ht 5' (1.524 m)    Wt 41.7 kg (92 lb)    SpO2 96%    Breastfeeding No    BMI 17.97 kg/m2       Physical Exam:  General Appearance: NAD, conversant w/ mild dementia, temporal wasting  Lungs: CTA with normal respiratory effort  CV: RRR, no m/r/g  Abdomen: soft, non-tender, normal bowel sounds  Extremities: no cyanosis, no peripheral edema  Neuro: No focal deficits, motor/sensory intact    Lab/Data Reviewed:  BMP:   Lab Results   Component Value Date/Time     04/10/2018 04:30 AM    K 3.4 (L) 04/10/2018 04:30 AM     04/10/2018 04:30 AM    CO2 23 04/10/2018 04:30 AM    AGAP 10 04/10/2018 04:30 AM    GLU 98 04/10/2018 04:30 AM    BUN 6 (L) 04/10/2018 04:30 AM    CREA 0.81 04/10/2018 04:30 AM    GFRAA >60 04/10/2018 04:30 AM    GFRNA >60 04/10/2018 04:30 AM     CBC:   No results found for: WBC, HGB, HGBEXT, HCT, HCTEXT, PLT, PLTEXT, HGBEXT, HCTEXT, PLTEXT    Imaging Reviewed:  No results found.     Medications Reviewed:  Current Facility-Administered Medications   Medication Dose Route Frequency    hydrALAZINE (APRESOLINE) 20 mg/mL injection 10 mg  10 mg IntraVENous Q6H PRN    dextrose 5 % - 0.45% NaCl infusion  75 mL/hr IntraVENous CONTINUOUS    mirtazapine (REMERON) tablet 15 mg  15 mg Oral QHS    amLODIPine (NORVASC) tablet 5 mg  5 mg Oral DAILY    aspirin delayed-release tablet 81 mg  81 mg Oral DAILY    atorvastatin (LIPITOR) tablet 80 mg  80 mg Oral QHS    carvedilol (COREG) tablet 25 mg  25 mg Oral BID    cloNIDine HCl (CATAPRES) tablet 0.2 mg  0.2 mg Oral BID    loratadine (CLARITIN) tablet 10 mg  10 mg Oral DAILY PRN    acetaminophen (TYLENOL) tablet 650 mg  650 mg Oral Q4H PRN    ondansetron (ZOFRAN) injection 4 mg  4 mg IntraVENous Q4H PRN    heparin (porcine) injection 5,000 Units  5,000 Units SubCUTAneous Q8H    bisacodyl (DULCOLAX) suppository 10 mg  10 mg Rectal DAILY PRN    docusate (COLACE) 50 mg/5 mL oral liquid 100 mg  100 mg Oral DAILY

## 2018-04-10 NOTE — ADT AUTH CERT NOTES
Patient Demographics        Patient Name [address] Sex  Address Phone       Joy Howard [place] [phone] Female 1940 [address] [place] [address] Center   [address] [place] [address] [phone] (Home)  [phone] (Mobile)           CSN:       [id number]           Admit Date: Admit Time Room Bed       2018 10:54 AM 4623 [[id number]] 01 [[id number]]           Attending Providers        Provider Pager From To       Harvinder Jordan MD  18       Curry Phillips MD  18       Landy Wong DO  18       Hayden Eagle MD  18            Emergency Contact(s)        Name Relation Home Work Mobile       Jamison Santos [phone]       Loren Goldberg Child [phone]         Utilization Review           RN NOTES 18 by Katarina Villegas RN        Review Entered Review Status       4/10/2018 In Primary       Details         18  RN NOTES:     RN NOTES:   Shift assessment completed,patient is a/o x 1,daughter by bedside,hel boots placed on patient as recommended by PT ,call bell within reach ,bed lowered and locked, no signs of distress noted      Due medications given,patient refusing medications and started throwing up after tasting med,BP elevated at 171/103 ,will notify MD,     2300 Md Paged     2250 Dr Maximiliano Saldivar gives  10 mg hydralazine prn Q6 at BpS elevated at  [id number]/110     2325 BP is 142/86 at this time,will continue monitoring     0000 Shift reassessment completed,patient soundly sleeping,per tele patient had 13 runs of v-tach,vitals stable no changes in previous assessment     0400 Shift reassessment done,vitals completed BP elevated  At 174/94 call bell within reach no signs of distress noted      0446 10 mg hydralazine given,well tolerated,will recheck Bp     0615 Bp 156/95                        Renal Failure, Acute - Care Day 2 (2018) by Katarina Villegas RN        Review Entered Review Status       4/10/2018 Completed       Details              Care Day: 2 Care Date: 4/7/2018 Level of Care: Telemetry       Guideline Day 2        Level Of Care       (X) Floor       4/10/2018 8:28 AM EDT by Radha Key         TELE                     Clinical Status       (X) * Electrolyte abnormalities absent or improved       (X) * Acid-base abnormalities absent or improved       (X) * Hypotension absent       4/10/2018 8:28 AM EDT by Radha Key         VS:  97.9  88  18  97%  138/86                     Routes       (X) Parenteral or oral hydration       4/10/2018 8:28 AM EDT by Radha Key         DEXTROSE 5%-0.45% NACL 75 ML/HR CONT IV              (X) Parenteral or oral medications       4/10/2018 8:28 AM EDT by Radha Key         POTASSIUM 10 MEQ IV ONCE X 3              (X) Renal diet as tolerated       4/10/2018 8:28 AM EDT by Radha Key         SOFT DIET                     Interventions       (X) Monitor electrolytes, renal function tests, acid-base, and volume status              Medications       (X) Possible medical therapies       4/10/2018 8:28 AM EDT by Radha Key         IV FLUIDS                                          * Milestone              Additional Notes       4.9.18              VS:  97.9  88  18  97%  138/86              RESULTS:       RBC 2.84       HGB 8.4       HCT 25.5       RDW 16.8       K 2.8       GLUCOSE 71       CA 7.5       PROTEIN 5.1       ALB 2.5       ALT 9       AST 14       MAG 1.3              MEDS:       DEXTROSE 5%-0.45% NACL 75 ML/HR CONT IV       HEPARIN 5000 UNITS SC EVERY 8 HOURS       POTASSIUM 10 MEQ IV ONCE X 3              PLAN: DENTAL SOFT DIET, SCDS, I/O, PT/OT , IV FLUIDS               IM:       - Pt at baseline mental status       - Cont to encourage PO intake       - Family wants PEG, but pt explicitly told me today she would not want this       - Calorie count ongoing and if not enough intake, which I suspect will be the case, PEG tube discussion will need to happen        - Replete matt PRN       - PT/OT       - Cont acceptable home medications for chronic conditions        - DVT protocol              CASE MANAGEMENT:       Chart reviewed.  Noted in prior case management note that family only wants [facility].. [facility] has declined to accept, as they do not have an appropriate bed.  Noted that pt not really participating with therapy & does not have skilled need @ this time for SNF, which will require [employer] authorization.  Call placed to pt's dtr, Demetri Arciniega, no answer. VM left re above & to return my call.  Will cont to follow.

## 2018-04-10 NOTE — PROGRESS NOTES
Nutrition follow up/Plan of care      RECOMMENDATIONS:     1. Dental Soft Diet (High protein/calorie)  2. SF CIB TID (trial) and magic cup with lunch meal  3. Monitor weight, labs and PO intake  4. RD to follow     GOALS:     1. Ongoing: PO intake meets >75% of protein/calorie needs by 4/13  2. Ongoing: Weight Maintenance (+/- 1-2 lb) by 4/17    ASSESSMENT:     Wt status is classified as underweight per BMI of 18.0. Pt w/ 30 lb or 23% weight loss over the past 3 months. Meets criteria for severe acute malnutrition. Patient is not meeting nutrition needs due to refusing meals and supplements since admission. Labs noted. Pt w/ hypocalcemia, hypoalbuminemia. Nutrition recommendations listed. RD to follow. Nutrition Diagnoses:   Unintentional weight loss related to decreased appetite with failure to thrive as evidenced by around a 30 lb or 23% loss over the past 3 months. Meets Criteria for Acute Malnutrition   [x] Severe Malnutrition, as evidenced by:   [] Moderate muscle wasting, loss of subcutaneous fat   [x] Nutritional intake of <50% of recommended intake for >5 days   [x] Weight loss of >1-2% in 1 week, >5% in 1 month, >7.5% in 3 months, or >10% in 6 months   [] Moderate-severe edema        Nutrition Risk:  [x] High  [] Moderate []  Low    SUBJECTIVE/OBJECTIVE:     Pt admitted for NESS. PMHx including HTN, cataract, dementia and renal insufficiency. Patient has been with a poor appetite since Dec 2017 and has continued to lose weight now equating to around 30 lb or 23% loss. Pt appears very thin. Calorie count ordered by MD to determine need for PEG. Per documentation patient with sips of estella milk for lunch and sips of ginger ale for dinner on 4/7 (<10% nutrition needs). No documentation for 4/8, 4/9 or 4/10 due to patient refusing meals and fluids. Patient refused both breakfast and lunch meals today. Sleeping during visit today.  Spoke to pt yesterday on 4/9 and patient did not want to eat any lunch despite encouragement. Encouraged pt to try a few sips of Ensure Clear supplement and patient adamantly stated \"No!\". Will monitor. Information Obtained from:    [x] Chart Review   [x] Patient   [] Family/Caregiver   [] Nurse/Physician   [] Interdisciplinary Meeting/Rounds    Diet: Dental Soft Diet  Medications: [x] Reviewed  IV: D5 NaCl @ 75 mL/hr  Lipitor,   Allergies: [x] Reviewed   Encounter Diagnoses     ICD-10-CM ICD-9-CM   1. Acute renal insufficiency N28.9 593.9   2. Elevated troponin R74.8 790.6   3.  Altered mental status, unspecified altered mental status type R41.82 780.97     Past Medical History:   Diagnosis Date    Amblyopia of left eye     Blind left eye     Cataract     bilat, right has been replaced    CRI (chronic renal insufficiency)     Dementia     Essential hypertension, malignant 4/25/2014    Hypertension     Left bundle branch block     Otitis media, acute 7/2/16    left      Labs:    Lab Results   Component Value Date/Time    Sodium 139 04/10/2018 04:30 AM    Potassium 3.4 (L) 04/10/2018 04:30 AM    Chloride 106 04/10/2018 04:30 AM    CO2 23 04/10/2018 04:30 AM    Anion gap 10 04/10/2018 04:30 AM    Glucose 98 04/10/2018 04:30 AM    BUN 6 (L) 04/10/2018 04:30 AM    Creatinine 0.81 04/10/2018 04:30 AM    Calcium 7.8 (L) 04/10/2018 04:30 AM    Magnesium 1.3 (L) 04/09/2018 01:49 PM    Phosphorus 4.1 10/04/2015 06:44 AM    Albumin 2.5 (L) 04/09/2018 04:15 AM     Anthropometrics: BMI (calculated): 18  Last 3 Recorded Weights in this Encounter    04/06/18 1053 04/06/18 1449   Weight: 44.9 kg (99 lb) 41.7 kg (92 lb)      Ht Readings from Last 1 Encounters:   04/06/18 5' (1.524 m)     Weight Metrics 4/6/2018 3/12/2018 3/8/2018 3/2/2018 2/19/2018 2/17/2018 10/3/2017   Weight 92 lb 109 lb 95 lb 95 lb 107 lb 14.4 oz 120 lb 125 lb   BMI 17.97 kg/m2 21.29 kg/m2 18.55 kg/m2 18.55 kg/m2 21.07 kg/m2 23.44 kg/m2 24.41 kg/m2     Patient Vitals for the past 100 hrs:   % Diet Eaten   04/08/18 0941 0 %   04/07/18 1859 0 %   04/07/18 1223 0 %   04/07/18 0904 0 %   04/06/18 1838 0 %     IBW: 100 lb %IBW: 92% UBW: 125-130 lb  [x] Weight Loss [] Weight Gain [] Weight Stable    Estimated Nutrition Needs: [x] MSJ  [] Other:  Calories: 0635-7258 kcal Based on:   [x] Actual BW    Protein:   55-65 g Based on:   [x] Actual BW    Fluid:       9047-0723 ml Based on:   [x] Actual BW      [x] No Cultural, Cheondoism or ethnic dietary need identified.     [] Cultural, Cheondoism and ethnic food preferences identified and addressed     Wt Status:  [] Normal (18.6 - 24.9) [x] Underweight (<18.5) [] Overweight (25 - 29.9) [] Mild Obesity (30 - 34.9)  [] Moderate Obesity (35 - 39.9) [] Morbid Obesity (40+)     Nutrition Problems Identified:   [x] Suboptimal PO intake   [] Food Allergies  [] Difficulty chewing/swallowing/poor dentition  [] Constipation/Diarrhea (Last BM 4/9)  [] Nausea/Vomiting   [] None  [] Other:     Plan:   [] Therapeutic Diet  [x]  Obtained/adjusted food preferences/tolerances and/or snacks options   [x]  Dietary supplements   [] Occupational therapy following for feeding techniques  []  HS snack added   [x]  Modify diet texture   []  Modify diet for food allergies   [x]  Assist with menu selection   [x]  Monitor PO intake on meal rounds   [x]  Continue inpatient monitoring and intervention   []  Participated in discharge planning/Interdisciplinary rounds/Team meetings   []  Other:     Education Needs:   [x] Not appropriate for teaching at this time    [] Identified and addressed    Nutrition Monitoring and Evaluation:  [x] Continue ongoing monitoring and intervention  [] Other    Callum Weaver

## 2018-04-10 NOTE — PROGRESS NOTES
Problem: Falls - Risk of  Goal: *Absence of Falls  Document [place] Fall Risk and appropriate interventions in the flowsheet.    Outcome: Progressing Towards Goal  Fall Risk Interventions:       Mentation Interventions: Adequate sleep, hydration, pain control, Door open when patient unattended, Family/sitter at bedside, More frequent rounding, Increase mobility, Reorient patient, Toileting rounds    Medication Interventions: Patient to call before getting OOB, Teach patient to arise slowly    Elimination Interventions: Call light in reach, Patient to call for help with toileting needs, Toileting schedule/hourly rounds    History of Falls Interventions: Door open when patient unattended

## 2018-04-10 NOTE — CDMP QUERY
Patient is noted to have a BMI of 18. Please clarify if this patient is:     =>Underweight  =>Cachexia  =>Other explanation of clinical findings  =>Unable to determine (no explanation for clinical findings)    Presentation: 5', 92 lbs = BMI 18    BMI 19.9 or less = underweight    Please clarify and document your clinical opinion in the progress notes and discharge summary including the definitive and/or presumptive diagnosis, (suspected or probable), related to the above clinical findings. Please include clinical findings supporting your diagnosis. If you DECLINE this query or would like to communicate with [facility], please utilize the \"Nabto message box\" at the TOP of the Progress Note on the right.       Thank you,  Clementine Salamanca RN [facility] [phone]

## 2018-04-10 NOTE — PROGRESS NOTES
1930 Bedside and Verbal shift change report given to Mary Romero Rn (oncoming nurse) by Toni Dutton  RN (off going nurse). Report included the following information SBAR, Kardex, MAR and Recent Results,cardiac rhythm NSR      2000 Shift assessment completed,patient is a/o x 1,daughter by bedside,hel boots placed on patient as recommended by PT ,call bell within reach ,bed lowered and locked, no signs of distress noted      2222 Due medications given,patient refusing medications and started throwing up after tasting med,BP elevated at 171/103 ,will notify MD,    2300 Md Paged    1162 Dr Alina Mario gives  10 mg hydralazine prn Q6 at BpS elevated at  [id number]/110      2325 BP is 142/86 at this time,will continue monitoring    0000 Shift reassessment completed,patient soundly sleeping,per tele patient had 13 runs of v-tach,vitals stable no changes in previous assessment      0400 Shift reassessment done,vitals completed BP elevated  At 174/94 call bell within reach no signs of distress noted     0446 10 mg hydralazine given,well tolerated,will recheck Bp    0615 Bp 156/95    0730 Bedside and Verbal shift change report given to [address] [place] [address] (oncoming nurse) by Mary Romero  RN (off going nurse).  Report included the following information SBAR, Kardex, [facility] and Recent Results

## 2018-04-10 NOTE — PROGRESS NOTES
Chart reviewed. Met with pt & her son, Lucy Camacho @ bedside. Made him aware that [facility] declined to accept & that more choices needed. States [facility], made him aware that [facility] not likely to accept as she too low level. States she has been to [facility], [facility] ok to send to them & he will touch base with his sister, Panda Grant, for other choices. Also made him aware that it will require authorization from [facility] if she's not participating they will likely deny SNF & she would have to go home with home health, verbalized understanding. Posted in e-discharge & referral sent in [facility] Will cont to follow. Oly Calix RN,ext. [phone].

## 2018-04-10 NOTE — PROGRESS NOTES
Problem: Mobility Impaired (Adult and Pediatric)  Goal: *Acute Goals and Plan of Care (Insert Text)  Physical Therapy Goals  Initiated 4/7/2018 and to be accomplished within 7 day(s)  1. Patient will move from supine to sit and sit to supine , scoot up and down and roll side to side in bed with minimal assistance/contact guard assist.     2.  Patient will transfer from bed to chair and chair to bed with minimal assistance/contact guard assist using the least restrictive device. 3.  Patient will perform sit to stand with minimal assistance/contact guard assist.  4.  Patient will ambulate with minimal assistance/contact guard assist for >/= 10 feet with the least restrictive device. 5.  Patient will demonstrate independence with performance of home exercise program.   Outcome: Progressing Towards Goal  physical Therapy TREATMENT    Patient: Roger Trimble (68 y.o. female)  Date: 4/10/2018  Diagnosis: NESS (acute kidney injury) [facility]) Acute metabolic encephalopathy  Precautions: Fall   Chart, physical therapy assessment, plan of care and goals were reviewed. PLOF:at rehab facility  ASSESSMENT:  Pt with nodding yes and no, non verbal at this time due to meds. Moderate assist to sit up at EOB. Pt able to maintain static sitting balance with use of 1 UE for support. Attempted seated LE ROM exercises but pt not following directions. Tolerated sitting for 10+minutes. Manfred with LE to return to lying down. Pt prefers fetal position in bed, (B) knee flexion contractures noted L>R. Education:stressed participation  Progression toward goals:  []      Improving appropriately and progressing toward goals  [x]      Improving slowly and progressing toward goals  []      Not making progress toward goals and plan of care will be adjusted     PLAN:  Patient continues to benefit from skilled intervention to address the above impairments. Continue treatment per established plan of care.   Discharge Recommendations: Skilled Nursing Facility  Further Equipment Recommendations for Discharge:  TBD by SNF     SUBJECTIVE:   Patient stated n/a.    OBJECTIVE DATA SUMMARY:   Critical Behavior:  Neurologic State: Confused  Orientation Level: Oriented to person  Cognition: Decreased attention/concentration, Decreased command following, Impaired decision making  Safety/Judgement: Awareness of environment, Fall prevention  Functional Mobility Training:  Bed Mobility:  Supine to Sit: Moderate assistance; Additional time  Sit to Supine: Minimum assistance  Scooting: Maximum assistance  Balance:  Sitting: Intact  Sitting - Static: Fair (occasional)  Therapeutic Exercises:   Attempted LAQ  Pain:  Pre:0  Post:0  Pain Scale 1: Adult Nonverbal Pain Scale  Activity Tolerance:   Poor  Please refer to the flowsheet for vital signs taken during this treatment.   After treatment:   [] Patient left in no apparent distress sitting up in chair  [x] Patient left in no apparent distress in bed  [x] Call bell left within reach  [] Nursing notified  [x] Caregiver present  [] Bed alarm activated      Alyssa Foley PTA   Time Calculation: 29 mins

## 2018-04-11 NOTE — PROGRESS NOTES
08:00- Assessment completed- see charting. Patient continues to eat/drink very poorly. Minimal intake at breakfast in which her son fed her. Continue to monitor. Call light in reach. Family at the bedside. Refused all her meds except clonidine. Vomited x1 small amount of bile colored vomitus at 10:45. Mepliex dressings clean, dry, intact on sacrum and bilateral heels- for skin protection. 12:00- No change in condition. Repositioned frequently. No c/o nausea at this time. 16:00- No change in condition. 17:10- Med with zofran 4 mg IV for nausea   Daughter at the bedside. 18:00- Daughter assisted patient and drank a small amount (1-2 sips) of orange juice. 18:45- Called to the room as patient had vomited a moderate amount of bile vomitus . Linen, pad and gown changed and patient cleansed. 19:15- report given to oncoming nurse Elissa HELM. Daughter at the bedside during report- daughter reported that she would like to change some things  regarding code status- Elissa RN aware.

## 2018-04-11 NOTE — PROGRESS NOTES
Bedside shift change report given to Elissa FAY RN (oncoming nurse) by Gilbert Hancock RN (off going nurse). Report included the following information SBAR, Kardex, MAR and Recent Results,Cardiac Rhythm NSR w/BBB. Daughter at bedside, states that her brother and her have discussed about their mother's DNR status and would want her changed to full code. States she would be back in the morning. 1930  Shift assessment completed. Pt made comfortable, daughter at bedside. 2200  Pt refused meds. Zofran and Heparin given. 0040  Pt asleep, no distress noted. 0500  Pt asleep, no distress noted. 0600  Pt bathed (partial bath). Gown, linen, bed pad changed. Pt resting in bed. Bedside shift change report given to Luke Duke RN (oncoming nurse) by Juancho Mason RN (offgoing nurse). Report included the following information SBAR, Kardex, Intake/Output, MAR and Cardiac Rhythm NSR w/BBB.

## 2018-04-11 NOTE — PROGRESS NOTES
Internal Medicine Progress Note    Patient's Name: Deep Casas Date: 4/6/2018  Length of Stay: 5      Assessment/Plan     C/Sanna Rosa 1106 Problems    Diagnosis Date Noted    Acute metabolic encephalopathy 38/25/2503    Elevated troponin 04/06/2018    Severe protein-calorie malnutrition Lawayne Peto: less than 60% of standard weight) (HCC) 03/22/2018    Nausea and vomiting 03/14/2018    Abnormality of gait and mobility 09/30/2015    Acute renal failure superimposed on stage 2 chronic kidney disease (HCC) 08/25/2015    Dehydration 08/25/2015    Essential hypertension, malignant 04/25/2014     - Pt at baseline mental status  - Cont to encourage PO intake  - Long conversation about PEG vs comfort/palliative today, gave them all information including poa and would like answer by tomorrow so that if they want a peg and I can consult GI. In addition explained SNF as well as need for 24 hr Care going forward  - also discussed DNI status. Subjective     Pt without distress. Lying in bed, family updated    Objective     Visit Vitals    /88    Pulse (!) 132    Temp 98.2 °F (36.8 °C)    Resp 20    Ht 5' (1.524 m)    Wt 41.7 kg (92 lb)    SpO2 98%    Breastfeeding No    BMI 17.97 kg/m2       Physical Exam:  General Appearance: NAD, conversant w/ mild dementia, temporal wasting  Lungs: CTA with normal respiratory effort  CV: RRR, no m/r/g  Abdomen: soft, non-tender, normal bowel sounds  Extremities: no cyanosis, no peripheral edema  Neuro: No focal deficits, motor/sensory intact    Lab/Data Reviewed:  BMP:   No results found for: NA, K, CL, CO2, AGAP, GLU, BUN, CREA, GFRAA, GFRNA  CBC:   No results found for: WBC, HGB, HGBEXT, HCT, HCTEXT, PLT, PLTEXT, HGBEXT, HCTEXT, PLTEXT    Imaging Reviewed:  Xr Chest Port    Result Date: 4/11/2018  A portable AP radiograph of the chest was obtained at 1209 hours: INDICATION:  Severe fatigue, lethargy. COMPARISON:  4/7/2018.  FINDINGS:  Heart and mediastinum: Unremarkable. Lungs and pleura: Clear without consolidation or pleural effusion. Aorta: Mildly tortuous. Bones: Within normal limits for age. Other: None. Impression: No significant abnormality. No new abnormality is seen developing since last study.        Medications Reviewed:  Current Facility-Administered Medications   Medication Dose Route Frequency    cloNIDine (CATAPRES) 0.2 mg/24 hr patch 1 Patch  1 Patch TransDERmal Q7D    metoprolol (LOPRESSOR) injection 2.5 mg  2.5 mg IntraVENous Q6H    hydrALAZINE (APRESOLINE) 20 mg/mL injection 10 mg  10 mg IntraVENous Q6H PRN    dextrose 5 % - 0.45% NaCl infusion  75 mL/hr IntraVENous CONTINUOUS    mirtazapine (REMERON) tablet 15 mg  15 mg Oral QHS    aspirin delayed-release tablet 81 mg  81 mg Oral DAILY    atorvastatin (LIPITOR) tablet 80 mg  80 mg Oral QHS    loratadine (CLARITIN) tablet 10 mg  10 mg Oral DAILY PRN    acetaminophen (TYLENOL) tablet 650 mg  650 mg Oral Q4H PRN    ondansetron (ZOFRAN) injection 4 mg  4 mg IntraVENous Q4H PRN    heparin (porcine) injection 5,000 Units  5,000 Units SubCUTAneous Q8H    bisacodyl (DULCOLAX) suppository 10 mg  10 mg Rectal DAILY PRN    docusate (COLACE) 50 mg/5 mL oral liquid 100 mg  100 mg Oral DAILY

## 2018-04-11 NOTE — PROGRESS NOTES
Problem: Falls - Risk of  Goal: *Absence of Falls  Document [place] Fall Risk and appropriate interventions in the flowsheet.    Outcome: Progressing Towards Goal  Fall Risk Interventions:       Mentation Interventions: Bed/chair exit alarm, Adequate sleep, hydration, pain control, More frequent rounding, Reorient patient, Toileting rounds    Medication Interventions: Bed/chair exit alarm    Elimination Interventions: Call light in reach, Toileting schedule/hourly rounds    History of Falls Interventions: Door open when patient unattended

## 2018-04-11 NOTE — INTERDISCIPLINARY ROUNDS
IDR Summary      Patient: Mallorie Rehman MRN: [id number]    Age: 68 y.o.  : 1940     Admit Diagnosis: NESS (acute kidney injury) [facility])      Problems pertinent to hospital stay: family does not wish to have PEG tube, family wants rehab, waiting for insurance to authorize    Consults:P.T, O.T. and Case Management     Testing due for patient today? NO    Nutrition plan:Yes     Mobility needs: Yes      Lines/Tubes:   IV: YES   Needed: YES  Anton: NO  Needed:NO  Central Line: NO Needed: NO      VTE Prophylaxis: Chemical    Influenza Vaccine received? NO        Care Management:  Discharge plan: SNF vs home w/ HH   PCP: Jam Hopper MD    : NO  Financial concerns:No   Interventions:       LOS: 5 days     Expected days until discharge: TBD       Signed:      Shruthi Nest, NP-C  [facility]  Hospitalist Division  Pager:  [phone]  Office:  [phone]

## 2018-04-11 NOTE — PROGRESS NOTES
Spoke to Nestor , patient and family expressed wishes for DNI, still wanting compressions/shock/meds if needed    RRT 1155 am    Tachypnea and Tachycardia  Sinus on 12 lead. St segment in lateral leads sllight depressed but upsloping not particularly worrisome to me. Will get cardiac panel and xray chest.  She doesn't look in particular distress and she is on RA 96%.

## 2018-04-11 NOTE — PROGRESS NOTES
0800-no signs of distress. Ordered meds given. Patient developed elevated heart rate in early afternoon from vomiting. Called rapid. Dr. Jael Heller observed and assessed patient. He ordered xray and labs to be drawn with no significant outcome. Patient quiet rest of day. Bedside shift change report given to RN Tina Harvey (oncoming nurse) by Roxie Alan (offgoing nurse). Report included the following information SBAR.

## 2018-04-11 NOTE — PROGRESS NOTES
conducted a Follow up consultation and Spiritual Assessment for [facility], who is a 68 y.o.,female. The  provided the following Interventions:  Continued the relationship of care and support. Listened empathically. Offered prayer and assurance of continued prayer on patients behalf. Chart reviewed. The following outcomes were achieved:  Patient expressed gratitude for pastoral care visit. Assessment:  There are no further spiritual or Restorationist issues which require Spiritual Care Services interventions at this time. Plan:  Chaplains will continue to follow and will provide pastoral care on an as needed/requested basis.  recommends bedside caregivers page  on duty if patient shows signs of acute spiritual or emotional distress.      [address] [facility]   Staff [address] [facility]   [phone]

## 2018-04-12 NOTE — PROGRESS NOTES
1900 --  Bedside, Verbal and Written shift change report given to [address] (oncoming nurse) by Hector [facility] nurse). Report included the following information SBAR, Kardex, Intake/Output, MAR and Recent Results. Pt provided with incontinence care, partial bath, gown and linen changed.       2202 -- PM medications administered, pt tolerated with ease, will continue to monitor. 2300 -- Heparin medication held, last platelet value 19/14/7057.     0015 -- Shift reassessment, pt condition unchanged, will continue to monitor.       0400 --  Shift reassessment, pt condition unchanged, will continue to monitor.       0641 -- Paged MD Bharat Hawthorne, critical lab result K+ 2.9.        0700 -- Bedside, Verbal and Written shift change report given to [address]) by JOSE J (offgoing nurse). Report included the following information SBAR, Kardex, Intake/Output, MAR and Recent Results. Skin assessment completed.

## 2018-04-12 NOTE — INTERDISCIPLINARY ROUNDS
IDR Summary      Patient: Rafi Danielson MRN: [id number]    Age: 68 y.o.  : 1940     Admit Diagnosis: NESS (acute kidney injury) [facility])      Problems pertinent to hospital stay: family now interested in PEG    Consults:P.T, O.T. and Case Management     Testing due for patient today? NO    Nutrition plan:Yes     Mobility needs: Yes      Lines/Tubes:   IV: YES   Needed: YES  Anton: NO  Needed:NO  Central Line: NO Needed: NO      VTE Prophylaxis: Chemical    Influenza Vaccine received? NO        Care Management:  Discharge plan: SNF vs home w/ HH   PCP: Grace Crain MD    : NO  Financial concerns:No   Interventions:       LOS: 6 days     Expected days until discharge: TBD       Signed:      [facility]  Hospitalist Division  Pager:  [phone]  Office:  [phone]

## 2018-04-12 NOTE — PROGRESS NOTES
Problem: Falls - Risk of  Goal: *Absence of Falls  Document [place] Fall Risk and appropriate interventions in the flowsheet.    Outcome: Progressing Towards Goal  Fall Risk Interventions:       Mentation Interventions: Bed/chair exit alarm, Door open when patient unattended, Family/sitter at bedside, More frequent rounding, Reorient patient, Room close to nurse's station, Update white board    Medication Interventions: Bed/chair exit alarm, Patient to call before getting OOB    Elimination Interventions: Call light in reach, Patient to call for help with toileting needs    History of Falls Interventions: Door open when patient unattended

## 2018-04-12 NOTE — PROGRESS NOTES
Problem: Mobility Impaired (Adult and Pediatric)  Goal: *Acute Goals and Plan of Care (Insert Text)  Physical Therapy Goals  Initiated 4/7/2018 and to be accomplished within 7 day(s)  1. Patient will move from supine to sit and sit to supine , scoot up and down and roll side to side in bed with minimal assistance/contact guard assist.     2.  Patient will transfer from bed to chair and chair to bed with minimal assistance/contact guard assist using the least restrictive device. 3.  Patient will perform sit to stand with minimal assistance/contact guard assist.  4.  Patient will ambulate with minimal assistance/contact guard assist for >/= 10 feet with the least restrictive device. 5.  Patient will demonstrate independence with performance of home exercise program.   Outcome: Not Progressing Towards Goal  physical Therapy TREATMENT    Patient: Keyla Apple (68 y.o. female)  Date: 4/12/2018  Diagnosis: NESS (acute kidney injury) ([facility] [address] Acute metabolic encephalopathy  Precautions: Fall   Chart, physical therapy assessment, plan of care and goals were reviewed. PLOF:dependent  ASSESSMENT:  Pt initially engaging, answering questions but when given instructions to mobilize whether VC/TC/MC pt does not respond. Remains in fetal position and very resistant to ROM. Education:extension of extremities to avoid flexion contractures  Progression toward goals:  []      Improving appropriately and progressing toward goals  []      Improving slowly and progressing toward goals  [x]      Not making progress toward goals and plan of care will be adjusted     PLAN:  Patient continues to benefit from skilled intervention to address the above impairments. Continue treatment per established plan of care.   Discharge Recommendations:  LTC and or home with hospice  Further Equipment Recommendations for Discharge:  hospital bed and wheelchair      SUBJECTIVE:   Patient stated I don't think I can do what you ask.    OBJECTIVE DATA SUMMARY:   Critical Behavior:  Neurologic State: Confused  Orientation Level: Oriented to person  Cognition: Memory loss  Safety/Judgement: Awareness of environment, Fall prevention  Therapeutic Exercises:   Attempted extension exercises of extremities, pt too resistant  Pain:  Pre:0  Post:0  Pain Scale 1: Visual  Activity Tolerance:   none  Please refer to the flowsheet for vital signs taken during this treatment.   After treatment:   [] Patient left in no apparent distress sitting up in chair  [x] Patient left in no apparent distress in bed  [x] Call bell left within reach  [] Nursing notified  [] Caregiver present  [] Bed alarm activated      Laura Seymour PTA   Time Calculation: 10 mins

## 2018-04-12 NOTE — CONSULTS
Gastroenterology Consult    Patient: Lorraine Tony MRN: [id number]  SSN: [id number]    YOB: 1940  Age: 68 y.o. Sex: female        Assessment:     1. Failure to thrive   2. Decreased PO intake   3. Dementia     Plan:     1. Will proceed with PEG tube placement once consent obtained from family. Left a message on pt's son's cell phone   2. Cont current supportive care     Subjective: Lorraine Tony is a 68 y.o. female who is being seen for Decreased PO intake, failure to thrive. She has hx of hypertension, dementia, recurrent nausea and vomiting for which she  Admitted in 3/2018 Work up at the time included - CT Abd/pelvis showed No acute or focal abnormality ,  HIDA scan 3/13/2018 showed patent cystic duct.  Decreased gallbladder ejection fraction of approximately 34 % suggesting gallbladder dyskinesia, I consulted 3/14Dr. Bj Rouse did an  EGD on 3/15/18  -Normal upper endoscopy, with no endoscopic evidence of neoplasia or mucosal abnormality.  -Esophagus empirically dilated with #48 Vivica Pane. Pt;s family refused PEG at that admission. She is re admitted this time secondary to fatigue, lethargy. Pt has been refusing to eat or take her medications. This time family requested PEG tube placement. She is a partial code. Pt unable to provide any meaningful history.    Hospital Problems  Date Reviewed: 3/8/2018          Codes Class Noted POA    * (Principal)Acute metabolic encephalopathy OAR-15-DAWSON: G93.41  ICD-9-CM: 348.31  4/6/2018 Unknown        Elevated troponin ICD-10-CM: R74.8  ICD-9-CM: 790.6  4/6/2018 Unknown        Severe protein-calorie malnutrition Katheleen Likes: less than 60% of standard weight) ([facility] ICD-10-CM: E43  ICD-9-CM: 262  3/22/2018 Yes        Nausea and vomiting ICD-10-CM: R11.2  ICD-9-CM: 787.01  3/14/2018 Yes        Abnormality of gait and mobility ICD-10-CM: R26.9  ICD-9-CM: 781.2  9/30/2015 Yes        Acute renal failure superimposed on stage 2 chronic kidney disease ([facility] ICD-10-CM: N17.9, N18.2  ICD-9-CM: 584.9, 585.2  2015 Unknown        Dehydration ICD-10-CM: E86.0  ICD-9-CM: 276.51  2015 Yes        Essential hypertension, malignant (Chronic) ICD-10-CM: I10  ICD-9-CM: 401.0  2014 Yes            Past Medical History:   Diagnosis Date    Amblyopia of left eye     Blind left eye     Cataract     bilat, right has been replaced    CRI (chronic renal insufficiency)     Dementia     Essential hypertension, malignant 2014    Hypertension     Left bundle branch block     Otitis media, acute 16    left     Past Surgical History:   Procedure Laterality Date    HX CATARACT REMOVAL Right 2013    HX  SECTION  1968    HX  SECTION  1964    HX  SECTION  1970    HX TONSILLECTOMY        Family History   Problem Relation Age of Onset    Heart Disease Father     Other Brother      ? brain injury     Social History   Substance Use Topics    Smoking status: Never Smoker    Smokeless tobacco: Never Used    Alcohol use No      Current Facility-Administered Medications   Medication Dose Route Frequency Provider Last Rate Last Dose    potassium chloride 10 mEq in 100 ml IVPB  10 mEq IntraVENous Hernando Fenton  mL/hr at 18 1144 10 mEq at 18 1144    magnesium sulfate 3 g in 0.9% sodium chloride 100 mL IVPB  3 g IntraVENous ONCE Antonino Weaver MD        cloNIDine (CATAPRES) 0.2 mg/24 hr patch 1 Patch  1 Patch TransDERmal Q7D Antonino Weaver MD   1 Patch at 18 1923    metoprolol (LOPRESSOR) injection 2.5 mg  2.5 mg IntraVENous Q6H Antonino Weaver MD   2.5 mg at 18 1403    hydrALAZINE (APRESOLINE) 20 mg/mL injection 10 mg  10 mg IntraVENous Q6H PRN Wanda Anders MD   10 mg at 18 0912    dextrose 5 % - 0.45% NaCl infusion  75 mL/hr IntraVENous CONTINUOUS Orlando Downey DO 75 mL/hr at 18 1823 75 mL/hr at 18 1823    mirtazapine (REMERON) tablet 15 mg  15 mg Oral QHS Marie Dodson DO   15 mg at 04/11/18 2202    aspirin delayed-release tablet 81 mg  81 mg Oral DAILY Katharine Muñoz, NP   81 mg at 04/11/18 1124    atorvastatin (LIPITOR) tablet 80 mg  80 mg Oral QHS Katharine Muñoz, NP   80 mg at 04/11/18 2202    loratadine (CLARITIN) tablet 10 mg  10 mg Oral DAILY PRN Katharine Muñoz, NP        acetaminophen (TYLENOL) tablet 650 mg  650 mg Oral Q4H PRN Katharine Muñoz, NP        ondansetron [facility]) injection 4 mg  4 mg IntraVENous Q4H PRN Katharine Muñoz, NP   4 mg at 04/11/18 1136    heparin (porcine) injection 5,000 Units  5,000 Units SubCUTAneous Q8H Katharine Muñoz, NP   Stopped at 04/11/18 2300    bisacodyl (DULCOLAX) suppository 10 mg  10 mg Rectal DAILY PRN Katharine Muñoz, NP        docusate (COLACE) 50 mg/5 mL oral liquid 100 mg  100 mg Oral DAILY Alexus العلي MD   100 mg at 04/07/18 0727        Allergies   Allergen Reactions    Codeine Unknown (comments)     Unsure due to happening so long    Levaquin [Levofloxacin] Nausea Only     intolerance    Pcn [Penicillins] Hives    Sulfa (Sulfonamide Antibiotics) Unknown (comments)     Unsure it was so long ago       Review of Systems:  A comprehensive review of systems was negative except for that written in the History of Present Illness. Objective:     Vitals:    04/12/18 0349 04/12/18 0849 04/12/18 1244 04/12/18 1357   BP: 141/78 (!) 150/98 171/81 (!) 170/108   Pulse: 78 90 99 100   Resp: 20 16 16 16   Temp: 98 °F (36.7 °C) 97.9 °F (36.6 °C) 97 °F (36.1 °C) 98.2 °F (36.8 °C)   SpO2: 96% 98% 95% 96%   Weight:       Height:            Physical Exam:  Gen: NAD  Heent: Mild  Pallor, no icterus  Lungs: Clear B/L   CVS exam: Regular rate and rhythm   Abd  : Soft, non tender, BS +, No masses felt. Extremities  : No pedal edema B/L   Neuro: Alert, oriented X 1, No asterexis. Lauri Saunas last  Recent Results (from the past 24 hour(s))   METABOLIC PANEL, BASIC    Collection Time: 04/12/18  4:10 AM   Result Value Ref Range    Sodium 140 136 - 145 mmol/L    Potassium 2.8 (LL) 3.5 - 5.5 mmol/L    Chloride 106 100 - 108 mmol/L    CO2 23 21 - 32 mmol/L    Anion gap 11 3.0 - 18 mmol/L    Glucose 88 74 - 99 mg/dL    BUN 4 (L) 7.0 - 18 MG/DL    Creatinine 0.95 0.6 - 1.3 MG/DL    BUN/Creatinine ratio 4 (L) 12 - 20      GFR est AA >60 >60 ml/min/1.73m2    GFR est non-AA 57 (L) >60 ml/min/1.73m2    Calcium 7.9 (L) 8.5 - 10.1 MG/DL   MAGNESIUM    Collection Time: 04/12/18  2:24 PM   Result Value Ref Range    Magnesium 1.5 (L) 1.6 - 2.6 mg/dL       Liver Panel:    Lab Results   Component Value Date/Time    ALT (SGPT) 9 (L) 04/09/2018 04:15 AM    ALT (SGPT) 11 (L) 04/08/2018 04:20 AM    ALT (SGPT) 12 (L) 04/07/2018 04:40 AM    AST (SGOT) 14 (L) 04/09/2018 04:15 AM    AST (SGOT) 17 04/08/2018 04:20 AM    AST (SGOT) 18 04/07/2018 04:40 AM    Alk. phosphatase 57 04/09/2018 04:15 AM    Alk. phosphatase 59 04/08/2018 04:20 AM    Alk.  phosphatase 58 04/07/2018 04:40 AM    Bilirubin, direct 0.2 03/15/2018 05:05 AM    Bilirubin, direct 0.3 (H) 03/02/2018 06:24 PM    Bilirubin, total 0.8 04/09/2018 04:15 AM    Bilirubin, total 0.9 04/08/2018 04:20 AM    Bilirubin, total 1.1 (H) 04/07/2018 04:40 AM         Signed By: Taylor Jackson MD     April 12, 2018

## 2018-04-12 NOTE — ANCILLARY DISCHARGE INSTRUCTIONS
Patient and/or next of kin has been given the [facility] Important Message From Medicare About Your Rights\" letter and all questions were answered.

## 2018-04-13 NOTE — PROGRESS NOTES
Problem: Falls - Risk of  Goal: *Absence of Falls  Document [place] Fall Risk and appropriate interventions in the flowsheet.    Outcome: Progressing Towards Goal  Fall Risk Interventions:       Mentation Interventions: Bed/chair exit alarm    Medication Interventions: Bed/chair exit alarm    Elimination Interventions: Call light in reach    History of Falls Interventions: Door open when patient unattended

## 2018-04-13 NOTE — ANESTHESIA PREPROCEDURE EVALUATION
Anesthetic History     PONV          Review of Systems / Medical History  Patient summary reviewed and pertinent labs reviewed    Pulmonary  Within defined limits                 Neuro/Psych       CVA  TIA     Cardiovascular    Hypertension: well controlled        Dysrhythmias       Exercise tolerance: <4 METS     GI/Hepatic/Renal                Endo/Other  Within defined limits           Other Findings   Comments: Documentation of current medication  Current medications obtained, documented and obtained? YES      Risk Factors for Postoperative nausea/vomiting:       History of postoperative nausea/vomiting? NO       Female? YES       Motion sickness? NO       Intended opioid administration for postoperative analgesia? NO      Smoking Abstinence:  Current Smoker? NO  Elective Surgery? YES  Seen preoperatively by anesthesiologist or proxy prior to day of surgery? YES  Pt abstained from smoking 24 hours prior to anesthesia?  N/A    Preventive care/screening for High Blood Pressure:  Aged 18 years and older: YES  Screened for high blood pressure: YES  Patients with high blood pressure referred to primary care provider   for BP management: YES                 Physical Exam    Airway  Mallampati: III  TM Distance: 4 - 6 cm  Neck ROM: decreased range of motion   Mouth opening: Diminished (comment)     Cardiovascular               Dental    Dentition: Poor dentition     Pulmonary                 Abdominal         Other Findings            Anesthetic Plan    ASA: 3  Anesthesia type: MAC            Anesthetic plan and risks discussed with: Patient

## 2018-04-13 NOTE — INTERDISCIPLINARY ROUNDS
IDR Summary      Patient: Mallorie Rehman MRN: [id number]    Age: 68 y.o.  : 1940     Admit Diagnosis: NESS [facility]acute [facility]  dysphagia       Problems pertinent to hospital stay: for PEG tube today    Consults:P.T, O.T. and Case Management     Testing due for patient today? YES    Nutrition plan:Yes     Mobility needs: Yes      Lines/Tubes:   IV: YES   Needed: YES  Anton: NO  Needed:NO  Central Line: NO Needed: NO      VTE Prophylaxis: Chemical    Influenza Vaccine received? NO        Care Management:  Discharge plan: SNF vs home w/ HH   PCP: Jam Hopper MD    : NO  Financial concerns:No   Interventions:       LOS: 7 days     Expected days until discharge: TBD       Signed:      OPAL Singh  [facility]  Hospitalist Division  Pager:  [phone]  Office:  [phone]

## 2018-04-13 NOTE — PROGRESS NOTES
Problem: Falls - Risk of  Goal: *Absence of Falls  Document [place] Fall Risk and appropriate interventions in the flowsheet.    Outcome: Progressing Towards Goal  Fall Risk Interventions:  Mobility Interventions: Bed/chair exit alarm    Mentation Interventions: Bed/chair exit alarm, Door open when patient unattended    Medication Interventions: Bed/chair exit alarm, Evaluate medications/consider consulting pharmacy    Elimination Interventions: Call light in reach    History of Falls Interventions: Door open when patient unattended

## 2018-04-13 NOTE — PROGRESS NOTES
Assumed care of patient. Patient is AOx2, alert to person and place. Patient is resting in bed, in stable condition. Patient denies having any pain or discomfort.

## 2018-04-13 NOTE — PROGRESS NOTES
2 [facility]  Hospitalist Division           Inpatient Daily Progress Note        Patient: Kavon Barraza MRN: [id number]  CSN: [id number]    YOB: 1940  Age: 68 y.o. Sex: female    DOA: 4/6/2018 LOS:  LOS: 6 days                    Chief Complaint:  Dehydration, NESS    Subjective:      Awake, not verbal during assessment     Objective:      Visit Vitals    BP (!) 148/92 (BP 1 Location: Left arm, BP Patient Position: At rest)    Pulse 94    Temp 98 °F (36.7 °C)    Resp 18    Ht 5' (1.524 m)    Wt 41.7 kg (92 lb)    SpO2 98%    Breastfeeding No    BMI 17.97 kg/m2       Physical Exam:  General appearance: alert, no distress  Lungs: clear to auscultation bilaterally  Heart: regular rate and rhythm, S1, S2 normal, no murmur, click, rub or gallop  Abdomen: soft, non tender, non distended. Normoactive bowel sounds  Extremities: extremities normal, atraumatic, no cyanosis or edema  Skin: Skin color, texture, turgor normal. No rashes or lesions      Intake and Output:  Current Shift:  04/12 1901 - 04/13 0700  In: -   Out: 1300 [Urine:1300]  Last three shifts:  04/11 0701 - 04/12 1900  In: 1886.3 [P.O.:120;  I.V.:1766.3]  Out: 3050 [Urine:3050]    Recent Results (from the past 24 hour(s))   METABOLIC PANEL, BASIC    Collection Time: 04/12/18  4:10 AM   Result Value Ref Range    Sodium 140 136 - 145 mmol/L    Potassium 2.8 (LL) 3.5 - 5.5 mmol/L    Chloride 106 100 - 108 mmol/L    CO2 23 21 - 32 mmol/L    Anion gap 11 3.0 - 18 mmol/L    Glucose 88 74 - 99 mg/dL    BUN 4 (L) 7.0 - 18 MG/DL    Creatinine 0.95 0.6 - 1.3 MG/DL    BUN/Creatinine ratio 4 (L) 12 - 20      GFR est AA >60 >60 ml/min/1.73m2    GFR est non-AA 57 (L) >60 ml/min/1.73m2    Calcium 7.9 (L) 8.5 - 10.1 MG/DL   MAGNESIUM    Collection Time: 04/12/18  2:24 PM   Result Value Ref Range    Magnesium 1.5 (L) 1.6 - 2.6 mg/dL   PROTHROMBIN TIME + INR    Collection Time: 04/12/18  4:37 PM   Result Value Ref Range    Prothrombin time 14.2 11.5 - 15.2 sec    INR 1.2 0.8 - 1.2     METABOLIC PANEL, BASIC    Collection Time: 04/12/18  7:44 PM   Result Value Ref Range    Sodium 140 136 - 145 mmol/L    Potassium 2.7 (LL) 3.5 - 5.5 mmol/L    Chloride 104 100 - 108 mmol/L    CO2 25 21 - 32 mmol/L    Anion gap 11 3.0 - 18 mmol/L    Glucose 101 (H) 74 - 99 mg/dL    BUN 4 (L) 7.0 - 18 MG/DL    Creatinine 0.77 0.6 - 1.3 MG/DL    BUN/Creatinine ratio 5 (L) 12 - 20      GFR est AA >60 >60 ml/min/1.73m2    GFR est non-AA >60 >60 ml/min/1.73m2    Calcium 7.9 (L) 8.5 - 10.1 MG/DL           Lab Results   Component Value Date/Time    Glucose 101 (H) 04/12/2018 07:44 PM    Glucose 88 04/12/2018 04:10 AM    Glucose 98 04/10/2018 04:30 AM    Glucose 98 04/09/2018 01:49 PM    Glucose 71 (L) 04/09/2018 04:15 AM        Assessment/Plan:     Patient Active Problem List   Diagnosis Code    Essential hypertension, malignant I10    Acute renal failure superimposed on stage 2 chronic kidney disease (HCC) N17.9, N18.2    Dehydration E86.0    Abnormality of gait and mobility R26.9    Stroke (HCC) I63.9    HTN (hypertension) I10    Follow up Z09    Nausea & vomiting R11.2    Nausea and vomiting R11.2    Advanced care planning/counseling discussion Z71.89    Dementia F03.90    Debility R53.81    Severe protein-calorie malnutrition ([place]: less than 60% of standard weight) (HCC) E43    Acute metabolic encephalopathy V21.18    Elevated troponin R74.8       A/P:  - Pt at baseline mental status  - Cont to encourage PO intake  - Long conversation about PEG vs comfort/palliative  - Family wishes to proceed with PEG placement- GI consulted.          Phill Ching, ALIYAH-BC  [facility]  Hospitalist Division  Pager:  [phone]  Office:  [phone]

## 2018-04-13 NOTE — PROGRESS NOTES
1950 -- Bedside, Verbal and Written shift change report given to [address] (oncoming nurse) by Tato nurse). Report included the following information SBAR, Kardex, Intake/Output, MAR and Recent Results. Pt provided with hygiene care, periwick changed, gown, linen and socks changed.       2120 -- PM medications administered, pt tolerated with ease, will continue to monitor. 2200 -- Peg tube feeding started, Osmolite 1.2, 10ml/hr, 100 free water q6 hours. Pt tolerated with ease, will continue to monitor.     0000 -- Shift reassessment, pt condition unchanged, will continue to monitor. 0015 -- Notified by CNA elevated blood pressure 151/102, pt due for medication. 0119 -- Blood pressure reassessed, 142/93, will continue to monitor.       --  Shift reassessment, pt condition unchanged, will continue to monitor.          -- Bedside, Verbal and Written shift change report given to   (oncoming nurse) by JOSE J (offgoing nurse). Report included the following information SBAR, Kardex, Intake/Output, MAR and Recent Results. Skin assessment completed.

## 2018-04-13 NOTE — ANESTHESIA POSTPROCEDURE EVALUATION
Post-Anesthesia Evaluation and Assessment    Patient: Mallorie Kidney MRN: [id number]  SSN: [id number]    YOB: 1940  Age: 68 y.o. Sex: female       Cardiovascular Function/Vital Signs  Visit Vitals    BP 95/74 (BP 1 Location: Left arm, BP Patient Position: At rest)    Pulse 83    Temp 36.8 °C (98.3 °F)    Resp 15    Ht 5' (1.524 m)    Wt 44.9 kg (98 lb 15.8 oz)    SpO2 98%    Breastfeeding No    BMI 19.33 kg/m2       Patient is status post MAC anesthesia for Procedure(s):  ESOPHAGOGASTRODUODENOSCOPY (EGD)  PERCUTANEOUS ENDOSCOPIC GASTROSTOMY TUBE INSERTION. Nausea/Vomiting: None    Postoperative hydration reviewed and adequate. Pain:  Pain Scale 1: Visual (04/13/18 0000)  Pain Intensity 1: 0 (04/13/18 0000)   Managed    Neurological Status:   Neuro  Neurologic State: Confused;Drowsy (04/12/18 2014)  Orientation Level: Oriented to person (04/12/18 2014)  Cognition: Follows commands (04/12/18 2014)   At baseline    Mental Status and Level of Consciousness: Alert and oriented     Pulmonary Status:   O2 Device: Room air (04/13/18 1434)   Adequate oxygenation and airway patent    Complications related to anesthesia: None    Post-anesthesia assessment completed.  No concerns    Signed By: Juancarlos Zamora CRNA     April 13, 2018

## 2018-04-13 NOTE — PROGRESS NOTES
Nutrition follow up/Plan of care      RECOMMENDATIONS:     1. TF: Osmolite 1.2 initiated at 25 ml/hr increased as tolerated to goal of 50 ml/hr to provide 1440 Kcal, 67 g protein and 984 ml free water. 125 ml free water Q 6 hours once IV fluids discontinued. 2. Dental Soft Diet (High protein/calorie)  3. SF CIB TID (trial) and magic cup with lunch meal  4. Monitor weight, labs, PO intake and tolerance of enteral nutrition  5. RD to follow     GOALS:     1. Ongoing: Enteral Nutrition/PO intake meets >75% of protein/calorie needs by 4/16  2. Met/Ongoing: Weight Maintenance (+/- 1-2 lb) by 4/20    ASSESSMENT:     Wt status is classified as normal per BMI of 19.3. However, patient with minimal intake of meals and 30 lb or 23% weight loss over the past 3 months. Meets criteria for severe acute malnutrition. Patient is not meeting nutrition needs due to refusing meals and supplements since admission. Labs noted. Pt w/ hypokalemia and hypoalbuminemia. Received nutrition consult for management of TF. Enteral nutrition recommendations listed. RD to follow. Nutrition Diagnoses:   Unintentional weight loss related to decreased appetite with failure to thrive as evidenced by around a 30 lb or 23% loss over the past 3 months. Meets Criteria for Acute Malnutrition   [x] Severe Malnutrition, as evidenced by:   [] Moderate muscle wasting, loss of subcutaneous fat   [x] Nutritional intake of <50% of recommended intake for >5 days   [x] Weight loss of >1-2% in 1 week, >5% in 1 month, >7.5% in 3 months, or >10% in 6 months   [] Moderate-severe edema        Nutrition Risk:  [x] High  [] Moderate []  Low    SUBJECTIVE/OBJECTIVE:     Pt admitted for NESS. PMHx including HTN, cataract, dementia and renal insufficiency. Patient has been with a poor appetite since Dec 2017 and has continued to lose weight now equating to around 30 lb or 23% loss. Pt appears very thin and with minimal intake of meals. Plan for PEG placement today. Will monitor. Information Obtained from:    [x] Chart Review   [x] Patient   [] Family/Caregiver   [] Nurse/Physician   [] Interdisciplinary Meeting/Rounds    Diet: NPO  Medications: [x] Reviewed  IV: D5 NaCl @ 75 mL/hr  Lipitor,   Allergies: [x] Reviewed   Encounter Diagnoses     ICD-10-CM ICD-9-CM   1. Acute renal insufficiency N28.9 593.9   2. Elevated troponin R74.8 790.6   3.  Altered mental status, unspecified altered mental status type R41.82 780.97     Past Medical History:   Diagnosis Date    Amblyopia of left eye     Blind left eye     Cataract     bilat, right has been replaced    CRI (chronic renal insufficiency)     Dementia     Essential hypertension, malignant 4/25/2014    Hypertension     Left bundle branch block     Otitis media, acute 7/2/16    left      Labs:    Lab Results   Component Value Date/Time    Sodium 139 04/13/2018 04:00 AM    Potassium 3.3 (L) 04/13/2018 04:00 AM    Chloride 104 04/13/2018 04:00 AM    CO2 23 04/13/2018 04:00 AM    Anion gap 12 04/13/2018 04:00 AM    Glucose 94 04/13/2018 04:00 AM    BUN 5 (L) 04/13/2018 04:00 AM    Creatinine 0.83 04/13/2018 04:00 AM    Calcium 7.8 (L) 04/13/2018 04:00 AM    Magnesium 2.9 (H) 04/13/2018 04:00 AM    Phosphorus 4.1 10/04/2015 06:44 AM    Albumin 2.7 (L) 04/13/2018 04:00 AM     Anthropometrics: BMI (calculated): 19.3  Last 3 Recorded Weights in this Encounter    04/06/18 1053 04/06/18 1449 04/13/18 0442   Weight: 44.9 kg (99 lb) 41.7 kg (92 lb) 44.9 kg (98 lb 15.8 oz)      Ht Readings from Last 1 Encounters:   04/06/18 5' (1.524 m)     Weight Metrics 4/13/2018 3/12/2018 3/8/2018 3/2/2018 2/19/2018 2/17/2018 10/3/2017   Weight 98 lb 15.8 oz 109 lb 95 lb 95 lb 107 lb 14.4 oz 120 lb 125 lb   BMI 19.33 kg/m2 21.29 kg/m2 18.55 kg/m2 18.55 kg/m2 21.07 kg/m2 23.44 kg/m2 24.41 kg/m2     Patient Vitals for the past 100 hrs:   % Diet Eaten   04/13/18 1305 0 %   04/11/18 1737 25 %   04/11/18 0957 0 %     Estimated Nutrition Needs: [x] MSJ  [] Other:  Calories: 9368-9942 kcal Based on:   [x] Actual BW    Protein:   55-65 g Based on:   [x] Actual BW    Fluid:       5041-8998 ml Based on:   [x] Actual BW      [x] No Cultural, Buddhism or ethnic dietary need identified.     [] Cultural, Buddhism and ethnic food preferences identified and addressed     Wt Status:  [x] Normal (18.6 - 24.9) [] Underweight (<18.5) [] Overweight (25 - 29.9) [] Mild Obesity (30 - 34.9)  [] Moderate Obesity (35 - 39.9) [] Morbid Obesity (40+)     Nutrition Problems Identified:   [x] Suboptimal PO intake   [] Food Allergies  [] Difficulty chewing/swallowing/poor dentition  [] Constipation/Diarrhea (Last BM 4/11)  [] Nausea/Vomiting   [] None  [x] Other: Enteral nutrition via PEG    Plan:   [] Therapeutic Diet  [x]  Obtained/adjusted food preferences/tolerances and/or snacks options   [x]  Dietary supplements   [] Occupational therapy following for feeding techniques  []  HS snack added   [x]  Modify diet texture   []  Modify diet for food allergies   []  Assist with menu selection   [x]  Monitor PO intake on meal rounds   [x]  Continue inpatient monitoring and intervention   []  Participated in discharge planning/Interdisciplinary rounds/Team meetings   [x]  Other: Enteral nutrition recommendations listed    Education Needs:   [x] Not appropriate for teaching at this time    [] Identified and addressed    Nutrition Monitoring and Evaluation:  [x] Continue ongoing monitoring and intervention  [] Nevin Johnson

## 2018-04-13 NOTE — PROCEDURES
Endoscopy Procedure Note    Patient: Nila Tobar MRN: [id number]  SSN: [id number]    YOB: 1940  Age: 68 y.o. Sex: female      Date/Time:  4/13/2018 2:31 PM    Esophagogastroduodenoscopy (EGD) with PEG Tube Placement Procedure Note    Procedure: Esophagogastroduodenoscopy with placement of a percutaneous endoscopic gastrostomy tube    IMPRESSION:   1. -Normal upper endoscopy, with no endoscopic evidence of neoplasia or mucosal abnormality. 2. -Successful PEG tube placement. RECOMMENDATIONS:  1. Typical peg tube care per nursing. 2. Flush peg tube with 20cc NS qshift. 3. Can use tube for medications and fluids now, Can start tube feeds in 6 hours. Indication: malnutrition, failure to thrive, weight loss   :  Dariusz Rivas MD  Assistants: Endoscopy Technician-1: Claudene Mccune  Endoscopy RN-1: Gibson Burkitt, RN  Endoscopy RN-2: Ajay Ta RN    Referring Provider: Trey Yuen MD  History: The history and physical exam were reviewed and updated. Endoscope: Olympus GIF-190 diagnostic endoscope  Extent of Exam: second portion of the duodenum  ASA: ASA 2 - Patient with mild systemic disease with no functional limitations  Anethesia/Sedation:  MAC anesthesia    Description of the procedure: The procedure was discussed with the patient including risks, benefits, alternatives including risks of iv sedation, bleeding, perforation and aspiration. A safety timeout was performed. The patient was placed in the left lateral decubitus position. A bite block was placed. The patient was given incremental doses of intravenous sedation until moderate sedation was achieved . The patients vital signs were monitored at all times including heart rate/rhythm, blood pressure and oxygen saturation. The endoscope was then passed under direct visualization to the second portion of the duodenum. The endoscope was then slowly withdrawn while visualizing the mucosa.   In the stomach a retroflexion was performed and gastric fundus and cardia visualized. The endoscope was then slowly withdrawn. The endoscope then in proximal body and evaluation for position of peg tube placement evaluated. This was accomplished with 1:1 abdominal pressure with finger and transillumination. When adequate area found the area was cleaned with betadyne and sterile drape placed. The area was locally anesthetized with 5cc of 1% lidocaine. A small 1cm incision was then made using an 11 blade. A Needle/catheter system was passed to the incision into gastric lumen. This was grasped via the endoscope using a snare. The needle was removed and guidewire passed via the catheter and grasped with the snare. The scope and guidewire were withdrawn from the patient. A 20Fr peg tube was attached to the oral side of the guidewire. It was pulled into place via the percutaneous side. The final bumper position was at 2 cm at skin surface. The area was then cleaned and dressed in usual fashion. The endoscope was then passed again to the stomach. The bumper location was noted. The endoscope was removed. The patient was then transferred to recovery in stable condition. Findings:    Esophagus: The esophageal mucosa was normal with no ulceration, mass or stricture. There was no evidence of Jordan's esophagus or reflux esophagitis. Stomach: The gastric mucosa was normal with no ulceration, mass, stricture. The peg tube bumper was in proper location. Duodenum: The duodenum mucosa was normal with no ulceration, mass, stricture and no evidence of villous atrophy. Therapies:  Peg tube placement. Specimens: none           Complications:   None; patient tolerated the procedure well.     EBL: 5 ml        Cleola Blizzard, MD  April 13, 2018  2:31 PM

## 2018-04-13 NOTE — PROGRESS NOTES
2 attempts at PT treatment. Will follow up. 1135: PT refusing. Family in room; encouraged. Continues to refuse. 1445: Off floor for PEG placement.      Thank you,     Imtiaz Umanzor, PT, DPT

## 2018-04-13 NOTE — PROGRESS NOTES
2 [facility]  Hospitalist Division           Inpatient Daily Progress Note        Patient: Ruy Taylor MRN: [id number]  CSN: [id number]    YOB: 1940  Age: 68 y.o. Sex: female    DOA: 4/6/2018 LOS:  LOS: 7 days                    Chief Complaint:  Dehydration, NESS    Subjective:      Awake, nodding yes or no appropriately     Objective:      Visit Vitals    BP 95/74 (BP 1 Location: Left arm, BP Patient Position: At rest)    Pulse 83    Temp 98.3 °F (36.8 °C)    Resp 15    Ht 5' (1.524 m)    Wt 44.9 kg (98 lb 15.8 oz)    SpO2 98%    Breastfeeding No    BMI 19.33 kg/m2       Physical Exam:  General appearance: alert, no distress  Lungs: clear to auscultation throughout, no wheezes   Heart: regular rate and rhythm, S1, S2 normal, no murmur, click, rub or gallop  Abdomen: soft, non tender, non distended.  Normoactive bowel sounds  Extremities: extremities normal, atraumatic, no cyanosis or edema  Skin: Skin color, texture, turgor normal. No rashes or lesions      Intake and Output:  Current Shift:  04/13 0701 - 04/13 1900  In: 250 [I.V.:250]  Out: -   Last three shifts:  04/11 1901 - 04/13 0700  In: 2638.8 [I.V.:2638.8]  Out: 3700 [Urine:3700]    Recent Results (from the past 24 hour(s))   PROTHROMBIN TIME + INR    Collection Time: 04/12/18  4:37 PM   Result Value Ref Range    Prothrombin time 14.2 11.5 - 15.2 sec    INR 1.2 0.8 - 1.2     METABOLIC PANEL, BASIC    Collection Time: 04/12/18  7:44 PM   Result Value Ref Range    Sodium 140 136 - 145 mmol/L    Potassium 2.7 (LL) 3.5 - 5.5 mmol/L    Chloride 104 100 - 108 mmol/L    CO2 25 21 - 32 mmol/L    Anion gap 11 3.0 - 18 mmol/L    Glucose 101 (H) 74 - 99 mg/dL    BUN 4 (L) 7.0 - 18 MG/DL    Creatinine 0.77 0.6 - 1.3 MG/DL    BUN/Creatinine ratio 5 (L) 12 - 20      GFR est AA >60 >60 ml/min/1.73m2    GFR est non-AA >60 >60 ml/min/1.73m2    Calcium 7.9 (L) 8.5 - 10.1 MG/DL   MAGNESIUM    Collection Time: 04/13/18  4:00 AM   Result Value Ref Range    Magnesium 2.9 (H) 1.6 - 2.6 mg/dL   CBC WITH AUTOMATED DIFF    Collection Time: 04/13/18  4:00 AM   Result Value Ref Range    WBC 8.4 4.6 - 13.2 K/uL    RBC 3.44 (L) 4.20 - 5.30 M/uL    HGB 10.5 (L) 12.0 - 16.0 g/dL    HCT 30.7 (L) 35.0 - 45.0 %    MCV 89.2 74.0 - 97.0 FL    MCH 30.5 24.0 - 34.0 PG    MCHC 34.2 31.0 - 37.0 g/dL    RDW 17.2 (H) 11.6 - 14.5 %    PLATELET 684 000 - 426 K/uL    MPV 9.2 9.2 - 11.8 FL    NEUTROPHILS 54 42 - 75 %    BAND NEUTROPHILS 2 0 - 5 %    LYMPHOCYTES 34 20 - 51 %    MONOCYTES 7 2 - 9 %    EOSINOPHILS 0 0 - 5 %    BASOPHILS 0 0 - 3 %    METAMYELOCYTES 3 (H) 0 %    ABS. NEUTROPHILS 4.5 1.8 - 8.0 K/UL    ABS. LYMPHOCYTES 2.9 0.8 - 3.5 K/UL    ABS. MONOCYTES 0.6 0 - 1.0 K/UL    ABS. EOSINOPHILS 0.0 0.0 - 0.4 K/UL    ABS. BASOPHILS 0.0 0.0 - 0.1 K/UL    RBC COMMENTS ANISOCYTOSIS  1+        DF MANUAL     METABOLIC PANEL, COMPREHENSIVE    Collection Time: 04/13/18  4:00 AM   Result Value Ref Range    Sodium 139 136 - 145 mmol/L    Potassium 3.3 (L) 3.5 - 5.5 mmol/L    Chloride 104 100 - 108 mmol/L    CO2 23 21 - 32 mmol/L    Anion gap 12 3.0 - 18 mmol/L    Glucose 94 74 - 99 mg/dL    BUN 5 (L) 7.0 - 18 MG/DL    Creatinine 0.83 0.6 - 1.3 MG/DL    BUN/Creatinine ratio 6 (L) 12 - 20      GFR est AA >60 >60 ml/min/1.73m2    GFR est non-AA >60 >60 ml/min/1.73m2    Calcium 7.8 (L) 8.5 - 10.1 MG/DL    Bilirubin, total 0.8 0.2 - 1.0 MG/DL    ALT (SGPT) 23 13 - 56 U/L    AST (SGOT) 32 15 - 37 U/L    Alk.  phosphatase 78 45 - 117 U/L    Protein, total 5.9 (L) 6.4 - 8.2 g/dL    Albumin 2.7 (L) 3.4 - 5.0 g/dL    Globulin 3.2 2.0 - 4.0 g/dL    A-G Ratio 0.8 0.8 - 1.7     GLUCOSE, POC    Collection Time: 04/13/18  7:57 AM   Result Value Ref Range    Glucose (POC) 106 70 - 110 mg/dL   GLUCOSE, POC    Collection Time: 04/13/18 12:19 PM   Result Value Ref Range    Glucose (POC) 103 70 - 110 mg/dL           Lab Results   Component Value Date/Time    Glucose 94 04/13/2018 04:00 AM    Glucose 101 (H) 04/12/2018 07:44 PM    Glucose 88 04/12/2018 04:10 AM    Glucose 98 04/10/2018 04:30 AM    Glucose 98 04/09/2018 01:49 PM        Assessment/Plan:     Patient Active Problem List   Diagnosis Code    Essential hypertension, malignant I10    Acute renal failure superimposed on stage 2 chronic kidney disease (HCC) N17.9, N18.2    Dehydration E86.0    Abnormality of gait and mobility R26.9    Stroke ([facility]) I63.9    HTN (hypertension) I10    Follow up Z09    Nausea & vomiting R11.2    Nausea and vomiting R11.2    Advanced care planning/counseling discussion Z71.89    Dementia F03.90    Debility R53.81    Severe protein-calorie malnutrition Shirley Rubtong: less than 60% of standard weight) ([facility]) E43    Acute metabolic encephalopathy D13.90    Elevated troponin R74.8       A/P:  Dementia  - Pt at baseline mental status  - For PEG placement today     HTN  - Clonidine  - Metoprolol    Hx CVA  - ASA     DVT prophylaxis  - Heparin       [facility]  Hospitalist Division  Pager:  [phone]  Office:  [phone]

## 2018-04-13 NOTE — ROUTINE PROCESS
Bedside and Verbal shift change report given to Stevan Duverney (oncoming nurse) by Robert Vick RN   (offgoing nurse). Report included the following information SBAR, Kardex, MAR and Recent Results.

## 2018-04-13 NOTE — PROGRESS NOTES
1900 -- Bedside, Verbal and Written shift change report given to [address] (oncoming nurse) by Nargis Brand nurse). Pt approved report to be given with daughter at bedside. Report included the following information SBAR, Kardex, Intake/Output, MAR and Recent Results. Pt educated on NPO status at midnight for PEG tube placement. Pt's daughter Dorothy Ugalde [phone], wants to be call prior to pt being taken down for procedure. Will advise on coming RN, whiteboard update with information. 2015 -- Critical lab value called in by Samy, potassium 2.7. Flowsheet updated, MD not notified, pt has scheduled IV medication to be administered, will continue to monitor.       2237 -- PM medications administered, pt tolerated with ease, will continue to monitor. 0486 61 38 26 -- PM medications administered, pt tolerated with ease, will continue to monitor. Notified by CNA elevated blood pressure 147/106. Pt was medicated, will continue to monitor.     0000 -- Shift reassessment, pt condition unchanged, will continue to monitor. 0100 -- Blood pressure reassessed, 151/100, will continue to monitor. 0245 -- Blood pressure reassessed, 172/100, will continue to monitor. PRN medication administered.      0345 -- Blood pressure reassessed, 133/90, will continue to monitor. 0400--  Shift reassessment, pt condition unchanged, will continue to monitor.         0700 -- Bedside, Verbal and Written shift change report given to [facility] ALONSO KNIGHT   (oncoming nurse) by JOSE J (offgoing nurse). Report included the following information SBAR, Kardex, Intake/Output, MAR and Recent Results. Skin assessment completed.

## 2018-04-14 NOTE — ROUTINE PROCESS
Bedside shift change report given to Doreen [email or web address] (oncoming nurse) by Mabel Beaver RN (offgoing nurse). Report included the following information SBAR, Intake/Output, MAR, Recent Results and Cardiac Rhythm NSR.

## 2018-04-14 NOTE — PROGRESS NOTES
Problem: Falls - Risk of  Goal: *Absence of Falls  Document [place] Fall Risk and appropriate interventions in the flowsheet.    Outcome: Progressing Towards Goal  Fall Risk Interventions:  Mobility Interventions: Assess mobility with egress test, Communicate number of staff needed for ambulation/transfer, OT consult for ADLs, Patient to call before getting OOB, PT Consult for mobility concerns, PT Consult for assist device competence, Strengthening exercises (ROM-active/passive), Utilize walker, cane, or other assitive device, Utilize gait belt for transfers/ambulation    Mentation Interventions: Adequate sleep, hydration, pain control, Door open when patient unattended, Familiar objects from home, Gait belt with transfers/ambulation, Increase mobility, More frequent rounding, Reorient patient, Room close to nurse's station, Toileting rounds, Update white board    Medication Interventions: Patient to call before getting OOB, Teach patient to arise slowly, Utilize gait belt for transfers/ambulation    Elimination Interventions: Call light in reach, Patient to call for help with toileting needs, Toilet paper/wipes in reach, Toileting schedule/hourly rounds    History of Falls Interventions: Consult care management for discharge planning, Door open when patient unattended, Room close to nurse's station, Utilize gait belt for transfer/ambulation

## 2018-04-14 NOTE — PROGRESS NOTES
Problem: Falls - Risk of  Goal: *Absence of Falls  Document [place] Fall Risk and appropriate interventions in the flowsheet.    Outcome: Progressing Towards Goal  Fall Risk Interventions:  Mobility Interventions: Patient to call before getting OOB, PT Consult for mobility concerns, PT Consult for assist device competence    Mentation Interventions: Adequate sleep, hydration, pain control, Door open when patient unattended    Medication Interventions: Patient to call before getting OOB    Elimination Interventions: Call light in reach, Toileting schedule/hourly rounds    History of Falls Interventions: Door open when patient unattended

## 2018-04-14 NOTE — PROGRESS NOTES
2 [facility]  Hospitalist Division           Inpatient Daily Progress Note        Patient: Garrett Bhakta MRN: [id number]  CSN: [id number]    YOB: 1940  Age: 68 y.o. Sex: female    DOA: 4/6/2018 LOS:  LOS: 8 days                    Chief Complaint:  Dehydration, NESS    Subjective:      Awake, speaks in single words and small phrases, NAD    Objective:      Visit Vitals    /83 (BP 1 Location: Left arm)    Pulse 94    Temp 97.5 °F (36.4 °C)    Resp 16    Ht 5' (1.524 m)    Wt 44.9 kg (98 lb 15.8 oz)    SpO2 99%    Breastfeeding No    BMI 19.33 kg/m2       Physical Exam:  General appearance: alert, no distress  Lungs: clear to auscultation throughout, no wheezes   Heart: regular rate and rhythm, S1, S2 normal, no murmur, click, rub or gallop  Abdomen: soft, non tender, non distended.  Normoactive bowel sounds  Extremities: extremities normal, atraumatic, no cyanosis or edema  Skin: Skin color, texture, turgor normal. No rashes or lesions      Intake and Output:  Current Shift:  04/14 0701 - 04/14 1900  In: 300   Out: 500 [Urine:500]  Last three shifts:  04/12 1901 - 04/14 0700  In: 3887.5 [I.V.:3207.5]  Out: 2000 [Urine:2000]    Recent Results (from the past 24 hour(s))   GLUCOSE, POC    Collection Time: 04/13/18  5:00 PM   Result Value Ref Range    Glucose (POC) 120 (H) 70 - 427 mg/dL   METABOLIC PANEL, BASIC    Collection Time: 04/14/18  4:40 AM   Result Value Ref Range    Sodium 139 136 - 145 mmol/L    Potassium 4.0 3.5 - 5.5 mmol/L    Chloride 103 100 - 108 mmol/L    CO2 25 21 - 32 mmol/L    Anion gap 11 3.0 - 18 mmol/L    Glucose 104 (H) 74 - 99 mg/dL    BUN 8 7.0 - 18 MG/DL    Creatinine 0.98 0.6 - 1.3 MG/DL    BUN/Creatinine ratio 8 (L) 12 - 20      GFR est AA >60 >60 ml/min/1.73m2    GFR est non-AA 55 (L) >60 ml/min/1.73m2    Calcium 7.7 (L) 8.5 - 10.1 MG/DL   GLUCOSE, POC    Collection Time: 04/14/18 12:33 PM   Result Value Ref Range Glucose (POC) 161 (H) 70 - 110 mg/dL           Lab Results   Component Value Date/Time    Glucose 104 (H) 04/14/2018 04:40 AM    Glucose 94 04/13/2018 04:00 AM    Glucose 101 (H) 04/12/2018 07:44 PM    Glucose 88 04/12/2018 04:10 AM    Glucose 98 04/10/2018 04:30 AM        Assessment/Plan:     Patient Active Problem List   Diagnosis Code    Essential hypertension, malignant I10    Acute renal failure superimposed on stage 2 chronic kidney disease ([facility]) N17.9, N18.2    Dehydration E86.0    Abnormality of gait and mobility R26.9    Stroke ([facility]) I63.9    HTN (hypertension) I10    Follow up Z09    Nausea & vomiting R11.2    Nausea and vomiting R11.2    Advanced care planning/counseling discussion Z71.89    Dementia F03.90    Debility R53.81    Severe protein-calorie malnutrition Delfin Rump: less than 60% of standard weight) ([facility]) E43    Acute metabolic encephalopathy K04.46    Elevated troponin R74.8       A/P:  Dementia  - Pt at baseline mental status  - s/p peg    HTN  - Clonidine  - Metoprolol    Hx CVA  - ASA     DVT prophylaxis  - Heparin     SNF monday

## 2018-04-14 NOTE — PROGRESS NOTES
Gastrointestinal Progress Note    Patient Name: Liang Carrero    Today's Date: 4/14/2018    Admit Date: 4/6/2018    Subjective: Liang Carrero is a 68 y.o. Female with failure to thrive and underwent EGD with peg tube placement yesterday. No complications noted. She is tolerating tube feeds. No complaints this morning. Current Facility-Administered Medications   Medication Dose Route Frequency    cloNIDine (CATAPRES) 0.2 mg/24 hr patch 1 Patch  1 Patch TransDERmal Q7D    metoprolol (LOPRESSOR) injection 2.5 mg  2.5 mg IntraVENous Q6H    hydrALAZINE (APRESOLINE) 20 mg/mL injection 10 mg  10 mg IntraVENous Q6H PRN    mirtazapine (REMERON) tablet 15 mg  15 mg Oral QHS    aspirin delayed-release tablet 81 mg  81 mg Oral DAILY    atorvastatin (LIPITOR) tablet 80 mg  80 mg Oral QHS    loratadine (CLARITIN) tablet 10 mg  10 mg Oral DAILY PRN    acetaminophen (TYLENOL) tablet 650 mg  650 mg Oral Q4H PRN    ondansetron (ZOFRAN) injection 4 mg  4 mg IntraVENous Q4H PRN    heparin (porcine) injection 5,000 Units  5,000 Units SubCUTAneous Q8H    bisacodyl (DULCOLAX) suppository 10 mg  10 mg Rectal DAILY PRN    docusate (COLACE) 50 mg/5 mL oral liquid 100 mg  100 mg Oral DAILY          Objective:     Physical Exam:    Visit Vitals    BP (!) 128/98 (BP 1 Location: Left arm, BP Patient Position: Supine)  Comment: deep Mock is aware    Pulse 92    Temp 97.4 °F (36.3 °C)    Resp 18    Ht 5' (1.524 m)    Wt 44.9 kg (98 lb 15.8 oz)    SpO2 100%    Breastfeeding No    BMI 19.33 kg/m2     General appearance: alert, cooperative, no distress, appears stated age  Abdomen: soft, non-tender.  Bowel sounds normal. No masses,  no organomegaly, peg site with no discharge, erythema or bleeding, bumper spins without difficulty    Data Review:    Labs: Results:       Chemistry Recent Labs      04/14/18   0440  04/13/18   0400  04/12/18   1944   GLU  104*  94  101*   NA  139  139  140   K  4.0 3. 3*  2.7*   CL  103  104  104   CO2  25  23  25   BUN  8  5*  4*   CREA  0.98  0.83  0.77   CA  7.7*  7.8*  7.9*   AGAP  11  12  11   BUCR  8*  6*  5*   AP   --   78   --    TP   --   5.9*   --    ALB   --   2.7*   --    GLOB   --   3.2   --    AGRAT   --   0.8   --       CBC w/Diff Recent Labs      04/13/18   0400   WBC  8.4   RBC  3.44*   HGB  10.5*   HCT  30.7*   PLT  270   GRANS  54   LYMPH  34   EOS  0      Coagulation Recent Labs      04/12/18   1637   PTP  14.2   INR  1.2       Liver Enzymes Recent Labs      04/13/18   0400   TP  5.9*   ALB  2.7*   AP  78   SGOT  32   ALT  23          Assessment:   1. Failure to thrive. She had EGD with peg tube placement yesterday. Tolerating tube feeds. No complications noted. Will continue tube feeds and continue peg tube nursing care. Recommendation:   1. Continue tube feeds. 2. Continue peg tube nursing care. Will sign off for now.          Aditi Tobar MD  April 14, 2018

## 2018-04-14 NOTE — ROUTINE PROCESS
Bedside, Verbal and Written shift change report given to Michele Forman RN (oncoming nurse) by Devora Peña RN (offgoing nurse). Report included the following information SBAR, Kardex, Intake/Output, MAR, Recent Results, Med Rec Status, Procedure Summary and Cardiac Rhythm NSR w/LBBB.      0730 - Shift assessment completed. Pt alert and oriented x2 to self and place. No respiratory distress noted. No c/o pain reported. Call bell within reach, bed in low position. Will continue to monitor. 1230 - Shift re-assessment completed, no change in pt condition.      1315 - Pt left unit via stretcher for Peg-Tube placement. 1600 - Pt returned to unit via stretcher from Peg-Tube placement. Pt's IVPB Potassium orders re-timed due to pt being off unit, will resume IV Potassium runs. 1630 - Shift re-assessment completed, no change in pt condition. Pt had Peg-Tube in place to mid-abdomen, and dressing clean, dry, and intact. Pt in stable condition at this time, no c/o pain.      Bedside, Verbal and Written shift change report given to Devora Peña RN (oncoming nurse) by Michele Forman RN (offgoing nurse). Report included the following information SBAR, Kardex, Intake/Output, MAR, Recent Results, Med Rec Status, Procedure Summary and Cardiac Rhythm NSR w/LBBB.

## 2018-04-14 NOTE — PROGRESS NOTES
Bedside report received from Eunice Brenner., RN, pi is alert but not communicating, tube feeding at 20ml/hr with a goal of 50 ml/hr, pt tolerating well. HOB elevated at 45 degree, bed lock and low with call light within pt's reach. 1000: Tube feeding adjusted to 30ml/hr, pt tolerating well  1630: Bisacodyl suppository administered. 1400: Tube feeding adjusted to 40ml/hr, pt tolerating well.

## 2018-04-14 NOTE — PROGRESS NOTES
1900  -- Bedside, Verbal and Written shift change report given to [address] (oncoming nurse) by RAINER (offgoing nurse). Report included the following information SBAR, Kardex, Intake/Output, MAR and Recent Results.       2136 -- PM medications administered, pt tolerated with ease, will continue to monitor. 2200 -- PEG tube feeding increased by 10mL, pt currently at goal of 50, pt tolerating with ease.      0030 -- Shift reassessment, pt condition unchanged, will continue to monitor. PEG tubing changed.       0400 --  Shift reassessment, pt condition unchanged, will continue to monitor.          0700 -- Bedside, Verbal and Written shift change report given to [address]) by JOSE J (offgoing nurse). Report included the following information SBAR, Kardex, Intake/Output, MAR and Recent Results. Skin assessment completed.

## 2018-04-15 NOTE — PROGRESS NOTES
Problem: Falls - Risk of  Goal: *Absence of Falls  Document [place] Fall Risk and appropriate interventions in the flowsheet.    Outcome: Progressing Towards Goal  Fall Risk Interventions:  Mobility Interventions: Communicate number of staff needed for ambulation/transfer    Mentation Interventions: Adequate sleep, hydration, pain control, Door open when patient unattended, Reorient patient    Medication Interventions: Evaluate medications/consider consulting pharmacy, Patient to call before getting OOB    Elimination Interventions: Call light in reach, Patient to call for help with toileting needs    History of Falls Interventions: Door open when patient unattended

## 2018-04-15 NOTE — PROGRESS NOTES
Bedside report received on pt, tube feeding at 50 ml, pt tolerating well. 1105: Incontinence care provided, pt tolerated well. 1519: Pt resting quietly in bed, pt's daughter at bedside. 8572: Pt had another episode of loose stool, laxative administered last night, pt on tube feeding. Skin care and arabella care provided, pt tolerated well. Bedside shift change report given to Anand Wood RN (oncoming nurse) by Juma Ackerman RN (offgoing nurse). Report included the following information SBAR, Procedure Summary, Intake/Output, MAR, Recent Results and Cardiac Rhythm NSR.

## 2018-04-15 NOTE — PROGRESS NOTES
2 [facility]  Hospitalist Division           Inpatient Daily Progress Note        Patient: Aranza Reese MRN: [id number]  CSN: [id number]    YOB: 1940  Age: 68 y.o. Sex: female    DOA: 4/6/2018 LOS:  LOS: 9 days                    Chief Complaint:  Dehydration, NESS    Subjective:      Awake, NAD, likely dc tomorrow as tolerating tube feeds    Objective:      Visit Vitals    /79 (BP 1 Location: Left arm, BP Patient Position: At rest)    Pulse 99    Temp 97.4 °F (36.3 °C)    Resp 18    Ht 5' (1.524 m)    Wt 44.9 kg (98 lb 15.8 oz)    SpO2 100%    Breastfeeding No    BMI 19.33 kg/m2       Physical Exam:  General appearance: alert, no distress  Lungs: clear to auscultation throughout, no wheezes   Heart: regular rate and rhythm, S1, S2 normal, no murmur, click, rub or gallop  Abdomen: soft, non tender, non distended.  Normoactive bowel sounds  Extremities: extremities normal, atraumatic, no cyanosis or edema  Skin: Skin color, texture, turgor normal. No rashes or lesions      Intake and Output:  Current Shift:  04/15 0701 - 04/15 1900  In: 160   Out: -   Last three shifts:  04/13 1901 - 04/15 0700  In: 3071.3 [I.V.:821.3]  Out: 1350 [Urine:1350]    Recent Results (from the past 24 hour(s))   GLUCOSE, POC    Collection Time: 04/14/18 12:33 PM   Result Value Ref Range    Glucose (POC) 161 (H) 70 - 110 mg/dL   GLUCOSE, POC    Collection Time: 04/14/18  5:39 PM   Result Value Ref Range    Glucose (POC) 153 (H) 70 - 110 mg/dL           Lab Results   Component Value Date/Time    Glucose 104 (H) 04/14/2018 04:40 AM    Glucose 94 04/13/2018 04:00 AM    Glucose 101 (H) 04/12/2018 07:44 PM    Glucose 88 04/12/2018 04:10 AM    Glucose 98 04/10/2018 04:30 AM        Assessment/Plan:     Patient Active Problem List   Diagnosis Code    Essential hypertension, malignant I10    Acute renal failure superimposed on stage 2 chronic kidney disease (HCC) N17.9, N18.2    Dehydration E86.0    Abnormality of gait and mobility R26.9    Stroke (HCC) I63.9    HTN (hypertension) I10    Follow up Z09    Nausea & vomiting R11.2    Nausea and vomiting R11.2    Advanced care planning/counseling discussion Z71.89    Dementia F03.90    Debility R53.81    Severe protein-calorie malnutrition Delwyn Solum: less than 60% of standard weight) (HCC) E43    Acute metabolic encephalopathy X69.95    Elevated troponin R74.8       A/P:  Dementia  - Pt at baseline mental status  - s/p peg    HTN  - Clonidine  - Metoprolol    Hx CVA  - ASA     DVT prophylaxis  - Heparin     SNF monday

## 2018-04-16 PROBLEM — R65.21 SEPTIC SHOCK (HCC): Status: ACTIVE | Noted: 2018-01-01

## 2018-04-16 PROBLEM — A41.9 SEPTIC SHOCK (HCC): Status: ACTIVE | Noted: 2018-01-01

## 2018-04-16 NOTE — PROGRESS NOTES
0730 Report received from [facility]. Patient received in bed sleeping but responds to tactile stimulation patient is alert but is unable to make all her needs known at this time. Patient is incontinent of bowel and bladder. Patient noted to have frequent loose runny stools. Patient is currently on bed rest, and unable to ambulate at this time. Patient noted on room air, noted with diminished lung sounds. Patient is noted with labia excoriation, with protective barrier applied. Plans are to discharge to SNF when medically stable. Patient currently c/o zero pain and is noted with zero s/s of distress. Patient came in with sleepiness, family states they think it is her bp medications. Patient is noted with failure to thrive, not eating or drinking. Peg Tube has been placed. Tube feeding running Osmolite 1.2 at 50 with 100 q 6 hr H2o flush. Family is noted to be very hyper-vigilent and is very anxious about her having stool on her. Patient has been noted with multiple loose watery stools, MD notified with C-diff precautions and specimen collected. Patient noted with BP 99/73, MD notified with zero new orders. BP dropped to 77/50 MD re-notified with new orders to discontinue Lopressor, Clonidine patch removed from left arm, 1000 cc NS Bolus given, BP after 5oo cc, remains at 77/50, MD re-notified, creatinine elevated, WBC's elevated, new orders to transfer patient to ICU room 2602. BP elevated to 91/58, Pulse 102, resp. 28, o2 sats 100 % at 4 Liters of oxygen via nasal cannula. Bedside and Verbal shift change report given to Ruma Prater. ICU nurse (oncoming nurse) by Karley Ashford RN (offgoing nurse). Report included the following informatiNSR; BBB; Sinus TachSBAR, Kardex, ED Summary, Intake/Output, MAR, Recent Results, Med Rec Status and Cardiac Rhythm NSR; BBB; Sinus Tach.

## 2018-04-16 NOTE — PROGRESS NOTES
Chart reviewed. Pt accepted to  [facility] pending [facility] authorization, which has been started. If SNF denied by [facility], then pt will need to go home with home health. Will cont to follow. Oly Calix RN,ext [phone].

## 2018-04-16 NOTE — ANCILLARY DISCHARGE INSTRUCTIONS
Patient and/or next of kin has been given the [facility] Important Message From Medicare About Your Rights\" letter and all questions were answered.

## 2018-04-16 NOTE — PROGRESS NOTES
Problem: Falls - Risk of  Goal: *Absence of Falls  Document [place] Fall Risk and appropriate interventions in the flowsheet.    Outcome: Progressing Towards Goal  Fall Risk Interventions:  Mobility Interventions: Communicate number of staff needed for ambulation/transfer    Mentation Interventions: Adequate sleep, hydration, pain control, Evaluate medications/consider consulting pharmacy    Medication Interventions: Bed/chair exit alarm, Patient to call before getting OOB, Evaluate medications/consider consulting pharmacy    Elimination Interventions: Bed/chair exit alarm, Call light in reach    History of Falls Interventions: Door open when patient unattended, Evaluate medications/consider consulting pharmacy

## 2018-04-16 NOTE — DISCHARGE SUMMARY
[facility] [place]    Discharge Summary    Patient: Zee Prado MRN: [id number]  CSN: [id number]    YOB: 1940  Age: 68 y.o.   Sex: female    DOA: 4/6/2018 LOS:  LOS: 10 days   Discharge Date:      Admission Diagnoses: NESS (acute kidney injury) ([facility]  dysphagia     Discharge Diagnoses:    Problem List as of 4/16/2018  Date Reviewed: 3/8/2018          Codes Class Noted - Resolved    * (Principal)Acute metabolic encephalopathy [id number]: G93.41  ICD-9-CM: 348.31  4/6/2018 - Present        Elevated troponin ICD-10-CM: R74.8  ICD-9-CM: 790.6  4/6/2018 - Present        Severe protein-calorie malnutrition Lawayne Peto: less than 60% of standard weight) ([facility] ICD-10-CM: E43  ICD-9-CM: 262  3/22/2018 - Present        Advanced care planning/counseling discussion ICD-10-CM: Z71.89  ICD-9-CM: V65.49  3/20/2018 - Present        Dementia ICD-10-CM: F03.90  ICD-9-CM: 294.20  3/20/2018 - Present        Debility ICD-10-CM: R53.81  ICD-9-CM: 799.3  3/20/2018 - Present        Nausea and vomiting ICD-10-CM: R11.2  ICD-9-CM: 787.01  3/14/2018 - Present        Nausea & vomiting ICD-10-CM: R11.2  ICD-9-CM: 787.01  3/12/2018 - Present        Follow up ICD-10-CM: O14  ICD-9-CM: V67.9  3/8/2018 - Present        HTN (hypertension) ICD-10-CM: I10  ICD-9-CM: 401.9  12/14/2015 - Present        Stroke ([facility] ICD-10-CM: I63.9  ICD-9-CM: 434.91  10/4/2015 - Present        Abnormality of gait and mobility ICD-10-CM: R26.9  ICD-9-CM: 781.2  9/30/2015 - Present        Acute renal failure superimposed on stage 2 chronic kidney disease (HCC) ICD-10-CM: N17.9, N18.2  ICD-9-CM: 584.9, 585.2  8/25/2015 - Present        Dehydration ICD-10-CM: E86.0  ICD-9-CM: 276.51  8/25/2015 - Present        Essential hypertension, malignant (Chronic) ICD-10-CM: I10  ICD-9-CM: 401.0  4/25/2014 - Present        RESOLVED: UTI (urinary tract infection) ICD-10-CM: N39.0  ICD-9-CM: 599.0  8/25/2015 - 10/4/2015        RESOLVED: Leukocytosis ICD-10-CM: M40.234  ICD-9-CM: 288.60 8/25/2015 - 10/4/2015              Discharge Condition: Stable    Discharge To: SNF    Consults: Gastroenterology and [facility] Course: 69 y/o  female with hx of hypertension, dementia, recurrent nausea and vomiting for which she has been in the ER for 4 times, recent UTI with treatment and recently discharged 3/2018 following n/v who presented to the ED on 4/6/2018 for acute fatigue that began one day prior, noting excessive tired and drowsy and \"sleeping too much. \"  Per family patient was recently discharged from Rehab the day prior following hospital admission for dehydration secondary to N/V. Of note on prior admission in 3/2018 Work up at the time included - CT Abd/pelvis showed No acute or focal abnormality ,  HIDA scan 3/13/2018 showed patent cystic duct.  Decreased gallbladder ejection fraction of approximately 34 % suggesting gallbladder dyskinesia. Dr. Tay Marcus was consulted on 3/14 and performed an EGD on 3/15/18, findings  -Normal upper endoscopy, with no endoscopic evidence of neoplasia or mucosal abnormality.  -Esophagus empirically dilated with #48 Jessica Cord. Pt's family refused PEG at that admission. She is re admitted this time secondary to fatigue, lethargy. Pt has been refusing to eat or take her medications. Attending had a long discussion with the family concerning goals of care, palliative/comfort measures vs pursuing PEG tube. Initially they did not want to proceed with PEG but later changed their mind. GI consulted again on 4/12/2018 for PEG placement, which was performed on 4/13/2018. She was started on tube feedings and tolerated advancement to goal.  PT worked with pt during hospitalization, recommend long term care. She is stable for discharge to SNF, to follow up with PCP within one week.       Physical Exam:  General appearance: alert to verbal and tactile stimuli, no acute distress noted  Head: Normocephalic, without obvious abnormality, atraumatic  Lungs: clear to auscultation bilaterally  Heart: regular rate and rhythm, S1, S2 normal, no murmur, click, rub or gallop  Abdomen: soft, non-tender. Bowel sounds normal. PEG intact. Extremities: extremities normal, atraumatic, no cyanosis or edema  Skin: Skin color, texture, turgor normal. No rashes or lesions  Neurologic: non-verbal, hx of dementia    Significant Diagnostic Studies: labs:   Recent Results (from the past 24 hour(s))   GLUCOSE, POC    Collection Time: 04/15/18 11:54 AM   Result Value Ref Range    Glucose (POC) 115 (H) 70 - 110 mg/dL   GLUCOSE, POC    Collection Time: 04/15/18  6:18 PM   Result Value Ref Range    Glucose (POC) 140 (H) 70 - 110 mg/dL   GLUCOSE, POC    Collection Time: 04/16/18  1:26 AM   Result Value Ref Range    Glucose (POC) 162 (H) 70 - 110 mg/dL   GLUCOSE, POC    Collection Time: 04/16/18  6:03 AM   Result Value Ref Range    Glucose (POC) 207 (H) 70 - 110 mg/dL   GLUCOSE, POC    Collection Time: 04/16/18  7:58 AM   Result Value Ref Range    Glucose (POC) 131 (H) 70 - 110 mg/dL   GLUCOSE, POC    Collection Time: 04/16/18 11:28 AM   Result Value Ref Range    Glucose (POC) 251 (H) 70 - 110 mg/dL         Discharge Medications:     Current Discharge Medication List      START taking these medications    Details   mirtazapine (REMERON) 15 mg tablet Take 1 Tab by mouth nightly. Qty: 30 Tab, Refills: 0         CONTINUE these medications which have NOT CHANGED    Details   loratadine (CLARITIN) 10 mg tablet Take 1 Tab by mouth daily as needed for Allergies. Indications: SNEEZING  Qty: 30 Tab, Refills: 0      ondansetron (ZOFRAN ODT) 4 mg disintegrating tablet Take 1 Tab by mouth every eight (8) hours as needed for Nausea. Qty: 30 Tab, Refills: 1      atorvastatin (LIPITOR) 80 mg tablet Take 80 mg by mouth nightly.       cloNIDine HCl (CATAPRES) 0.2 mg tablet TAKE 1 TABLET BY MOUTH TWICE DAILY  Qty: 180 Tab, Refills: 5    Comments: **Patient requests 90 days supply**      aspirin delayed-release 81 mg tablet Take 1 Tab by mouth daily. Qty: 1 Tab, Refills: 0      carvedilol (COREG) 25 mg tablet Take 1 Tab by mouth two (2) times a day. Indications: HYPERTENSION  Qty: 60 Tab, Refills: 5    Associated Diagnoses: Medication refill         STOP taking these medications       lisinopril (PRINIVIL, ZESTRIL) 40 mg tablet Comments:   Reason for Stopping:         amLODIPine (NORVASC) 5 mg tablet Comments:   Reason for Stopping:               Activity: Activity as tolerated    Diet: PEG feeding- Osmolite 1.2 roberth @50 ml/hour, free water flushes 100 ml Q6H    Wound Care: Reinforce dressing PRN    Follow-up: Follow up with PCP within one week.     Discharge time: > 35 mins  Cecilio Pérez NP  4/16/2018, 11:39 AM

## 2018-04-16 NOTE — PROGRESS NOTES
1600- Assumed care of pt. Anton and FMS placed. FMS leaking large amounts of liquid stools. FMS removed and manual disimpaction done, large soft formed stool noted. Pt opens eyes to voice and responds to pain on extremities. 1915- Bedside and Verbal shift change report given to Mercy Smith (oncoming nurse) by Georgina Cardenas. Ronn Duarte RN   (offgoing nurse). Report included the following information SBAR, Kardex, ED Summary, Intake/Output, MAR, Recent Results and Cardiac Rhythm SR/ST.

## 2018-04-16 NOTE — PROGRESS NOTES
Pharmacy Dosing Services: Vancomycin    Indication: sepsis    Day of therapy: 0    Other Antimicrobials (Include dose, start day & day of therapy):  levaquin      Loading dose (date given): 1000 mg  Current Maintenance dose: 750 mg IV q48h    Goal Vancomycin Level: 15-20  (Trough 15-20 for most infections, 20 for meningitis/osteomyelitis, pre-HD level ~25)    Vancomycin Level (if drawn): n/a     Significant Cultures: pending    Renal function stable? (unstable defined as SCr increase of 0.5 mg/dL or > 50% increase from baseline, whichever is greater) (Y/N): Y     CAPD, Hemodialysis or Renal Replacement Therapy (Y/N): No     Recent Labs      18   1320  18   0440   CREA  2.56*  0.98   BUN  40*  8   WBC  26.7*   --      Temp (24hrs), Av.8 °F (37.1 °C), Min:97.9 °F (36.6 °C), Max:99.5 °F (37.5 °C)    Creatinine Clearance (Creatinine Clearance (ml/min)): 13 ml/min     Pharmacy will follow daily and adjust medications as appropriate for renal function and/or serum levels.     Thank you,  Roman Santos, PHARMD

## 2018-04-16 NOTE — ROUTINE PROCESS
1930: Assumed care. Resting in bed. HOB elevated. No sob on RA. No discomfort noted at this time. On TF via peg tube. Infusing Osmolite 1.2 roberth. Call light within reach. Daughter at bedside. 2210: Due meds given. 2230: Incontinence care provided. External catheter d/cd. Noted to have ecchymotic area in the right labia majora. Will continue to monitor. 0038: No change from previous assessment. Due meds given. 5592: No change from previous assessment. Incontinence care provided. 0604: Due med given. 0730: Bedside and Verbal shift change report given to [facility], RN (oncoming nurse) by me (offgoing nurse). Report included the following information SBAR, Kardex, Intake/Output, MAR and Recent Results. Incontinence care provided.

## 2018-04-16 NOTE — PROGRESS NOTES
Patient now mildly tachycardic with low bp. Bolus liter of NS. Recheck bp after.    UA and Bcx  On 97% RA so unlikely lung cause  Take off her patch and hold bp meds until resolved    Addendum  Labs back with NESS  Likely hypovolemic   Will start IVF and bolus as above

## 2018-04-16 NOTE — PROGRESS NOTES
Problem: Mobility Impaired (Adult and Pediatric)  Goal: *Acute Goals and Plan of Care (Insert Text)  Physical Therapy Goals  Initiated 4/7/2018 and to be accomplished within 7 day(s)  1. Patient will move from supine to sit and sit to supine , scoot up and down and roll side to side in bed with minimal assistance/contact guard assist.     2.  Patient will transfer from bed to chair and chair to bed with minimal assistance/contact guard assist using the least restrictive device. 3.  Patient will perform sit to stand with minimal assistance/contact guard assist.  4.  Patient will ambulate with minimal assistance/contact guard assist for >/= 10 feet with the least restrictive device. 5.  Patient will demonstrate independence with performance of home exercise program.   Outcome: Not Progressing Towards Goal  [facility]  PHYSICAL THERAPY: Daily Note   INPATIENT: Medicare: Hospital Day: 11     Patient: Stephanie Roblero (68 y.o. female)    Date: 4/16/2018  Primary Diagnosis: NESS (acute kidney injury) ([facility]  dysphagia    Procedure(s) (LRB):  ESOPHAGOGASTRODUODENOSCOPY (EGD) (N/A)  PERCUTANEOUS ENDOSCOPIC GASTROSTOMY TUBE INSERTION (N/A), 3 Days Post-Op,   Precautions: Falls , PEG  ASSESSMENT: Pt presents in bed in fetal position . Seen w rehab tech. Son Wilberto Bojorquez present. Inc stool. Dependent for all mobility. No participation w ROM. Received PROM all extremities without resistance. Provided hygiene two person A . Dependent for rolling. SUP<> SIT is dep. And poor sit tolerance EOB. Inc thoracic kyphosis.         Problems:  Decreased Strength  Decreased ADL/Functional Activities  Decreased Transfer Abilities  Decreased Ambulation Ability/Technique  Decreased Balance  Decreased Activity Tolerance  Decreased Knowledge of Precautions  Decreased Skin Integrity/Hygeine  Decreased Rabun with Home Exercise Program  Decreased Cognition    Interventions: Bed Mobility  Family Education  Range of Motion (ROM)  SUBJECTIVE:  Patient non verbal    OBJECTIVE/TREATMENT:     Past Medical History:   Diagnosis Date    Amblyopia of left eye     Blind left eye     Cataract     bilat, right has been replaced    CRI (chronic renal insufficiency)     Dementia     Essential hypertension, malignant 2014    Hypertension     Left bundle branch block     Otitis media, acute 16    left     Past Surgical History:   Procedure Laterality Date    HX CATARACT REMOVAL Right     HX  SECTION      HX  SECTION      HX  SECTION      HX TONSILLECTOMY                Functional Status      Indep   (I)   Mod I   Stand-by Assist   Contact Guard   Min Assist   Mod Assist   Max assist   Total Assist   Comments   Rolling []  []  []  []    []    []    []  [x]     Supine to sit []  []  []  []  []  []  []  [x]     Sit to supine []  []  []  []  []  []  []  [x]     Sit to stand []  []  []  []  []  []  []  []  N/T   Stand to sit []  []  []  []  []  []  []  []  N/T   Bed to chair transfers []  []  []  []  []  []  []  []  N/T       Balance    Good   Fair   Poor   Unable   Comments   Sitting static []  []  [x]  []     Sitting dynamic []  []  [x]  []     Standing static []  []  []  [x]     Standing dynamic []  []  []  [x]     Reaction Time []  []  []  [x]       Mobility/Gait: Patient unable to perform dynamic mobility (ambulation, wheelchair mobility, etc.) at this time. Functional Measure:   [facility] Sitting Balance Scale  0: Pt performs 25% or less of sitting activity (Max assist) CN, 100% impaired. Therapeutic Exercise:  PROM all extremities. Sitting balance EOB w 2 person A. Pain:   Scale: non verbal  Pre-treatment Intensity 0-10: 0  Post-treatment Intensity 0-10: 0  Activity tolerance:   poor      COMMUNICATION/EDUCATION:   []         Fall prevention education was provided and the patient/caregiver indicated understanding.   [x]         Patient/family have participated as able in goal setting and plan of care. []         Patient/family agree to work toward stated goals and plan of care. []         Patient understands intent and goals of therapy, but is neutral about his/her participation    EDUCATION:   Education:  Family member were educated on the following topics : ROM, positioning. Patient unable to participate    Treatment/Session Assessment:    · After treatment position/precautions:   o Family at bedside  · Side rails x 3   · Clari RN alerted. · HOB > 35 deg  2/2 PEG  · Compliance with Program/Exercises: pt unable to participate 2/2 cognitive barriers. Weakness.    Wil Dumont PTA   Start Time: 0731   Stop Time: 1012   Time Calculation: 27 mins

## 2018-04-17 NOTE — CONSULTS
[facility]  Pulmonary, Critical Care, and Sleep Medicine     Name: Mallorie Rehman MRN: [id number]   : 1940 Hospital: [address] [place] [address]   Date of Service: 2018        Critical Care Consult          Consult requesting physician: Dr. Olga Mcknight  Reason for Consult: hypotension    HISTORY OF PRESENT ILLNESS:     This 68 y.o.  female is seen in consultation at the request of Dr. Olga Mcknight for recommendations on further evaluation and management of hypotension. The patient was admitted on 2018 with complaints of somnolence and lethargy after recently having been discharged from a rehab facility. She struggles with chronic nausea and vomiting and is known to have chronic diverticular disease. She has anorexia and dementia at baseline. Her baseline cognitive function is not clearly documented, and at the time of clinical interview, she is essentially nonverbal. She does say \"Ow\" to pain. She does not follow commands. The patient is critically ill and cannot provide additional history due to Unable to speak. Review of Systems:  Review of systems not obtained due to patient factors.      Allergies   Allergen Reactions    Codeine Unknown (comments)     Unsure due to happening so long    Levaquin [Levofloxacin] Nausea Only     intolerance    Pcn [Penicillins] Hives    Sulfa (Sulfonamide Antibiotics) Unknown (comments)     Unsure it was so long ago          PAST MEDICAL/SOCIAL/FAMILY HISTORIES     Past Medical History:   Diagnosis Date    Amblyopia of left eye     Blind left eye     Cataract     bilat, right has been replaced    CRI (chronic renal insufficiency)     Dementia     Essential hypertension, malignant 2014    Hypertension     Left bundle branch block     Otitis media, acute 16    left      Past Surgical History:   Procedure Laterality Date    HX CATARACT REMOVAL Right     HX  SECTION      HX  SECTION [address]    HX TONSILLECTOMY  1952      Social History   Substance Use Topics    Smoking status: Never Smoker    Smokeless tobacco: Never Used    Alcohol use No      Family History   Problem Relation Age of Onset    Heart Disease Father     Other Brother      ? brain injury      Prior to Admission medications    Medication Sig Start Date End Date Taking? Authorizing Provider   mirtazapine (REMERON) 15 mg tablet Take 1 Tab by mouth nightly. 4/16/18  Yes Lisa Gao MD   loratadine (CLARITIN) 10 mg tablet Take 1 Tab by mouth daily as needed for Allergies. Indications: SNEEZING 3/22/18   Melissa Ocampo NP   lisinopril (PRINIVIL, ZESTRIL) 40 mg tablet Take 1 Tab by mouth daily. 3/19/18   Lisa Gao MD   amLODIPine (NORVASC) 5 mg tablet Take 1 Tab by mouth daily. 3/19/18   Lisa Gao MD   ondansetron (ZOFRAN ODT) 4 mg disintegrating tablet Take 1 Tab by mouth every eight (8) hours as needed for Nausea. 3/5/18   Leana Foy MD   atorvastatin (LIPITOR) 80 mg tablet Take 80 mg by mouth nightly. Gregoria Rooney MD   cloNIDine HCl (CATAPRES) 0.2 mg tablet TAKE 1 TABLET BY MOUTH TWICE DAILY 4/14/17   Leana Foy MD   aspirin delayed-release 81 mg tablet Take 1 Tab by mouth daily. 10/4/15   Carmen Cat MD   carvedilol (COREG) 25 mg tablet Take 1 Tab by mouth two (2) times a day.  Indications: HYPERTENSION 8/10/15   Leana Foy MD     Current Facility-Administered Medications   Medication Dose Route Frequency    0.9% sodium chloride infusion  250 mL/hr IntraVENous ONCE    0.45% sodium chloride infusion  75 mL/hr IntraVENous CONTINUOUS    levoFLOXacin (LEVAQUIN) 500 mg in D5W IVPB  500 mg IntraVENous Q48H    [START ON 4/18/2018] vancomycin (VANCOCIN) 750 mg in 0.9% sodium chloride (MBP/ADV) 250 mL ADV  750 mg IntraVENous Q48H    [START ON 4/17/2018] famotidine (PF) (PEPCID) 20 mg in sodium chloride 0.9% 10 mL injection  20 mg IntraVENous Q12H    [START ON 4/17/2018] cefepime (MAXIPIME) 1 g in 0.9% sodium chloride (MBP/ADV) 50 mL MBP  1 g IntraVENous Q12H    [START ON 4/17/2018] metroNIDAZOLE (FLAGYL) IVPB premix 500 mg  500 mg IntraVENous Q8H    mirtazapine (REMERON) tablet 15 mg  15 mg Oral QHS    aspirin delayed-release tablet 81 mg  81 mg Oral DAILY    atorvastatin (LIPITOR) tablet 80 mg  80 mg Oral QHS    heparin (porcine) injection 5,000 Units  5,000 Units SubCUTAneous Q8H    docusate (COLACE) 50 mg/5 mL oral liquid 100 mg  100 mg Oral DAILY       Objective:     Current Facility-Administered Medications   Medication Dose Route Frequency    0.9% sodium chloride infusion  250 mL/hr IntraVENous ONCE    0.45% sodium chloride infusion  75 mL/hr IntraVENous CONTINUOUS    levoFLOXacin (LEVAQUIN) 500 mg in D5W IVPB  500 mg IntraVENous Q48H    [START ON 4/18/2018] vancomycin (VANCOCIN) 750 mg in 0.9% sodium chloride (MBP/ADV) 250 mL ADV  750 mg IntraVENous Q48H    [START ON 4/17/2018] famotidine (PF) (PEPCID) 20 mg in sodium chloride 0.9% 10 mL injection  20 mg IntraVENous Q12H    [START ON 4/17/2018] cefepime (MAXIPIME) 1 g in 0.9% sodium chloride (MBP/ADV) 50 mL MBP  1 g IntraVENous Q12H    [START ON 4/17/2018] metroNIDAZOLE (FLAGYL) IVPB premix 500 mg  500 mg IntraVENous Q8H    hydrALAZINE (APRESOLINE) 20 mg/mL injection 10 mg  10 mg IntraVENous Q6H PRN    mirtazapine (REMERON) tablet 15 mg  15 mg Oral QHS    aspirin delayed-release tablet 81 mg  81 mg Oral DAILY    atorvastatin (LIPITOR) tablet 80 mg  80 mg Oral QHS    loratadine (CLARITIN) tablet 10 mg  10 mg Oral DAILY PRN    acetaminophen (TYLENOL) tablet 650 mg  650 mg Oral Q4H PRN    ondansetron (ZOFRAN) injection 4 mg  4 mg IntraVENous Q4H PRN    heparin (porcine) injection 5,000 Units  5,000 Units SubCUTAneous Q8H    bisacodyl (DULCOLAX) suppository 10 mg  10 mg Rectal DAILY PRN    docusate (COLACE) 50 mg/5 mL oral liquid 100 mg  100 mg Oral DAILY       Intake/Output:   Last shift:      1901 -  0700  In: 0   Out: 15 [Urine:15]    Last 3 shifts: 04/15 0701 - 1900  In: 2598.8 [I.V.:438.8]  Out: 140 [Urine:140]      Intake/Output Summary (Last 24 hours) at 18 2310  Last data filed at 18 2000   Gross per 24 hour   Intake          1188.75 ml   Output               55 ml   Net          1133.75 ml       Last 3 Recorded Weights in this Encounter    18 1053 18 1449 18 0442   Weight: 44.9 kg (99 lb) 41.7 kg (92 lb) 44.9 kg (98 lb 15.8 oz)       Hemodynamics:   . @MAP  55   . @CVP         Physical Exam:  Vital Signs:    Visit Vitals    BP (!) 76/53    Pulse 96    Temp 97.5 °F (36.4 °C)    Resp 22    Ht 5' (1.524 m)    Wt 44.9 kg (98 lb 15.8 oz)    SpO2 100%    Breastfeeding No    BMI 19.33 kg/m2       O2 Device: Nasal cannula   O2 Flow Rate (L/min): 3 l/min   Temp (24hrs), Av.7 °F (37.1 °C), Min:97.5 °F (36.4 °C), Max:99.5 °F (37.5 °C)       General: alert, awake. No acute distress. uncooperative with exam.  Head: atraumatic, normocephalic  Eye: PERRLA, EOM intact, no scleral icterus. OS cataract. Nose: Nares are patent. No polyps. No exudate. No sinus tenderness. Throat: No oral thrush. No exudate. Mucous membranes are moist. No tonsillar enlargement. Neck: supple, no thyromegaly, no JVD, no lymphadenopathy. Trachea midline  Lung: Symmetric in development and expansion. Good air entry. No crackles. No wheezes. Heart: Regular S1, S2 with 3/6 systolic and diastolic murmur at the left and right parasternal borders  Abdomen: soft, diffusely tender. nondistended. Normoactive bowel sounds. No rebound. Guarding. Extremities: bilateral foot drop. no pedal edema, no cyanosis, no clubbing, 2+ peripheral pulses in DP  Lymphatic: no cervical/axillary/inguinal lymphadenopathy  Neurologic: facial symmetry. Doll's eyes intact. Respirations intact. Babinski negative. DTR [email or web address], 2+@LUE, 2+@RLE, 2+@LLE. Gait was not assessed.   Musculoskeletal: bulk atrophy. Data:     Recent Results (from the past 24 hour(s))   GLUCOSE, POC    Collection Time: 04/16/18  1:26 AM   Result Value Ref Range    Glucose (POC) 162 (H) 70 - 110 mg/dL   GLUCOSE, POC    Collection Time: 04/16/18  6:03 AM   Result Value Ref Range    Glucose (POC) 207 (H) 70 - 110 mg/dL   GLUCOSE, POC    Collection Time: 04/16/18  7:58 AM   Result Value Ref Range    Glucose (POC) 131 (H) 70 - 110 mg/dL   GLUCOSE, POC    Collection Time: 04/16/18 11:28 AM   Result Value Ref Range    Glucose (POC) 251 (H) 70 - 110 mg/dL   CBC WITH AUTOMATED DIFF    Collection Time: 04/16/18  1:20 PM   Result Value Ref Range    WBC 26.7 (H) 4.6 - 13.2 K/uL    RBC 3.48 (L) 4.20 - 5.30 M/uL    HGB 10.4 (L) 12.0 - 16.0 g/dL    HCT 31.7 (L) 35.0 - 45.0 %    MCV 91.1 74.0 - 97.0 FL    MCH 29.9 24.0 - 34.0 PG    MCHC 32.8 31.0 - 37.0 g/dL    RDW 17.8 (H) 11.6 - 14.5 %    PLATELET 048 394 - 805 K/uL    MPV 10.0 9.2 - 11.8 FL    NEUTROPHILS 73 42 - 75 %    BAND NEUTROPHILS 8 (H) 0 - 5 %    LYMPHOCYTES 12 (L) 20 - 51 %    MONOCYTES 3 2 - 9 %    EOSINOPHILS 0 0 - 5 %    BASOPHILS 0 0 - 3 %    METAMYELOCYTES 1 (H) 0 %    MYELOCYTES 2 (H) 0 %    PROMYELOCYTES 1 (H) 0 %    ABS. NEUTROPHILS 19.5 (H) 1.8 - 8.0 K/UL    ABS. LYMPHOCYTES 3.2 0.8 - 3.5 K/UL    ABS. MONOCYTES 0.8 0 - 1.0 K/UL    ABS. EOSINOPHILS 0.0 0.0 - 0.4 K/UL    ABS.  BASOPHILS 0.0 0.0 - 0.1 K/UL    RBC COMMENTS ANISOCYTOSIS  1+        DF MANUAL     METABOLIC PANEL, BASIC    Collection Time: 04/16/18  1:20 PM   Result Value Ref Range    Sodium 138 136 - 145 mmol/L    Potassium 3.9 3.5 - 5.5 mmol/L    Chloride 102 100 - 108 mmol/L    CO2 20 (L) 21 - 32 mmol/L    Anion gap 16 3.0 - 18 mmol/L    Glucose 205 (H) 74 - 99 mg/dL    BUN 40 (H) 7.0 - 18 MG/DL    Creatinine 2.56 (H) 0.6 - 1.3 MG/DL    BUN/Creatinine ratio 16 12 - 20      GFR est AA 22 (L) >60 ml/min/1.73m2    GFR est non-AA 18 (L) >60 ml/min/1.73m2    Calcium 7.9 (L) 8.5 - 10.1 MG/DL   URINALYSIS W/ RFLX MICROSCOPIC    Collection Time: 04/16/18  4:22 PM   Result Value Ref Range    Color CARLI      Appearance TURBID      Specific gravity 1.023 1.005 - 1.030      pH (UA) 5.5 5.0 - 8.0      Protein 100 (A) NEG mg/dL    Glucose NEGATIVE  NEG mg/dL    Ketone NEGATIVE  NEG mg/dL    Bilirubin LARGE (A) NEG      Blood SMALL (A) NEG      Urobilinogen 1.0 0.2 - 1.0 EU/dL    Nitrites POSITIVE (A) NEG      Leukocyte Esterase LARGE (A) NEG     URINE MICROSCOPIC ONLY    Collection Time: 04/16/18  4:22 PM   Result Value Ref Range    WBC TOO NUMEROUS TO COUNT 0 - 4 /hpf    RBC 0 0 - 5 /hpf    Epithelial cells FEW 0 - 5 /lpf    Bacteria 4+ (A) NEG /hpf           No results for input(s): FIO2I, IFO2, HCO3I, IHCO3, HCOPOC, PCO2I, PCOPOC, IPHI, PHI, PHPOC, PO2I, PO2POC in the last 72 hours. No lab exists for component: IPOC2    Recent Labs      04/16/18   1320   WBC  26.7*   HGB  10.4*   HCT  31.7*   PLT  276     Recent Labs      04/16/18   1320  04/14/18   0440   NA  138  139   K  3.9  4.0   CL  102  103   CO2  20*  25   GLU  205*  104*   BUN  40*  8   CREA  2.56*  0.98   CA  7.9*  7.7*     No results for input(s): PH, PCO2, PO2, HCO3, FIO2 in the last 72 hours.     Lab Results   Component Value Date/Time    Color CARLI 04/16/2018 04:22 PM    Appearance TURBID 04/16/2018 04:22 PM    Specific gravity 1.023 04/16/2018 04:22 PM    pH (UA) 5.5 04/16/2018 04:22 PM    Protein 100 (A) 04/16/2018 04:22 PM    Glucose NEGATIVE  04/16/2018 04:22 PM    Ketone NEGATIVE  04/16/2018 04:22 PM    Bilirubin LARGE (A) 04/16/2018 04:22 PM    Urobilinogen 1.0 04/16/2018 04:22 PM    Nitrites POSITIVE (A) 04/16/2018 04:22 PM    Leukocyte Esterase LARGE (A) 04/16/2018 04:22 PM    Epithelial cells FEW 04/16/2018 04:22 PM    Bacteria 4+ (A) 04/16/2018 04:22 PM    WBC TOO NUMEROUS TO COUNT 04/16/2018 04:22 PM    RBC 0 04/16/2018 04:22 PM       Telemetry: normal sinus rhythm    Imaging:  [x] I have personally reviewed the patients radiographs  [] Radiographs reviewed with radiologist  [x] No CXR study available for review today  [] No change from prior study, tubes and lines are in adequate position  [] Improved   []Worsening      ASSESSMENT:   Principal Problem:    Septic shock (RUST 75.) (4/16/2018)    Active Problems:    Acute renal failure superimposed on stage 2 chronic kidney disease (RUST 75.) (8/25/2015)      Dehydration (8/25/2015)      Nausea and vomiting (3/14/2018)      Severe protein-calorie malnutrition Aldair Reichmann: less than 60% of standard weight) (RUST 75.) (3/22/2018)      Acute metabolic encephalopathy (5/8/7713)      Elevated troponin (4/6/2018)      Abnormality of gait and mobility (9/30/2015)      CODE STATUS: PARTIAL/LIMITED      PLAN/RECOMMENDATIONS:   · IV fluids: normal saline 2000 mL bolus  · Add cefepime, Flagyl  · Nutrition: consider changing Osmolite to Perative (elemental feed)  · Check troponin, proBNP  · 12-lead EKG  · Start norepinephrine if MAP < 60 after 2 L bolus  · GI Prophylaxis with famotidine  · DVT Prophylaxis with heparin  · Further recommendations will be based on the patient's response to recommended treatment and results of the investigation ordered. The patient is: [x] acutely ill Risk of deterioration: [] moderate    [x] critically ill  [x] high     My assessment, plan of care, findings, medications, side effects, etc were discussed with:  [x] Nurse [] PT/OT    [] Respiratory therapy []     [] Family [] Patient: answered all questions to satisfaction   [] Pharmacist []      [] Case & management strategies discussed today on multidisciplinary rounds    [x]Total critical care time spent: 90 minutes, reviewing the case, medical record, data, notes, EMR, patient examination, documentation, coordinating care with nurse/consultants, exclusive of procedures (with complex decision making performed and > 50% time spent in face to face evaluation).       Sandra Evans MD  Board Certified by the [employer], Pulmonary Diseases & Critical Care Medicine  4/16/2018

## 2018-04-17 NOTE — ROUTINE PROCESS
1900: assumed care of pt from [facility], [address] [place] [address]. VS stable, no s/s of pain. Will continue to monitor. 2000: Assessment completed. Pt receiving 1L NS bolus now then 250mL NS for 4 hrs. FMS placed for large amount of loose watery stools. TF restarted per Dr. Joseluis Madden at 10mL/hr. Pt's residuals 15mL. 0000: pt tolerating tube feeding with 15mL residual. Increased tube feed to 20mL/hr     0700: Bedside shift change report given to Danay Peralta (oncoming nurse) by Kevan Simmons RN   (offgoing nurse). Report included the following information SBAR, Intake/Output, MAR and Recent Results.

## 2018-04-17 NOTE — PROGRESS NOTES
Problem: Falls - Risk of  Goal: *Absence of Falls  Document [place] Fall Risk and appropriate interventions in the flowsheet.    Outcome: Progressing Towards Goal  Fall Risk Interventions:  Mobility Interventions: Bed/chair exit alarm    Mentation Interventions: Adequate sleep, hydration, pain control, Bed/chair exit alarm, Door open when patient unattended, Reorient patient, More frequent rounding, Self-releasing belt, Toileting rounds, Update white board    Medication Interventions: Bed/chair exit alarm    Elimination Interventions: Bed/chair exit alarm, Call light in reach, Toileting schedule/hourly rounds    History of Falls Interventions: Door open when patient unattended

## 2018-04-17 NOTE — PROGRESS NOTES
2 [facility]  Hospitalist Division        Inpatient Daily Progress Note    Daily progress Note    Patient: Jigna Spencer MRN: [id number]  CSN: [id number]    YOB: 1940  Age: 68 y.o. Sex: female    DOA: 4/6/2018 LOS:  LOS: 11 days                    Chief Complaint:  Septic Shock    Interval History: 67 y/o  female with hx of hypertension, dementia, recurrent nausea and vomiting for which she has been in the ER for 4 times, recent UTI with treatment and recently discharged 3/2018 following n/v who presented to the ED on 4/6/2018 for acute fatigue that began one day prior, noting excessive tired and drowsy and \"sleeping too much. \"  Per family patient was recently discharged from Rehab the day prior following hospital admission for dehydration secondary to N/V. Of note on prior admission in 3/2018 Work up at the time included - CT Abd/pelvis showed No acute or focal abnormality ,  HIDA scan 3/13/2018 showed patent cystic duct.  Decreased gallbladder ejection fraction of approximately 34 % suggesting gallbladder dyskinesia. Dr. Tammy Day was consulted on 3/14 and performed an EGD on 3/15/18, findings  -Normal upper endoscopy, with no endoscopic evidence of neoplasia or mucosal abnormality.  -Esophagus empirically dilated with #48 Leticia Fearing. Pt's family refused PEG at that admission. She is re admitted this time secondary to fatigue, lethargy. Pt has been refusing to eat or take her medications. Attending had a long discussion with the family concerning goals of care, palliative/comfort measures vs pursuing PEG tube. Initially they did not want to proceed with PEG but later changed their mind. GI consulted again on 4/12/2018 for PEG placement, which was performed on 4/13/2018. She was started on tube feedings and tolerated advancement to goal.  PT worked with pt during hospitalization, recommend long term care.   Patient was supposed to d/c on 4/16/2018 but cancelled due to hypotension. She received IV fluid bolus, labs with NESS creatinine 2.56 - -> 2.22, WBC 26.7 - ->21.4 and lactic acid of 2.1 - ->1.6. She was transferred to the ICU for further management, PCCM following. Stool for C-diff pending. Blood cultures collected 4/16/2018 no growth x 16 hours x 1. CXR with minimal patchy density in right mid lung suggesting atelectasis or early infiltrate. UA with positive nitrites, large leukocytes esterase, numerous WBC's and 4+ bacteria. Pt on IV abx. On 4/17/2018 she had high TF residual, TF on hold, for CT abd/pelvis today.       Assessment/Plan:     Patient Active Problem List   Diagnosis Code    Essential hypertension, malignant I10    Acute renal failure superimposed on stage 2 chronic kidney disease (HCC) N17.9, N18.2    Dehydration E86.0    Abnormality of gait and mobility R26.9    Stroke ([facility] [address] I63.9    HTN (hypertension) I10    Follow up Z09    Nausea & vomiting R11.2    Nausea and vomiting R11.2    Advanced care planning/counseling discussion Z71.89    Dementia F03.90    Debility R53.81    Severe protein-calorie malnutrition Debborah Pea: less than 60% of standard weight) ([facility]) E43    Acute metabolic encephalopathy R47.44    Elevated troponin R74.8    Septic shock ([facility]) A41.9, R65.21       A/P:  Septic Shock  - hypotensive requiring IVF bolus, lactic acidosis, leukocytosis  - blood cultures collected 4/16/2018 negative x 1  - UA collected 4/16/2018 with positive nitrates, large leukocyte esterase, 4+ bacteria and numerous WBC's  - stool for c-diff results pending- pt has flexiseal  - CXR 4/16/2018 minimal patchy density in right mid lung suggesting atelectasis or early infiltrate  - continue IV Abx  - trend CBC  - lactic acid now normal  - continue IVF's    Acute renal failure superimposed on CKD 2  - creatinine 0.98 - -> 2.56 - -> 2.22  - continue IVF's  - monitor BMP    N/V  - sp PEG placement by Dr. Dorothea Ramirez  - high TF residual today- currently on hold  - for CT abd/pelvis w/ oral contrast today    Hx of dementia  - pt at baseline mental status    DVT Prophylaxis  - Heparin subcutaneously TID      Ran Osorio, [address]  Pager:  [phone]  Office:  [phone]        Subjective:      Reviewed interval notes- high TF residual today, currently on hold. Son at bedside at time of exam.  For CT abd/pelvis. Objective:      Visit Vitals    /67    Pulse 100    Temp 99.8 °F (37.7 °C)    Resp 25    Ht 5' (1.524 m)    Wt 44.9 kg (98 lb 15.8 oz)    SpO2 100%    Breastfeeding No    BMI 19.33 kg/m2           Physical Exam:  General appearance: alert to verbal and tactile stimuli, no acute distress noted  Head: Normocephalic, without obvious abnormality, atraumatic  Lungs: clear to auscultation bilaterally  Heart: regular rate and rhythm, S1, S2 normal, no murmur, click, rub or gallop  Abdomen: soft, non-tender. Bowel sounds normal. PEG intact.   Extremities: extremities normal, atraumatic, no cyanosis or edema  Skin: Skin color, texture, turgor normal. No rashes or lesions  Neurologic: non-verbal, hx of dementia        Intake and Output:  Current Shift:  04/17 0701 - 04/17 1900  In: 455 [I.V.:375]  Out: 25 [Urine:25]  Last three shifts:  04/15 1901 - 04/17 0700  In: 4758.8 [I.V.:2488.8]  Out: 450 [Urine:250; Drains:200]    Recent Results (from the past 24 hour(s))   CBC WITH AUTOMATED DIFF    Collection Time: 04/16/18  1:20 PM   Result Value Ref Range    WBC 26.7 (H) 4.6 - 13.2 K/uL    RBC 3.48 (L) 4.20 - 5.30 M/uL    HGB 10.4 (L) 12.0 - 16.0 g/dL    HCT 31.7 (L) 35.0 - 45.0 %    MCV 91.1 74.0 - 97.0 FL    MCH 29.9 24.0 - 34.0 PG    MCHC 32.8 31.0 - 37.0 g/dL    RDW 17.8 (H) 11.6 - 14.5 %    PLATELET 003 450 - 746 K/uL    MPV 10.0 9.2 - 11.8 FL    NEUTROPHILS 73 42 - 75 %    BAND NEUTROPHILS 8 (H) 0 - 5 %    LYMPHOCYTES 12 (L) 20 - 51 %    MONOCYTES 3 2 - 9 %    EOSINOPHILS 0 0 - 5 % BASOPHILS 0 0 - 3 %    METAMYELOCYTES 1 (H) 0 %    MYELOCYTES 2 (H) 0 %    PROMYELOCYTES 1 (H) 0 %    ABS. NEUTROPHILS 19.5 (H) 1.8 - 8.0 K/UL    ABS. LYMPHOCYTES 3.2 0.8 - 3.5 K/UL    ABS. MONOCYTES 0.8 0 - 1.0 K/UL    ABS. EOSINOPHILS 0.0 0.0 - 0.4 K/UL    ABS.  BASOPHILS 0.0 0.0 - 0.1 K/UL    RBC COMMENTS ANISOCYTOSIS  1+        DF MANUAL     METABOLIC PANEL, BASIC    Collection Time: 04/16/18  1:20 PM   Result Value Ref Range    Sodium 138 136 - 145 mmol/L    Potassium 3.9 3.5 - 5.5 mmol/L    Chloride 102 100 - 108 mmol/L    CO2 20 (L) 21 - 32 mmol/L    Anion gap 16 3.0 - 18 mmol/L    Glucose 205 (H) 74 - 99 mg/dL    BUN 40 (H) 7.0 - 18 MG/DL    Creatinine 2.56 (H) 0.6 - 1.3 MG/DL    BUN/Creatinine ratio 16 12 - 20      GFR est AA 22 (L) >60 ml/min/1.73m2    GFR est non-AA 18 (L) >60 ml/min/1.73m2    Calcium 7.9 (L) 8.5 - 10.1 MG/DL   CULTURE, BLOOD    Collection Time: 04/16/18  3:19 PM   Result Value Ref Range    Special Requests: NO SPECIAL REQUESTS      Culture result: NO GROWTH AFTER 16 HOURS     URINALYSIS W/ RFLX MICROSCOPIC    Collection Time: 04/16/18  4:22 PM   Result Value Ref Range    Color CARLI      Appearance TURBID      Specific gravity 1.023 1.005 - 1.030      pH (UA) 5.5 5.0 - 8.0      Protein 100 (A) NEG mg/dL    Glucose NEGATIVE  NEG mg/dL    Ketone NEGATIVE  NEG mg/dL    Bilirubin LARGE (A) NEG      Blood SMALL (A) NEG      Urobilinogen 1.0 0.2 - 1.0 EU/dL    Nitrites POSITIVE (A) NEG      Leukocyte Esterase LARGE (A) NEG     URINE MICROSCOPIC ONLY    Collection Time: 04/16/18  4:22 PM   Result Value Ref Range    WBC TOO NUMEROUS TO COUNT 0 - 4 /hpf    RBC 0 0 - 5 /hpf    Epithelial cells FEW 0 - 5 /lpf    Bacteria 4+ (A) NEG /hpf   LACTIC ACID    Collection Time: 04/16/18 11:25 PM   Result Value Ref Range    Lactic acid 2.1 (HH) 0.4 - 2.0 MMOL/L   TROPONIN I    Collection Time: 04/16/18 11:25 PM   Result Value Ref Range    Troponin-I, Qt. 0.02 0.0 - 0.045 NG/ML   NT-PRO BNP    Collection Time: 04/16/18 11:25 PM   Result Value Ref Range    NT pro-BNP 5635 (H) 0 - 1800 PG/ML   EKG, 12 LEAD, SUBSEQUENT    Collection Time: 04/17/18 12:25 AM   Result Value Ref Range    Ventricular Rate 98 BPM    Atrial Rate 98 BPM    P-R Interval 146 ms    QRS Duration 72 ms    Q-T Interval 384 ms    QTC Calculation (Bezet) 490 ms    Calculated P Axis 64 degrees    Calculated R Axis 30 degrees    Calculated T Axis -124 degrees    Diagnosis       Normal sinus rhythm  Possible Left atrial enlargement  ST & T wave abnormality, consider inferior ischemia  ST & T wave abnormality, consider anterolateral ischemia  Prolonged QT  Abnormal ECG  When compared with ECG of 11-APR-2018 11:56,  Questionable change in QRS duration     GLUCOSE, POC    Collection Time: 04/17/18 12:38 AM   Result Value Ref Range    Glucose (POC) 100 70 - 132 mg/dL   METABOLIC PANEL, BASIC    Collection Time: 04/17/18  3:50 AM   Result Value Ref Range    Sodium 139 136 - 145 mmol/L    Potassium 4.0 3.5 - 5.5 mmol/L    Chloride 105 100 - 108 mmol/L    CO2 15 (L) 21 - 32 mmol/L    Anion gap 19 (H) 3.0 - 18 mmol/L    Glucose 45 (LL) 74 - 99 mg/dL    BUN 48 (H) 7.0 - 18 MG/DL    Creatinine 2.22 (H) 0.6 - 1.3 MG/DL    BUN/Creatinine ratio 22 (H) 12 - 20      GFR est AA 26 (L) >60 ml/min/1.73m2    GFR est non-AA 21 (L) >60 ml/min/1.73m2    Calcium 7.4 (L) 8.5 - 10.1 MG/DL   CBC WITH AUTOMATED DIFF    Collection Time: 04/17/18  6:00 AM   Result Value Ref Range    WBC 21.4 (H) 4.6 - 13.2 K/uL    RBC 2.73 (L) 4.20 - 5.30 M/uL    HGB 8.1 (L) 12.0 - 16.0 g/dL    HCT 25.0 (L) 35.0 - 45.0 %    MCV 91.6 74.0 - 97.0 FL    MCH 29.7 24.0 - 34.0 PG    MCHC 32.4 31.0 - 37.0 g/dL    RDW 17.8 (H) 11.6 - 14.5 %    PLATELET 517 807 - 257 K/uL    MPV 9.9 9.2 - 11.8 FL    NEUTROPHILS 82 (H) 40 - 73 %    BAND NEUTROPHILS 5 %    LYMPHOCYTES 3 (L) 21 - 52 %    MONOCYTES 4 3 - 10 %    EOSINOPHILS 0 0 - 5 %    BASOPHILS 0 0 - 2 %    METAMYELOCYTES 2 %    MYELOCYTES 3 %    PROMYELOCYTES 1 %    ABS. NEUTROPHILS 17.5 (H) 1.8 - 8.0 K/UL    ABS. LYMPHOCYTES 0.6 (L) 0.9 - 3.6 K/UL    ABS. MONOCYTES 0.9 0.05 - 1.2 K/UL    ABS. EOSINOPHILS 0.0 0.0 - 0.4 K/UL    ABS. BASOPHILS 0.0 0.0 - 0.06 K/UL    PLATELET COMMENTS few giant platelets     RBC COMMENTS ANISOCYTOSIS  1+        RBC COMMENTS POLYCHROMASIA  1+        RBC COMMENTS occasional elliptocytes  occasional schistocytes       WBC COMMENTS few smudge cells     DF MANUAL     GLUCOSE, POC    Collection Time: 04/17/18  6:20 AM   Result Value Ref Range    Glucose (POC) 118 (H) 70 - 110 mg/dL   LACTIC ACID    Collection Time: 04/17/18  9:00 AM   Result Value Ref Range    Lactic acid 1.6 0.4 - 2.0 MMOL/L   GLUCOSE, POC    Collection Time: 04/17/18 12:21 PM   Result Value Ref Range    Glucose (POC) 103 70 - 110 mg/dL           Lab Results   Component Value Date/Time    Glucose 45 (LL) 04/17/2018 03:50 AM    Glucose 205 (H) 04/16/2018 01:20 PM    Glucose 104 (H) 04/14/2018 04:40 AM    Glucose 94 04/13/2018 04:00 AM    Glucose 101 (H) 04/12/2018 07:44 PM        Imaging:  Xr Chest Sngl V    Result Date: 4/16/2018  EXAM: AP portable semiupright chest INDICATION: Leukocytosis tachypnea COMPARISON: April 11, 2018 _______________ FINDINGS: Cardiac size is enlarged partly due to AP magnification. The mediastinal contours are normal.  Minimal patchy density seen in the right midlung suggesting atelectasis and/or minimal early infiltrate There are no effusions or pneumothorax. _______________     IMPRESSION: Minimal patchy density in the right mid lung suggesting atelectasis or early infiltrate.  Follow-up recommended       Medication List Reviewed:  Current Facility-Administered Medications   Medication Dose Route Frequency    iohexol (OMNIPAQUE) solution 50 mL  50 mL Oral RAD ONCE    [START ON 4/18/2018] cefepime (MAXIPIME) 1 g in 0.9% sodium chloride (MBP/ADV) 50 mL MBP  1 g IntraVENous Q24H    [START ON 4/18/2018] famotidine (PEPCID) tablet 20 mg  20 mg Oral DAILY    VANCOMYCIN INFORMATION NOTE   Other Rx Dosing/Monitoring    VANCOMYCIN INFORMATION NOTE   Other ONCE    0.45% sodium chloride infusion  75 mL/hr IntraVENous CONTINUOUS    levoFLOXacin (LEVAQUIN) 500 mg in D5W IVPB  500 mg IntraVENous Q48H    metroNIDAZOLE (FLAGYL) IVPB premix 500 mg  500 mg IntraVENous Q8H    hydrALAZINE (APRESOLINE) 20 mg/mL injection 10 mg  10 mg IntraVENous Q6H PRN    mirtazapine (REMERON) tablet 15 mg  15 mg Oral QHS    aspirin delayed-release tablet 81 mg  81 mg Oral DAILY    atorvastatin (LIPITOR) tablet 80 mg  80 mg Oral QHS    loratadine (CLARITIN) tablet 10 mg  10 mg Oral DAILY PRN    acetaminophen (TYLENOL) tablet 650 mg  650 mg Oral Q4H PRN    ondansetron (ZOFRAN) injection 4 mg  4 mg IntraVENous Q4H PRN    heparin (porcine) injection 5,000 Units  5,000 Units SubCUTAneous Q8H    bisacodyl (DULCOLAX) suppository 10 mg  10 mg Rectal DAILY PRN    docusate (COLACE) 50 mg/5 mL oral liquid 100 mg  100 mg Oral DAILY

## 2018-04-17 NOTE — PROGRESS NOTES
[facility]  04/16/18 04/16 1901 - 04/17 0700  In: 2995 [I.V.:1975]  Out: 185 [Urine:185]    Last 3 Recorded Weights in this Encounter    04/06/18 1053 04/06/18 1449 04/13/18 0442   Weight: 44.9 kg (99 lb) 41.7 kg (92 lb) 44.9 kg (98 lb 15.8 oz)       Overnight Shift Events:  1915:   · Bedside and verbal shift change report received from Lupe Felipe, off-going RN. Provider's instructions/meds/plan for current shift reviewed by this writer; ongoing education rendered at this time. Rate verify on IV fluids/gtts. · Pt lying in bed, call bell within reach, bed in lowest position, side rails engaged x3 for pt safety; will continue to monitor pt.    2300:   · Dr. Georges Basilio on the unit - MD asked by this RN to assess pt 2/2 pt being hypotensive. MD reviewed chart, & assessed pt. Awaiting further instruction post MD pérez.      2310:   · New orders received and to be carried out as ordered. 2325:   · Lactic acid, troponin & pro-BNP drawn by phlebotomy at this time & taken for processing. 0001:   · Critical lactic acid of 2.1 addressed verbally face-to-face with Dr. Georges Basilio by this RN; no new orders at this time. 0025:   · Subsequent EKG completed at this time & placed on pts hard chart. 4327:   · Liter bolus administered as ordered. 0150:   · First liter bolus completed at this time. BP greatly improved. See flowsheets. Second liter bolus to be held @ this time 2/2 elevated pro-BNP & improved BP s/p completion of first bolus. Will continue to monitor & evaluate pts need for second bolus. 0315:   · FMS inserted by this RN 2/2 incontinent episodes of watery stool. Pt tolerated insertion of FMS; will continue to monitor. 0500:   · FMS repositioned 2/2 stool leaking around tubing. Will continue to monitor for any further leakage around tubing s/p repositioning.        56:   · Pts son called to this RN to check on his mothers status overnight - 4 digit PIN provided to writer by son & updates given. Son expressed his thanks in regards to overnight interventions & all questions were answered to his satisfaction. Will continue to monitor pt.      0618:   · Critical glucose called to this writer by Henok in lab; accucheck performed prior to intervening & glucose currently reads 118. Pt without signs or symptoms of hypoglycemia & continues to have tube feedings running at goal rate. Will continue to monitor. 0720:   · Bedside and Verbal shift change report given to Mayra Moss RN (oncoming nurse) by Adams Henderson RN(offgoing nurse). Report included the following information SBAR, Kardex, Procedure Summary, Intake/Output, MAR and Recent Results. Normal Sinus Rhythm.         Date 04/16/18 0700 - 04/17/18 0659 04/17/18 0700 - 04/18/18 0659   Shift 1821-7840 6474-3710 24 Hour Total 0393-8206 2250-3271 24 Hour Total   I  N  T  A  K  E   I.V.  (mL/kg/hr) 438.8  (0.8) 1975 2413.8         Volume (0.45% sodium chloride infusion) 88.8 825 913.8         Volume (0.9% sodium chloride infusion)  1000 1000         Volume (levoFLOXacin (LEVAQUIN) 500 mg in D5W IVPB) 100  100         Volume (vancomycin (VANCOCIN) 1,000 mg in 0.9% sodium chloride (MBP/ADV) 250 mL adv) 250  250         Volume (cefepime (MAXIPIME) 1 g in 0.9% sodium chloride (MBP/ADV) 50 mL MBP)  50 50         Volume (metroNIDAZOLE (FLAGYL) IVPB premix 500 mg)  100 100       Other  0 0         Irrigation Volume Input (mL) (Urinary Catheter 04/16/18 Antno)  0 0       NG/ 1020 1170         Water Flush Volume (mL) (PEG/Gastrostomy Tube 04/13/18)  230 230         Medication Volume (PEG/Gastrostomy Tube 04/13/18)  30 30         Intake (ml) (PEG/Gastrostomy Tube 04/13/18) 150 760 910       Shift Total  (mL/kg) 588.8  (13.1) 2995  (66.7) 3583.8  (79.8)      O  U  T  P  U  T   Urine  (mL/kg/hr) 15  (0) 210 225         Urine Occurrence(s) 1 x  1 x         Urine Output (mL) (Urinary Catheter 04/16/18 Anton) 15 450 712 Stool            Stool Occurrence(s) 10 x 3 x 13 x       Shift Total  (mL/kg) 15  (0.3) 210  (4.7) 225  (5)      .8 2785 3358.8      Weight (kg) 44.9 44.9 44.9 44.9 44.9 44.9

## 2018-04-17 NOTE — PROGRESS NOTES
[facility]: This patient meets P & T approved criteria for conversion from IV to oral therapy for the following medication:     Famotidine 20 mg IV q24h was discontinued and Famotidine 20 mg PO q24 was started. If the patient no longer meets all criteria for oral therapy, the pharmacist will switch back to IV therapy. Thanks.

## 2018-04-17 NOTE — PROGRESS NOTES
Problem: Falls - Risk of  Goal: *Absence of Falls  Document [place] Fall Risk and appropriate interventions in the flowsheet.    Outcome: Progressing Towards Goal  Fall Risk Interventions:  Mobility Interventions: Bed/chair exit alarm    Mentation Interventions: Door open when patient unattended, Update white board, Evaluate medications/consider consulting pharmacy, Adequate sleep, hydration, pain control, More frequent rounding, Reorient patient    Medication Interventions: Evaluate medications/consider consulting pharmacy    Elimination Interventions: Call light in reach, Toileting schedule/hourly rounds    History of Falls Interventions: Evaluate medications/consider consulting pharmacy, Consult care management for discharge planning

## 2018-04-17 NOTE — PROGRESS NOTES
Physical Exam   Skin: Skin is warm and dry. Ecchymosis noted.           Skin assessed with Irma Arguelles RN

## 2018-04-17 NOTE — PROGRESS NOTES
Pharmacy adjusting dose for Cefepime (Maxipime) per renal protocol. Was on a regimen of: 1 gm IV q12h. Labs:   Serum Creatinine - 2.4 mg/dl  CrCl - 15 ml/min    Plan:  Changed Cefepime to 1 gm IV q24h. Pharmacy to follow and will redose based on renal function changes. Thanks.

## 2018-04-17 NOTE — PROGRESS NOTES
Assumed care of patient resting in bed. Report received from off- going nurse, [facility], [address] [place] [address]. Tube feeding continues to infuse at 50ml/hr. HOB elevated. Will continue to monitor. 4980: residual noted > 250ml, TF held at this time. Patient scheduled for CT this am but due to increased residual will hold off, Dr. Bozena Valencia made aware. 1200: residual noted at 200ml. TF continues to be held. Dr. Bozena Valencia made aware. Will administer half of oral contrast for pending CT.     1300: off floor to CT.     1928: Bedside and Verbal shift change report given to Jayson Kline RN (oncoming nurse) by Virgil Cates RN   (offgoing nurse). Report included the following information SBAR, ED Summary, Intake/Output, MAR, Recent Results, Cardiac Rhythm sinus tach and Alarm Parameters .

## 2018-04-17 NOTE — DIABETES MGMT
Nutrition follow up/Plan of care      RECOMMENDATIONS:     1. Tube feeding when pt able to tolerate: Osmolite 1.2 roberth at  50 ml/hr  providing 1440 calories, 67 grams protein/day and 984 ml free water/day (from TF). 2.  RD to follow     GOALS:     1. Monitor tube feeding tolerance to ensure it meets >75% of protein/calorie needs by 4/19/18  2. Ongoing: Weight Maintenance (+/- 1-2 lb) by 4/17    ASSESSMENT:     Wt status is classified as underweight per BMI of 18.0. Pt w/ 30 lb or 23% weight loss over the past 3 months. Meets criteria for severe acute malnutrition. Patient is not meeting nutrition needs through po intake due to refusing meals and supplements since admission and now relying on enteral feedings via PEG. Weight has been stable over past week. Labs noted. Pt w/ hypocalcemia, hypoalbuminemia. Nutrition recommendations listed. RD to follow. Nutrition Diagnoses:   Unintentional weight loss related to decreased appetite with failure to thrive as evidenced by around a 30 lb or 23% loss over the past 3 months. Meets Criteria for Acute Malnutrition   [x] Severe Malnutrition, as evidenced by:   [] Moderate muscle wasting, loss of subcutaneous fat   [x] Nutritional intake of <50% of recommended intake for >5 days   [x] Weight loss of >1-2% in 1 week, >5% in 1 month, >7.5% in 3 months, or >10% in 6 months   [] Moderate-severe edema        Nutrition Risk:  [x] High  [] Moderate []  Low    SUBJECTIVE/OBJECTIVE:     Pt admitted for NESS. PMHx including HTN, cataract, dementia and renal insufficiency. Patient has been with a poor appetite since Dec 2017 and has continued to lose weight now equating to around 30 lb or 23% loss. Pt appears very thin. 4/10/18:  Calorie count ordered by MD to determine need for PEG. Per documentation patient with sips of estella milk for lunch and sips of ginger ale for dinner on 4/7 (<10% nutrition needs).  No documentation for 4/8, 4/9 or 4/10 due to patient refusing meals and fluids. Patient refused both breakfast and lunch meals today. Sleeping during visit today. Spoke to pt yesterday on 4/9 and patient did not want to eat any lunch despite encouragement. Encouraged pt to try a few sips of Ensure Clear supplement and patient adamantly stated \"No!\". 4/17/18:  PEG placement 4/13/18. Pt receiving enteral feedings but currently on hold due to high residuals >250 ml. Pt not taking any po at this time. History of N&V noted with GI work up at prior admission being non-diagnostic per chart review. Information Obtained from:    [x] Chart Review   [] Patient   [] Family/Caregiver   [] Nurse/Physician   [x] Interdisciplinary Meeting/Rounds    Diet: pt was receiving Osmolite 1.2 roberth tube feeding via PEG at .  TF is now on hold duet of high residuals >250 ml. Medications: [x] Reviewed  IV: 1/2 NaCl @ 75 mL/hr  Lipitor, Flagyl  Allergies: [x] Reviewed   Encounter Diagnoses     ICD-10-CM ICD-9-CM   1. Acute renal insufficiency N28.9 593.9   2. Elevated troponin R74.8 790.6   3.  Altered mental status, unspecified altered mental status type R41.82 780.97     Past Medical History:   Diagnosis Date    Amblyopia of left eye     Blind left eye     Cataract     bilat, right has been replaced    CRI (chronic renal insufficiency)     Dementia     Essential hypertension, malignant 4/25/2014    Hypertension     Left bundle branch block     Otitis media, acute 7/2/16    left      Labs:    Lab Results   Component Value Date/Time    Sodium 139 04/17/2018 03:50 AM    Potassium 4.0 04/17/2018 03:50 AM    Chloride 105 04/17/2018 03:50 AM    CO2 15 (L) 04/17/2018 03:50 AM    Anion gap 19 (H) 04/17/2018 03:50 AM    Glucose 45 (LL) 04/17/2018 03:50 AM    BUN 48 (H) 04/17/2018 03:50 AM    Creatinine 2.22 (H) 04/17/2018 03:50 AM    Calcium 7.4 (L) 04/17/2018 03:50 AM    Magnesium 2.9 (H) 04/13/2018 04:00 AM    Phosphorus 4.1 10/04/2015 06:44 AM    Albumin 2.7 (L) 04/13/2018 04:00 AM Anthropometrics: BMI (calculated): 19.3  Last 3 Recorded Weights in this Encounter    04/06/18 1053 04/06/18 1449 04/13/18 0442   Weight: 44.9 kg (99 lb) 41.7 kg (92 lb) 44.9 kg (98 lb 15.8 oz)      Ht Readings from Last 1 Encounters:   04/14/18 5' (1.524 m)     Weight Metrics 4/13/2018 3/12/2018 3/8/2018 3/2/2018 2/19/2018 2/17/2018 10/3/2017   Weight 98 lb 15.8 oz 109 lb 95 lb 95 lb 107 lb 14.4 oz 120 lb 125 lb   BMI 19.33 kg/m2 21.29 kg/m2 18.55 kg/m2 18.55 kg/m2 21.07 kg/m2 23.44 kg/m2 24.41 kg/m2     Patient Vitals for the past 100 hrs:   % Diet Eaten   04/14/18 1451 0 %   04/14/18 0929 0 %   04/13/18 1305 0 %     IBW: 100 lb %IBW: 92% UBW: 125-130 lb  [x] Weight Loss [] Weight Gain [] Weight Stable    Estimated Nutrition Needs: [x] MSJ  [] Other:  Calories: 3372-2813 kcal Based on:   [x] Actual BW    Protein:   55-65 g Based on:   [x] Actual BW    Fluid:       4696-7996 ml Based on:   [x] Actual BW      [x] No Cultural, Gnosticism or ethnic dietary need identified.     [] Cultural, Gnosticism and ethnic food preferences identified and addressed     Wt Status:  [] Normal (18.6 - 24.9) [x] Underweight (<18.5) [] Overweight (25 - 29.9) [] Mild Obesity (30 - 34.9)  [] Moderate Obesity (35 - 39.9) [] Morbid Obesity (40+)     Nutrition Problems Identified:   [x] Suboptimal PO intake   [] Food Allergies  [] Difficulty chewing/swallowing/poor dentition  [] Constipation/Diarrhea (Last BM 4/9)  [] Nausea/Vomiting   [] None  [] Other:     Plan:   [] Therapeutic Diet  []  Obtained/adjusted food preferences/tolerances and/or snacks options   []  Dietary supplements   [] Occupational therapy following for feeding techniques  []  HS snack added   []  Modify diet texture   []  Modify diet for food allergies   []  Assist with menu selection   []  Monitor PO intake on meal rounds   [x]  Continue inpatient monitoring and intervention   []  Participated in discharge planning/Interdisciplinary rounds/Team meetings   [x]  Other: Tube feeding  Education Needs:   [x] Not appropriate for teaching at this time    [] Identified and addressed    Nutrition Monitoring and Evaluation:  [x] Continue ongoing monitoring and intervention  [] Other    Ellie Dear, [address] [place] [address], E   Office:  [address] [place] [address] Pager:  [phone]

## 2018-04-17 NOTE — PROGRESS NOTES
[facility]   Pulmonary, Critical Care, and Sleep Medicine     Critical Care Progress Note    Name: Hiwot Marino   : 1940   MRN: [id number]   Date: 2018                                 [x]I have reviewed the flowsheet and previous days notes. Events, vitals, medications and notes from last 24 hours reviewed. Care plan discussed on multidisciplinary rounds. [] The patient is unable to give any meaningful history or review of systems because the patient is:  [] Intubated [] Sedated   [] Unresponsive []      []The patient is critically ill on      [] Mechanical ventilation [] Vasoactive agents   [] BiPAP [] Inotropes                 SUBJECTIVE  - This 68 y.o.  female is seen in consultation at the request of Dr. Stephen Martinez for recommendations on further evaluation and management of hypotension. The patient was admitted on 2018 with complaints of somnolence and lethargy after recently having been discharged from a rehab facility. She struggles with chronic nausea and vomiting and is known to have chronic diverticular disease. She has anorexia and dementia at baseline. Her baseline cognitive function is not clearly documented, and at the time of clinical interview, she is essentially nonverbal. She does say \"Ow\" to pain. She does not follow commands.    - Overnight events: alert, follows commands. MAP > 60. BP improved with IV fluid bolus (1 L). Vasopressors never started. Lactic acid was 2.1-->1.6. WBC downtrending. Afebrile. High residuals (> 250 mL) with TF. U/O 10-15 mL/hr. On IV fluids: 1/2NS @ 75 mL/hr. [x] Nutrition: Osmolite 1.2  [x] BM today. ROS: Review of systems not obtained due to patient factors.     Past Medical History:  Past Medical History:   Diagnosis Date    Amblyopia of left eye     Blind left eye     Cataract     bilat, right has been replaced    CRI (chronic renal insufficiency)     Dementia     Essential hypertension, malignant 4/25/2014    Hypertension     Left bundle branch block     Otitis media, acute 7/2/16    left        Allergy:  Allergies   Allergen Reactions    Codeine Unknown (comments)     Unsure due to happening so long    Levaquin [Levofloxacin] Nausea Only     intolerance    Pcn [Penicillins] Hives    Sulfa (Sulfonamide Antibiotics) Unknown (comments)     Unsure it was so long ago          Current Facility-Administered Medications:     iohexol (OMNIPAQUE) solution 50 mL, 50 mL, Oral, RAD ONCE, Sara Greene, DO    0.45% sodium chloride infusion, 75 mL/hr, IntraVENous, CONTINUOUS, Chrissy Mendes MD, Last Rate: 75 mL/hr at 04/17/18 0513, 75 mL/hr at 04/17/18 0513    levoFLOXacin (LEVAQUIN) 500 mg in D5W IVPB, 500 mg, IntraVENous, Q48H, Chrissy Mendes MD, Last Rate: 100 mL/hr at 04/16/18 1656, 500 mg at 04/16/18 1656    [COMPLETED] vancomycin (VANCOCIN) 1,000 mg in 0.9% sodium chloride (MBP/ADV) 250 mL adv, 1,000 mg, IntraVENous, ONCE, Stopped at 04/16/18 1900 **FOLLOWED BY** [START ON 4/18/2018] vancomycin (VANCOCIN) 750 mg in 0.9% sodium chloride (MBP/ADV) 250 mL ADV, 750 mg, IntraVENous, Q48H, Chrissy Mendes MD    famotidine (PF) (PEPCID) 20 mg in sodium chloride 0.9% 10 mL injection, 20 mg, IntraVENous, Q24H, Margy Toro MD, 20 mg at 04/17/18 0006    cefepime (MAXIPIME) 1 g in 0.9% sodium chloride (MBP/ADV) 50 mL MBP, 1 g, IntraVENous, Q12H, Margy Toro MD, Last Rate: 100 mL/hr at 04/17/18 0011, 1 g at 04/17/18 0011    metroNIDAZOLE (FLAGYL) IVPB premix 500 mg, 500 mg, IntraVENous, Q8H, Margy Toro MD, Last Rate: 100 mL/hr at 04/17/18 0839, 500 mg at 04/17/18 0839    hydrALAZINE (APRESOLINE) 20 mg/mL injection 10 mg, 10 mg, IntraVENous, Q6H PRN, Jaden Fried MD, 10 mg at 04/13/18 0250    mirtazapine (REMERON) tablet 15 mg, 15 mg, Oral, QHS, Sara Greene DO, Stopped at 04/16/18 2145    aspirin delayed-release tablet 81 mg, 81 mg, Oral, DAILY, Roc Smith NP, 81 mg at 18 0839    atorvastatin (LIPITOR) tablet 80 mg, 80 mg, Oral, QHS, Ranjeet Sebastianus, NP, 80 mg at 18 2145    loratadine (CLARITIN) tablet 10 mg, 10 mg, Oral, DAILY PRN, Ranjeet Maldonado, NP    acetaminophen (TYLENOL) tablet 650 mg, 650 mg, Oral, Q4H PRN, Ranjeet Maldonado, NP    ondansetron [facility]) injection 4 mg, 4 mg, IntraVENous, Q4H PRN, Jenet Romulous, NP, 4 mg at 18 1136    heparin (porcine) injection 5,000 Units, 5,000 Units, SubCUTAneous, Q8H, Jenbrianne Maldonado, NP, 5,000 Units at 18 3083    bisacodyl (DULCOLAX) suppository 10 mg, 10 mg, Rectal, DAILY PRN, Jenet Romulous, NP, 10 mg at 18 1630    docusate (COLACE) 50 mg/5 mL oral liquid 100 mg, 100 mg, Oral, DAILY, Kina Reza MD, Stopped at 18 1018      OBJECTIVE  Vital Signs:    Visit Vitals    /75    Pulse (!) 106    Temp 98.1 °F (36.7 °C)    Resp 17    Ht 5' (1.524 m)    Wt 44.9 kg (98 lb 15.8 oz)    SpO2 100%    Breastfeeding No    BMI 19.33 kg/m2       O2 Device: Nasal cannula   O2 Flow Rate (L/min): 3 l/min   Temp (24hrs), Av.2 °F (36.8 °C), Min:97.5 °F (36.4 °C), Max:98.8 °F (37.1 °C)       PHYSICAL EXAM:   General: alert, awake. No acute distress. uncooperative with exam.  Head: atraumatic, normocephalic  Eye: PERRLA, EOM intact, no scleral icterus. OS cataract. Nose: Nares are patent. No polyps. No exudate. No sinus tenderness. Throat: No oral thrush. No exudate. Mucous membranes are moist. No tonsillar enlargement. Neck: supple, no thyromegaly, no JVD, no lymphadenopathy. Trachea midline  Lung: Symmetric in development and expansion. Good air entry. No crackles. No wheezes. Heart: Regular S1, S2 with 3/6 systolic and diastolic murmur at the left and right parasternal borders  Abdomen: soft, diffusely tender. nondistended. Normoactive bowel sounds. No rebound. Guarding. Extremities: bilateral foot drop.  no pedal edema, no cyanosis, no clubbing, 2+ peripheral pulses in DP  Lymphatic: no cervical/axillary/inguinal lymphadenopathy  Neurologic: facial symmetry. Doll's eyes intact. Respirations intact. Babinski negative. DTR [email or web address], [email or web address], 2+@[email or web address], 2+@LLE. Gait was not assessed. Musculoskeletal: bulk atrophy. DATA:     Intake/Output:   Last shift:           Last 3 shifts: 04/15 1901 - 04/17 0700  In: 4758.8 [I.V.:2488.8]  Out: 440 [Urine:240; Drains:200]      Intake/Output Summary (Last 24 hours) at 04/17/18 1035  Last data filed at 04/17/18 0615   Gross per 24 hour   Intake          3608.75 ml   Output              440 ml   Net          3168.75 ml       Last 3 Recorded Weights in this Encounter    04/06/18 1053 04/06/18 1449 04/13/18 0442   Weight: 44.9 kg (99 lb) 41.7 kg (92 lb) 44.9 kg (98 lb 15.8 oz)       Hemodynamics:   . @MAP     . @CVP       Lines: All central lines examined by me. No signs of erythema, induration, discharge. Peripheral Intravenous Line:  Peripheral IV 04/13/18 Lower;Right Cephalic (Active)   Site Assessment Clean, dry, & intact 4/17/2018  4:00 AM   Phlebitis Assessment 0 4/17/2018  6:00 AM   Infiltration Assessment 0 4/17/2018  6:00 AM   Dressing Status Clean, dry, & intact 4/17/2018  4:00 AM   Dressing Type Transparent;Tape 4/17/2018  4:00 AM   Hub Color/Line Status Pink;Patent; Infusing 4/17/2018  4:00 AM   Action Taken Open ports on tubing capped 4/17/2018  4:00 AM   Alcohol Cap Used Yes 4/17/2018  4:00 AM       Peripheral IV 04/17/18 Left Forearm (Active)   Site Assessment Clean, dry, & intact 4/17/2018  4:00 AM   Phlebitis Assessment 0 4/17/2018  5:00 AM   Infiltration Assessment 0 4/17/2018  5:00 AM   Dressing Status Clean, dry, & intact 4/17/2018  4:00 AM   Dressing Type Transparent;Tape 4/17/2018  4:00 AM   Hub Color/Line Status Pink;Patent;Capped 4/17/2018  4:00 AM   Action Taken Open ports on tubing capped 4/17/2018  4:00 AM   Alcohol Cap Used Yes 4/17/2018  4:00 AM     Drain(s):  PEG/Gastrostomy Tube 04/13/18 (Active)   Site Assessment Clean, dry, & intact 4/17/2018  4:00 AM   Dressing Status Clean, dry, & intact 4/17/2018  4:00 AM   G Port Status Infusing 4/17/2018  4:00 AM   External Catheter Length (cm) 2 centimeters 4/16/2018  4:10 PM   Action Taken Placement verified (comment) 4/17/2018  4:00 AM   Gastric Residual (mL) 0 ml 4/16/2018  8:00 PM   Tube Feeding/Formula Options Osmolite 1.2 4/17/2018  4:00 AM   Tube Feeding/Verify Rate (mL/hr) 50 4/17/2018  6:00 AM   Water Flush Volume (mL) 100 mL 4/17/2018  1:00 AM   Intake (ml) 50 ml 4/17/2018  5:00 AM   Medication Volume 30 ml 4/16/2018  9:45 PM   Drainage Chamber Level (ml) 0 ml 4/13/2018 10:00 PM   Output (ml) 0 ml 4/13/2018 10:00 PM       Fecal Management (Active)   Stool Consistency Liquid 4/17/2018  4:00 AM   Position of Indicator Line Visible 4/17/2018  4:00 AM   Signal Indicator Bubble Appropriate 4/17/2018  4:00 AM   Skin Assessment of the Anal Area Treatment with Zinc Oxide cream 4/17/2018  4:00 AM   Drainage Bag Changed Not due to be changed 4/17/2018  4:00 AM   Balloon Deflated No 4/17/2018  4:00 AM   Tube Irrigated No 4/17/2018  4:00 AM   Output (ml) 200 ml 4/17/2018  6:15 AM       Telemetry: [x]Sinus []A-flutter []Paced    []A-fib []Multiple PVCs     Imaging:  [x] I have personally reviewed the patients radiographs  [] Radiographs reviewed with radiologist  [x] No CXR study available for review today  [] No change from prior, tubes and lines are in adequate position  [] Improved   [] Worsening    Labs:  Recent Results (from the past 24 hour(s))   GLUCOSE, POC    Collection Time: 04/16/18 11:28 AM   Result Value Ref Range    Glucose (POC) 251 (H) 70 - 110 mg/dL   CBC WITH AUTOMATED DIFF    Collection Time: 04/16/18  1:20 PM   Result Value Ref Range    WBC 26.7 (H) 4.6 - 13.2 K/uL    RBC 3.48 (L) 4.20 - 5.30 M/uL    HGB 10.4 (L) 12.0 - 16.0 g/dL    HCT 31.7 (L) 35.0 - 45.0 %    MCV 91.1 74.0 - 97.0 FL    MCH 29.9 24.0 - 34.0 PG    MCHC 32.8 31.0 - 37.0 g/dL    RDW 17.8 (H) 11.6 - 14.5 %    PLATELET 508 410 - 567 K/uL    MPV 10.0 9.2 - 11.8 FL    NEUTROPHILS 73 42 - 75 %    BAND NEUTROPHILS 8 (H) 0 - 5 %    LYMPHOCYTES 12 (L) 20 - 51 %    MONOCYTES 3 2 - 9 %    EOSINOPHILS 0 0 - 5 %    BASOPHILS 0 0 - 3 %    METAMYELOCYTES 1 (H) 0 %    MYELOCYTES 2 (H) 0 %    PROMYELOCYTES 1 (H) 0 %    ABS. NEUTROPHILS 19.5 (H) 1.8 - 8.0 K/UL    ABS. LYMPHOCYTES 3.2 0.8 - 3.5 K/UL    ABS. MONOCYTES 0.8 0 - 1.0 K/UL    ABS. EOSINOPHILS 0.0 0.0 - 0.4 K/UL    ABS.  BASOPHILS 0.0 0.0 - 0.1 K/UL    RBC COMMENTS ANISOCYTOSIS  1+        DF MANUAL     METABOLIC PANEL, BASIC    Collection Time: 04/16/18  1:20 PM   Result Value Ref Range    Sodium 138 136 - 145 mmol/L    Potassium 3.9 3.5 - 5.5 mmol/L    Chloride 102 100 - 108 mmol/L    CO2 20 (L) 21 - 32 mmol/L    Anion gap 16 3.0 - 18 mmol/L    Glucose 205 (H) 74 - 99 mg/dL    BUN 40 (H) 7.0 - 18 MG/DL    Creatinine 2.56 (H) 0.6 - 1.3 MG/DL    BUN/Creatinine ratio 16 12 - 20      GFR est AA 22 (L) >60 ml/min/1.73m2    GFR est non-AA 18 (L) >60 ml/min/1.73m2    Calcium 7.9 (L) 8.5 - 10.1 MG/DL   CULTURE, BLOOD    Collection Time: 04/16/18  3:19 PM   Result Value Ref Range    Special Requests: NO SPECIAL REQUESTS      Culture result: NO GROWTH AFTER 16 HOURS     URINALYSIS W/ RFLX MICROSCOPIC    Collection Time: 04/16/18  4:22 PM   Result Value Ref Range    Color CARLI      Appearance TURBID      Specific gravity 1.023 1.005 - 1.030      pH (UA) 5.5 5.0 - 8.0      Protein 100 (A) NEG mg/dL    Glucose NEGATIVE  NEG mg/dL    Ketone NEGATIVE  NEG mg/dL    Bilirubin LARGE (A) NEG      Blood SMALL (A) NEG      Urobilinogen 1.0 0.2 - 1.0 EU/dL    Nitrites POSITIVE (A) NEG      Leukocyte Esterase LARGE (A) NEG     URINE MICROSCOPIC ONLY    Collection Time: 04/16/18  4:22 PM   Result Value Ref Range    WBC TOO NUMEROUS TO COUNT 0 - 4 /hpf    RBC 0 0 - 5 /hpf    Epithelial cells FEW 0 - 5 /lpf    Bacteria 4+ (A) NEG /hpf   LACTIC ACID    Collection Time: 04/16/18 11:25 PM   Result Value Ref Range    Lactic acid 2.1 (HH) 0.4 - 2. 0 MMOL/L   TROPONIN I    Collection Time: 04/16/18 11:25 PM   Result Value Ref Range    Troponin-I, Qt. 0.02 0.0 - 0.045 NG/ML   NT-PRO BNP    Collection Time: 04/16/18 11:25 PM   Result Value Ref Range    NT pro-BNP 5635 (H) 0 - 1800 PG/ML   EKG, 12 LEAD, SUBSEQUENT    Collection Time: 04/17/18 12:25 AM   Result Value Ref Range    Ventricular Rate 98 BPM    Atrial Rate 98 BPM    P-R Interval 146 ms    QRS Duration 72 ms    Q-T Interval 384 ms    QTC Calculation (Bezet) 490 ms    Calculated P Axis 64 degrees    Calculated R Axis 30 degrees    Calculated T Axis -124 degrees    Diagnosis       Normal sinus rhythm  Possible Left atrial enlargement  ST & T wave abnormality, consider inferior ischemia  ST & T wave abnormality, consider anterolateral ischemia  Prolonged QT  Abnormal ECG  When compared with ECG of 11-APR-2018 11:56,  Questionable change in QRS duration     GLUCOSE, POC    Collection Time: 04/17/18 12:38 AM   Result Value Ref Range    Glucose (POC) 100 70 - 861 mg/dL   METABOLIC PANEL, BASIC    Collection Time: 04/17/18  3:50 AM   Result Value Ref Range    Sodium 139 136 - 145 mmol/L    Potassium 4.0 3.5 - 5.5 mmol/L    Chloride 105 100 - 108 mmol/L    CO2 15 (L) 21 - 32 mmol/L    Anion gap 19 (H) 3.0 - 18 mmol/L    Glucose 45 (LL) 74 - 99 mg/dL    BUN 48 (H) 7.0 - 18 MG/DL    Creatinine 2.22 (H) 0.6 - 1.3 MG/DL    BUN/Creatinine ratio 22 (H) 12 - 20      GFR est AA 26 (L) >60 ml/min/1.73m2    GFR est non-AA 21 (L) >60 ml/min/1.73m2    Calcium 7.4 (L) 8.5 - 10.1 MG/DL   CBC WITH AUTOMATED DIFF    Collection Time: 04/17/18  6:00 AM   Result Value Ref Range    WBC 21.4 (H) 4.6 - 13.2 K/uL    RBC 2.73 (L) 4.20 - 5.30 M/uL    HGB 8.1 (L) 12.0 - 16.0 g/dL    HCT 25.0 (L) 35.0 - 45.0 %    MCV 91.6 74.0 - 97.0 FL    MCH 29.7 24.0 - 34.0 PG    MCHC 32.4 31.0 - 37.0 g/dL    RDW 17.8 (H) 11.6 - 14.5 %    PLATELET 254 355 - 046 K/uL    MPV 9.9 9.2 - 11.8 FL    NEUTROPHILS 82 (H) 40 - 73 %    BAND NEUTROPHILS 5 % LYMPHOCYTES 3 (L) 21 - 52 %    MONOCYTES 4 3 - 10 %    EOSINOPHILS 0 0 - 5 %    BASOPHILS 0 0 - 2 %    METAMYELOCYTES 2 %    MYELOCYTES 3 %    PROMYELOCYTES 1 %    ABS. NEUTROPHILS 17.5 (H) 1.8 - 8.0 K/UL    ABS. LYMPHOCYTES 0.6 (L) 0.9 - 3.6 K/UL    ABS. MONOCYTES 0.9 0.05 - 1.2 K/UL    ABS. EOSINOPHILS 0.0 0.0 - 0.4 K/UL    ABS. BASOPHILS 0.0 0.0 - 0.06 K/UL    PLATELET COMMENTS few giant platelets     RBC COMMENTS ANISOCYTOSIS  1+        RBC COMMENTS POLYCHROMASIA  1+        RBC COMMENTS occasional elliptocytes  occasional schistocytes       WBC COMMENTS few smudge cells     DF MANUAL     GLUCOSE, POC    Collection Time: 04/17/18  6:20 AM   Result Value Ref Range    Glucose (POC) 118 (H) 70 - 110 mg/dL   LACTIC ACID    Collection Time: 04/17/18  9:00 AM   Result Value Ref Range    Lactic acid 1.6 0.4 - 2.0 MMOL/L           No results for input(s): FIO2I, IFO2, HCO3I, IHCO3, HCOPOC, PCO2I, PCOPOC, IPHI, PHI, PHPOC, PO2I, PO2POC in the last 72 hours. No lab exists for component: IPOC2    Recent Labs      04/17/18   0600  04/16/18   1320   WBC  21.4*  26.7*   HGB  8.1*  10.4*   HCT  25.0*  31.7*   PLT  211  276     Recent Labs      04/17/18   0350  04/16/18   1320   NA  139  138   K  4.0  3.9   CL  105  102   CO2  15*  20*   GLU  45*  205*   BUN  48*  40*   CREA  2.22*  2.56*   CA  7.4*  7.9*     No results for input(s): PH, PCO2, PO2, HCO3, FIO2 in the last 72 hours.     Lab Results   Component Value Date/Time    Color CARLI 04/16/2018 04:22 PM    Appearance TURBID 04/16/2018 04:22 PM    Specific gravity 1.023 04/16/2018 04:22 PM    pH (UA) 5.5 04/16/2018 04:22 PM    Protein 100 (A) 04/16/2018 04:22 PM    Glucose NEGATIVE  04/16/2018 04:22 PM    Ketone NEGATIVE  04/16/2018 04:22 PM    Bilirubin LARGE (A) 04/16/2018 04:22 PM    Urobilinogen 1.0 04/16/2018 04:22 PM    Nitrites POSITIVE (A) 04/16/2018 04:22 PM    Leukocyte Esterase LARGE (A) 04/16/2018 04:22 PM    Epithelial cells FEW 04/16/2018 04:22 PM    Bacteria 4+ (A) 04/16/2018 04:22 PM    WBC TOO NUMEROUS TO COUNT 04/16/2018 04:22 PM    RBC 0 04/16/2018 04:22 PM     All Micro Results     Procedure Component Value Units Date/Time    CULTURE, BLOOD [[id number]] Collected:  04/16/18 1519    Order Status:  Completed Specimen:  Blood from Blood Updated:  04/17/18 0834     Special Requests: NO SPECIAL REQUESTS        Culture result: NO GROWTH AFTER 16 HOURS       C. DIFFICILE/EPI PCR [[id number]] Collected:  04/16/18 1530    Order Status:  Completed Specimen:  Stool Updated:  04/16/18 1619    C DIFFICILE, Tiny Blush [[id number]] Collected:  04/16/18 1530    Order Status:  Canceled Specimen:  Stool Updated:  04/16/18 1619    CULTURE, BLOOD [[id number]] Collected:  04/06/18 1125    Order Status:  Completed Specimen:  Blood from Blood Updated:  04/12/18 0916     Special Requests: PERIPHERAL        Culture result: NO GROWTH 6 DAYS       CULTURE, BLOOD [[id number]] Collected:  04/06/18 1140    Order Status:  Completed Specimen:  Blood from Blood Updated:  04/12/18 0916     Special Requests: PERIPHERAL        Culture result: NO GROWTH 6 DAYS             ASSESSMENT/PLAN:   Principal Problem:    Se[facility] shock ([facility] (4/16/2018)    Active Problems:    Acute renal failure superimposed on stage 2 chronic kidney disease ([facility] (8/25/2015)      Dehydration (8/25/2015)      Nausea and vomiting (3/14/2018)      Severe protein-calorie malnutrition Redd Bone: less than 60% of standard weight) ([facility] (3/22/2018)      Acute metabolic encephalopathy (0/1/0894)      Elevated troponin (4/6/2018)      Abnormality of gait and mobility (9/30/2015)      CT abdomen/pelvis with PO contrast today. Continue IV fluids  Continue cefepime/levofloxacin/Flagyl/vancomycin  Repeat proBNP in am    NUTRITION: Osmolite. Check prealbumin q week. Consult Clinical Dietician.   ICU electrolyte replacement protocol    PROPHYLAXIS:  DVT prophylaxis: heparin  GI prophylaxis: famotidine  HOB 30-degree elevation  Chlorhexidine mouth washes    CODE STATUS: PARTIAL/LIMITED    Further recommendations will be based on the patient's response to recommended treatment and results of the investigation ordered. DISPOSITION:    The patient is: [x] acutely ill Risk of deterioration: [x] moderate    [] critically ill  [] high     [x]See my orders for details    My assessment, plan of care, findings, medications, side effects etc were discussed with:  [x] Nurse [] PT/OT    [] Respiratory therapy []     [] Family [] Patient: answered all questions to satisfaction   [x] Pharmacist []      [x] Case & management strategies discussed today on multidisciplinary rounds    During this entire length of time I was immediately available to the patient. The reason for providing this level of medical care for this critically ill patient was due a critical illness that impaired one or more vital organ systems such that there was a high probability of imminent or life threatening deterioration in the patients condition. This care involved high complexity decision making to assess, manipulate, and support vital system functions, to treat this degreee vital organ system failure and to prevent further life threatening deterioration of the patients condition    []Total critical care time spent on reviewing the case/medical record/data/notes/EMR/patient examination/documentation/coordinating care with nurse/consultants, exclusive of procedures - minutes with complex decision making performed and > 50% time spent in face to face evaluation.         Leroy Horne MD   4/17/2018

## 2018-04-18 NOTE — PROGRESS NOTES
[facility]   Pulmonary, Critical Care, and Sleep Medicine     Critical Care Progress Note    Name: Karlos Silva   : 1940   MRN: [id number]   Date: 2018                                 [x]I have reviewed the flowsheet and previous days notes. Events, vitals, medications and notes from last 24 hours reviewed. Care plan discussed on multidisciplinary rounds. [] The patient is unable to give any meaningful history or review of systems because the patient is:  [] Intubated [] Sedated   [] Unresponsive []      []The patient is critically ill on      [] Mechanical ventilation [] Vasoactive agents   [] BiPAP [] Inotropes                 SUBJECTIVE  - This 68 y.o.  female is seen in consultation at the request of Dr. Shailesh Mcnamara for recommendations on further evaluation and management of hypotension. The patient was admitted on 2018 with complaints of somnolence and lethargy after recently having been discharged from a rehab facility. She struggles with chronic nausea and vomiting and is known to have chronic diverticular disease. She has anorexia and dementia at baseline. Her baseline cognitive function is not clearly documented, and at the time of clinical interview, she is essentially nonverbal. She does say \"Ow\" to pain. She does not follow commands.    - Overnight events: BP improved after fluid bolus. Son reports overall decline in mental status since PEG placement. Hgb 10.4-->8.1-->7. 2. Awake, alert, follows some commands. MAP > 60. Vasopressors never started. WBC downtrending. Afebrile. Back on TF. U/O 60-70 mL/hr. On IV fluids: 1/2NS @ 75 mL/hr. [x] Nutrition: Osmolite 1.2  [x] BM today. FMS in situ    ROS: Review of systems not obtained due to patient factors.     Past Medical History:  Past Medical History:   Diagnosis Date    Amblyopia of left eye     Blind left eye     Cataract     bilat, right has been replaced    CRI (chronic renal insufficiency)  Dementia     Essential hypertension, malignant 4/25/2014    Hypertension     Left bundle branch block     Otitis media, acute 7/2/16    left        Allergy:  Allergies   Allergen Reactions    Codeine Unknown (comments)     Unsure due to happening so long    Levaquin [Levofloxacin] Nausea Only     intolerance    Pcn [Penicillins] Hives    Sulfa (Sulfonamide Antibiotics) Unknown (comments)     Unsure it was so long ago          Current Facility-Administered Medications:     potassium chloride (KLOR-CON) packet 40 mEq, 40 mEq, Per G Tube, Q4H, Boy Mora NP    cefepime (MAXIPIME) 1 g in 0.9% sodium chloride (MBP/ADV) 50 mL MBP, 1 g, IntraVENous, Q24H, Fabián Solano MD, Last Rate: 100 mL/hr at 04/18/18 0049, 1 g at 04/18/18 0049    famotidine (PEPCID) tablet 20 mg, 20 mg, Oral, DAILY, Fabián Solano MD    0.45% sodium chloride infusion, 75 mL/hr, IntraVENous, CONTINUOUS, Isa Weldon MD, Last Rate: 75 mL/hr at 04/17/18 2147, 75 mL/hr at 04/17/18 2147    levoFLOXacin (LEVAQUIN) 500 mg in D5W IVPB, 500 mg, IntraVENous, Q48H, Isa Weldon MD, Last Rate: 100 mL/hr at 04/16/18 1656, 500 mg at 04/16/18 1656    metroNIDAZOLE (FLAGYL) IVPB premix 500 mg, 500 mg, IntraVENous, Q8H, Fabián Solano MD, Last Rate: 100 mL/hr at 04/18/18 0744, 500 mg at 04/18/18 0744    hydrALAZINE (APRESOLINE) 20 mg/mL injection 10 mg, 10 mg, IntraVENous, Q6H PRN, Arina Kaur MD, 10 mg at 04/13/18 0250    mirtazapine (REMERON) tablet 15 mg, 15 mg, Oral, QHS, Luiz Dunne DO, 15 mg at 04/17/18 2147    aspirin delayed-release tablet 81 mg, 81 mg, Oral, DAILY, Blayne Rea NP, 81 mg at 04/17/18 0839    atorvastatin (LIPITOR) tablet 80 mg, 80 mg, Oral, QHS, Blayne Beverage, NP, 80 mg at 04/17/18 2147    loratadine (CLARITIN) tablet 10 mg, 10 mg, Oral, DAILY PRN, Blayne Beverage, NP    acetaminophen (TYLENOL) tablet 650 mg, 650 mg, Oral, Q4H PRN, Blayne Beverage, NP    ondansetron (ZOFRAN) injection 4 mg, 4 mg, IntraVENous, Q4H PRN, Niesha Mott, NP, 4 mg at 18 1136    heparin (porcine) injection 5,000 Units, 5,000 Units, SubCUTAneous, Q8H, Niesha Mott NP, 5,000 Units at 18 0744    bisacodyl (DULCOLAX) suppository 10 mg, 10 mg, Rectal, DAILY PRN, Niesha Mott NP, 10 mg at 18 1630      OBJECTIVE  Vital Signs:    Visit Vitals    /73    Pulse 91    Temp 98.9 °F (37.2 °C)    Resp 21    Ht 5' (1.524 m)    Wt 44.9 kg (98 lb 15.8 oz)    SpO2 100%    Breastfeeding No    BMI 19.33 kg/m2       O2 Device: Nasal cannula   O2 Flow Rate (L/min): 3 l/min   Temp (24hrs), Av.2 °F (37.3 °C), Min:98.8 °F (37.1 °C), Max:99.8 °F (37.7 °C)       PHYSICAL EXAM:   General: alert, awake. No acute distress. uncooperative with exam.  Head: atraumatic, normocephalic  Eye: PERRLA, EOM intact, no scleral icterus. OS cataract. Nose: Nares are patent. No polyps. No exudate. No sinus tenderness. Throat: No oral thrush. No exudate. Mucous membranes are moist. No tonsillar enlargement. Neck: supple, no thyromegaly, no JVD, no lymphadenopathy. Trachea midline  Lung: Symmetric in development and expansion. Good air entry. No crackles. No wheezes. Heart: Regular S1, S2 with 3/6 systolic and diastolic murmur at the left and right parasternal borders  Abdomen: soft, diffusely tender. nondistended. Normoactive bowel sounds. No rebound. Guarding. Extremities: bilateral foot drop. no pedal edema, no cyanosis, no clubbing, 2+ peripheral pulses in DP  Lymphatic: no cervical/axillary/inguinal lymphadenopathy  Neurologic: facial symmetry. Doll's eyes intact. Respirations intact. Babinski negative. DTR [email or web address], 2+@LUE, 2+@RLE, 2+@LLE. Gait was not assessed. Musculoskeletal: bulk atrophy.     DATA:     Intake/Output:   Last shift:           Last 3 shifts:  1901 -  0700  In: 7401.7 [I.V.:6091.7]  Out: 5486 [Urine:1302; Drains:250]      Intake/Output Summary (Last 24 hours) at 18 0911  Last data filed at 04/18/18 0700   Gross per 24 hour   Intake          4101.67 ml   Output             1127 ml   Net          2974.67 ml       Last 3 Recorded Weights in this Encounter    04/06/18 1053 04/06/18 1449 04/13/18 0442   Weight: 44.9 kg (99 lb) 41.7 kg (92 lb) 44.9 kg (98 lb 15.8 oz)       Hemodynamics:   . @MAP     . @CVP       Lines: All central lines examined by me. No signs of erythema, induration, discharge. Peripheral Intravenous Line:  Peripheral IV 04/13/18 Lower;Right Cephalic (Active)   Site Assessment Clean, dry, & intact 4/18/2018 12:00 PM   Phlebitis Assessment 0 4/18/2018 12:00 PM   Infiltration Assessment 0 4/18/2018 12:00 PM   Dressing Status Clean, dry, & intact 4/18/2018 12:00 PM   Dressing Type Transparent 4/18/2018 12:00 PM   Hub Color/Line Status Patent; Flushed 4/18/2018 12:00 PM   Action Taken Open ports on tubing capped 4/18/2018 12:00 PM   Alcohol Cap Used Yes 4/18/2018  8:00 AM       Peripheral IV 04/17/18 Left Forearm (Active)   Site Assessment Clean, dry, & intact 4/18/2018  8:00 AM   Phlebitis Assessment 0 4/18/2018  8:00 AM   Infiltration Assessment 0 4/18/2018  8:00 AM   Dressing Status Clean, dry, & intact 4/18/2018  8:00 AM   Dressing Type Transparent 4/18/2018  8:00 AM   Hub Color/Line Status Patent 4/18/2018  8:00 AM   Action Taken Open ports on tubing capped 4/18/2018  8:00 AM   Alcohol Cap Used Yes 4/18/2018  8:00 AM     Drain(s):  PEG/Gastrostomy Tube 04/13/18 (Active)   Site Assessment Clean, dry, & intact 4/18/2018  8:00 AM   Dressing Status Clean, dry, & intact 4/18/2018  8:00 AM   G Port Status Infusing 4/18/2018  8:00 AM   External Catheter Length (cm) 2 centimeters 4/18/2018  4:00 AM   Action Taken Placement verified (comment) 4/18/2018  8:00 AM   Drainage Description Tan 4/18/2018  8:00 AM   Gastric Residual (mL) 60 ml 4/18/2018 12:06 PM   Tube Feeding/Formula Options Osmolite 1.2 4/18/2018  8:00 AM   Tube Feeding/Verify Rate (mL/hr) 30 4/18/2018  9:00 AM   Water Flush Volume (mL) 30 mL 4/18/2018  4:00 AM   Intake (ml) 20 ml 4/18/2018  9:00 AM   Medication Volume 120 ml 4/18/2018 12:06 PM   Drainage Chamber Level (ml) 0 ml 4/13/2018 10:00 PM   Output (ml) 0 ml 4/13/2018 10:00 PM       Fecal Management (Active)   Stool Consistency Semi-liquid 4/18/2018  8:00 AM   Position of Indicator Line Visible 4/18/2018  8:00 AM   Signal Indicator Bubble Appropriate 4/18/2018  8:00 AM   Skin Assessment of the Anal Area Treatment with Zinc Oxide cream 4/18/2018  8:00 AM   Drainage Bag Changed Not due to be changed 4/18/2018  8:00 AM   Balloon Deflated No 4/18/2018  8:00 AM   Tube Irrigated No 4/18/2018  8:00 AM   Output (ml) 50 ml 4/18/2018  1:00 AM       Telemetry: [x]Sinus []A-flutter []Paced    []A-fib []Multiple PVCs     Imaging:  [x] I have personally reviewed the patients radiographs  [] Radiographs reviewed with radiologist  [x] No CXR study available for review today  [] No change from prior, tubes and lines are in adequate position  [] Improved   [] Worsening    Labs:  Recent Results (from the past 24 hour(s))   GLUCOSE, POC    Collection Time: 04/17/18 12:21 PM   Result Value Ref Range    Glucose (POC) 103 70 - 110 mg/dL   VANCOMYCIN, RANDOM    Collection Time: 04/17/18  1:20 PM   Result Value Ref Range    Vancomycin, random 9.6 5.0 - 40.0 UG/ML   GLUCOSE, POC    Collection Time: 04/17/18  6:15 PM   Result Value Ref Range    Glucose (POC) 92 70 - 110 mg/dL   GLUCOSE, POC    Collection Time: 04/17/18 11:53 PM   Result Value Ref Range    Glucose (POC) 70 70 - 110 mg/dL   GLUCOSE, POC    Collection Time: 04/18/18  1:44 AM   Result Value Ref Range    Glucose (POC) 73 70 - 110 mg/dL   CBC WITH AUTOMATED DIFF    Collection Time: 04/18/18  3:00 AM   Result Value Ref Range    WBC 16.8 (H) 4.6 - 13.2 K/uL    RBC 2.46 (L) 4.20 - 5.30 M/uL    HGB 7.2 (L) 12.0 - 16.0 g/dL    HCT 23.2 (L) 35.0 - 45.0 %    MCV 94.3 74.0 - 97.0 FL    MCH 29.3 24.0 - 34.0 PG    MCHC 31.0 31.0 - 37.0 g/dL    RDW 18.2 (H) 11.6 - 14.5 %    PLATELET 280 019 - 938 K/uL    MPV 9.8 9.2 - 11.8 FL    NEUTROPHILS 79 (H) 40 - 73 %    LYMPHOCYTES 13 (L) 21 - 52 %    MONOCYTES 7 3 - 10 %    EOSINOPHILS 1 0 - 5 %    BASOPHILS 0 0 - 2 %    ABS. NEUTROPHILS 13.3 (H) 1.8 - 8.0 K/UL    ABS. LYMPHOCYTES 2.2 0.9 - 3.6 K/UL    ABS. MONOCYTES 1.1 0.05 - 1.2 K/UL    ABS. EOSINOPHILS 0.1 0.0 - 0.4 K/UL    ABS. BASOPHILS 0.0 0.0 - 0.06 K/UL    DF AUTOMATED     METABOLIC PANEL, COMPREHENSIVE    Collection Time: 04/18/18  3:00 AM   Result Value Ref Range    Sodium 142 136 - 145 mmol/L    Potassium 3.1 (L) 3.5 - 5.5 mmol/L    Chloride 112 (H) 100 - 108 mmol/L    CO2 19 (L) 21 - 32 mmol/L    Anion gap 11 3.0 - 18 mmol/L    Glucose 62 (L) 74 - 99 mg/dL    BUN 38 (H) 7.0 - 18 MG/DL    Creatinine 1.32 (H) 0.6 - 1.3 MG/DL    BUN/Creatinine ratio 29 (H) 12 - 20      GFR est AA 47 (L) >60 ml/min/1.73m2    GFR est non-AA 39 (L) >60 ml/min/1.73m2    Calcium 6.2 (L) 8.5 - 10.1 MG/DL    Bilirubin, total 0.4 0.2 - 1.0 MG/DL    ALT (SGPT) 15 13 - 56 U/L    AST (SGOT) 26 15 - 37 U/L    Alk. phosphatase 66 45 - 117 U/L    Protein, total 4.1 (L) 6.4 - 8.2 g/dL    Albumin 1.6 (L) 3.4 - 5.0 g/dL    Globulin 2.5 2.0 - 4.0 g/dL    A-G Ratio 0.6 (L) 0.8 - 1.7     GLUCOSE, POC    Collection Time: 04/18/18  5:55 AM   Result Value Ref Range    Glucose (POC) 74 70 - 110 mg/dL           No results for input(s): FIO2I, IFO2, HCO3I, IHCO3, HCOPOC, PCO2I, PCOPOC, IPHI, PHI, PHPOC, PO2I, PO2POC in the last 72 hours.     No lab exists for component: IPOC2    Recent Labs      04/18/18   0300  04/17/18   0600  04/16/18   1320   WBC  16.8*  21.4*  26.7*   HGB  7.2*  8.1*  10.4*   HCT  23.2*  25.0*  31.7*   PLT  181  211  276     Recent Labs      04/18/18   0300  04/17/18   0350  04/16/18   1320   NA  142  139  138   K  3.1*  4.0  3.9   CL  112*  105  102   CO2  19*  15*  20*   GLU  62*  45*  205*   BUN  38*  48*  40*   CREA  1.32*  2.22*  2.56*   CA  6.2*  7.4*  7.9*   ALB  1.6*   --    -- SGOT  26   --    --    ALT  15   --    --      No results for input(s): PH, PCO2, PO2, HCO3, FIO2 in the last 72 hours.     Lab Results   Component Value Date/Time    Color CARLI 04/16/2018 04:22 PM    Appearance TURBID 04/16/2018 04:22 PM    Specific gravity 1.023 04/16/2018 04:22 PM    pH (UA) 5.5 04/16/2018 04:22 PM    Protein 100 (A) 04/16/2018 04:22 PM    Glucose NEGATIVE  04/16/2018 04:22 PM    Ketone NEGATIVE  04/16/2018 04:22 PM    Bilirubin LARGE (A) 04/16/2018 04:22 PM    Urobilinogen 1.0 04/16/2018 04:22 PM    Nitrites POSITIVE (A) 04/16/2018 04:22 PM    Leukocyte Esterase LARGE (A) 04/16/2018 04:22 PM    Epithelial cells FEW 04/16/2018 04:22 PM    Bacteria 4+ (A) 04/16/2018 04:22 PM    WBC TOO NUMEROUS TO COUNT 04/16/2018 04:22 PM    RBC 0 04/16/2018 04:22 PM     All Micro Results     Procedure Component Value Units Date/Time    CULTURE, BLOOD [[id number]] Collected:  04/16/18 1519    Order Status:  Completed Specimen:  Blood from Blood Updated:  04/18/18 0829     Special Requests: NO SPECIAL REQUESTS        Culture result: NO GROWTH 2 DAYS       C. DIFFICILE/EPI PCR [[id number]] Collected:  04/16/18 1530    Order Status:  Completed Specimen:  Stool Updated:  04/16/18 1619    C DIFFICILE, Conrado Verdugo [[id number]] Collected:  04/16/18 1530    Order Status:  Canceled Specimen:  Stool Updated:  04/16/18 1619    CULTURE, BLOOD [[id number]] Collected:  04/06/18 1125    Order Status:  Completed Specimen:  Blood from Blood Updated:  04/12/18 0916     Special Requests: PERIPHERAL        Culture result: NO GROWTH 6 DAYS       CULTURE, BLOOD [[id number]] Collected:  04/06/18 1140    Order Status:  Completed Specimen:  Blood from Blood Updated:  04/12/18 0916     Special Requests: PERIPHERAL        Culture result: NO GROWTH 6 DAYS             ASSESSMENT/PLAN:   Principal Problem:    Septic shock ([address] (4/16/2018)    Active Problems:    Urinary tract infection without hematuria (8/25/2015)      Acute renal failure superimposed on stage 2 chronic kidney disease ([email or web address] (8/25/2015)      Dehydration (8/25/2015)      Nausea and vomiting (3/14/2018)      Elevated troponin (4/6/2018)      Severe protein-calorie malnutrition Jevon Ree: less than 60% of standard weight) ([email or web address] (3/22/2018)      Acute metabolic encephalopathy (9/8/2697)      Abnormality of gait and mobility (9/30/2015)      Continue IV fluids  Continue Flagyl/vancomycin. With ongoing encephalopathy, will stop levofloxacin. Change cefepime to Rocephin. NUTRITION: Osmolite. Check prealbumin q week. Consult Clinical Dietician. ICU electrolyte replacement protocol    PROPHYLAXIS:  DVT prophylaxis: heparin  GI prophylaxis: famotidine  HOB 30-degree elevation  Chlorhexidine mouth washes    CODE STATUS: PARTIAL/LIMITED    Further recommendations will be based on the patient's response to recommended treatment and results of the investigation ordered. DISPOSITION:    The patient is: [x] acutely ill Risk of deterioration: [x] moderate    [] critically ill  [] high     [x]See my orders for details    My assessment, plan of care, findings, medications, side effects etc were discussed with:  [x] Nurse [] PT/OT    [] Respiratory therapy [] Dr.    [] Family [] Patient: answered all questions to satisfaction   [x] Pharmacist []      [x] Case & management strategies discussed today on multidisciplinary rounds    During this entire length of time I was immediately available to the patient. The reason for providing this level of medical care for this critically ill patient was due a critical illness that impaired one or more vital organ systems such that there was a high probability of imminent or life threatening deterioration in the patients condition.  This care involved high complexity decision making to assess, manipulate, and support vital system functions, to treat this degreee vital organ system failure and to prevent further life threatening deterioration of the patients condition    []Total critical care time spent on reviewing the case/medical record/data/notes/EMR/patient examination/documentation/coordinating care with nurse/consultants, exclusive of procedures - minutes with complex decision making performed and > 50% time spent in face to face evaluation.         Nelly Wallace MD   4/18/2018

## 2018-04-18 NOTE — PROGRESS NOTES
Problem: Falls - Risk of  Goal: *Absence of Falls  Document [place] Fall Risk and appropriate interventions in the flowsheet.    Outcome: Progressing Towards Goal  Fall Risk Interventions:  Mobility Interventions: Assess mobility with egress test, Bed/chair exit alarm    Mentation Interventions: Bed/chair exit alarm, Adequate sleep, hydration, pain control    Medication Interventions: Bed/chair exit alarm    Elimination Interventions: Bed/chair exit alarm    History of Falls Interventions: Bed/chair exit alarm

## 2018-04-18 NOTE — DIABETES MGMT
NUTRITION       Per nursing, pt with large watery stools last PM.  Noted Colace had been discontinued. Current tube feeding:  Osmolite 1.2 roberth at 30 ml/hr via PEG with goal of 50 ml/hr (will provide 1440 calories, 67 grams protein, ~ 1.0 L free water/d at goal rate). Suggest:  1. Changing tube feeding formula to Osmolite 1 roberth (lower osmolality formula for optimal tolerance) at 30 ml/hr increasing as tolerated to 50 ml/hr for now (will provide 1272 calories, 53 grams protein, ~ 1.0 L free water/d). 2. Banatrol, 1 packet QID. Will monitor.      Christos Zepeda RD, CDE   Office:  [address] [place] [address] Pager:  [phone]

## 2018-04-18 NOTE — PROGRESS NOTES
Problem: Mobility Impaired (Adult and Pediatric)  Goal: *Acute Goals and Plan of Care (Insert Text)  Physical Therapy Goals  Initiated 4/7/2018 and to be accomplished within 7 day(s)  1. Patient will move from supine to sit and sit to supine , scoot up and down and roll side to side in bed with minimal assistance/contact guard assist.     2.  Patient will transfer from bed to chair and chair to bed with minimal assistance/contact guard assist using the least restrictive device. 3.  Patient will perform sit to stand with minimal assistance/contact guard assist.  4.  Patient will ambulate with minimal assistance/contact guard assist for >/= 10 feet with the least restrictive device. 5.  Patient will demonstrate independence with performance of home exercise program.   Outcome: Not Met  PHYSICAL THERAPY: Re-evaluation/DISCHARGE  INPATIENT: Medicare: Hospital Day: 13     Patient: Nila Tobar (68 y.o. female)    Date: 4/18/2018  Primary Diagnosis: NESS (acute kidney injury) ([facility]  dysphagia    Procedure(s) (LRB):  ESOPHAGOGASTRODUODENOSCOPY (EGD) (N/A)  PERCUTANEOUS ENDOSCOPIC GASTROSTOMY TUBE INSERTION (N/A), 5 Days Post-Op,   Precautions: Falls  PLOF:   ASSESSMENT:  Supine in bed. RN Ihsan Smoker cleared for PT. Eyes open spontaneously. Non-verbal. No command following. Bilateral heels soft; skin intact; Prevalon boots donned. No AROM on BLE d/t no command following. BLE PROM limited; non-functional. Unclear if limitation stiffness v. patient resistance to movement. In no acute distress. All needs in reach at end of session. Vitals /83, , O2 saturation 100% on 2L. Not appropriate for skilled PT d/t no command following and no active participation. Discharging from PT caseload at this time.    EDUCATION: bed mobility, ROM, cognition, skin integrity  Progression toward goals:  []      Goals met  []      Improving appropriately and progressing toward goals  []      Improving slowly and progressing toward goals  [x]      Not making progress toward goals and plan of care will be adjusted     PLAN:  Patient will be discharged from physical therapy at this time. Rationale for discharge:  [] Goals Achieved  [] Plateau Reached  [x] Patient not participating in therapy  [x] Other: not appropriate for PT at this time  Discharge Recommendations:  Long term care  Further Equipment Recommendations for Discharge:  TBD at next level of care     SUBJECTIVE:   Non-verbal    OBJECTIVE DATA SUMMARY:     G CODES:  Mobility   Goal  CK= 40-59%  D/C  CN= 100%. The severity rating is based on the Other clinical judgement    Critical Behavior:  Neurologic State: Eyes open spontaneously  Orientation Level: Unable to verbalize  Cognition: No command following, Decreased attention/concentration  Safety/Judgement: Fall prevention    Manual Muscle Testing (LE) unable to assess  Tone : BLE normal  Sensation: unable to assess  Range Of Motion: PROM decreased    Functional Mobility: not tested d/t no command following  Pain:  Pre treatment pain: 0  Post treatment pain: 0  Pain Scale 1: Adult Nonverbal Pain Scale  Pain Intensity 1: 0  Activity Tolerance:   Poor  Please refer to the flowsheet for vital signs taken during this treatment.   After treatment:   []  Patient left in no apparent distress sitting up in chair  [x]  Patient left in no apparent distress in bed  [x]  Call bell left within reach  [x]  Nursing notified  []  Caregiver present  []  Bed alarm activated      Aaron Barrera PT   Time Calculation: 8 mins

## 2018-04-18 NOTE — PROGRESS NOTES
2 [facility]  Hospitalist Division        Inpatient Daily Progress Note    Daily progress Note    Patient: Jono Forde MRN: [id number]  CSN: [id number]    YOB: 1940  Age: 68 y.o. Sex: female    DOA: 4/6/2018 LOS:  LOS: 12 days                    Chief Complaint:  Septic Shock    Interval History: 69 y/o  female with hx of hypertension, dementia, recurrent nausea and vomiting for which she has been in the ER for 4 times, recent UTI with treatment and recently discharged 3/2018 following n/v who presented to the ED on 4/6/2018 for acute fatigue that began one day prior, noting excessive tired and drowsy and \"sleeping too much. \"  Per family patient was recently discharged from Rehab the day prior following hospital admission for dehydration secondary to N/V. Of note on prior admission in 3/2018 Work up at the time included - CT Abd/pelvis showed No acute or focal abnormality ,  HIDA scan 3/13/2018 showed patent cystic duct.  Decreased gallbladder ejection fraction of approximately 34 % suggesting gallbladder dyskinesia. Dr. Nakita Echevarria was consulted on 3/14 and performed an EGD on 3/15/18, findings  -Normal upper endoscopy, with no endoscopic evidence of neoplasia or mucosal abnormality.  -Esophagus empirically dilated with #48 Catie Terra. Pt's family refused PEG at that admission. She is re admitted this time secondary to fatigue, lethargy. Pt has been refusing to eat or take her medications. Attending had a long discussion with the family concerning goals of care, palliative/comfort measures vs pursuing PEG tube. Initially they did not want to proceed with PEG but later changed their mind. GI consulted again on 4/12/2018 for PEG placement, which was performed on 4/13/2018. She was started on tube feedings and tolerated advancement to goal.  PT worked with pt during hospitalization, recommend long term care.   Patient was supposed to d/c on 4/16/2018 but cancelled due to hypotension. She received IV fluid bolus, labs with NESS creatinine 2.56 - -> 2.22, WBC 26.7 - ->21.4 and lactic acid of 2.1 - ->1.6. She was transferred to the ICU for further management, PCCM following. Stool for C-diff pending. Blood cultures collected 4/16/2018 no growth x 16 hours x 1. CXR with minimal patchy density in right mid lung suggesting atelectasis or early infiltrate. UA with positive nitrites, large leukocytes esterase, numerous WBC's and 4+ bacteria. Pt on IV abx. On 4/17/2018 she had high TF residual, TF on hold. CT abd/pelvis 4/17/2018 with mild circumferential mural thickening throughout the transverse, descending and sigmoid colon, suggestive of acute infectious colitis but not the typical appearance of C-diff,  Presacral edema, no bowel obstruction.       Assessment/Plan:     Patient Active Problem List   Diagnosis Code    Essential hypertension, malignant I10    Acute renal failure superimposed on stage 2 chronic kidney disease ([facility]) N17.9, N18.2    Dehydration E86.0    Abnormality of gait and mobility R26.9    Stroke ([facility] [address] I63.9    HTN (hypertension) I10    Follow up Z09    Nausea & vomiting R11.2    Nausea and vomiting R11.2    Advanced care planning/counseling discussion Z71.89    Dementia F03.90    Debility R53.81    Severe protein-calorie malnutrition Honora Fees: less than 60% of standard weight) ([facility]) E43    Acute metabolic encephalopathy H98.24    Elevated troponin R74.8    Septic shock ([facility]) A41.9, R65.21       A/P:  Septic Shock  - hypotensive requiring IVF bolus, lactic acidosis, leukocytosis  - blood cultures collected 4/16/2018 negative x 1  - UA collected 4/16/2018 with positive nitrates, large leukocyte esterase, 4+ bacteria and numerous WBC's  - stool for c-diff results pending- pt has flexiseal  - CXR 4/16/2018 minimal patchy density in right mid lung suggesting atelectasis or early infiltrate  - CT abd/pelvis 4/17/2018 with mild circumferential mural thickening throughout the transverse, descending and sigmoid colon, suggestive of acute infectious colitis but not the typical appearance of C-diff,  Presacral edema, no bowel obstruction  - continue IV Abx  - trend CBC  - lactic acid now normal  - continue IVF's    Acute renal failure superimposed on CKD 2  - creatinine improving  - continue IVF's  - monitor BMP    UTI  - UA collected 4/16/2018 with positive nitrates, large leukocyte esterase, 4+ bacteria and numerous WBC's  - continue IV abx    N/V  - sp PEG placement by Dr. Crane Records  - TF resumed, working towards reaching goal rate  - CT abd/pelvis 4/17/2018 with mild circumferential mural thickening throughout the transverse, descending and sigmoid colon, suggestive of acute infectious colitis but not the typical appearance of C-diff,  Presacral edema, no bowel obstruction    Acute metabolic encephalopathy  - Levaquin dc'd  - continue IV abx     Hx of dementia  - pt at baseline mental status    DVT Prophylaxis  - Heparin subcutaneously TID      Lilia Robertson, [facility]  Pager:  [phone]  Office:  [phone]        Subjective:      Pt nods head \"no\" when asked if in pain. Back on TF's via PEG. Objective:      Visit Vitals    /66    Pulse 92    Temp 98.9 °F (37.2 °C)    Resp 21    Ht 5' (1.524 m)    Wt 44.9 kg (98 lb 15.8 oz)    SpO2 100%    Breastfeeding No    BMI 19.33 kg/m2           Physical Exam:  General appearance: alert to verbal and tactile stimuli, no acute distress noted  Head: Normocephalic, without obvious abnormality, atraumatic  Lungs: clear to auscultation bilaterally  Heart: regular rate and rhythm, S1, S2 normal, no murmur, click, rub or gallop  Abdomen: soft, non-tender. Bowel sounds normal. PEG intact.   Extremities: extremities normal, atraumatic, no cyanosis or edema  Skin: Skin color, texture, turgor normal. No rashes or lesions  Neurologic: non-verbal, hx of dementia        Intake and Output:  Current Shift:     Last three shifts:  04/16 1901 - 04/18 0700  In: 7401.7 [I.V.:6091.7]  Out: 1552 [Urine:1302; Drains:250]    Recent Results (from the past 24 hour(s))   LACTIC ACID    Collection Time: 04/17/18  9:00 AM   Result Value Ref Range    Lactic acid 1.6 0.4 - 2.0 MMOL/L   GLUCOSE, POC    Collection Time: 04/17/18 12:21 PM   Result Value Ref Range    Glucose (POC) 103 70 - 110 mg/dL   VANCOMYCIN, RANDOM    Collection Time: 04/17/18  1:20 PM   Result Value Ref Range    Vancomycin, random 9.6 5.0 - 40.0 UG/ML   GLUCOSE, POC    Collection Time: 04/17/18  6:15 PM   Result Value Ref Range    Glucose (POC) 92 70 - 110 mg/dL   GLUCOSE, POC    Collection Time: 04/17/18 11:53 PM   Result Value Ref Range    Glucose (POC) 70 70 - 110 mg/dL   GLUCOSE, POC    Collection Time: 04/18/18  1:44 AM   Result Value Ref Range    Glucose (POC) 73 70 - 110 mg/dL   CBC WITH AUTOMATED DIFF    Collection Time: 04/18/18  3:00 AM   Result Value Ref Range    WBC 16.8 (H) 4.6 - 13.2 K/uL    RBC 2.46 (L) 4.20 - 5.30 M/uL    HGB 7.2 (L) 12.0 - 16.0 g/dL    HCT 23.2 (L) 35.0 - 45.0 %    MCV 94.3 74.0 - 97.0 FL    MCH 29.3 24.0 - 34.0 PG    MCHC 31.0 31.0 - 37.0 g/dL    RDW 18.2 (H) 11.6 - 14.5 %    PLATELET 778 971 - 897 K/uL    MPV 9.8 9.2 - 11.8 FL    NEUTROPHILS 79 (H) 40 - 73 %    LYMPHOCYTES 13 (L) 21 - 52 %    MONOCYTES 7 3 - 10 %    EOSINOPHILS 1 0 - 5 %    BASOPHILS 0 0 - 2 %    ABS. NEUTROPHILS 13.3 (H) 1.8 - 8.0 K/UL    ABS. LYMPHOCYTES 2.2 0.9 - 3.6 K/UL    ABS. MONOCYTES 1.1 0.05 - 1.2 K/UL    ABS. EOSINOPHILS 0.1 0.0 - 0.4 K/UL    ABS.  BASOPHILS 0.0 0.0 - 0.06 K/UL    DF AUTOMATED     METABOLIC PANEL, COMPREHENSIVE    Collection Time: 04/18/18  3:00 AM   Result Value Ref Range    Sodium 142 136 - 145 mmol/L    Potassium 3.1 (L) 3.5 - 5.5 mmol/L    Chloride 112 (H) 100 - 108 mmol/L    CO2 19 (L) 21 - 32 mmol/L    Anion gap 11 3.0 - 18 mmol/L    Glucose 62 (L) 74 - 99 mg/dL BUN 38 (H) 7.0 - 18 MG/DL    Creatinine 1.32 (H) 0.6 - 1.3 MG/DL    BUN/Creatinine ratio 29 (H) 12 - 20      GFR est AA 47 (L) >60 ml/min/1.73m2    GFR est non-AA 39 (L) >60 ml/min/1.73m2    Calcium 6.2 (L) 8.5 - 10.1 MG/DL    Bilirubin, total 0.4 0.2 - 1.0 MG/DL    ALT (SGPT) 15 13 - 56 U/L    AST (SGOT) 26 15 - 37 U/L    Alk. phosphatase 66 45 - 117 U/L    Protein, total 4.1 (L) 6.4 - 8.2 g/dL    Albumin 1.6 (L) 3.4 - 5.0 g/dL    Globulin 2.5 2.0 - 4.0 g/dL    A-G Ratio 0.6 (L) 0.8 - 1.7     GLUCOSE, POC    Collection Time: 04/18/18  5:55 AM   Result Value Ref Range    Glucose (POC) 74 70 - 110 mg/dL           Lab Results   Component Value Date/Time    Glucose 62 (L) 04/18/2018 03:00 AM    Glucose 45 (LL) 04/17/2018 03:50 AM    Glucose 205 (H) 04/16/2018 01:20 PM    Glucose 104 (H) 04/14/2018 04:40 AM    Glucose 94 04/13/2018 04:00 AM        Imaging:  Ct Abd Pelv Wo Cont    Result Date: 4/17/2018   CT OF THE ABDOMEN AND PELVIS, WITHOUT CONTRAST INDICATION: Abdominal pain and constipation COMPARISON: 3/19/2018. TECHNIQUE: Standard helical CT performed through the abdomen and pelvis without IV contrast.  Coronal and sagittal reformations were generated. One or more dose reduction techniques were used on this CT: automated exposure control, adjustment of the mAs and/or kVp according to patient's size, and iterative reconstruction techniques. The specific techniques utilized on this CT exam have been documented in the patient's electronic medical record. ============ FINDINGS: INFERIOR THORAX: Emphysematous changes are seen within the hyperinflated left and right lung bases. There are a few regions of new bandlike atelectasis within the lower lobes compared to the prior study. No acute pulmonary infiltrate. Unchanged global cardiomegaly. No pericardial effusion. Minimal ingested contrast is seen in the distal thoracic esophagus. LIVER/BILIARY: No suspicious liver lesion on this unenhanced CT. No biliary dilation. Exogenous contrast excretion is seen layering dependently within the minimally distended gallbladder. SPLEEN: Normal. PANCREAS: Normal. ADRENALS: Normal. KIDNEYS: No acute abnormality. LYMPH NODES: No mesenteric or retroperitoneal lymphadenopathy. GI TRACT: Normal caliber small and large bowel loops are seen throughout the abdomen and pelvis. There is mild circumferential mural thickening throughout most of the transverse colon, descending colon, and sigmoid colon. No significant adjacent mesenteric inflammatory stranding or other findings to suggest acute diverticulitis. Rectal tube appears adequately positioned with mild fluid distention of the rectum itself. There is also moderate adjacent presacral inflammatory stranding that is new since prior CT of 3/12/2018. Gastrostomy tube appears adequately positioned within the distal aspect of the gastric body. VASCULATURE: Normal caliber distal thoracic and abdominal aorta with moderate atherosclerotic vascular calcification. PELVIC ORGANS: Anton catheter is present within the minimally distended bladder. BONES: No acute osseous abnormality. Diffuse osteopenia. Multilevel degenerative changes and facet arthropathy. OTHER: No ascites or free intraperitoneal air. ============     IMPRESSION: Mild circumferential mural thickening is seen throughout the transverse colon, descending colon, and sigmoid colon, all of which is new compared to prior CT scan of 3/12/2018. While findings are most suggestive of an acute infectious colitis, this does not have the typical CT appearance of C. difficile pseudomembranous colitis. New, probably related presacral edema. No morphology of bowel obstruction. Adequately located rectal tube.        Medication List Reviewed:  Current Facility-Administered Medications   Medication Dose Route Frequency    cefepime (MAXIPIME) 1 g in 0.9% sodium chloride (MBP/ADV) 50 mL MBP  1 g IntraVENous Q24H    famotidine (PEPCID) tablet 20 mg  20 mg Oral DAILY    0.45% sodium chloride infusion  75 mL/hr IntraVENous CONTINUOUS    levoFLOXacin (LEVAQUIN) 500 mg in D5W IVPB  500 mg IntraVENous Q48H    metroNIDAZOLE (FLAGYL) IVPB premix 500 mg  500 mg IntraVENous Q8H    hydrALAZINE (APRESOLINE) 20 mg/mL injection 10 mg  10 mg IntraVENous Q6H PRN    mirtazapine (REMERON) tablet 15 mg  15 mg Oral QHS    aspirin delayed-release tablet 81 mg  81 mg Oral DAILY    atorvastatin (LIPITOR) tablet 80 mg  80 mg Oral QHS    loratadine (CLARITIN) tablet 10 mg  10 mg Oral DAILY PRN    acetaminophen (TYLENOL) tablet 650 mg  650 mg Oral Q4H PRN    ondansetron (ZOFRAN) injection 4 mg  4 mg IntraVENous Q4H PRN    heparin (porcine) injection 5,000 Units  5,000 Units SubCUTAneous Q8H    bisacodyl (DULCOLAX) suppository 10 mg  10 mg Rectal DAILY PRN    docusate (COLACE) 50 mg/5 mL oral liquid 100 mg  100 mg Oral DAILY

## 2018-04-18 NOTE — PROGRESS NOTES
Problem: Falls - Risk of  Goal: *Absence of Falls  Document [place] Fall Risk and appropriate interventions in the flowsheet.    Outcome: Progressing Towards Goal  Fall Risk Interventions:  Mobility Interventions: Bed/chair exit alarm    Mentation Interventions: Bed/chair exit alarm, More frequent rounding, Adequate sleep, hydration, pain control, Room close to nurse's station    Medication Interventions: Bed/chair exit alarm    Elimination Interventions: Bed/chair exit alarm, Call light in reach, Toileting schedule/hourly rounds    History of Falls Interventions: Bed/chair exit alarm, Door open when patient unattended, Room close to nurse's station

## 2018-04-18 NOTE — PROGRESS NOTES
Chart reviewed. Plan remains SNF vs home health when medically stable. [facility] will likely not approve SNF, as she's not able to participate in therapy, family aware. Will cont to follow. Oly Calix RN,ext [phone].

## 2018-04-19 NOTE — MANAGEMENT PLAN
Discharge/Transition Planning    Went to room. Son at bedside. Pt with occasional noise making, but not interacting at all. Son states he wishes they had not put PEG tube in now and she has been barely communicating, if at all since placement. Pt does not have a skilled need for SNF rehab where [facility] will authorize. Will continue top assess; however, pt will most likely go home. If d/c home, will need 24 hour care.  Hospital bed would be beneficial      Ricardo Fuentes RN BSN  Outcomes Manager  Pager # [phone]

## 2018-04-19 NOTE — PROGRESS NOTES
conducted a Follow up consultation and Spiritual Assessment for [facility], who is a 68 y.o.,female. The  provided the following Interventions:  Continued the relationship of care and support. Listened empathically. Offered prayer and assurance of continued prayer on patients behalf. Chart reviewed. The following outcomes were achieved:  Patient expressed gratitude for 's visit. Assessment:  There are no spiritual or Baptist issues which require intervention at this time. Plan:  Chaplains will continue to follow and will provide pastoral care on an as needed/requested basis.  recommends bedside caregivers page  on duty if patient shows signs of acute spiritual or emotional distress. Davina Chi M.Div.   [facility] [address]  [phone]

## 2018-04-19 NOTE — PROGRESS NOTES
[address] [place] [address] care of patient transferred from ICU by Natan Verma. 2000  Bedside report given to Lani Jones RN. Pt's daughter at bedside.

## 2018-04-19 NOTE — PROGRESS NOTES
2 [facility]  Hospitalist Division        Inpatient Daily Progress Note    Daily progress Note    Patient: Joanne Wadsworth MRN: [id number]  [facility]: [id number]    YOB: 1940  Age: 68 y.o. Sex: female    DOA: 4/6/2018 LOS:  LOS: 13 days                    Chief Complaint:  Septic Shock    Interval History: 69 y/o  female with hx of hypertension, dementia, recurrent nausea and vomiting for which she has been in the ER for 4 times, recent UTI with treatment and recently discharged 3/2018 following n/v who presented to the ED on 4/6/2018 for acute fatigue that began one day prior, noting excessive tired and drowsy and \"sleeping too much. \"  Per family patient was recently discharged from Rehab the day prior following hospital admission for dehydration secondary to N/V. Of note on prior admission in 3/2018 Work up at the time included - CT Abd/pelvis showed No acute or focal abnormality ,  HIDA scan 3/13/2018 showed patent cystic duct.  Decreased gallbladder ejection fraction of approximately 34 % suggesting gallbladder dyskinesia. Dr. Dennys Campa was consulted on 3/14 and performed an EGD on 3/15/18, findings  -Normal upper endoscopy, with no endoscopic evidence of neoplasia or mucosal abnormality.  -Esophagus empirically dilated with #48 Diandraaquiles Casarez. Pt's family refused PEG at that admission. She is re admitted this time secondary to fatigue, lethargy. Pt has been refusing to eat or take her medications. Attending had a long discussion with the family concerning goals of care, palliative/comfort measures vs pursuing PEG tube. Initially they did not want to proceed with PEG but later changed their mind. GI consulted again on 4/12/2018 for PEG placement, which was performed on 4/13/2018. She was started on tube feedings and tolerated advancement to goal.  PT worked with pt during hospitalization, recommend long term care.   Patient was supposed to d/c on 4/16/2018 but cancelled due to hypotension. She received IV fluid bolus, labs with NESS creatinine 2.56 - -> 2.22, WBC 26.7 - ->21.4 and lactic acid of 2.1 - ->1.6. She was transferred to the ICU for further management, PCCM following. Stool for C-diff pending. Blood cultures collected 4/16/2018 no growth x 16 hours x 1. CXR with minimal patchy density in right mid lung suggesting atelectasis or early infiltrate. UA with positive nitrites, large leukocytes esterase, numerous WBC's and 4+ bacteria. Pt on IV abx. On 4/17/2018 she had high TF residual, TF on hold. CT abd/pelvis 4/17/2018 with mild circumferential mural thickening throughout the transverse, descending and sigmoid colon, suggestive of acute infectious colitis but not the typical appearance of C-diff,  Presacral edema, no bowel obstruction.       Assessment/Plan:     Patient Active Problem List   Diagnosis Code    Essential hypertension, malignant I10    Urinary tract infection without hematuria N39.0    Acute renal failure superimposed on stage 2 chronic kidney disease (HCC) N17.9, N18.2    Dehydration E86.0    Abnormality of gait and mobility R26.9    Stroke ([facility] [address] I63.9    HTN (hypertension) I10    Follow up Z09    Nausea & vomiting R11.2    Nausea and vomiting R11.2    Advanced care planning/counseling discussion Z71.89    Dementia F03.90    Debility R53.81    Severe protein-calorie malnutrition Nieto Aldair: less than 60% of standard weight) ([facility]) E43    Acute metabolic encephalopathy V30.78    Elevated troponin R74.8    Septic shock ([facility]) A41.9, R65.21       A/P:  Septic Shock  - hypotensive requiring IVF bolus, lactic acidosis, leukocytosis  - blood cultures collected 4/16/2018 negative x 1  - UA collected 4/16/2018 with positive nitrates, large leukocyte esterase, 4+ bacteria and numerous WBC's  - stool for c-diff results pending- pt has flexiseal  - CXR 4/16/2018 minimal patchy density in right mid lung suggesting atelectasis or early infiltrate  - CT abd/pelvis 4/17/2018 with mild circumferential mural thickening throughout the transverse, descending and sigmoid colon, suggestive of acute infectious colitis but not the typical appearance of C-diff,  Presacral edema, no bowel obstruction  - continue IV Abx  - trend CBC  - lactic acid now normal  - continue IVF's    Acute renal failure superimposed on CKD 2  - creatinine improving  - continue IVF's  - monitor BMP    Anemia  - H/H 6.3 / 20 no active bleeding  - s/p 2 units PRBC's  - monitor CBC    UTI  - UA collected 4/16/2018 with positive nitrates, large leukocyte esterase, 4+ bacteria and numerous WBC's  - continue IV abx    N/V  - sp PEG placement by Dr. Taryn Galloway  - high residual via PEG per nursing staff  - CT abd/pelvis 4/17/2018 with mild circumferential mural thickening throughout the transverse, descending and sigmoid colon, suggestive of acute infectious colitis but not the typical appearance of C-diff,  Presacral edema, no bowel obstruction    Acute metabolic encephalopathy  - Levaquin dc'd  - continue IV abx     Hx of dementia  - pt at baseline mental status    DVT Prophylaxis  - Heparin subcutaneously TID    Disposition  - recommend Palliative care to assist with goals of care      Mihir Barnhart 15  Pager:  [phone]  Office:  [phone]        Subjective: In bed resting quietly, appears weak. To receive 2 units of PRBC's today. Nurse states high residuals from PEG, had to hold TF.     Objective:      Visit Vitals    /88 (BP 1 Location: Left arm, BP Patient Position: At rest)    Pulse (!) 116    Temp 97.9 °F (36.6 °C)    Resp 24    Ht 5' (1.524 m)    Wt 51.7 kg (113 lb 15.7 oz)    SpO2 100%    Breastfeeding No    BMI 22.26 kg/m2           Physical Exam:  General appearance: alert to verbal and tactile stimuli, no acute distress noted, appears weak  Head: Normocephalic, without obvious abnormality, atraumatic  Lungs: clear to auscultation bilaterally  Heart: regular rate and rhythm, S1, S2 normal, no murmur, click, rub or gallop  Abdomen: soft, non-tender. Bowel sounds normal. PEG intact.   Extremities: extremities normal, atraumatic, no cyanosis or edema  Skin: Skin color, texture, turgor normal. No rashes or lesions  Neurologic: non-verbal, hx of dementia        Intake and Output:  Current Shift:  04/19 0701 - 04/19 1900  In: 325   Out: -   Last three shifts:  04/17 1901 - 04/19 0700  In: 4446.7 [I.V.:3216.7]  Out: 1800 [Urine:1650; Drains:150]    Recent Results (from the past 24 hour(s))   GLUCOSE, POC    Collection Time: 04/18/18  5:51 PM   Result Value Ref Range    Glucose (POC) 77 70 - 110 mg/dL   GLUCOSE, POC    Collection Time: 04/18/18  9:03 PM   Result Value Ref Range    Glucose (POC) 76 70 - 110 mg/dL   GLUCOSE, POC    Collection Time: 04/19/18  4:09 AM   Result Value Ref Range    Glucose (POC) 90 70 - 011 mg/dL   METABOLIC PANEL, BASIC    Collection Time: 04/19/18  5:07 AM   Result Value Ref Range    Sodium 142 136 - 145 mmol/L    Potassium 4.5 3.5 - 5.5 mmol/L    Chloride 117 (H) 100 - 108 mmol/L    CO2 14 (L) 21 - 32 mmol/L    Anion gap 11 3.0 - 18 mmol/L    Glucose 91 74 - 99 mg/dL    BUN 28 (H) 7.0 - 18 MG/DL    Creatinine 0.97 0.6 - 1.3 MG/DL    BUN/Creatinine ratio 29 (H) 12 - 20      GFR est AA >60 >60 ml/min/1.73m2    GFR est non-AA 56 (L) >60 ml/min/1.73m2    Calcium 7.3 (L) 8.5 - 10.1 MG/DL   CBC W/O DIFF    Collection Time: 04/19/18  5:07 AM   Result Value Ref Range    WBC 16.2 (H) 4.6 - 13.2 K/uL    RBC 2.23 (L) 4.20 - 5.30 M/uL    HGB 6.3 (L) 12.0 - 16.0 g/dL    HCT 20.0 (L) 35.0 - 45.0 %    MCV 89.7 74.0 - 97.0 FL    MCH 28.3 24.0 - 34.0 PG    MCHC 31.5 31.0 - 37.0 g/dL    RDW 18.5 (H) 11.6 - 14.5 %    PLATELET 159 190 - 774 K/uL    MPV 9.3 9.2 - 11.8 FL   TYPE + CROSSMATCH    Collection Time: 04/19/18  8:45 AM   Result Value Ref Range    Crossmatch Expiration 04/22/2018     ABO/Rh(D) A POSITIVE     Antibody screen NEG     CALLED TO: Haleigh Chavira 3000 AT 10:24 ON 04/19/2018 BY MICHELE     Unit number [id number]     Blood component type  LR     Unit division 00     Status of unit ISSUED     Crossmatch result Compatible     Unit number [id number]     Blood component type  LR     Unit division 00     Status of unit ISSUED     Crossmatch result Compatible    GLUCOSE, POC    Collection Time: 04/19/18 11:17 AM   Result Value Ref Range    Glucose (POC) 117 (H) 70 - 110 mg/dL           Lab Results   Component Value Date/Time    Glucose 91 04/19/2018 05:07 AM    Glucose 62 (L) 04/18/2018 03:00 AM    Glucose 45 (LL) 04/17/2018 03:50 AM    Glucose 205 (H) 04/16/2018 01:20 PM    Glucose 104 (H) 04/14/2018 04:40 AM        Imaging:  No results found.     Medication List Reviewed:  Current Facility-Administered Medications   Medication Dose Route Frequency    sodium bicarbonate (8.4%) 100 mEq in dextrose 5% 1,000 mL infusion   IntraVENous CONTINUOUS    0.9% sodium chloride infusion 250 mL  250 mL IntraVENous PRN    cefTRIAXone (ROCEPHIN) 1 g in 0.9% sodium chloride (MBP/ADV) 50 mL MBP  1 g IntraVENous Q24H    famotidine (PEPCID) tablet 20 mg  20 mg Oral DAILY    levoFLOXacin (LEVAQUIN) 500 mg in D5W IVPB  500 mg IntraVENous Q48H    metroNIDAZOLE (FLAGYL) IVPB premix 500 mg  500 mg IntraVENous Q8H    hydrALAZINE (APRESOLINE) 20 mg/mL injection 10 mg  10 mg IntraVENous Q6H PRN    mirtazapine (REMERON) tablet 15 mg  15 mg Oral QHS    aspirin delayed-release tablet 81 mg  81 mg Oral DAILY    atorvastatin (LIPITOR) tablet 80 mg  80 mg Oral QHS    loratadine (CLARITIN) tablet 10 mg  10 mg Oral DAILY PRN    acetaminophen (TYLENOL) tablet 650 mg  650 mg Oral Q4H PRN    ondansetron (ZOFRAN) injection 4 mg  4 mg IntraVENous Q4H PRN    heparin (porcine) injection 5,000 Units  5,000 Units SubCUTAneous Q8H    bisacodyl (DULCOLAX) suppository 10 mg  10 mg Rectal DAILY PRN

## 2018-04-19 NOTE — PROGRESS NOTES
2007 Received bedside verbal shift report from [address] [place] [address]. Pt lying in bed resting comfortably daughter at bedside. 3lnc in place. Piv # 20  to right arm and piv #20 to left arm intact. Perwic to low wall suction in place with yellow urine noted,  FMS in place with liquid stool noted less than 100 ml zan. Call bell within reach, side rails up x 3, bed low and locked. No complaints offered, pt instructed to call for assistance. 0015  Pt with elevated bp 185/121    0120  bp recheck 176/96  Medicated with hydralazine 10 mg ivp as ordered. 0240  bp recheck 146/90  Hr 120,     Bedside and Verbal shift change report given to catrachita pereira rn (oncoming nurse) by Alphonso Reddy rn  (offgoing nurse). Report included the following information SBAR, Kardex, Intake/Output, MAR, Recent Results and Cardiac Rhythm sinus tach.

## 2018-04-19 NOTE — PROGRESS NOTES
Problem: Falls - Risk of  Goal: *Absence of Falls  Document [place] Fall Risk and appropriate interventions in the flowsheet.    Outcome: Progressing Towards Goal  Fall Risk Interventions:  Mobility Interventions: Communicate number of staff needed for ambulation/transfer    Mentation Interventions: Door open when patient unattended, More frequent rounding, Update white board, Reorient patient    Medication Interventions: Bed/chair exit alarm    Elimination Interventions: Call light in reach    History of Falls Interventions: Door open when patient unattended

## 2018-04-20 NOTE — PROGRESS NOTES
Nutrition follow up/Plan of care      RECOMMENDATIONS:     1. TF: Osmolite 1 roberth increased as tolerated to goal of 55 ml/hr to provide 1400 Kcal, 58 g protein and 1111 ml free water. 100 ml free water Q 6 hours. 2. Monitor weight, labs, and tolerance of enteral nutrition  3. RD to follow     GOALS:     1. Ongoing: Enteral Nutrition/PO intake meets >75% of protein/calorie needs by 4/23  2. Met/Ongoing: Weight Maintenance (+/- 1-2 lb) by 4/23    ASSESSMENT:     Wt status is classified as normal per BMI of 22.3. However, patient with 23% weight loss over the past 3 months. Meets criteria for severe acute malnutrition. Patient is not meeting nutrition needs through po intake due to refusing meals and supplements since admission and now relying on enteral feedings via PEG. Osmolite 1 roberth infusing at 40 ml/hr providing 1018 Kcal and 43 g protein (~73% nutrition needs). Labs noted. Pt w/ hypokalemia and hypoalbuminemia. Enteral nutrition recommendations listed. RD to follow. Nutrition Diagnoses:   Unintentional weight loss related to decreased appetite with failure to thrive as evidenced by around a 30 lb or 23% loss over the past 3 months. Meets Criteria for Acute Malnutrition   [x] Severe Malnutrition, as evidenced by:   [] Moderate muscle wasting, loss of subcutaneous fat   [x] Nutritional intake of <50% of recommended intake for >5 days   [x] Weight loss of >1-2% in 1 week, >5% in 1 month, >7.5% in 3 months, or >10% in 6 months   [] Moderate-severe edema        Nutrition Risk:  [x] High  [] Moderate []  Low    SUBJECTIVE/OBJECTIVE:     Pt admitted for NESS. PMHx including HTN, cataract, dementia and renal insufficiency. Patient has been with a poor appetite since Dec 2017 and has continued to lose weight now equating to around 30 lb or 23% loss. Pt appears very thin. 4/10/18:  Calorie count ordered by MD to determine need for PEG.  Per documentation patient with sips of estella milk for lunch and sips of ginger ale for dinner on 4/7 (<10% nutrition needs). No documentation for 4/8, 4/9 or 4/10 due to patient refusing meals and fluids. Patient refused both breakfast and lunch meals today. Sleeping during visit today. Spoke to pt yesterday on 4/9 and patient did not want to eat any lunch despite encouragement. Encouraged pt to try a few sips of Ensure Clear supplement and patient adamantly stated \"No!\". 4/17/18:  PEG placement 4/13/18. Pt receiving enteral feedings but currently on hold due to high residuals >250 ml. Pt not taking any po at this time. History of N&V noted with GI work up at prior admission being non-diagnostic per chart review. 4/18/18: Per nursing, pt with large watery stools last PM.  Noted Colace had been discontinued. Tube feeding formula changed to Osmolite 1 roberth (lower osmolality formula for optimal tolerance) at 30 ml/hr increasing as tolerated to 50 ml/hr for now (will provide 1272 calories, 53 grams protein, ~ 1.0 L free water/d). 4/20/18: Osmolite 1 roberth infusing at 40 ml/hr. 180 ml residual over past 24 hours. Watery stool recorded on 4/19. Family meeting with palliative care this afternoon to discuss comfort measures. Information Obtained from:    [x] Chart Review   [x] Patient   [] Family/Caregiver   [x] Nurse/Physician   [] Interdisciplinary Meeting/Rounds    Diet: Osmolite 1 roberth at goal rate of 55 ml/hr  Medications: [x] Reviewed  Allergies: [x] Reviewed   Encounter Diagnoses     ICD-10-CM ICD-9-CM   1. Acute renal insufficiency N28.9 593.9   2. Elevated troponin R74.8 790.6   3.  Altered mental status, unspecified altered mental status type R41.82 780.97     Past Medical History:   Diagnosis Date    Amblyopia of left eye     Blind left eye     Cataract     bilat, right has been replaced    CRI (chronic renal insufficiency)     Dementia     Essential hypertension, malignant 4/25/2014    Hypertension     Left bundle branch block     Otitis media, acute 7/2/16    left Labs:    Lab Results   Component Value Date/Time    Sodium 142 04/20/2018 04:42 AM    Potassium 3.4 (L) 04/20/2018 04:42 AM    Chloride 111 (H) 04/20/2018 04:42 AM    CO2 20 (L) 04/20/2018 04:42 AM    Anion gap 11 04/20/2018 04:42 AM    Glucose 127 (H) 04/20/2018 04:42 AM    BUN 22 (H) 04/20/2018 04:42 AM    Creatinine 0.90 04/20/2018 04:42 AM    Calcium 7.1 (L) 04/20/2018 04:42 AM    Magnesium 1.8 04/18/2018 03:00 AM    Phosphorus 4.1 10/04/2015 06:44 AM    Albumin 1.6 (L) 04/18/2018 03:00 AM     Anthropometrics: BMI (calculated): 22.3  Last 3 Recorded Weights in this Encounter    04/06/18 1449 04/13/18 0442 04/19/18 0413   Weight: 41.7 kg (92 lb) 44.9 kg (98 lb 15.8 oz) 51.7 kg (113 lb 15.7 oz)      Ht Readings from Last 1 Encounters:   04/14/18 5' (1.524 m)     Weight Metrics 4/19/2018 3/12/2018 3/8/2018 3/2/2018 2/19/2018 2/17/2018 10/3/2017   Weight 113 lb 15.7 oz 109 lb 95 lb 95 lb 107 lb 14.4 oz 120 lb 125 lb   BMI 22.26 kg/m2 21.29 kg/m2 18.55 kg/m2 18.55 kg/m2 21.07 kg/m2 23.44 kg/m2 24.41 kg/m2     No data found. Estimated Nutrition Needs: [x] MSJ  [] Other:  Calories: 9198-6341 kcal Based on:   [x] Actual BW    Protein:   55-65 g Based on:   [x] Actual BW    Fluid:       6413-3765 ml Based on:   [x] Actual BW      [x] No Cultural, Mandaeism or ethnic dietary need identified.     [] Cultural, Mandaeism and ethnic food preferences identified and addressed     Wt Status:  [x] Normal (18.6 - 24.9) [] Underweight (<18.5) [] Overweight (25 - 29.9) [] Mild Obesity (30 - 34.9)  [] Moderate Obesity (35 - 39.9) [] Morbid Obesity (40+)     Nutrition Problems Identified:   [x] Suboptimal PO intake   [] Food Allergies  [] Difficulty chewing/swallowing/poor dentition  [x] Diarrhea (Last BM 4/19)  [] Nausea/Vomiting   [] None  [x] Other: Enteral nutrition via PEG    Plan:   [] Therapeutic Diet  []  Obtained/adjusted food preferences/tolerances and/or snacks options   []  Dietary supplements   [] Occupational therapy following for feeding techniques  []  HS snack added   []  Modify diet texture   []  Modify diet for food allergies   []  Assist with menu selection   []  Monitor PO intake on meal rounds   [x]  Continue inpatient monitoring and intervention   []  Participated in discharge planning/Interdisciplinary rounds/Team meetings   [x]  Other: Enteral nutrition recommendations listed    Education Needs:   [x] Not appropriate for teaching at this time    [] Identified and addressed    Nutrition Monitoring and Evaluation:  [x] Continue ongoing monitoring and intervention  [] Other    Danay Records

## 2018-04-20 NOTE — ANCILLARY DISCHARGE INSTRUCTIONS
Patient and/or next of kin has been given the [facility] Important Message From Medicare About Your Rights\" letter and all questions were answered.

## 2018-04-20 NOTE — PROGRESS NOTES
[facility] [place]: [phone] ([phone])  Formerly [facility] [place]: [address] [place] [address] Way: [phone]    Patient Name: Kandace Kong  YOB: 1940    Date of Initial Consult: 4/20/18  Reason for Consult: care decisions  Requesting Provider: Dr. Beverly  Primary Care Physician: Dodie Gutierrez MD      SUMMARY:   Kandace Kong is a 68y.o. year old with a past history of HTN, dementia, recurrent N/V, UTI , who was admitted on 4/6/2018 from home with a diagnosis of acute metabolic encephalopathy . Current medical issues leading to Palliative Medicine involvement include: 67 y/o female who has been in the hospital/rehab setting for the past month, went home for 1 day after discharge from rehab and was then readmitted to the hospital two weeks ago. Has had poor appetite and recurrent nausea/ vomiting and family decided to have PEG placed. Despite tube feedings and treatment of UTI patient remains lethargic and debilitated. Palliative care is consulted for goals of care discussions. PALLIATIVE DIAGNOSES:   1. ACP/goals of care discussion  2. UTI  3. Acute metabolic encephalopathy  4. Severe malnutrition  5. diarrhea  6. debility       PLAN:   1. ACP/goals of care discussion: Met with patient at bedside. She opens eyes to name, but did not answer questions or follow commands for me. We later met with her son Chandler Al, daughter Bon Vidales [facility]) and Dr. Beverly. Dr. Beverly and I discussed the patient's medical condition and likely poor outcome. We then introduced comfort care and hospice and I discussed this in detail. Her children stated that they feel that she would be happier at home and that per her AMD, that she does not want life prolonging treatments at the end of her life. With this in mind they elected to start comfort measures with plans for home hospice early next week.  Per discussion with the daughter the patient is now DNR/DNI, do not intubate for any reason, and do not escalate care (no ICU, no pressors). They understand that aggressive measures including labs will be discontinued and that medications for symptom management will be ordered. We did discuss the PEG tube and they have elected to discontinue tube feeds and encourage PO, allowing the patient to eat what/how much she desires for comfort. The PEG can continue to be used for PO medications as needed. 2. UTI: Patient will continue with IV antibiotics for now and can be transitioned to PO at discharge. We discussed the risk of recurrent UTI and how that would be managed with hospice  3. Acute metabolic encephalopathy: Despite aggressive treatment including antibiotics and tube feeds the patient has been lethargic, sleeping most of the time and talking very little, discussed with family that that may or may not improve  4. Severe malnutrition: Patient has not taken much PO for several months and struggles with recurrent N/V.  PEG tube was placed and tube feeds started, however patient has been having high residuals and diarrhea. Will DC tube feed and order comfort diet. Family understands that patient may not eat much. 5. Diarrhea: Per Dr. Kash Verdugo will treat for c-diff but diarrhea likely caused by tube feeds, will continue FMS for now with plans to DC as stools thicken  6. Debility: Patient has been mostly bed bound since at least March, not able to participate in rehab due to lethargy. Discussed with family that repeated or prolonged hospital stay in elderly patient can lead to profound debility  7. Initial consult note routed to primary continuity provider  8.  Communicated plan of care with: Palliative IDT    GOALS OF CARE:  Patient/Health Care Proxy Stated Goals: Comfort      TREATMENT PREFERENCES:   Code Status: DNR    Advance Care Planning:  Advance Care Planning 4/6/2018   Patient's Healthcare Decision Maker is: Legal Next of Manuelajeva 69   Primary Decision Maker Name Nereida Comer Primary Decision Maker Phone Number [phone]   Primary Decision Maker Relationship to Patient Adult child   Confirm Advance Directive Yes, on file   Patient Would Like to Complete Advance Directive -   Does the patient have other document types -       Medical Interventions: Comfort measures   Other Instructions: do not intubate for any reason, no escalation of care  Artificially Administered Nutrition: No feeding tube (may use PEG already in place for meds)     Other:  As far as possible, the palliative care team has discussed with patient / health care proxy about goals of care / treatment preferences for patient. HISTORY:     History obtained from: family, chart    CHIEF COMPLAINT: lethargy    HPI/SUBJECTIVE:    The patient is:   [] Verbal and participatory  [x] Non-participatory due to:   Opens eyes but Not responding to questions    Clinical Pain Assessment (nonverbal scale for nonverbal patients): Clinical Pain Assessment  Severity: 0     Activity (Movement): Lying quietly, normal position    Duration: for how long has pt been experiencing pain (e.g., 2 days, 1 month, years)  Frequency: how often pain is an issue (e.g., several times per day, once every few days, constant)     FUNCTIONAL ASSESSMENT:     Palliative Performance Scale (PPS):  PPS: 20    ECOG  ECOG Status : Completely disabled     PSYCHOSOCIAL/SPIRITUAL SCREENING:      Any spiritual / Mandaen concerns:  [] Yes /  [x] No    Caregiver Burnout:  [] Yes /  [x] No /  [] No Caregiver Present      Anticipatory grief assessment:   [x] Normal  / [] Maladaptive        REVIEW OF SYSTEMS:     Positive and pertinent negative findings in ROS are noted above in HPI. The following systems were [] reviewed / [] unable to be reviewed as noted in HPI  Other findings are noted below. Systems: constitutional, ears/nose/mouth/throat, respiratory, gastrointestinal, genitourinary, musculoskeletal, integumentary, neurologic, psychiatric, endocrine.  Positive findings noted below. Modified ESAS Completed by: provider     Drowsiness: 8     Pain: 0           Dyspnea: 0           Stool Occurrence(s): 1        PHYSICAL EXAM:     Wt Readings from Last 3 Encounters:   18 51.7 kg (113 lb 15.7 oz)   18 49.4 kg (109 lb)   18 43.1 kg (95 lb)     Blood pressure (!) 142/92, pulse (!) 127, temperature 98.4 °F (36.9 °C), resp. rate (!) 98, height 5' (1.524 m), weight 51.7 kg (113 lb 15.7 oz), SpO2 98 %, not currently breastfeeding.   Pain:  Pain Scale 1: Adult Nonverbal Pain Scale  Pain Intensity 1: 0                 Last bowel movement: FMS    Constitutional: ill appearing white female in bed, opens eyes to name  Eyes: pupils equal, anicteric  ENMT: no nasal discharge, moist mucous membranes  CV: edematous extremities  Respiratory: breathing not labored, symmetric  Musculoskeletal: no deformity, no tenderness to palpation  Skin: warm, dry  Neurologic: not following commands         HISTORY:     Principal Problem:    Septic shock ([facility] [address] (2018)    Active Problems:    Urinary tract infection without hematuria (2015)      Acute renal failure superimposed on stage 2 chronic kidney disease ([address] (2015)      Dehydration (2015)      Abnormality of gait and mobility (2015)      Nausea and vomiting (3/14/2018)      Severe protein-calorie malnutrition Nieto Aldair: less than 60% of standard weight) ([facility] [address] (3/22/2018)      Acute metabolic encephalopathy (5508)      Elevated troponin (2018)      Past Medical History:   Diagnosis Date    Amblyopia of left eye     Blind left eye     Cataract     bilat, right has been replaced    CRI (chronic renal insufficiency)     Dementia     Essential hypertension, malignant 2014    Hypertension     Left bundle branch block     Otitis media, acute 16    left      Past Surgical History:   Procedure Laterality Date    HX CATARACT REMOVAL Right     HX  SECTION  1968    HX  SECTION 1964    HX  SECTION  1970    HX TONSILLECTOMY        Family History   Problem Relation Age of Onset    Heart Disease Father     Other Brother      ? brain injury     History reviewed, no pertinent family history.   Social History   Substance Use Topics    Smoking status: Never Smoker    Smokeless tobacco: Never Used    Alcohol use No     Allergies   Allergen Reactions    Codeine Unknown (comments)     Unsure due to happening so long    Levaquin [Levofloxacin] Nausea Only     intolerance    Pcn [Penicillins] Hives    Sulfa (Sulfonamide Antibiotics) Unknown (comments)     Unsure it was so long ago      Current Facility-Administered Medications   Medication Dose Route Frequency    LORazepam (INTENSOL) 2 mg/mL oral concentrate 1 mg  1 mg Per G Tube Q6H PRN    acetaminophen (TYLENOL) solution 650 mg  650 mg Oral Q4H PRN    Or    acetaminophen (TYLENOL) suppository 650 mg  650 mg Rectal Q4H PRN    hyoscyamine sulfate (CYSTOSPAZ) tablet 0.125 mg  0.125 mg SubLINGual Q4H PRN    scopolamine (TRANSDERM-SCOP) 1 mg over 3 days 1 Patch  1 Patch TransDERmal Q72H PRN    morphine (ROXANOL) 100 mg/5 mL (20 mg/mL) concentrated solution 5 mg  5 mg Oral Q4H PRN    vancomycin 50 mg/mL oral solution (compounded) 250 mg  250 mg Per G Tube Q6H    cefTRIAXone (ROCEPHIN) 1 g in 0.9% sodium chloride (MBP/ADV) 50 mL MBP  1 g IntraVENous Q24H    famotidine (PEPCID) tablet 20 mg  20 mg Oral DAILY    mirtazapine (REMERON) tablet 15 mg  15 mg Oral QHS    aspirin delayed-release tablet 81 mg  81 mg Oral DAILY    ondansetron (ZOFRAN) injection 4 mg  4 mg IntraVENous Q4H PRN        LAB AND IMAGING FINDINGS:     Lab Results   Component Value Date/Time    WBC 13.3 (H) 2018 04:42 AM    HGB 11.7 (L) 2018 04:42 AM    PLATELET 568  04:42 AM     Lab Results   Component Value Date/Time    Sodium 142 2018 04:42 AM    Potassium 3.4 (L) 2018 04:42 AM    Chloride 111 (H) 2018 04:42 AM CO2 20 (L) 04/20/2018 04:42 AM    BUN 22 (H) 04/20/2018 04:42 AM    Creatinine 0.90 04/20/2018 04:42 AM    Calcium 7.1 (L) 04/20/2018 04:42 AM    Magnesium 1.8 04/18/2018 03:00 AM    Phosphorus 4.1 10/04/2015 06:44 AM      Lab Results   Component Value Date/Time    AST (SGOT) 26 04/18/2018 03:00 AM    Alk. phosphatase 66 04/18/2018 03:00 AM    Protein, total 4.1 (L) 04/18/2018 03:00 AM    Albumin 1.6 (L) 04/18/2018 03:00 AM    Globulin 2.5 04/18/2018 03:00 AM     Lab Results   Component Value Date/Time    INR 1.2 04/12/2018 04:37 PM    Prothrombin time 14.2 04/12/2018 04:37 PM    aPTT 25.1 01/04/2017 05:00 PM      Lab Results   Component Value Date/Time    Iron 94 03/15/2018 05:05 AM    TIBC 171 (L) 03/15/2018 05:05 AM    Iron % saturation 55 03/15/2018 05:05 AM    Ferritin 319 03/15/2018 05:05 AM      No results found for: PH, PCO2, PO2  No components found for: [place]   Lab Results   Component Value Date/Time    CK 37 04/11/2018 12:00 PM    CK - MB 2.0 04/11/2018 12:00 PM              Total time: 70  Counseling / coordination time, spent as noted above: 65  > 50% counseling / coordination: yes with family, provider, RN, hospice liason    Prolonged service was provided for  []30 min   []75 min in face to face time in the presence of the patient, spent as noted above. Time Start:   Time End:   Note: this can only be billed with 71142 (initial) or 52198 (follow up). If multiple start / stop times, list each separately.

## 2018-04-20 NOTE — PROGRESS NOTES
[facility]  Hospitalist Division        Inpatient Daily Progress Note    Daily progress Note    Patient: Austin Rene MRN: [id number]  CSN: [id number]    YOB: 1940  Age: 68 y.o. Sex: female    DOA: 4/6/2018 LOS:  LOS: 14 days                    Chief Complaint:  Septic Shock    Interval History: 67 y/o  female with hx of hypertension, dementia, recurrent nausea and vomiting for which she has been in the ER for 4 times, recent UTI with treatment and recently discharged 3/2018 following n/v who presented to the ED on 4/6/2018 for acute fatigue that began one day prior, noting excessive tired and drowsy and \"sleeping too much. \"  Per family patient was recently discharged from Rehab the day prior following hospital admission for dehydration secondary to N/V. Of note on prior admission in 3/2018 Work up at the time included - CT Abd/pelvis showed No acute or focal abnormality ,  HIDA scan 3/13/2018 showed patent cystic duct.  Decreased gallbladder ejection fraction of approximately 34 % suggesting gallbladder dyskinesia. Dr. Ronn Grimes was consulted on 3/14 and performed an EGD on 3/15/18, findings  -Normal upper endoscopy, with no endoscopic evidence of neoplasia or mucosal abnormality.  -Esophagus empirically dilated with #48 Albreto Bread. Pt's family refused PEG at that admission. She is re admitted this time secondary to fatigue, lethargy. Pt has been refusing to eat or take her medications. Attending had a long discussion with the family concerning goals of care, palliative/comfort measures vs pursuing PEG tube. Initially they did not want to proceed with PEG but later changed their mind. GI consulted again on 4/12/2018 for PEG placement, which was performed on 4/13/2018. She was started on tube feedings and tolerated advancement to goal.  PT worked with pt during hospitalization, recommend long term care.   Patient was supposed to d/c on 4/16/2018 but cancelled due to hypotension. She received IV fluid bolus, labs with NESS creatinine 2.56 - -> 2.22, WBC 26.7 - ->21.4 and lactic acid of 2.1 - ->1.6. She was transferred to the ICU for further management, PCCM following. Stool for C-diff pending. Blood cultures collected 4/16/2018 no growth x 16 hours x 1. CXR with minimal patchy density in right mid lung suggesting atelectasis or early infiltrate. UA with positive nitrites, large leukocytes esterase, numerous WBC's and 4+ bacteria. Pt on IV abx. On 4/17/2018 she had high TF residual, TF on hold. CT abd/pelvis 4/17/2018 with mild circumferential mural thickening throughout the transverse, descending and sigmoid colon, suggestive of acute infectious colitis but not the typical appearance of C-diff,  Presacral edema, no bowel obstruction.       Assessment/Plan:     Patient Active Problem List   Diagnosis Code    Essential hypertension, malignant I10    Urinary tract infection without hematuria N39.0    Acute renal failure superimposed on stage 2 chronic kidney disease (HCC) N17.9, N18.2    Dehydration E86.0    Abnormality of gait and mobility R26.9    Stroke ([facility] [address] I63.9    HTN (hypertension) I10    Follow up Z09    Nausea & vomiting R11.2    Nausea and vomiting R11.2    Advanced care planning/counseling discussion Z71.89    Dementia F03.90    Debility R53.81    Severe protein-calorie malnutrition Lawayne Peto: less than 60% of standard weight) ([facility]) E43    Acute metabolic encephalopathy L29.14    Elevated troponin R74.8    Septic shock ([facility]) A41.9, R65.21       A/P:  Septic Shock  - hypotensive requiring IVF bolus, lactic acidosis, leukocytosis  - blood cultures collected 4/16/2018 negative x 1  - UA collected 4/16/2018 with positive nitrates, large leukocyte esterase, 4+ bacteria and numerous WBC's  - stool for c-diff results pending- pt has flexiseal  - CXR 4/16/2018 minimal patchy density in right mid lung suggesting atelectasis or early infiltrate  - CT abd/pelvis 4/17/2018 with mild circumferential mural thickening throughout the transverse, descending and sigmoid colon, suggestive of acute infectious colitis but not the typical appearance of C-diff,  Presacral edema, no bowel obstruction  - continue IV Abx  - trend CBC  - lactic acid now normal  - continue IVF's    Acute renal failure superimposed on CKD 2  - creatinine improving  - continue IVF's  - monitor BMP    Anemia  - H/H 6.3 / 20 no active bleeding  - s/p 2 units PRBC's with improvement in CBC  - monitor CBC    UTI  - UA collected 4/16/2018 with positive nitrates, large leukocyte esterase, 4+ bacteria and numerous WBC's  - continue IV abx    N/V  - sp PEG placement by Dr. Caden Brand  - high residual via PEG per nursing staff  - CT abd/pelvis 4/17/2018 with mild circumferential mural thickening throughout the transverse, descending and sigmoid colon, suggestive of acute infectious colitis but not the typical appearance of C-diff,  Presacral edema, no bowel obstruction    Acute metabolic encephalopathy  - Levaquin dc'd  - continue IV abx     Hx of dementia  - pt at baseline mental status    DVT Prophylaxis  - Heparin subcutaneously TID    Disposition  - recommend Palliative care to assist with goals of care      Mihir Quiñones 15  Pager:  [phone]  Office:  [phone]        Subjective:     No events overnight. Son at bedside this morning at time of exam- planning for family meeting today @3pm.  Pt non-verbal, will open eyes.     Objective:      Visit Vitals    BP (!) 157/104 (BP 1 Location: Left arm, BP Patient Position: At rest)    Pulse (!) 122    Temp 98.1 °F (36.7 °C)    Resp 18    Ht 5' (1.524 m)    Wt 51.7 kg (113 lb 15.7 oz)    SpO2 99%    Breastfeeding No    BMI 22.26 kg/m2           Physical Exam:  General appearance: alert to verbal and tactile stimuli, no acute distress noted, appears weak  Head: Normocephalic, without obvious abnormality, atraumatic  Lungs: clear to auscultation bilaterally  Heart: regular rate and rhythm, S1, S2 normal, no murmur, click, rub or gallop  Abdomen: soft, non-tender. Bowel sounds normal. PEG intact.   Extremities: extremities normal, atraumatic, no cyanosis or edema  Skin: Skin color, texture, turgor normal. No rashes or lesions  Neurologic: non-verbal, hx of dementia        Intake and Output:  Current Shift:     Last three shifts:  04/18 1901 - 04/20 0700  In: 4076.3 [I.V.:1403.3]  Out: 2050 [Urine:1800; Drains:250]    Recent Results (from the past 24 hour(s))   GLUCOSE, POC    Collection Time: 04/19/18  4:54 PM   Result Value Ref Range    Glucose (POC) 108 70 - 110 mg/dL   GLUCOSE, POC    Collection Time: 04/19/18 10:57 PM   Result Value Ref Range    Glucose (POC) 136 (H) 70 - 350 mg/dL   METABOLIC PANEL, BASIC    Collection Time: 04/20/18  4:42 AM   Result Value Ref Range    Sodium 142 136 - 145 mmol/L    Potassium 3.4 (L) 3.5 - 5.5 mmol/L    Chloride 111 (H) 100 - 108 mmol/L    CO2 20 (L) 21 - 32 mmol/L    Anion gap 11 3.0 - 18 mmol/L    Glucose 127 (H) 74 - 99 mg/dL    BUN 22 (H) 7.0 - 18 MG/DL    Creatinine 0.90 0.6 - 1.3 MG/DL    BUN/Creatinine ratio 24 (H) 12 - 20      GFR est AA >60 >60 ml/min/1.73m2    GFR est non-AA >60 >60 ml/min/1.73m2    Calcium 7.1 (L) 8.5 - 10.1 MG/DL   CBC W/O DIFF    Collection Time: 04/20/18  4:42 AM   Result Value Ref Range    WBC 13.3 (H) 4.6 - 13.2 K/uL    RBC 3.95 (L) 4.20 - 5.30 M/uL    HGB 11.7 (L) 12.0 - 16.0 g/dL    HCT 33.6 (L) 35.0 - 45.0 %    MCV 85.1 74.0 - 97.0 FL    MCH 29.6 24.0 - 34.0 PG    MCHC 34.8 31.0 - 37.0 g/dL    RDW 17.2 (H) 11.6 - 14.5 %    PLATELET 873 056 - 362 K/uL    MPV 9.6 9.2 - 11.8 FL   GLUCOSE, POC    Collection Time: 04/20/18  9:26 AM   Result Value Ref Range    Glucose (POC) 121 (H) 70 - 110 mg/dL           Lab Results   Component Value Date/Time    Glucose 127 (H) 04/20/2018 04:42 AM    Glucose 91 04/19/2018 05:07 AM Glucose 62 (L) 04/18/2018 03:00 AM    Glucose 45 (LL) 04/17/2018 03:50 AM    Glucose 205 (H) 04/16/2018 01:20 PM        Imaging:  No results found.     Medication List Reviewed:  Current Facility-Administered Medications   Medication Dose Route Frequency    sodium bicarbonate (8.4%) 100 mEq in dextrose 5% 1,000 mL infusion   IntraVENous CONTINUOUS    0.9% sodium chloride infusion 250 mL  250 mL IntraVENous PRN    cefTRIAXone (ROCEPHIN) 1 g in 0.9% sodium chloride (MBP/ADV) 50 mL MBP  1 g IntraVENous Q24H    famotidine (PEPCID) tablet 20 mg  20 mg Oral DAILY    levoFLOXacin (LEVAQUIN) 500 mg in D5W IVPB  500 mg IntraVENous Q48H    metroNIDAZOLE (FLAGYL) IVPB premix 500 mg  500 mg IntraVENous Q8H    hydrALAZINE (APRESOLINE) 20 mg/mL injection 10 mg  10 mg IntraVENous Q6H PRN    mirtazapine (REMERON) tablet 15 mg  15 mg Oral QHS    aspirin delayed-release tablet 81 mg  81 mg Oral DAILY    atorvastatin (LIPITOR) tablet 80 mg  80 mg Oral QHS    loratadine (CLARITIN) tablet 10 mg  10 mg Oral DAILY PRN    acetaminophen (TYLENOL) tablet 650 mg  650 mg Oral Q4H PRN    ondansetron (ZOFRAN) injection 4 mg  4 mg IntraVENous Q4H PRN    heparin (porcine) injection 5,000 Units  5,000 Units SubCUTAneous Q8H    bisacodyl (DULCOLAX) suppository 10 mg  10 mg Rectal DAILY PRN

## 2018-04-20 NOTE — PROGRESS NOTES
Offered the pt's dgt,  Claudio Chou, [address] for home hospice care. She chose [facility]. Referral sent to them via [facility]. Included in the CC note dc is planned for week of 4-218. Care Management Interventions  PCP Verified by CM: Yes (Dr Nadege Villa)  Last Visit to PCP: 02/07/18  Mode of Transport at Discharge:  Other (see comment) (to be determined)  Transition of Care Consult (CM Consult): Marc: Yes  Current Support Network: [place] (lives with daughter)  Confirm Follow Up Transport: Family  Plan discussed with Pt/Family/Caregiver: Yes  Freedom of Choice Offered: Yes  Discharge Location  Discharge Placement: Home with hospice

## 2018-04-20 NOTE — MANAGEMENT PLAN
Discharge/Transition Planning    Patient is now DNR and comfort care.  Family working on 24 hour care over weekend      Deniz Hancock RN BSN  Outcomes Manager  Pager # [phone]

## 2018-04-20 NOTE — ROUTINE PROCESS
0800-Assessment completed, call bell within reach, no distress noted, voiced no complaints at this time, just says \"ouch\" when touched. TF residual greater than 300 ml. Stopped tube feeding. Will continue to monitor. 1000-am due medications given, TF residual still greater than 200.  1208-first unit of blood started. 1230-Residual at 70 ml, TF restarted, no other changes in condition. 1530-first unit completed. 1545-second unit of blood started. 1630-no change in condition, no distress noted. TF residuals good. 1818-second unit of blood completed, tolerated well. 1940-Bedside and Verbal shift change report given to [employer] b (oncoming nurse) by Ewelina Pineda (offgoing nurse). Report included the following information SBAR, Intake/Output and Recent Results.

## 2018-04-20 NOTE — PALLIATIVE CARE
Full note to follow. Per discussion with patient's children Remington and Serenity Cristal, will start to comfort measures only with plans to DC home with hospice early next week. Comfort orders placed and hospice referral ordered. Patient is now DNR/DNI, do not intubate for any reason, do not escalate care. Family understands that tube feeding will be discontinued and PEG used for meds only. Aggressive measures including labs will not be ordered and focus will be on symptom management.

## 2018-04-20 NOTE — PROGRESS NOTES
Problem: Falls - Risk of  Goal: *Absence of Falls  Document [place] Fall Risk and appropriate interventions in the flowsheet.    Fall Risk Interventions:  Mobility Interventions: Communicate number of staff needed for ambulation/transfer    Mentation Interventions: Door open when patient unattended    Medication Interventions: Patient to call before getting OOB    Elimination Interventions: Patient to call for help with toileting needs, Call light in reach    History of Falls Interventions: Door open when patient unattended, Evaluate medications/consider consulting pharmacy

## 2018-04-21 NOTE — PROGRESS NOTES
[facility]: [phone]  [facility]: [address] [place] [address] Way: [phone]    Patient Name: Camille Galeazzi  YOB: 1940    Date of Initial Consult: 4/20/18  Reason for Consult: care decisions  Requesting Provider: Dr. Taylor Whitney  Primary Care Physician: David Gordon MD      SUMMARY:   Camille Galeazzi is a 68y.o. year old with a past history of HTN, dementia, recurrent N/V, UTI , who was admitted on 4/6/2018 from home with a diagnosis of acute metabolic encephalopathy . Current medical issues leading to Palliative Medicine involvement include: 69 y/o female who has been in the hospital/rehab setting for the past month, went home for 1 day after discharge from rehab and was then readmitted to the hospital two weeks ago. Has had poor appetite and recurrent nausea/ vomiting and family decided to have PEG placed. Despite tube feedings and treatment of UTI patient remains lethargic and debilitated. Palliative care is consulted for goals of care discussions. PALLIATIVE DIAGNOSES:   1. ACP/goals of care discussion  2. Comfort Measures  3. Altered mental status  4. Recurrent nausea and vomiting   5. H/O bacturia and UTIs'  6. Acute metabolic encephalopathy and Dementia   7. Severe protein calorie malnutrition, essentially anorexia since January    8. Failure to thrive   9. Dependent for ADLs  10. Recurring hospitalizations   11. Diarrhea,incontinence   12. Debility      PLAN:   1. ACP/goals of care discussion: Met with patient and her son at bedside. Pt was sleeping ,she opened her  eyes went back to sleep. Per her son  Cesar Woodruff, she has been watching \" I love Michelle Tyler \" . Pt appears to be comfortable at this time. Pt was started on comfort measures 4/20/2018 including DNR/DNI . Per son report he and his sister are arranging for the pt to discharge to home with  Hospice.  However son became very protective of his mother when Hospice was spoken, he left room and asked me to follow. He stated \"She hates the word Hospice,it is OK to say comfort or palliative . \"  He stated the food tray is overwhelming for the pt ,she has not eaten since January and she is not interested in food,just tastes of gingerale and coke. DNR/DNI, do not intubate for any reason, and do not escalate care (no ICU, no pressors). Tube feeding have been stopped, no IVF , peg is being used as conduit for medications. 2. Comfort measures symptom management; Roxanol 5 mg every 4 hours SL or via peg for pain,shortness of breath. Compazine 5 mg via peg every 6 hours  prn nausea vomiting. Ativan  elixir 1mg every 6 hours prn  via PEG or SL for anxiety, Haldol 1 mg every 6 hours prn SL or via peg for agitation, delerium, N/V. Continue with fecal management , benites as needed . Continue to monitor for symptom namagement . 3. Altered mental status; Acute metabolic encephalopathy with H/O dementia : pt has completed Advanced directive August 12,2013 indicating her daughter Zully Orellana to be he MPOA in event pt looses capacity to make complex medical decisions for herself. 4. Recurrent nausea vomiting ,son denies her having this complaint , however if becomes severe then  peg may be opened to drain if Compazine is not effective. 5. Bacteria and  UTI with drug resistant organisms in past : consider enteral route PEG for antibiotics or stopping antibiotics    6. Severe protein calorie malnutrition: Patient has had anorexia /refusing to eat since January per son report . Likely would have refeeding syndrome with PO intake . 7. Diarrhea: On enteral vancomycin for concern for c-diff ,may have  refeeding syndrome. Continue FMS for comfort   8. Recurring hospitalizations the patient's son reports pt has been home one night since March . She wants to be in her bed. in her home of 50 years.    9. Debility: Pt's family is arranging self pay personal care for pt at home ,with hospice care. 10. Initial consult note routed to primary continuity provider  11. Communicated plan of care with: Palliative IDT    GOALS OF CARE:  Patient/Health Care Proxy Stated Goals: Comfort Measures including DNR/DNI       TREATMENT PREFERENCES:   Code Status: DNR/DNI    Advance Care Planning:  Advance Care Planning 4/6/2018   Patient's Healthcare Decision Maker is: Legal Next of Kati Sabillon   Primary Decision Maker Name Bridget Liu   Primary Decision Maker Phone Number [phone]   Primary Decision Maker Relationship to Patient Adult child   Confirm Advance Directive Yes, on file   Patient Would Like to Complete Advance Directive -   Does the patient have other document types -       Medical Interventions: Comfort measures   Other Instructions: do not intubate for any reason, no escalation of treatments   Artificially Administered Nutrition: No feeding tube (may use PEG already in place for meds)     Other:  As far as possible, the palliative care team has discussed with patient / health care proxy about goals of care / treatment preferences for patient.      HISTORY:     History obtained from: family, chart    CHIEF COMPLAINT: lethargy    HPI/SUBJECTIVE:    The patient is:   [] Verbal and participatory  [x] Non-participatory due to:   Opens eyes but Not responding to questions    Clinical Pain Assessment (nonverbal scale for nonverbal patients): Clinical Pain Assessment  Severity: 0     Activity (Movement): Lying quietly, normal position    Duration: for how long has pt been experiencing pain (e.g., 2 days, 1 month, years)  Frequency: how often pain is an issue (e.g., several times per day, once every few days, constant)     FUNCTIONAL ASSESSMENT:     Palliative Performance Scale (PPS):  PPS: 10    ECOG  ECOG Status : Completely disabled     PSYCHOSOCIAL/SPIRITUAL SCREENING:      Any spiritual / Oriental orthodox concerns:  [] Yes /  [x] No    Caregiver Burnout:  [] Yes /  [x] No /  [] No Caregiver Present Anticipatory grief assessment:   [x] Normal  / [] Maladaptive        REVIEW OF SYSTEMS:     Positive and pertinent negative findings in ROS are noted above in HPI. The following systems were [] reviewed / [x] unable to be reviewed as noted in HPI  Other findings are noted below. Systems: constitutional, ears/nose/mouth/throat, respiratory, gastrointestinal, genitourinary, musculoskeletal, integumentary, neurologic, psychiatric, endocrine. Positive findings noted below. Modified ESAS Completed by: provider   Fatigue:  (pt is not offerring information ) Drowsiness: 8     Pain: 0         Anorexia: 10 (per son ) Dyspnea: 0           Stool Occurrence(s): 1        PHYSICAL EXAM:     Wt Readings from Last 3 Encounters:   04/19/18 51.7 kg (113 lb 15.7 oz)   03/12/18 49.4 kg (109 lb)   03/08/18 43.1 kg (95 lb)     Blood pressure 104/75, pulse (!) 131, temperature 99.3 °F (37.4 °C), resp. rate 18, height 5' (1.524 m), weight 51.7 kg (113 lb 15.7 oz), SpO2 97 %, not currently breastfeeding. Pain:  Pain Scale 1: Visual  Pain Intensity 1: 0            Last bowel movement: FMS    Constitutional: chronically ill ,frail,elderly appearing  female lying in bed HOB elevated, pt is sleeping. opens eyes to name. Son is bedside   ENMT: no nasal discharge, moist mucous membranes  CV: edematous extremities  Respiratory: breathing not labored, symmetric  Musculoskeletal: no deformity, no tenderness to palpation  Skin: warm, dry  Neurologic: pt is not interacting at this time.       HISTORY:     Principal Problem:    Septic shock ([facility] [address] (4/16/2018)    Active Problems:    Urinary tract infection without hematuria (8/25/2015)      Acute renal failure superimposed on stage 2 chronic kidney disease ([address] (8/25/2015)      Dehydration (8/25/2015)      Abnormality of gait and mobility (9/30/2015)      Nausea and vomiting (3/14/2018)      Severe protein-calorie malnutrition [place]: less than 60% of standard weight) ([facility] [address] (3/22/2018)      Acute metabolic encephalopathy ()      Elevated troponin (2018)      Past Medical History:   Diagnosis Date    Amblyopia of left eye     Blind left eye     Cataract     bilat, right has been replaced    CRI (chronic renal insufficiency)     Dementia     Essential hypertension, malignant 2014    Hypertension     Left bundle branch block     Otitis media, acute 16    left      Past Surgical History:   Procedure Laterality Date    HX CATARACT REMOVAL Right     HX  SECTION      HX  SECTION      HX  SECTION      HX TONSILLECTOMY        Family History   Problem Relation Age of Onset    Heart Disease Father     Other Brother      ? brain injury     History reviewed, no pertinent family history.   Social History   Substance Use Topics    Smoking status: Never Smoker    Smokeless tobacco: Never Used    Alcohol use No     Allergies   Allergen Reactions    Codeine Unknown (comments)     Unsure due to happening so long    Levaquin [Levofloxacin] Nausea Only     intolerance    Pcn [Penicillins] Hives    Sulfa (Sulfonamide Antibiotics) Unknown (comments)     Unsure it was so long ago      Current Facility-Administered Medications   Medication Dose Route Frequency    [START ON 2018] famotidine (PEPCID) tablet 20 mg  20 mg Per G Tube DAILY    mirtazapine (REMERON) tablet 15 mg  15 mg Per G Tube QHS    morphine (ROXANOL) 100 mg/5 mL (20 mg/mL) concentrated solution 5 mg  5 mg SubLINGual Q4H PRN    prochlorperazine (COMPAZINE) tablet 5 mg  5 mg Per G Tube Q6H PRN    haloperidol (HALDOL) 2 mg/mL oral solution 1 mg  1 mg Per G Tube Q6H PRN    LORazepam (INTENSOL) 2 mg/mL oral concentrate 1 mg  1 mg Per G Tube Q6H PRN    acetaminophen (TYLENOL) solution 650 mg  650 mg Oral Q4H PRN    Or    acetaminophen (TYLENOL) suppository 650 mg  650 mg Rectal Q4H PRN    hyoscyamine sulfate (CYSTOSPAZ) tablet 0.125 mg  0.125 mg SubLINGual Q4H PRN    scopolamine (TRANSDERM-SCOP) 1 mg over 3 days 1 Patch  1 Patch TransDERmal Q72H PRN    vancomycin 50 mg/mL oral solution (compounded) 250 mg  250 mg Per G Tube Q6H    cefTRIAXone (ROCEPHIN) 1 g in 0.9% sodium chloride (MBP/ADV) 50 mL MBP  1 g IntraVENous Q24H    aspirin delayed-release tablet 81 mg  81 mg Oral DAILY        LAB AND IMAGING FINDINGS:     Lab Results   Component Value Date/Time    WBC 13.3 (H) 04/20/2018 04:42 AM    HGB 11.7 (L) 04/20/2018 04:42 AM    PLATELET 307 03/40/6601 04:42 AM     Lab Results   Component Value Date/Time    Sodium 142 04/20/2018 04:42 AM    Potassium 3.4 (L) 04/20/2018 04:42 AM    Chloride 111 (H) 04/20/2018 04:42 AM    CO2 20 (L) 04/20/2018 04:42 AM    BUN 22 (H) 04/20/2018 04:42 AM    Creatinine 0.90 04/20/2018 04:42 AM    Calcium 7.1 (L) 04/20/2018 04:42 AM    Magnesium 1.8 04/18/2018 03:00 AM    Phosphorus 4.1 10/04/2015 06:44 AM      Lab Results   Component Value Date/Time    AST (SGOT) 26 04/18/2018 03:00 AM    Alk.  phosphatase 66 04/18/2018 03:00 AM    Protein, total 4.1 (L) 04/18/2018 03:00 AM    Albumin 1.6 (L) 04/18/2018 03:00 AM    Globulin 2.5 04/18/2018 03:00 AM     Lab Results   Component Value Date/Time    INR 1.2 04/12/2018 04:37 PM    Prothrombin time 14.2 04/12/2018 04:37 PM    aPTT 25.1 01/04/2017 05:00 PM      Lab Results   Component Value Date/Time    Iron 94 03/15/2018 05:05 AM    TIBC 171 (L) 03/15/2018 05:05 AM    Iron % saturation 55 03/15/2018 05:05 AM    Ferritin 319 03/15/2018 05:05 AM      No results found for: PH, PCO2, PO2  No components found for: [place]   Lab Results   Component Value Date/Time    CK 37 04/11/2018 12:00 PM    CK - MB 2.0 04/11/2018 12:00 PM              Total time: 35  Counseling / coordination time, spent as noted above: 65  > 50% counseling / coordination: yes with family, provider, RN, hospice [address] [phone]

## 2018-04-21 NOTE — ROUTINE PROCESS
0730-Assessment completed, call bell within reach, no distress noted. 0840-cleaned and repositioned. 1100-am due medications given. 1230-no change in condition, no distress noted. Voiced no complaints. 1400-resting quietly in bed. 1630-no change in condition, no distress noted. 1809-pm due medications given. 1900-Bedside and Verbal shift change report given to Pavan Gomez (oncoming nurse) by Karla Ledbetter (offgoing nurse). Report included the following information SBAR, MAR and Recent Results.

## 2018-04-21 NOTE — PROGRESS NOTES
1900 -- Bedside, Verbal and Written shift change report given to [address] (oncoming nurse) by [facility]  (offgoing nurse). DNR band placed on pt's wrist, is now comfort care. Report included the following information SBAR, Kardex, Intake/Output, MAR and Recent Results.        3421 -- PM medications administered, pt tolerated with ease, will continue to monitor.      0037 -- Shift reassessment, pt condition unchanged, will continue to monitor.       0430 --  Shift reassessment, pt condition unchanged, will continue to monitor.         0700 -- Bedside, Verbal and Written shift change report given to [address]ais[address] nurse) by JOSE J (offgoing nurse). Report included the following information SBAR, Kardex, Intake/Output, MAR and Recent Results. Skin assessment completed.

## 2018-04-21 NOTE — PROGRESS NOTES
Problem: Falls - Risk of  Goal: *Absence of Falls  Document [place] Fall Risk and appropriate interventions in the flowsheet. Outcome: Progressing Towards Goal  Fall Risk Interventions:  Mobility Interventions: Communicate number of staff needed for ambulation/transfer    Mentation Interventions: Door open when patient unattended, Family/sitter at bedside    Medication Interventions: Evaluate medications/consider consulting pharmacy    Elimination Interventions:  Toileting schedule/hourly rounds    History of Falls Interventions: Door open when patient unattended

## 2018-04-21 NOTE — PROGRESS NOTES
Problem: Falls - Risk of  Goal: *Absence of Falls  Document [place] Fall Risk and appropriate interventions in the flowsheet. Outcome: Progressing Towards Goal  Fall Risk Interventions:  Mobility Interventions: Communicate number of staff needed for ambulation/transfer    Mentation Interventions: Door open when patient unattended    Medication Interventions: Evaluate medications/consider consulting pharmacy    Elimination Interventions:  Toileting schedule/hourly rounds    History of Falls Interventions: Door open when patient unattended

## 2018-04-21 NOTE — PROGRESS NOTES
[facility]  Hospitalist Division        Inpatient Daily Progress Note    Daily progress Note    Patient: Lorraine Tony MRN: [id number]  CSN: [id number]    YOB: 1940  Age: 68 y.o. Sex: female    DOA: 4/6/2018 LOS:  LOS: 15 days                    Chief Complaint:  Septic Shock    Interval History: 69 y/o  female with hx of hypertension, dementia, recurrent nausea and vomiting for which she has been in the ER for 4 times, recent UTI with treatment and recently discharged 3/2018 following n/v who presented to the ED on 4/6/2018 for acute fatigue that began one day prior, noting excessive tired and drowsy and \"sleeping too much. \"  Per family patient was recently discharged from Rehab the day prior following hospital admission for dehydration secondary to N/V. Of note on prior admission in 3/2018 Work up at the time included - CT Abd/pelvis showed No acute or focal abnormality ,  HIDA scan 3/13/2018 showed patent cystic duct.  Decreased gallbladder ejection fraction of approximately 34 % suggesting gallbladder dyskinesia. Dr. Bj Rouse was consulted on 3/14 and performed an EGD on 3/15/18, findings  -Normal upper endoscopy, with no endoscopic evidence of neoplasia or mucosal abnormality.  -Esophagus empirically dilated with #48 Vivica Pane. Pt's family refused PEG at that admission. She is re admitted this time secondary to fatigue, lethargy. Pt has been refusing to eat or take her medications. Attending had a long discussion with the family concerning goals of care, palliative/comfort measures vs pursuing PEG tube. Initially they did not want to proceed with PEG but later changed their mind. GI consulted again on 4/12/2018 for PEG placement, which was performed on 4/13/2018. She was started on tube feedings and tolerated advancement to goal.  PT worked with pt during hospitalization, recommend long term care.   Patient was supposed to d/c on 4/16/2018 but cancelled due to hypotension. She received IV fluid bolus, labs with NESS creatinine 2.56 - -> 2.22, WBC 26.7 - ->21.4 and lactic acid of 2.1 - ->1.6. She was transferred to the ICU for further management, PCCM following. Stool for C-diff pending. Blood cultures collected 4/16/2018 no growth x 16 hours x 1. CXR with minimal patchy density in right mid lung suggesting atelectasis or early infiltrate. UA with positive nitrites, large leukocytes esterase, numerous WBC's and 4+ bacteria. Pt on IV abx. On 4/17/2018 she had high TF residual, TF on hold. CT abd/pelvis 4/17/2018 with mild circumferential mural thickening throughout the transverse, descending and sigmoid colon, suggestive of acute infectious colitis but not the typical appearance of C-diff,  Presacral edema, no bowel obstruction. Attending and palliative care had meeting with family on 4/20/2018 given severe debility and little to no reserve, chose to proceed with comfort care. TF's stopped but using PEG for medications, plans to continue IV abx for UTI and transition to oral at time of discharge. No further IV sticks or aggressive measures.       Assessment/Plan:     Patient Active Problem List   Diagnosis Code    Essential hypertension, malignant I10    Urinary tract infection without hematuria N39.0    Acute renal failure superimposed on stage 2 chronic kidney disease (HCC) N17.9, N18.2    Dehydration E86.0    Abnormality of gait and mobility R26.9    Stroke ([facility] [address] I63.9    HTN (hypertension) I10    Follow up Z09    Nausea & vomiting R11.2    Nausea and vomiting R11.2    Advanced care planning/counseling discussion Z71.89    Dementia F03.90    Debility R53.81    Severe protein-calorie malnutrition [place]: less than 60% of standard weight) ([facility]) E43    Acute metabolic encephalopathy M95.15    Elevated troponin R74.8    Septic shock ([facility]) A41.9, R65.21    Altered mental status R41.82    Diarrhea R19.7    Comfort measures only status Z51.5       A/P:  Comfort measures only       Septic Shock  - hypotensive requiring IVF bolus, lactic acidosis, leukocytosis  - blood cultures collected 4/16/2018 negative x 1  - UA collected 4/16/2018 with positive nitrates, large leukocyte esterase, 4+ bacteria and numerous WBC's  - stool for c-diff results pending- pt has flexiseal  - CXR 4/16/2018 minimal patchy density in right mid lung suggesting atelectasis or early infiltrate  - CT abd/pelvis 4/17/2018 with mild circumferential mural thickening throughout the transverse, descending and sigmoid colon, suggestive of acute infectious colitis but not the typical appearance of C-diff,  Presacral edema, no bowel obstruction  - continue IV Abx  - trend CBC  - lactic acid now normal    Acute renal failure superimposed on CKD 2  - resolved  - no further monitoring    Anemia  -improved after receiving 2 units PRBC's  - no further monitoring as pt comfort care only    UTI  - UA collected 4/16/2018 with positive nitrates, large leukocyte esterase, 4+ bacteria and numerous WBC's  - continue IV abx, transition to oral at time of discharge    N/V  - PEG as a conduit for meds, can be connected to suction if pt experiences N/V  - comfort care measures only    Disposition  - home w/ hospice/comfort care measures      [facility]  Pager:  [phone]  Office:  [phone]        Subjective:     Resting in bed. No acute distress noted.   Pt non-verbal.    palliative care meeting yesterday- comfort care measures only    Objective:      Visit Vitals    /75 (BP 1 Location: Left arm)    Pulse (!) 131    Temp 99.3 °F (37.4 °C)    Resp 18    Ht 5' (1.524 m)    Wt 51.7 kg (113 lb 15.7 oz)    SpO2 97%    Breastfeeding No    BMI 22.26 kg/m2           Physical Exam:  General appearance: alert to verbal and tactile stimuli, no acute distress noted, appears weak  Head: Normocephalic, without obvious abnormality, atraumatic  Lungs: clear to auscultation bilaterally  Heart: regular rate and rhythm, S1, S2 normal, no murmur, click, rub or gallop  Abdomen: soft, non-tender. Bowel sounds normal. PEG intact. Extremities: extremities normal, atraumatic, no cyanosis or edema  Skin: Skin color, texture, turgor normal. No rashes or lesions  Neurologic: non-verbal, hx of dementia        Intake and Output:  Current Shift:     Last three shifts:  04/19 1901 - 04/21 0700  In: 3140 [I.V.:1350]  Out: 2450 [Urine:2050; Drains:400]    Recent Results (from the past 24 hour(s))   GLUCOSE, POC    Collection Time: 04/21/18  7:38 AM   Result Value Ref Range    Glucose (POC) 94 70 - 110 mg/dL           Lab Results   Component Value Date/Time    Glucose 127 (H) 04/20/2018 04:42 AM    Glucose 91 04/19/2018 05:07 AM    Glucose 62 (L) 04/18/2018 03:00 AM    Glucose 45 (LL) 04/17/2018 03:50 AM    Glucose 205 (H) 04/16/2018 01:20 PM        Imaging:  No results found.     Medication List Reviewed:  Current Facility-Administered Medications   Medication Dose Route Frequency    [START ON 4/22/2018] famotidine (PEPCID) tablet 20 mg  20 mg Per G Tube DAILY    mirtazapine (REMERON) tablet 15 mg  15 mg Per G Tube QHS    morphine (ROXANOL) 100 mg/5 mL (20 mg/mL) concentrated solution 5 mg  5 mg SubLINGual Q4H PRN    prochlorperazine (COMPAZINE) tablet 5 mg  5 mg Per G Tube Q6H PRN    haloperidol (HALDOL) 2 mg/mL oral solution 1 mg  1 mg Per G Tube Q6H PRN    LORazepam (INTENSOL) 2 mg/mL oral concentrate 1 mg  1 mg Per G Tube Q6H PRN    acetaminophen (TYLENOL) solution 650 mg  650 mg Oral Q4H PRN    Or    acetaminophen (TYLENOL) suppository 650 mg  650 mg Rectal Q4H PRN    hyoscyamine sulfate (CYSTOSPAZ) tablet 0.125 mg  0.125 mg SubLINGual Q4H PRN    scopolamine (TRANSDERM-SCOP) 1 mg over 3 days 1 Patch  1 Patch TransDERmal Q72H PRN    vancomycin 50 mg/mL oral solution (compounded) 250 mg  250 mg Per G Tube Q6H    cefTRIAXone (ROCEPHIN) 1 g in 0.9% sodium chloride (MBP/ADV) 50 mL MBP  1 g IntraVENous Q24H    aspirin delayed-release tablet 81 mg  81 mg Oral DAILY

## 2018-04-22 NOTE — PROGRESS NOTES
2 [facility]  Hospitalist Division        Inpatient Daily Progress Note    Daily progress Note    Patient: Asuncion Bernheim MRN: [id number]  CSN: [id number]    YOB: 1940  Age: 68 y.o. Sex: female    DOA: 4/6/2018 LOS:  LOS: 16 days                    Chief Complaint:  Septic Shock    Interval History: 67 y/o  female with hx of hypertension, dementia, recurrent nausea and vomiting for which she has been in the ER for 4 times, recent UTI with treatment and recently discharged 3/2018 following n/v who presented to the ED on 4/6/2018 for acute fatigue that began one day prior, noting excessive tired and drowsy and \"sleeping too much. \"  Per family patient was recently discharged from Rehab the day prior following hospital admission for dehydration secondary to N/V. Of note on prior admission in 3/2018 Work up at the time included - CT Abd/pelvis showed No acute or focal abnormality ,  HIDA scan 3/13/2018 showed patent cystic duct.  Decreased gallbladder ejection fraction of approximately 34 % suggesting gallbladder dyskinesia. Dr. Rendell Saint was consulted on 3/14 and performed an EGD on 3/15/18, findings  -Normal upper endoscopy, with no endoscopic evidence of neoplasia or mucosal abnormality.  -Esophagus empirically dilated with #48 Eugune Aldair. Pt's family refused PEG at that admission. She is re admitted this time secondary to fatigue, lethargy. Pt has been refusing to eat or take her medications. Attending had a long discussion with the family concerning goals of care, palliative/comfort measures vs pursuing PEG tube. Initially they did not want to proceed with PEG but later changed their mind. GI consulted again on 4/12/2018 for PEG placement, which was performed on 4/13/2018. She was started on tube feedings and tolerated advancement to goal.  PT worked with pt during hospitalization, recommend long term care.   Patient was supposed to d/c on 4/16/2018 but cancelled due to hypotension. She received IV fluid bolus, labs with NESS creatinine 2.56 - -> 2.22, WBC 26.7 - ->21.4 and lactic acid of 2.1 - ->1.6. She was transferred to the ICU for further management, PCCM following. Stool for C-diff pending. Blood cultures collected 4/16/2018 no growth x 16 hours x 1. CXR with minimal patchy density in right mid lung suggesting atelectasis or early infiltrate. UA with positive nitrites, large leukocytes esterase, numerous WBC's and 4+ bacteria. Pt on IV abx. On 4/17/2018 she had high TF residual, TF on hold. CT abd/pelvis 4/17/2018 with mild circumferential mural thickening throughout the transverse, descending and sigmoid colon, suggestive of acute infectious colitis but not the typical appearance of C-diff,  Presacral edema, no bowel obstruction. Attending and palliative care had meeting with family on 4/20/2018 given severe debility and little to no reserve, chose to proceed with comfort care. TF's stopped but using PEG for medications, plans to continue IV abx for UTI and transition to oral at time of discharge. No further IV sticks or aggressive measures.       Assessment/Plan:     Patient Active Problem List   Diagnosis Code    Essential hypertension, malignant I10    Urinary tract infection without hematuria N39.0    Acute renal failure superimposed on stage 2 chronic kidney disease (HCC) N17.9, N18.2    Dehydration E86.0    Abnormality of gait and mobility R26.9    Stroke ([facility] [address] I63.9    HTN (hypertension) I10    Follow up Z09    Nausea & vomiting R11.2    Nausea and vomiting R11.2    Advanced care planning/counseling discussion Z71.89    Dementia F03.90    Debility R53.81    Severe protein-calorie malnutrition [place]: less than 60% of standard weight) ([facility]) E43    Acute metabolic encephalopathy H70.55    Elevated troponin R74.8    Septic shock ([facility]) A41.9, R65.21    Altered mental status R41.82    Diarrhea R19.7    Comfort measures only status Z51.5       A/P:  Comfort measures only       Septic Shock  - hypotensive requiring IVF bolus, lactic acidosis, leukocytosis  - blood cultures collected 4/16/2018 negative x 1  - UA collected 4/16/2018 with positive nitrates, large leukocyte esterase, 4+ bacteria and numerous WBC's  - stool for c-diff results pending- pt has flexiseal  - CXR 4/16/2018 minimal patchy density in right mid lung suggesting atelectasis or early infiltrate  - CT abd/pelvis 4/17/2018 with mild circumferential mural thickening throughout the transverse, descending and sigmoid colon, suggestive of acute infectious colitis but not the typical appearance of C-diff,  Presacral edema, no bowel obstruction  - continue IV Abx  - trend CBC  - lactic acid now normal    Acute renal failure superimposed on CKD 2  - resolved  - no further monitoring    Anemia  -improved after receiving 2 units PRBC's  - no further monitoring as pt comfort care only    UTI  - UA collected 4/16/2018 with positive nitrates, large leukocyte esterase, 4+ bacteria and numerous WBC's  - continue IV abx, transition to oral at time of discharge    N/V  - PEG as a conduit for meds, can be connected to suction if pt experiences N/V  - comfort care measures only    Disposition  - home w/ hospice/comfort care measures, hopefully tomorrow      CONNOR [address]  Pager:  [phone]  Office:  [phone]        Subjective: Interval notes reviewed, no events overnight. Resting in bed comfortably, opens eyes to verbal stimuli, non-verbal, drifts back to sleep.     Objective:      Visit Vitals    BP (!) 129/93 (BP 1 Location: Left arm)    Pulse (!) 113    Temp 97.7 °F (36.5 °C)    Resp 18    Ht 5' (1.524 m)    Wt 51.7 kg (113 lb 15.7 oz)    SpO2 99%    Breastfeeding No    BMI 22.26 kg/m2           Physical Exam:  General appearance: alert to verbal and tactile stimuli, no acute distress noted, appears weak  Head: Normocephalic, without obvious abnormality, atraumatic  Lungs: clear to auscultation bilaterally  Heart: regular rate and rhythm, S1, S2 normal, no murmur, click, rub or gallop  Abdomen: soft, non-tender. Bowel sounds normal. PEG intact. Extremities: extremities normal, atraumatic, no cyanosis or edema  Skin: Skin color, texture, turgor normal. No rashes or lesions  Neurologic: non-verbal, hx of dementia      Intake and Output:  Current Shift:     Last three shifts:  04/20 1901 - 04/22 0700  In: 370   Out: 1100 [Urine:700; Drains:400]    No results found for this or any previous visit (from the past 24 hour(s)). Lab Results   Component Value Date/Time    Glucose 127 (H) 04/20/2018 04:42 AM    Glucose 91 04/19/2018 05:07 AM    Glucose 62 (L) 04/18/2018 03:00 AM    Glucose 45 (LL) 04/17/2018 03:50 AM    Glucose 205 (H) 04/16/2018 01:20 PM        Imaging:  No results found.     Medication List Reviewed:  Current Facility-Administered Medications   Medication Dose Route Frequency    famotidine (PEPCID) tablet 20 mg  20 mg Per G Tube DAILY    mirtazapine (REMERON) tablet 15 mg  15 mg Per G Tube QHS    morphine (ROXANOL) 100 mg/5 mL (20 mg/mL) concentrated solution 5 mg  5 mg SubLINGual Q4H PRN    prochlorperazine (COMPAZINE) tablet 5 mg  5 mg Per G Tube Q6H PRN    haloperidol (HALDOL) 2 mg/mL oral solution 1 mg  1 mg Per G Tube Q6H PRN    LORazepam (INTENSOL) 2 mg/mL oral concentrate 1 mg  1 mg Per G Tube Q6H PRN    acetaminophen (TYLENOL) solution 650 mg  650 mg Oral Q4H PRN    Or    acetaminophen (TYLENOL) suppository 650 mg  650 mg Rectal Q4H PRN    hyoscyamine sulfate (CYSTOSPAZ) tablet 0.125 mg  0.125 mg SubLINGual Q4H PRN    scopolamine (TRANSDERM-SCOP) 1 mg over 3 days 1 Patch  1 Patch TransDERmal Q72H PRN    vancomycin 50 mg/mL oral solution (compounded) 250 mg  250 mg Per G Tube Q6H    cefTRIAXone (ROCEPHIN) 1 g in 0.9% sodium chloride (MBP/ADV) 50 mL MBP  1 g IntraVENous Q24H    aspirin delayed-release tablet 81 mg  81 mg Oral DAILY

## 2018-04-22 NOTE — ROUTINE PROCESS
0830-Assessment completed, call bell within reach, no distress noted. 0900-am due medications given. 1230-no change in condition, no distress noted. 1400-resting quietly in bed. 1630-no change in condition, no distress noted. 1810-pm due medications given. 1920-Bedside and Verbal shift change report given to Lukas Tello (oncoming nurse) by Deidra Maldonado (offgoing nurse). Report included the following information SBAR, MAR and Recent Results.

## 2018-04-22 NOTE — PROGRESS NOTES
Problem: Falls - Risk of  Goal: *Absence of Falls  Document [place] Fall Risk and appropriate interventions in the flowsheet. Outcome: Progressing Towards Goal  Fall Risk Interventions:  Mobility Interventions: Strengthening exercises (ROM-active/passive), Communicate number of staff needed for ambulation/transfer    Mentation Interventions: Toileting rounds    Medication Interventions: Evaluate medications/consider consulting pharmacy    Elimination Interventions:  Toileting schedule/hourly rounds    History of Falls Interventions: Door open when patient unattended

## 2018-04-23 NOTE — DISCHARGE SUMMARY
[facility]    Discharge Summary    Patient: Nila Tobar MRN: [id number]  CSN: [id number]    YOB: 1940  Age: 68 y.o.   Sex: female    DOA: 4/6/2018 LOS:  LOS: 17 days   Discharge Date:      Admission Diagnoses: NESS (acute kidney injury) ([facility]  dysphagia     Discharge Diagnoses:    Problem List as of 4/23/2018  Date Reviewed: 4/18/2018          Codes Class Noted - Resolved    Altered mental status ICD-10-CM: R41.82  ICD-9-CM: 780.97  Unknown - Present        Diarrhea ICD-10-CM: R19.7  ICD-9-CM: 787.91  Unknown - Present        Comfort measures only status ICD-10-CM: Z51.5  ICD-9-CM: V66.7  Unknown - Present        * (Principal)Septic shock ([facility] ICD-10-CM: A41.9, R65.21  ICD-9-CM: 038.9, 785.52, 995.92  4/16/2018 - Present        Acute metabolic encephalopathy R-68-CD: G93.41  ICD-9-CM: 348.31  4/6/2018 - Present        Elevated troponin ICD-10-CM: R74.8  ICD-9-CM: 790.6  4/6/2018 - Present        Severe protein-calorie malnutrition [place]: less than 60% of standard weight) ([facility] ICD-10-CM: E43  ICD-9-CM: 227  3/22/2018 - Present        Advanced care planning/counseling discussion ICD-10-CM: Z71.89  ICD-9-CM: V65.49  3/20/2018 - Present        Dementia ICD-10-CM: F03.90  ICD-9-CM: 294.20  3/20/2018 - Present        Debility ICD-10-CM: R53.81  ICD-9-CM: 799.3  3/20/2018 - Present        Nausea and vomiting ICD-10-CM: R11.2  ICD-9-CM: 787.01  3/14/2018 - Present        Nausea & vomiting ICD-10-CM: R11.2  ICD-9-CM: 787.01  3/12/2018 - Present        Follow up ICD-10-CM: K39  ICD-9-CM: V67.9  3/8/2018 - Present        HTN (hypertension) ICD-10-CM: I10  ICD-9-CM: 401.9  12/14/2015 - Present        Stroke ([address] ICD-10-CM: I63.9  ICD-9-CM: 434.91  10/4/2015 - Present        Abnormality of gait and mobility ICD-10-CM: R26.9  ICD-9-CM: 781.2  9/30/2015 - Present        Urinary tract infection without hematuria ICD-10-CM: N39.0  ICD-9-CM: 599.0  8/25/2015 - Present        Acute renal failure superimposed on stage 2 chronic kidney disease ([facility] [address] ICD-10-CM: N17.9, N18.2  ICD-9-CM: 584.9, 585.2  8/25/2015 - Present        Dehydration ICD-10-CM: E86.0  ICD-9-CM: 276.51  8/25/2015 - Present        Essential hypertension, malignant (Chronic) ICD-10-CM: I10  ICD-9-CM: 401.0  4/25/2014 - Present        RESOLVED: Leukocytosis ICD-10-CM: Q79.764  ICD-9-CM: 288.60  8/25/2015 - 10/4/2015              Discharge Condition: Stable    Discharge To: Home    Consults: Gastroenterology, Hospitalist and [facility] Course: 67 y/o  female with hx of hypertension, dementia, recurrent nausea and vomiting for which she has been in the ER for 4 times, recent UTI with treatment and recently discharged 3/2018 following n/v who presented to the ED on 4/6/2018 for acute fatigue that began one day prior, noting excessive tired and drowsy and \"sleeping too much. \"  Per family patient was recently discharged from [address] [place] [address] the day prior following hospital admission for dehydration secondary to N/V.   Of note on prior admission in 3/2018 Work up at the time included - CT Abd/pelvis showed No acute or focal abnormality ,  HIDA scan 3/13/2018 showed patent cystic duct.  Decreased gallbladder ejection fraction of approximately 34 % suggesting gallbladder dyskinesia.  Dr. Yordy Marcial was consulted on 3/14 and performed an EGD on 3/15/18, findings  -Normal upper endoscopy, with no endoscopic evidence of neoplasia or mucosal abnormality.  -Esophagus empirically dilated with #48 Ada Hernández.  Pt's family refused PEG at that admission.  She is re admitted this time secondary to fatigue, lethargy.   Pt has been refusing to eat or take her medications.  Attending had a long discussion with the family concerning goals of care, palliative/comfort measures vs pursuing PEG tube.  Initially they did not want to proceed with PEG but later changed their mind. Rommel Casas consulted again on 4/12/2018 for PEG placement, which was performed on 4/13/2018. Kasie President was started on tube feedings and tolerated advancement to goal.  PT worked with pt during hospitalization, recommend long term care. Patient was supposed to d/c on 4/16/2018 but cancelled due to hypotension. She received IV fluid bolus, labs with NESS creatinine 2.56 - -> 2.22, WBC 26.7 - ->21.4 and lactic acid of 2.1 - ->1.6. She was transferred to the ICU for further management, PCCM following. Stool for C-diff pending. Blood cultures collected 4/16/2018 no growth x 16 hours x 1. CXR with minimal patchy density in right mid lung suggesting atelectasis or early infiltrate. UA with positive nitrites, large leukocytes esterase, numerous WBC's and 4+ bacteria. Pt on IV abx. On 4/17/2018 she had high TF residual, TF on hold. CT abd/pelvis 4/17/2018 with mild circumferential mural thickening throughout the transverse, descending and sigmoid colon, suggestive of acute infectious colitis but not the typical appearance of C-diff,  Presacral edema, no bowel obstruction. Attending and palliative care had meeting with family on 4/20/2018 given severe debility and little to no reserve, chose to proceed with comfort care. TF's stopped but using PEG for medications, plans to continue IV abx for UTI and transition to oral at time of discharge. I suspect the reasons for her shock at the beginning of the week was due to the UTI and not active C Diff, as her leukocytosis has improved as well as her BP but her liquid stood continues. This points to the tube feeds being the most likely cause of diarrhea. Will cont to treat with full course for C Diff given benefit outweighs the risk in this case and since the focus is comfort, having persistent diarrhea would be very uncomfortable for the patient. No further IV sticks or aggressive measures. She is stable for discharge home w/ hospice.     Physical Exam:  General appearance: alert to verbal and tactile stimuli, no acute distress noted, appears weak  Head: Normocephalic, without obvious abnormality, atraumatic  Lungs: clear to auscultation bilaterally  Heart: regular rate and rhythm, S1, S2 normal, no murmur, click, rub or gallop  Abdomen: soft, non-tender. Bowel sounds normal. PEG intact. Extremities: extremities normal, atraumatic, no cyanosis or edema  Skin: Skin color, texture, turgor normal. No rashes or lesions  Neurologic: non-verbal, hx of dementia    Significant Diagnostic Studies: labs: No results found for this or any previous visit (from the past 24 hour(s)). Discharge Medications:     Current Discharge Medication List      START taking these medications    Details   haloperidol (HALDOL) 2 mg/mL oral concentrate 0.5 mL by Per G Tube route every six (6) hours as needed. Qty: 30 mL, Refills: 0    Associated Diagnoses: Advanced care planning/counseling discussion      hyoscyamine sulfate (CYSTOSPAZ) 0.125 mg tablet 1 Tab by SubLINGual route every four (4) hours as needed for Other (Secretions). Qty: 30 Tab, Refills: 0    Associated Diagnoses: Advanced care planning/counseling discussion      LORazepam (INTENSOL) 2 mg/mL concentrated solution 0.5 mL by Per G Tube route every six (6) hours as needed for Anxiety or Agitation. Max Daily Amount: 4 mg. Qty: 1 Bottle, Refills: 0    Associated Diagnoses: Advanced care planning/counseling discussion      mirtazapine (REMERON) 15 mg tablet 1 Tab by Per G Tube route nightly. Qty: 30 Tab, Refills: 0    Associated Diagnoses: Advanced care planning/counseling discussion      morphine (ROXANOL) 100 mg/5 mL (20 mg/mL) concentrated solution 0.25 mL by SubLINGual route every four (4) hours as needed. Max Daily Amount: 30 mg.  Qty: 1 Bottle, Refills: 0    Associated Diagnoses: Advanced care planning/counseling discussion      prochlorperazine (COMPAZINE) 5 mg tablet 1 Tab by Per G Tube route every six (6) hours as needed for up to 7 days.   Qty: 30 Tab, Refills: 0    Associated Diagnoses: Advanced care planning/counseling discussion scopolamine (TRANSDERM-SCOP) 1 mg over 3 days pt3d 1 Patch by TransDERmal route every seventy-two (72) hours as needed for Other (SECRETIONS). Qty: 10 Patch, Refills: 0    Associated Diagnoses: Advanced care planning/counseling discussion      famotidine (PEPCID) 20 mg tablet 1 Tab by Per G Tube route daily. Qty: 30 Tab, Refills: 0      vancomycin 50 mg/mL oral solution (compounded) 5 mL by Per G Tube route every six (6) hours for 10 days.   Qty: 200 mL, Refills: 0         STOP taking these medications       loratadine (CLARITIN) 10 mg tablet Comments:   Reason for Stopping:         lisinopril (PRINIVIL, ZESTRIL) 40 mg tablet Comments:   Reason for Stopping:         amLODIPine (NORVASC) 5 mg tablet Comments:   Reason for Stopping:         ondansetron (ZOFRAN ODT) 4 mg disintegrating tablet Comments:   Reason for Stopping:         atorvastatin (LIPITOR) 80 mg tablet Comments:   Reason for Stopping:         cloNIDine HCl (CATAPRES) 0.2 mg tablet Comments:   Reason for Stopping:         aspirin delayed-release 81 mg tablet Comments:   Reason for Stopping:         carvedilol (COREG) 25 mg tablet Comments:   Reason for Stopping:               Activity: Activity as tolerated    Diet: Full liquid    Wound Care: Reinforce dressing PRN    Follow-up: Follow up with hospice    Discharge time: > 35 mins  Jurgen Matamoros NP  4/23/2018, 2:06 PM

## 2018-04-23 NOTE — DISCHARGE INSTRUCTIONS
DISCHARGE SUMMARY from Nurse    PATIENT INSTRUCTIONS:    After general anesthesia or intravenous sedation, for 24 hours or while taking prescription Narcotics:  · Limit your activities  · Do not drive and operate hazardous machinery  · Do not make important personal or business decisions  · Do  not drink alcoholic beverages  · If you have not urinated within 8 hours after discharge, please contact your surgeon on call. Report the following to your surgeon:  · Excessive pain, swelling, redness or odor of or around the surgical area  · Temperature over 100.5  · Nausea and vomiting lasting longer than 4 hours or if unable to take medications  · Any signs of decreased circulation or nerve impairment to extremity: change in color, persistent  numbness, tingling, coldness or increase pain  · Any questions    What to do at Home:  Recommended activity: Activity as tolerated. If you experience any of the following symptoms Shortness of breath, chest pain, or worsening of previous symptoms, please follow up with your primary care provider and proceed to your local emergency room. *  Please give a list of your current medications to your Primary Care Provider. *  Please update this list whenever your medications are discontinued, doses are      changed, or new medications (including over-the-counter products) are added. *  Please carry medication information at all times in case of emergency situations. These are general instructions for a healthy lifestyle:    No smoking/ No tobacco products/ Avoid exposure to second hand smoke  Surgeon General's Warning:  Quitting smoking now greatly reduces serious risk to your health.     Obesity, smoking, and sedentary lifestyle greatly increases your risk for illness    A healthy diet, regular physical exercise & weight monitoring are important for maintaining a healthy lifestyle    You may be retaining fluid if you have a history of heart failure or if you experience any of the following symptoms:  Weight gain of 3 pounds or more overnight or 5 pounds in a week, increased swelling in our hands or feet or shortness of breath while lying flat in bed. Please call your doctor as soon as you notice any of these symptoms; do not wait until your next office visit. Recognize signs and symptoms of STROKE:    F-face looks uneven    A-arms unable to move or move unevenly    S-speech slurred or non-existent    T-time-call 911 as soon as signs and symptoms begin-DO NOT go       Back to bed or wait to see if you get better-TIME IS BRAIN. Warning Signs of HEART ATTACK     Call 911 if you have these symptoms:   Chest discomfort. Most heart attacks involve discomfort in the center of the chest that lasts more than a few minutes, or that goes away and comes back. It can feel like uncomfortable pressure, squeezing, fullness, or pain.  Discomfort in other areas of the upper body. Symptoms can include pain or discomfort in one or both arms, the back, neck, jaw, or stomach.  Shortness of breath with or without chest discomfort.  Other signs may include breaking out in a cold sweat, nausea, or lightheadedness. Don't wait more than five minutes to call 911 - MINUTES MATTER! Fast action can save your life. Calling 911 is almost always the fastest way to get lifesaving treatment. Emergency Medical Services staff can begin treatment when they arrive -- up to an hour sooner than if someone gets to the hospital by car. The discharge information has been reviewed with the patient. The patient verbalized understanding. Discharge medications reviewed with the patient and appropriate educational materials and side effects teaching were provided.   ___________________________________________________________________________________________________________________________________

## 2018-04-23 NOTE — TELEPHONE ENCOUNTER
Tessie with [facility] is calling to verify you will be the patient's PCP once she is admitted to hospice.     Tessie - [phone]

## 2018-04-23 NOTE — PROGRESS NOTES
Transportation at Discharge:  4/23/18    Transport Company:  [facility]  Reference number:  None    Estimated pick-up time: 3:30p    Method of Transport: Stretcher/ BLS    Insurance Info: [employer] M[employer]  Authorization: No auth required    [address] [facility] transportation arrangements at the requested of Outcomes Manager [facility].        [place], Care- ext [phone]

## 2018-04-23 NOTE — ROUTINE PROCESS
Bedside and Verbal shift change report given to [facility] [facility] (oncoming nurse) by Jaylene Rodriguez (offgoing nurse). Report included the following information SBAR, Kardex, Intake/Output, MAR and Recent Results. 0930 Pt resting in bed with no complaints of pain and no change to condition. Will continue to monitor. 1220 Pt resting in bed with no complaints of pain and no change to condition. Will continue to monitor. 1530 Pt's PIV's removed and discharge instructions reviewed by [facility] RN. FMS removed by Morales Kinney RN. Barrier cream placed and pt tolerated without difficulty. 65 Pt's son is at bedside. Pt discharged home via transport.

## 2018-04-23 NOTE — TELEPHONE ENCOUNTER
Called and spoke with Tessie. Confirmed I will be the PCP (there is also a hospice director) for her care.

## 2018-04-23 NOTE — PROGRESS NOTES
Problem: Falls - Risk of  Goal: *Absence of Falls  Document [place] Fall Risk and appropriate interventions in the flowsheet.    Outcome: Progressing Towards Goal  Fall Risk Interventions:  Mobility Interventions: Strengthening exercises (ROM-active/passive)    Mentation Interventions: Door open when patient unattended, More frequent rounding    Medication Interventions: Evaluate medications/consider consulting pharmacy    Elimination Interventions: Call light in reach    History of Falls Interventions: Door open when patient unattended, Room close to nurse's station

## 2018-04-23 NOTE — MANAGEMENT PLAN
Discharge/Transition Planning    0915: Spoke with son, Tess Wong. He is arranging Personal care now through [facility] and family friends now. States sister procrastinated. They have the rental hospital bed in home which may need to get switched out. 1400: Spoke with Claudette Rule from [facility] and they are ready for pt. Pt to go in rental bed or own bed and will switch out to Hospice equipment when able to.  1405: Transport set up for 1530. Notified son who would like to ride with pt.  PCS form and POST form to nurses station       Duane Hartmann RN BSN  Outcomes Manager  Pager # [phone]

## 2018-04-23 NOTE — PROGRESS NOTES
[facility]: [phone]  [facility]: [address] [place] [address] Way: [phone]    Patient Name: Dani Stern  YOB: 1940    Date of Initial Consult: 4/20/18  Reason for Consult: care decisions  Requesting Provider: Dr. Natalio Hunter  Primary Care Physician: Dina Guardado MD      SUMMARY:   Dani Stern is a 68y.o. year old with a past history of HTN, dementia, recurrent N/V, UTI , who was admitted on 4/6/2018 from home with a diagnosis of acute metabolic encephalopathy . Current medical issues leading to Palliative Medicine involvement include: 67 y/o female who has been in the hospital/rehab setting for the past month, went home for 1 day after discharge from rehab and was then readmitted to the hospital two weeks ago. Has had poor appetite and recurrent nausea/ vomiting and family decided to have PEG placed. Despite tube feedings and treatment of UTI patient remains lethargic and debilitated. Palliative care is consulted for goals of care discussions. PALLIATIVE DIAGNOSES:   1. ACP/goals of care discussion  2. UTI  3. Acute metabolic encephalopathy  4. Severe malnutrition  5. diarrhea  6. debility       PLAN:   1. ACP/goals of care discussion: Met with patient at bedside. She is more alert today and said \"good morning\". When asked if she is feeling okay, the patient nodded. Her son is at bedside. Plan is home with hospice. Goals of care are unchanged: DNR/DNI, do not intubate for any reason, comfort measures only. 2. UTI: Patient will continue with IV antibiotics for now and can be transitioned to PO at discharge. We discussed the risk of recurrent UTI and how that would be managed with hospice  3.  Acute metabolic encephalopathy: Despite aggressive treatment including antibiotics and tube feeds the patient has been lethargic, sleeping most of the time and talking very little, discussed with family that that may or may not improve, patient is more alert today  4. Severe malnutrition: Patient has not taken much PO for several months and struggles with recurrent N/V.  PEG tube was placed and tube feeds started, however patient has been having high residuals and diarrhea. Tube feed was stopped on Friday. Patient is now taking small amounts of comfort foods. 5. Diarrhea: Per Dr. Arcenio Mabry will treat for c-diff but diarrhea likely caused by tube feeds, Asked nurse to remove FMS for DC home with hospice, discussed with hospice liason  6. Debility: Patient has been mostly bed bound since at least March, not able to participate in rehab due to lethargy. Discussed with family that repeated or prolonged hospital stay in elderly patient can lead to profound debility  7. Initial consult note routed to primary continuity provider  8. Communicated plan of care with: Palliative IDT    GOALS OF CARE:  Patient/Health Care Proxy Stated Goals: Comfort      TREATMENT PREFERENCES:   Code Status: DNR    Advance Care Planning:  Advance Care Planning 4/6/2018   Patient's Healthcare Decision Maker is: Legal Next of Kati Sabillon   Primary Decision Maker Name Michael Davis   Primary Decision Maker Phone Number [phone]   Primary Decision Maker Relationship to Patient Adult child   Confirm Advance Directive Yes, on file   Patient Would Like to Complete Advance Directive -   Does the patient have other document types -       Medical Interventions: Comfort measures   Other Instructions: do not intubate for any reason, no escalation of care  Artificially Administered Nutrition: No feeding tube (may use PEG already in place for meds)    Other:  As far as possible, the palliative care team has discussed with patient / health care proxy about goals of care / treatment preferences for patient.      HISTORY:     History obtained from: family, chart    CHIEF COMPLAINT: lethargy    HPI/SUBJECTIVE:    The patient is:   [] Verbal and participatory  [x] Non-participatory due to:   Opens eyes but Not responding to questions    Clinical Pain Assessment (nonverbal scale for nonverbal patients): Clinical Pain Assessment  Severity: 0     Activity (Movement): Lying quietly, normal position    Duration: for how long has pt been experiencing pain (e.g., 2 days, 1 month, years)  Frequency: how often pain is an issue (e.g., several times per day, once every few days, constant)     FUNCTIONAL ASSESSMENT:     Palliative Performance Scale (PPS):  PPS: 20    ECOG  ECOG Status : Completely disabled     PSYCHOSOCIAL/SPIRITUAL SCREENING:      Any spiritual / Anabaptist concerns:  [] Yes /  [x] No    Caregiver Burnout:  [] Yes /  [x] No /  [] No Caregiver Present      Anticipatory grief assessment:   [x] Normal  / [] Maladaptive        REVIEW OF SYSTEMS:     Positive and pertinent negative findings in ROS are noted above in HPI. The following systems were [] reviewed / [] unable to be reviewed as noted in HPI  Other findings are noted below. Systems: constitutional, ears/nose/mouth/throat, respiratory, gastrointestinal, genitourinary, musculoskeletal, integumentary, neurologic, psychiatric, endocrine. Positive findings noted below. Modified ESAS Completed by: provider         Pain: 0         Anorexia: 10 (per son ) Dyspnea: 0           Stool Occurrence(s): 1        PHYSICAL EXAM:     Wt Readings from Last 3 Encounters:   04/19/18 51.7 kg (113 lb 15.7 oz)   03/12/18 49.4 kg (109 lb)   03/08/18 43.1 kg (95 lb)     Blood pressure (!) 166/95, pulse (!) 110, temperature 97.5 °F (36.4 °C), resp. rate 20, height 5' (1.524 m), weight 51.7 kg (113 lb 15.7 oz), SpO2 100 %, not currently breastfeeding.   Pain:  Pain Scale 1: Visual  Pain Intensity 1: 0                 Last bowel movement: FMS    Constitutional: ill appearing white female in bed, alert today and responds to greeting  Eyes: pupils equal, anicteric  ENMT: no nasal discharge, moist mucous membranes  CV: edematous extremities  Respiratory: breathing not labored, symmetric  Musculoskeletal: no deformity, no tenderness to palpation  Skin: warm, dry  Neurologic: more alert today, saying few words         HISTORY:     Principal Problem:    Septic shock ([facility] [address] (2018)    Active Problems:    Urinary tract infection without hematuria (2015)      Acute renal failure superimposed on stage 2 chronic kidney disease ([facility] [address] (2015)      Dehydration (2015)      Abnormality of gait and mobility (2015)      Nausea and vomiting (3/14/2018)      Severe protein-calorie malnutrition Ailyn Fairview: less than 60% of standard weight) ([facility] [address] (3/22/2018)      Acute metabolic encephalopathy (1093)      Elevated troponin (2018)      Altered mental status ()      Diarrhea ()      Comfort measures only status ()      Past Medical History:   Diagnosis Date    Amblyopia of left eye     Blind left eye     Cataract     bilat, right has been replaced    CRI (chronic renal insufficiency)     Dementia     Essential hypertension, malignant 2014    Hypertension     Left bundle branch block     Otitis media, acute 16    left      Past Surgical History:   Procedure Laterality Date    HX CATARACT REMOVAL Right 2013    HX  SECTION  1968    HX  SECTION  1964    HX  SECTION  1970    HX TONSILLECTOMY        Family History   Problem Relation Age of Onset    Heart Disease Father     Other Brother      ? brain injury     History reviewed, no pertinent family history.   Social History   Substance Use Topics    Smoking status: Never Smoker    Smokeless tobacco: Never Used    Alcohol use No     Allergies   Allergen Reactions    Codeine Unknown (comments)     Unsure due to happening so long    Levaquin [Levofloxacin] Nausea Only     intolerance    Pcn [Penicillins] Hives    Sulfa (Sulfonamide Antibiotics) Unknown (comments)     Unsure it was so long ago      Current Facility-Administered Medications   Medication Dose Route Frequency    famotidine (PEPCID) tablet 20 mg  20 mg Per G Tube DAILY    mirtazapine (REMERON) tablet 15 mg  15 mg Per G Tube QHS    morphine (ROXANOL) 100 mg/5 mL (20 mg/mL) concentrated solution 5 mg  5 mg SubLINGual Q4H PRN    prochlorperazine (COMPAZINE) tablet 5 mg  5 mg Per G Tube Q6H PRN    haloperidol (HALDOL) 2 mg/mL oral solution 1 mg  1 mg Per G Tube Q6H PRN    LORazepam (INTENSOL) 2 mg/mL oral concentrate 1 mg  1 mg Per G Tube Q6H PRN    acetaminophen (TYLENOL) solution 650 mg  650 mg Oral Q4H PRN    Or    acetaminophen (TYLENOL) suppository 650 mg  650 mg Rectal Q4H PRN    hyoscyamine sulfate (CYSTOSPAZ) tablet 0.125 mg  0.125 mg SubLINGual Q4H PRN    scopolamine (TRANSDERM-SCOP) 1 mg over 3 days 1 Patch  1 Patch TransDERmal Q72H PRN    vancomycin 50 mg/mL oral solution (compounded) 250 mg  250 mg Per G Tube Q6H    cefTRIAXone (ROCEPHIN) 1 g in 0.9% sodium chloride (MBP/ADV) 50 mL MBP  1 g IntraVENous Q24H    aspirin delayed-release tablet 81 mg  81 mg Oral DAILY        LAB AND IMAGING FINDINGS:     Lab Results   Component Value Date/Time    WBC 13.3 (H) 04/20/2018 04:42 AM    HGB 11.7 (L) 04/20/2018 04:42 AM    PLATELET 205 27/83/8276 04:42 AM     Lab Results   Component Value Date/Time    Sodium 142 04/20/2018 04:42 AM    Potassium 3.4 (L) 04/20/2018 04:42 AM    Chloride 111 (H) 04/20/2018 04:42 AM    CO2 20 (L) 04/20/2018 04:42 AM    BUN 22 (H) 04/20/2018 04:42 AM    Creatinine 0.90 04/20/2018 04:42 AM    Calcium 7.1 (L) 04/20/2018 04:42 AM    Magnesium 1.8 04/18/2018 03:00 AM    Phosphorus 4.1 10/04/2015 06:44 AM      Lab Results   Component Value Date/Time    AST (SGOT) 26 04/18/2018 03:00 AM    Alk.  phosphatase 66 04/18/2018 03:00 AM    Protein, total 4.1 (L) 04/18/2018 03:00 AM    Albumin 1.6 (L) 04/18/2018 03:00 AM    Globulin 2.5 04/18/2018 03:00 AM     Lab Results   Component Value Date/Time    INR 1.2 04/12/2018 04:37 PM    Prothrombin time 14.2 04/12/2018 04:37 PM    aPTT 25.1 01/04/2017 05:00 PM      Lab Results   Component Value Date/Time    Iron 94 03/15/2018 05:05 AM    TIBC 171 (L) 03/15/2018 05:05 AM    Iron % saturation 55 03/15/2018 05:05 AM    Ferritin 319 03/15/2018 05:05 AM      No results found for: PH, PCO2, PO2  No components found for: [place]   Lab Results   Component Value Date/Time    CK 37 04/11/2018 12:00 PM    CK - MB 2.0 04/11/2018 12:00 PM              Total time: 15  Counseling / coordination time, spent as noted above: 10  > 50% counseling / coordination: yes with family, RN, hospice liason    Prolonged service was provided for  []30 min   []75 min in face to face time in the presence of the patient, spent as noted above. Time Start:   Time End:   Note: this can only be billed with 30114 (initial) or 47087 (follow up). If multiple start / stop times, list each separately.

## 2018-04-23 NOTE — HOSPICE
Hospice referral is appreciated. Met with the patient's son and friend Hallie Chu. Spoke with the patient's daughter via telephone. Explained hospice philosophy, IDT, services, medications covered and how they are normally administered. Answered questions regarding 24 hr nursing availability, symptom management, disease management, what to do after hours and the time of patient's death. Pt in bed, alert. Talks occasionally. When her family tried to get her to eat, she refused. Pt has a newly placed PEG that is there for medications only. TF have stopped. Provided family with hospice contact information.

## 2018-04-23 NOTE — INTERDISCIPLINARY ROUNDS
Discussed during IDR's- will be discharged home w/ comfort care today.         CONNOR Warren  [facility]  Hospitalist Division  Pager:  [phone]  Office:  [phone]

## 2018-04-23 NOTE — PROGRESS NOTES
1900 -- Bedside, Verbal and Written shift change report given to 2309 Ysabel Campos (oncoming nurse) by [address] [place] [address]). Report included the following information SBAR, Kardex, Intake/Output, MAR and Recent Results. Pt is comfort care and is to be discharged in the morning 04/23/2018.      2128 -- PM medications administered, pt tolerated with ease, will continue to monitor.       -- Bedside, Verbal and Written shift change report given to   (oncoming nurse) by JOSE J (offgoing nurse). Report included the following information SBAR, Kardex, Intake/Output, MAR and Recent Results. Skin assessment completed.

## 2018-04-23 NOTE — PROGRESS NOTES
Notified [facility]osp[facility] nurse, Belem Moctezuma, of the referral for home hospice. She will eval the pt at the bedside later this morning . Provided her with the contact information for the pt's dgt,  Nereida Comer.

## 2018-04-24 NOTE — TELEPHONE ENCOUNTER
I spoke with son who confirmed that Hospice nurse is with patient at her home at present. Son didn't want to talk.     Hans Vo RN

## 2018-05-11 NOTE — TELEPHONE ENCOUNTER
Sangeeta with [facility][note 3] called to let you know the patient passed away this morning.

## 2024-09-13 NOTE — PROGRESS NOTES
[facility]  Hospitalist Division        Inpatient Daily Progress Note    Daily progress Note    Patient: Adria Anton MRN: [id number]  CSN: [id number]    YOB: 1940  Age: 68 y.o. Sex: female    DOA: 3/12/2018 LOS:  LOS: 2 days                    Chief Complaint: N/V- unclear etiology     Interval History:    The patient is a pleasant 66-year-old female with past medical history significant for hypertension, dementia, recurrent nausea and vomiting for which she has been in the ER for 4 times and has been followed by her PCP as well. On one occasion, the patient was discharged with possible pneumonia. Last time she was treated for UTI; however, she continued to have nausea and vomiting with very poor p.o. intake and significant weight loss. Per daughter, the patient has been feeling nauseous and been vomiting since this morning, two more episodes in the ER. She has had zero p.o. intake today and minimal liquid intake yesterday. The patient has not seen a GI doctor per family and has never had an EGD or colonoscopy. Patient admitted for ongoing management. CT Abd/pelvis showed No acute or focal abnormality to explain patient's nausea or vomiting. Minimal layering density in the gallbladder but no secondary CT findings of cholecystitis. Chronic diverticular disease. No findings of an acute diverticulitis. HIDA scan showed patent cystic duct. Decreased gallbladder ejection fraction of approximately 34 % suggesting gallbladder dysfunction. GI consulted 3/14. EGD on 3/15 1. -Normal upper endoscopy, with no endoscopic evidence of neoplasia or mucosal abnormality. 2. -Esophagus empirically dilated with #48 Munoz. GI soft diet ordered. Patient continued to vomit overnight 3/15 into 3/16. Unable to complete GES 2/2 to vomiting. IV Reglan added. Subjective:      Sleeping- easily arouses.  Denies abdominal pain     Objective:      Visit Vitals    BP (!) 154/96 Called and spoke with pt this morning to have an appt scheduled with ROSEMARY Bernal. Pt stated that due to her age she is no longer interested to schedule an appt. Will close order. Thanks    (BP 1 Location: Right arm, BP Patient Position: At rest)    Pulse 74    Temp 98.5 °F (36.9 °C)    Resp 16    Ht 5' (1.524 m)    Wt 49.4 kg (109 lb)    SpO2 98%    Breastfeeding No    BMI 21.29 kg/m2       Physical Exam:  General appearance: sleeping soundly, easily arouses, no distress   Lungs: clear to auscultation throughout, no wheezes   Heart: regular rate and rhythm, S1, S2 normal, no murmur, click, rub or gallop  Abdomen: soft, non tender, non distended.  Normoactive bowel sounds  Extremities: extremities normal, atraumatic, no cyanosis or edema  Skin: Skin color, texture, turgor normal. No rashes or lesions  Neurologic: moves all 4 extremities equally   PSY: Mood and affect normal, appropriately behaved      Intake and Output:  Current Shift:     Last three shifts:  03/14 1901 - 03/16 0700  In: 50 [I.V.:50]  Out: 400 [Urine:400]    Recent Results (from the past 24 hour(s))   METABOLIC PANEL, BASIC    Collection Time: 03/16/18  4:22 AM   Result Value Ref Range    Sodium 146 (H) 136 - 145 mmol/L    Potassium 3.7 3.5 - 5.5 mmol/L    Chloride 110 (H) 100 - 108 mmol/L    CO2 23 21 - 32 mmol/L    Anion gap 13 3.0 - 18 mmol/L    Glucose 92 74 - 99 mg/dL    BUN 10 7.0 - 18 MG/DL    Creatinine 1.07 0.6 - 1.3 MG/DL    BUN/Creatinine ratio 9 (L) 12 - 20      GFR est AA >60 >60 ml/min/1.73m2    GFR est non-AA 50 (L) >60 ml/min/1.73m2    Calcium 7.8 (L) 8.5 - 10.1 MG/DL   MAGNESIUM    Collection Time: 03/16/18  4:22 AM   Result Value Ref Range    Magnesium 1.6 1.6 - 2.6 mg/dL           Lab Results   Component Value Date/Time    Glucose 92 03/16/2018 04:22 AM    Glucose 134 (H) 03/15/2018 05:05 AM    Glucose 156 (H) 03/14/2018 05:20 AM    Glucose 137 (H) 03/13/2018 06:35 AM    Glucose 92 03/12/2018 11:21 AM        Assessment/Plan:     Patient Active Problem List   Diagnosis Code    Essential hypertension, malignant I10    [facility] N17.9    Dehydration E86.0    Abnormality of gait and mobility R26.9    Stroke (HCC) I63.9    CKD (chronic kidney disease) N18.9    HTN (hypertension) I10    Follow up Z09    Nausea & vomiting R11.2    Nausea and vomiting R11.2        HTN resistant  - Clonidine patch started 3/16   - Hydralazine, Metoprolol PRN      N/V  - HIDA scan with very mild dysfunction of GB  - MBS showed no evidence for airway penetration or aspiration  - GI consulted 3/14  - EGD 3/15- 1. -Normal upper endoscopy, with no endoscopic evidence of neoplasia or mucosal abnormality.             2. -Esophagus empirically dilated with #48 Franco Adolph  - Unable to complete GES as patient continues to vomit  - Add IV Reglan q6h     UTI  - Culture with Candida Albicans  - Discontinue Rocephin  - Diflucan for 5 days starting 3/15- day 2 today      Hypokalemia  - Potassium 3.7 this am- 2 IV runs ordered as patient continued to vomit overnight and this am    Hypomagnesemia  - 1.6 - 2 gm magnesium IV ordered as patient continued to vomit overnight and this am    DVT prophylaxis  - Heparin         MAAME Cristobal-RISA Jorge [address]  Pager:  [phone]  Office:  [phone]

## (undated) DEVICE — SET ADMIN 16ML TBNG L100IN 2 Y INJ SITE IV PIGGY BK DISP

## (undated) DEVICE — KENDALL 500 SERIES DIAPHORETIC FOAM MONITORING ELECTRODE - TEAR DROP SHAPE ( 30/PK): Brand: KENDALL

## (undated) DEVICE — BINDER ABD H12IN COT FOR 45-62IN WAIST UNIV PREM 4 PNL DSGN

## (undated) DEVICE — KIT GASTMY PERC PEG PULL 20FR -- ENDOVIVE BX/2

## (undated) DEVICE — BASIN EMESIS 500CC ROSE 250/CS 60/PLT: Brand: MEDEGEN MEDICAL PRODUCTS, LLC

## (undated) DEVICE — GLOVE SURG SZ 75 L114IN FNGR THK98MIL CUF THK75MIL CRM

## (undated) DEVICE — MEDI-VAC NON-CONDUCTIVE SUCTION TUBING: Brand: CARDINAL HEALTH

## (undated) DEVICE — KIT COLON W/ 1.1OZ LUB AND 2 END

## (undated) DEVICE — SYR 50ML SLIP TIP NSAF LF STRL --

## (undated) DEVICE — BITE BLK ENDOSCP AD 54FR GRN POLYETH ENDOSCP W STRP SLD

## (undated) DEVICE — FLEX ADVANTAGE 1500CC: Brand: FLEX ADVANTAGE